# Patient Record
Sex: MALE | Race: BLACK OR AFRICAN AMERICAN | NOT HISPANIC OR LATINO | Employment: OTHER | URBAN - METROPOLITAN AREA
[De-identification: names, ages, dates, MRNs, and addresses within clinical notes are randomized per-mention and may not be internally consistent; named-entity substitution may affect disease eponyms.]

---

## 2017-01-17 ENCOUNTER — ANTI-COAG VISIT (OUTPATIENT)
Dept: CARDIOLOGY | Facility: CLINIC | Age: 82
End: 2017-01-17
Payer: MEDICARE

## 2017-01-17 DIAGNOSIS — Z79.01 LONG TERM (CURRENT) USE OF ANTICOAGULANTS: Primary | ICD-10-CM

## 2017-01-17 DIAGNOSIS — Z86.711 HISTORY OF PULMONARY EMBOLISM: ICD-10-CM

## 2017-01-17 LAB
CTP QC/QA: NORMAL
INR PPP: 2.1 (ref 2–3)

## 2017-01-17 PROCEDURE — 85610 PROTHROMBIN TIME: CPT | Mod: QW,S$GLB,,

## 2017-01-17 PROCEDURE — 99211 OFF/OP EST MAY X REQ PHY/QHP: CPT | Mod: 25,S$GLB,,

## 2017-01-17 RX ORDER — WARFARIN SODIUM 5 MG/1
TABLET ORAL
Qty: 30 TABLET | Refills: 3 | Status: SHIPPED | OUTPATIENT
Start: 2017-01-17 | End: 2017-05-29 | Stop reason: SDUPTHER

## 2017-01-17 RX ORDER — WARFARIN 4 MG/1
TABLET ORAL
Qty: 30 TABLET | Refills: 3 | Status: SHIPPED | OUTPATIENT
Start: 2017-01-17 | End: 2017-06-28 | Stop reason: SDUPTHER

## 2017-01-17 NOTE — MR AVS SNAPSHOT
[facility]  [address] [facility]  [place] [address]  Phone: [phone]  Fax: [phone]                  Amauri Lara   2017 8:15 AM   Anti-coag visit    Description:  Male : 1929   Provider:  Anamaria Santos PharmD   Department:  [facility]           Diagnoses this Visit        Comments    Long term (current) use of anticoagulants    -  Primary     History of pulmonary embolism                To Do List           Future Appointments        Provider Department Dept Phone    2017 8:15 AM Anamaria Santos, PharmD [facility] [phone]    2017 8:40 AM LABORATORY, [facility] Laboratory [phone]    2017 1:40 PM Stefan Vera MD [facility] Internal Medicine [phone]    2017 9:00 AM NANNETTE Townsend OD [facility] [phone]      Goals (5 Years of Data)     None      [facility]     [facility] On Call Nurse [facility] -  Assistance  Registered nurses in the [facility] On Call Center provide clinical advisement, health education, appointment booking, and other advisory services.  Call for this free service at [phone].             Medications           Message regarding Medications     Verify the changes and/or additions to your medication regime listed below are the same as discussed with your clinician today.  If any of these changes or additions are incorrect, please notify your healthcare provider.             Verify that the below list of medications is an accurate representation of the medications you are currently taking.  If none reported, the list may be blank. If incorrect, please contact your healthcare provider. Carry this list with you in case of emergency.           Current Medications     allopurinol (ZYLOPRIM) 300 MG tablet TAKE ONE TABLET BY MOUTH EVERY DAY    glipiZIDE (GLUCOTROL) 10 MG TR24     hydrocodone-acetaminophen 7.5-325mg (NORCO) 7.5-325 mg per tablet Take 1 tablet by mouth  every 6 (six) hours as needed for Pain.    lisinopril-hydrochlorothiazide (PRINZIDE,ZESTORETIC) 20-12.5 mg per tablet TAKE 1 TABLET BY MOUTH 2 TIMES DAILY    lovastatin (MEVACOR) 40 MG tablet TAKE ONE TABLET BY MOUTH EVERY EVENING    metformin (GLUCOPHAGE) 850 MG tablet ONE BY MOUTH DAILY    metoprolol tartrate (LOPRESSOR) 25 MG tablet     PROAIR HFA 90 mcg/actuation inhaler     SSD 1 % cream     warfarin (COUMADIN) 4 MG tablet Take one-half tablet (2mg) by mouth every evening along with 5mg tablet as directed by the [facility] Clinic.    warfarin (COUMADIN) 5 MG tablet Take 1 tablet by mouth every evening along with 2mg (one-half of 4mg) as directed by the [facility]madin Clinic.           Clinical Reference Information           Allergies as of 1/17/2017     No Known Allergies      Immunizations Administered on Date of Encounter - 1/17/2017     None      Orders Placed During Today's Visit      Normal Orders This Visit    POCT PT/INR          1/17/2017  8:21 AM - Yanet KoD      Component Results     Component Value Flag Ref Range Units Status    INR 2.1  2.0 - 3.0  Final     Acceptable           December 2016 Details    Sun Mon Tue Wed Thu Fri Sat         1               2               3                 4               5               6               7               8               9               10                 11               12               13   2.5   7 mg   See details      14      7 mg         15      7 mg         16      7 mg         17      7 mg           18      7 mg         19      7 mg         20      7 mg         21      7 mg         22      7 mg         23      7 mg         24      7 mg           25      7 mg         26      7 mg         27      7 mg         28      7 mg         29      7 mg         30      7 mg         31      7 mg          Date Details   12/13 Last INR check   INR: 2.5                 January 2017 Details    Sun Mon Tue Wed Thu Fri Sat     1      7 mg          2      7 mg         3      7 mg         4      7 mg         5      7 mg         6      7 mg         7      7 mg           8      7 mg         9      7 mg         10      7 mg         11      7 mg         12      7 mg         13      7 mg         14      7 mg           15      7 mg         16      7 mg         17   2.1   7 mg   See details      18      7 mg         19      7 mg         20      7 mg         21      7 mg           22      7 mg         23      7 mg         24      7 mg         25      7 mg         26      7 mg         27      7 mg         28      7 mg           29      7 mg         30      7 mg         31      7 mg              Date Details   01/17 This INR check   INR: 2.1                     How to take your warfarin dose     To take:  7 mg Take 1 of the 5 mg tablets and 0.5 of a 4 mg tablet.           February 2017 Details    Sun Mon Tue Wed Thu Fri Sat        1      7 mg         2      7 mg         3      7 mg         4      7 mg           5      7 mg         6      7 mg         7      7 mg         8      7 mg         9      7 mg         10      7 mg         11      7 mg           12      7 mg         13      7 mg         14      7 mg         15      7 mg         16      7 mg         17      7 mg         18      7 mg           19      7 mg         20      7 mg         21            22               23               24               25                 26               27               28                    Date Details   No additional details    Date of next INR:  2/21/2017         How to take your warfarin dose     To take:  7 mg Take 1 of the 5 mg tablets and 0.5 of a 4 mg tablet.           Anticoagulation Summary as of 1/17/2017     Maintenance plan 7 mg (5 mg x 1 and 4 mg x 0.5) every day    Full instructions 7 mg every day    Next INR check 2/21/2017      Anticoagulation Episode Summary     Comments       [facility] Sign-Up     Activating your [facility] account is as easy as 1-2-3!     1)  Visit [email or web address], select Sign Up Now, enter this activation code and your date of birth, then select Next.  [email or web address]  Expires: 2/13/2017  2:06 PM      2) Create a username and password to use when you visit [facility] in the future and select a security question in case you lose your password and select Next.    3) Enter your e-mail address and click Sign Up!    Additional Information  If you have questions, please e-mail [email or web address] or call [phone] to talk to our [facility] staff. Remember, [facility] is NOT to be used for urgent needs. For medical emergencies, dial 911.

## 2017-01-17 NOTE — PROGRESS NOTES
INR is therapeutic. Patient recently received flu vaccine but no other changes and does not appear to affect INR. Continue dose and diet until follow-up.

## 2017-01-27 ENCOUNTER — HOSPITAL ENCOUNTER (OUTPATIENT)
Facility: HOSPITAL | Age: 82
Discharge: HOME | End: 2017-01-28
Attending: SPECIALIST | Admitting: INTERNAL MEDICINE
Payer: MEDICARE

## 2017-01-27 DIAGNOSIS — R10.9 RIGHT SIDED ABDOMINAL PAIN: ICD-10-CM

## 2017-01-27 DIAGNOSIS — R06.02 SHORTNESS OF BREATH: ICD-10-CM

## 2017-01-27 DIAGNOSIS — R33.9 URINARY RETENTION: Primary | ICD-10-CM

## 2017-01-27 DIAGNOSIS — R79.89 ELEVATED TROPONIN: ICD-10-CM

## 2017-01-27 LAB
ALBUMIN SERPL BCP-MCNC: 3.6 G/DL
ALLENS TEST: ABNORMAL
ALP SERPL-CCNC: 68 U/L
ALT SERPL W/O P-5'-P-CCNC: 10 U/L
ANION GAP SERPL CALC-SCNC: 11 MMOL/L
APTT BLDCRRT: 43.2 SEC
AST SERPL-CCNC: 18 U/L
BACTERIA #/AREA URNS HPF: ABNORMAL /HPF
BASOPHILS # BLD AUTO: 0.01 K/UL
BASOPHILS NFR BLD: 0.1 %
BILIRUB SERPL-MCNC: 1.1 MG/DL
BILIRUB UR QL STRIP: NEGATIVE
BNP SERPL-MCNC: 70 PG/ML
BUN SERPL-MCNC: 16 MG/DL
CALCIUM SERPL-MCNC: 9.7 MG/DL
CHLORIDE SERPL-SCNC: 103 MMOL/L
CK SERPL-CCNC: 104 U/L
CLARITY UR: CLEAR
CO2 SERPL-SCNC: 25 MMOL/L
COLOR UR: YELLOW
CREAT SERPL-MCNC: 1.2 MG/DL
D DIMER PPP IA.FEU-MCNC: 1.3 MG/L FEU
DELSYS: ABNORMAL
DIFFERENTIAL METHOD: ABNORMAL
EOSINOPHIL # BLD AUTO: 0.1 K/UL
EOSINOPHIL NFR BLD: 0.8 %
ERYTHROCYTE [DISTWIDTH] IN BLOOD BY AUTOMATED COUNT: 16.7 %
EST. GFR  (AFRICAN AMERICAN): >60 ML/MIN/1.73 M^2
EST. GFR  (NON AFRICAN AMERICAN): 54 ML/MIN/1.73 M^2
FIO2: 21
GLUCOSE SERPL-MCNC: 100 MG/DL
GLUCOSE UR QL STRIP: NEGATIVE
HCO3 UR-SCNC: 26.9 MMOL/L (ref 24–28)
HCT VFR BLD AUTO: 48.1 %
HGB BLD-MCNC: 16.2 G/DL
HGB UR QL STRIP: ABNORMAL
INR PPP: 2.8
KETONES UR QL STRIP: NEGATIVE
LACTATE SERPL-SCNC: 1.4 MMOL/L
LEUKOCYTE ESTERASE UR QL STRIP: NEGATIVE
LYMPHOCYTES # BLD AUTO: 1.4 K/UL
LYMPHOCYTES NFR BLD: 12.9 %
MAGNESIUM SERPL-MCNC: 1.9 MG/DL
MCH RBC QN AUTO: 28.9 PG
MCHC RBC AUTO-ENTMCNC: 33.7 %
MCV RBC AUTO: 86 FL
MICROSCOPIC COMMENT: ABNORMAL
MODE: ABNORMAL
MONOCYTES # BLD AUTO: 1.6 K/UL
MONOCYTES NFR BLD: 14.6 %
NEUTROPHILS # BLD AUTO: 7.6 K/UL
NEUTROPHILS NFR BLD: 71.9 %
NITRITE UR QL STRIP: POSITIVE
PCO2 BLDA: 41.1 MMHG (ref 35–45)
PH SMN: 7.42 [PH] (ref 7.35–7.45)
PH UR STRIP: 6 [PH] (ref 5–8)
PLATELET # BLD AUTO: 179 K/UL
PMV BLD AUTO: 10.9 FL
PO2 BLDA: 68 MMHG (ref 80–100)
POC BE: 2 MMOL/L
POC SATURATED O2: 94 % (ref 95–100)
POCT GLUCOSE: 112 MG/DL (ref 70–110)
POTASSIUM SERPL-SCNC: 4.1 MMOL/L
PROT SERPL-MCNC: 7.8 G/DL
PROT UR QL STRIP: NEGATIVE
PROTHROMBIN TIME: 28.1 SEC
RBC # BLD AUTO: 5.61 M/UL
RBC #/AREA URNS HPF: 2 /HPF (ref 0–4)
SAMPLE: ABNORMAL
SITE: ABNORMAL
SODIUM SERPL-SCNC: 139 MMOL/L
SP GR UR STRIP: 1.01 (ref 1–1.03)
TROPONIN I SERPL DL<=0.01 NG/ML-MCNC: 0.03 NG/ML
TROPONIN I SERPL DL<=0.01 NG/ML-MCNC: <0.006 NG/ML
URN SPEC COLLECT METH UR: ABNORMAL
UROBILINOGEN UR STRIP-ACNC: 1 EU/DL
WBC # BLD AUTO: 10.65 K/UL
WBC #/AREA URNS HPF: 3 /HPF (ref 0–5)
WBC CLUMPS URNS QL MICRO: ABNORMAL

## 2017-01-27 PROCEDURE — 25000242 PHARM REV CODE 250 ALT 637 W/ HCPCS: Performed by: NURSE PRACTITIONER

## 2017-01-27 PROCEDURE — G0378 HOSPITAL OBSERVATION PER HR: HCPCS

## 2017-01-27 PROCEDURE — 81000 URINALYSIS NONAUTO W/SCOPE: CPT

## 2017-01-27 PROCEDURE — 85730 THROMBOPLASTIN TIME PARTIAL: CPT

## 2017-01-27 PROCEDURE — 36600 WITHDRAWAL OF ARTERIAL BLOOD: CPT

## 2017-01-27 PROCEDURE — 99900035 HC TECH TIME PER 15 MIN (STAT)

## 2017-01-27 PROCEDURE — 63600175 PHARM REV CODE 636 W HCPCS: Performed by: NURSE PRACTITIONER

## 2017-01-27 PROCEDURE — 93010 ELECTROCARDIOGRAM REPORT: CPT | Mod: ,,, | Performed by: INTERNAL MEDICINE

## 2017-01-27 PROCEDURE — 94640 AIRWAY INHALATION TREATMENT: CPT

## 2017-01-27 PROCEDURE — 85025 COMPLETE CBC W/AUTO DIFF WBC: CPT

## 2017-01-27 PROCEDURE — 82550 ASSAY OF CK (CPK): CPT

## 2017-01-27 PROCEDURE — 96376 TX/PRO/DX INJ SAME DRUG ADON: CPT

## 2017-01-27 PROCEDURE — 83880 ASSAY OF NATRIURETIC PEPTIDE: CPT

## 2017-01-27 PROCEDURE — 63600175 PHARM REV CODE 636 W HCPCS: Performed by: SPECIALIST

## 2017-01-27 PROCEDURE — 82962 GLUCOSE BLOOD TEST: CPT

## 2017-01-27 PROCEDURE — 99285 EMERGENCY DEPT VISIT HI MDM: CPT | Mod: 25

## 2017-01-27 PROCEDURE — 83036 HEMOGLOBIN GLYCOSYLATED A1C: CPT

## 2017-01-27 PROCEDURE — 85379 FIBRIN DEGRADATION QUANT: CPT

## 2017-01-27 PROCEDURE — 83735 ASSAY OF MAGNESIUM: CPT

## 2017-01-27 PROCEDURE — 83605 ASSAY OF LACTIC ACID: CPT

## 2017-01-27 PROCEDURE — 85610 PROTHROMBIN TIME: CPT

## 2017-01-27 PROCEDURE — 93005 ELECTROCARDIOGRAM TRACING: CPT

## 2017-01-27 PROCEDURE — 87040 BLOOD CULTURE FOR BACTERIA: CPT | Mod: 59

## 2017-01-27 PROCEDURE — 84484 ASSAY OF TROPONIN QUANT: CPT

## 2017-01-27 PROCEDURE — 80053 COMPREHEN METABOLIC PANEL: CPT

## 2017-01-27 PROCEDURE — 25000242 PHARM REV CODE 250 ALT 637 W/ HCPCS: Performed by: SPECIALIST

## 2017-01-27 PROCEDURE — 51702 INSERT TEMP BLADDER CATH: CPT

## 2017-01-27 PROCEDURE — 25000003 PHARM REV CODE 250: Performed by: SPECIALIST

## 2017-01-27 PROCEDURE — 96365 THER/PROPH/DIAG IV INF INIT: CPT

## 2017-01-27 PROCEDURE — 87086 URINE CULTURE/COLONY COUNT: CPT

## 2017-01-27 PROCEDURE — 96375 TX/PRO/DX INJ NEW DRUG ADDON: CPT

## 2017-01-27 PROCEDURE — 82803 BLOOD GASES ANY COMBINATION: CPT

## 2017-01-27 RX ORDER — HYDROCODONE BITARTRATE AND ACETAMINOPHEN 10; 325 MG/1; MG/1
1 TABLET ORAL EVERY 4 HOURS PRN
Status: DISCONTINUED | OUTPATIENT
Start: 2017-01-27 | End: 2017-01-28 | Stop reason: HOSPADM

## 2017-01-27 RX ORDER — HYDROCODONE BITARTRATE AND ACETAMINOPHEN 5; 325 MG/1; MG/1
1 TABLET ORAL EVERY 4 HOURS PRN
Status: DISCONTINUED | OUTPATIENT
Start: 2017-01-27 | End: 2017-01-28 | Stop reason: HOSPADM

## 2017-01-27 RX ORDER — IPRATROPIUM BROMIDE AND ALBUTEROL SULFATE 2.5; .5 MG/3ML; MG/3ML
3 SOLUTION RESPIRATORY (INHALATION) ONCE
Status: COMPLETED | OUTPATIENT
Start: 2017-01-27 | End: 2017-01-27

## 2017-01-27 RX ORDER — IBUPROFEN 200 MG
24 TABLET ORAL
Status: DISCONTINUED | OUTPATIENT
Start: 2017-01-27 | End: 2017-01-28 | Stop reason: HOSPADM

## 2017-01-27 RX ORDER — GLUCAGON 1 MG
1 KIT INJECTION
Status: DISCONTINUED | OUTPATIENT
Start: 2017-01-27 | End: 2017-01-28 | Stop reason: HOSPADM

## 2017-01-27 RX ORDER — IPRATROPIUM BROMIDE AND ALBUTEROL SULFATE 2.5; .5 MG/3ML; MG/3ML
3 SOLUTION RESPIRATORY (INHALATION) EVERY 6 HOURS PRN
Status: DISCONTINUED | OUTPATIENT
Start: 2017-01-27 | End: 2017-01-28 | Stop reason: HOSPADM

## 2017-01-27 RX ORDER — METOPROLOL TARTRATE 25 MG/1
25 TABLET, FILM COATED ORAL 2 TIMES DAILY
Status: DISCONTINUED | OUTPATIENT
Start: 2017-01-27 | End: 2017-01-28 | Stop reason: HOSPADM

## 2017-01-27 RX ORDER — ASPIRIN 325 MG
325 TABLET ORAL DAILY
Status: DISCONTINUED | OUTPATIENT
Start: 2017-01-28 | End: 2017-01-28 | Stop reason: HOSPADM

## 2017-01-27 RX ORDER — FUROSEMIDE 10 MG/ML
40 INJECTION INTRAMUSCULAR; INTRAVENOUS
Status: COMPLETED | OUTPATIENT
Start: 2017-01-27 | End: 2017-01-27

## 2017-01-27 RX ORDER — FAMOTIDINE 10 MG/ML
20 INJECTION INTRAVENOUS EVERY 12 HOURS
Status: DISCONTINUED | OUTPATIENT
Start: 2017-01-27 | End: 2017-01-28 | Stop reason: HOSPADM

## 2017-01-27 RX ORDER — INSULIN ASPART 100 [IU]/ML
0-5 INJECTION, SOLUTION INTRAVENOUS; SUBCUTANEOUS
Status: DISCONTINUED | OUTPATIENT
Start: 2017-01-27 | End: 2017-01-28 | Stop reason: HOSPADM

## 2017-01-27 RX ORDER — LOVASTATIN 20 MG/1
40 TABLET ORAL NIGHTLY
Status: DISCONTINUED | OUTPATIENT
Start: 2017-01-27 | End: 2017-01-28 | Stop reason: HOSPADM

## 2017-01-27 RX ORDER — IBUPROFEN 200 MG
16 TABLET ORAL
Status: DISCONTINUED | OUTPATIENT
Start: 2017-01-27 | End: 2017-01-28 | Stop reason: HOSPADM

## 2017-01-27 RX ADMIN — HYDROCODONE BITARTRATE AND ACETAMINOPHEN 1 TABLET: 5; 325 TABLET ORAL at 10:01

## 2017-01-27 RX ADMIN — FUROSEMIDE 40 MG: 10 INJECTION, SOLUTION INTRAVENOUS at 06:01

## 2017-01-27 RX ADMIN — FAMOTIDINE 20 MG: 10 INJECTION, SOLUTION INTRAVENOUS at 10:01

## 2017-01-27 RX ADMIN — METOPROLOL TARTRATE 25 MG: 25 TABLET ORAL at 10:01

## 2017-01-27 RX ADMIN — FUROSEMIDE 40 MG: 10 INJECTION, SOLUTION INTRAVENOUS at 07:01

## 2017-01-27 RX ADMIN — CEFTRIAXONE 1 G: 1 INJECTION, SOLUTION INTRAVENOUS at 10:01

## 2017-01-27 RX ADMIN — IPRATROPIUM BROMIDE AND ALBUTEROL SULFATE 3 ML: .5; 3 SOLUTION RESPIRATORY (INHALATION) at 06:01

## 2017-01-27 RX ADMIN — IPRATROPIUM BROMIDE AND ALBUTEROL SULFATE 3 ML: .5; 3 SOLUTION RESPIRATORY (INHALATION) at 09:01

## 2017-01-27 NOTE — IP AVS SNAPSHOT
[address] [place] [address]           Patient Discharge Instructions     Our goal is to set you up for success. This packet includes information on your condition, medications, and your home care. It will help you to care for yourself so you don't get sicker and need to go back to the hospital.     Please ask your nurse if you have any questions.        There are many details to remember when preparing to leave the hospital. Here is what you will need to do:    1. Take your medicine. If you are prescribed medications, review your Medication List in the following pages. You may have new medications to  at the pharmacy and others that you'll need to stop taking. Review the instructions for how and when to take your medications. Talk with your doctor or nurses if you are unsure of what to do.     2. Go to your follow-up appointments. Specific follow-up information is listed in the following pages. Your may be contacted by a transition nurse or clinical provider about future appointments. Be sure we have all of the phone numbers to reach you, if needed. Please contact your provider's office if you are unable to make an appointment.     3. Watch for warning signs. Your doctor or nurse will give you detailed warning signs to watch for and when to call for assistance. These instructions may also include educational information about your condition. If you experience any of warning signs to your health, call your doctor.               ** Verify the list of medication(s) below is accurate and up to date. Carry this with you in case of emergency. If your medications have changed, please notify your healthcare provider.             Medication List      START taking these medications        Additional Info                      ciprofloxacin HCl 500 MG tablet   Commonly known as:  CIPRO   Quantity:  10 tablet   Refills:  0   Dose:  500 mg    Instructions:  Take 1 tablet  (500 mg total) by mouth 2 (two) times daily.     Begin Date    AM    Noon    PM    Bedtime         CONTINUE taking these medications        Additional Info                      allopurinol 300 MG tablet   Commonly known as:  ZYLOPRIM   Quantity:  30 tablet   Refills:  11    Instructions:  TAKE ONE TABLET BY MOUTH EVERY DAY     Begin Date    AM    Noon    PM    Bedtime       glipiZIDE 10 MG Tr24   Commonly known as:  GLUCOTROL   Refills:  0      Begin Date    AM    Noon    PM    Bedtime       hydrocodone-acetaminophen 7.5-325mg 7.5-325 mg per tablet   Commonly known as:  NORCO   Quantity:  90 tablet   Refills:  0   Dose:  1 tablet    Instructions:  Take 1 tablet by mouth every 6 (six) hours as needed for Pain.     Begin Date    AM    Noon    PM    Bedtime       lisinopril-hydrochlorothiazide 20-12.5 mg per tablet   Commonly known as:  PRINZIDE,ZESTORETIC   Quantity:  60 tablet   Refills:  11    Instructions:  TAKE 1 TABLET BY MOUTH 2 TIMES DAILY     Begin Date    AM    Noon    PM    Bedtime       lovastatin 40 MG tablet   Commonly known as:  MEVACOR   Quantity:  30 tablet   Refills:  11   Comments:  This prescription was filled today(11/28/2016). Any refills authorized will be placed on file.    Last time this was given:  40 mg on 1/28/2017 12:19 AM   Instructions:  TAKE ONE TABLET BY MOUTH EVERY EVENING     Begin Date    AM    Noon    PM    Bedtime       metformin 850 MG tablet   Commonly known as:  GLUCOPHAGE   Quantity:  30 tablet   Refills:  11   Comments:  This prescription was filled today. Any refills authorized will be placed on file.    Instructions:  ONE BY MOUTH DAILY     Begin Date    AM    Noon    PM    Bedtime       metoprolol tartrate 25 MG tablet   Commonly known as:  LOPRESSOR   Refills:  0    Last time this was given:  25 mg on 1/28/2017  8:03 AM     Begin Date    AM    Noon    PM    Bedtime       PROAIR HFA 90 mcg/actuation inhaler   Refills:  0   Generic drug:  albuterol      Begin Date    AM     Noon    PM    Bedtime       SSD 1 % cream   Refills:  0   Generic drug:  silver sulfADIAZINE 1%      Begin Date    AM    Noon    PM    Bedtime       * warfarin 4 MG tablet   Commonly known as:  COUMADIN   Quantity:  30 tablet   Refills:  3    Instructions:  Take one-half tablet (2mg) by mouth every evening along with 5mg tablet as directed by the [facility]madin Clinic.     Begin Date    AM    Noon    PM    Bedtime       * warfarin 5 MG tablet   Commonly known as:  COUMADIN   Quantity:  30 tablet   Refills:  3    Instructions:  Take 1 tablet by mouth every evening along with 2mg (one-half of 4mg) as directed by the [facility]madin Clinic.     Begin Date    AM    Noon    PM    Bedtime       * Notice:  This list has 2 medication(s) that are the same as other medications prescribed for you. Read the directions carefully, and ask your doctor or other care provider to review them with you.         Where to Get Your Medications      You can get these medications from any pharmacy     Bring a paper prescription for each of these medications     ciprofloxacin HCl 500 MG tablet                  Please bring to all follow up appointments:    1. A copy of your discharge instructions.  2. All medicines you are currently taking in their original bottles.  3. Identification and insurance card.    Please arrive 15 minutes ahead of scheduled appointment time.    Please call 24 hours in advance if you must reschedule your appointment and/or time.        Your Scheduled Appointments     Feb 21, 2017  8:15 AM CST   Coumadin with Yanet KoD   [facility]    [address] [facility] [address] [phone]            Jun 23, 2017  8:40 AM CDT   Fasting Lab with LABORATORY, [facility] ([place])    [address] [facility] [address] [phone]            Jun 30, 2017  1:40 PM CDT   Established Patient Visit with Stefan Vera MD   [place] [facility] ([place]     [address] [facility] [id number] [phone]            Jul 21, 2017  9:00 AM CDT   Established Patient Visit with NANNETTE Townsend OD   [facility] ([facility]    [address]  [facility] [address] [phone]              Follow-up Information     Follow up with Stefan Vera MD In 3 days.    Specialty:  Internal Medicine    Why:  hospital follow up     Contact information:    [address] [facility]  [address]  [facility] [address] [phone]          Follow up with Michael Westbrook IV, MD In 1 week.    Specialty:  Urology    Why:  follow up for urinary retention with indwelling catheter placed    Contact information:    [address]  [facility] [address] [phone]        Referrals     Future Orders    Ambulatory Referral to Urology         Discharge Instructions     Future Orders    Activity as tolerated     Call MD for:  difficulty breathing or increased cough     Call MD for:  increased confusion or weakness     Call MD for:  persistent dizziness, light-headedness, or visual disturbances     Call MD for:  persistent nausea and vomiting or diarrhea     Call MD for:  severe uncontrolled pain     Call MD for:  temperature >100.4     Diet general     Questions:    Total calories:      Fat restriction, if any:      Protein restriction, if any:      Na restriction, if any:  2gNa    Fluid restriction:      Additional restrictions:  Diabetic 1800      Discharge References/Attachments     TYPE 2 DIABETES, MANAGING (ENGLISH)    CATHETER-ASSOCIATED URINARY TRACT INFECTIONS (ENGLISH)    BONILLA CATHETER, CARE (ENGLISH)    INDWELLING URINARY CATHETER, DISCHARGE INSTRUCTIONS (ENGLISH)    LEG BAG, DISCHARGE INSTRUCTIONS (ENGLISH)        Primary Diagnosis     Your primary diagnosis was:  Retention Of Urine      Admission Information     Date & Time Provider Department CSN    1/27/2017  3:59 PM Michael Wright MD [facility] [id number]      Care Providers      "Provider Role Specialty Primary office phone    Michael Wright MD Attending Provider Internal Medicine [phone]    Michael Wright MD Consulting Physician  Internal Medicine [phone]      Your Vitals Were     BP Pulse Temp Resp Height Weight    116/65 57 98.6 °F (37 °C) (Oral) 20 5' 11.5" (1.816 m) 106.9 kg (235 lb 10.8 oz)    SpO2 BMI             98% 32.41 kg/m2         Recent Lab Values        6/11/2013 12/11/2013 6/11/2014 12/16/2014 6/15/2015 12/15/2015 6/15/2016 12/13/2016      8:20 AM  5:17 PM  8:31 AM  8:54 AM  8:35 AM  8:15 AM  8:00 AM  8:29 AM    A1C 6.2 6.4 (H) 6.1 6.0 6.2 6.0 6.1 5.9    Comment for A1C at  8:29 AM on 12/13/2016:  According to ADA guidelines, hemoglobin A1C <7.0% represents  optimal control in non-pregnant diabetic patients.  Different  metrics may apply to specific populations.   Standards of Medical Care in Diabetes - 2016.  For the purpose of screening for the presence of diabetes:  <5.7%     Consistent with the absence of diabetes  5.7-6.4%  Consistent with increasing risk for diabetes   (prediabetes)  >or=6.5%  Consistent with diabetes  Currently no consensus exists for use of hemoglobin A1C  for diagnosis of diabetes for children.        Pending Labs     Order Current Status    Hemoglobin A1c In process    Urine culture In process    Blood culture Preliminary result    Blood culture Preliminary result      Allergies as of 1/28/2017     No Known Allergies      [facility]     [facility] Nurse Care Line - 24/7 Assistance  Unless otherwise directed by your provider, please contact [facility], our nurse care line that is available for 24/7 assistance.     Registered nurses in the [facility] Center provide clinical advisement, health education, appointment booking, and other advisory services.  Call for this free service at [phone].        Advance Directives     An advance directive is a document which, in the event you are no longer able to make " decisions for yourself, tells your healthcare team what kind of treatment you do or do not want to receive, or who you would like to make those decisions for you.  If you do not currently have an advance directive, [facility] encourages you to create one.  For more information call:  [phone]), [phone] ([phone]),  or log on to [email or web address].        Language Assistance Services     ATTENTION: Language assistance services are available, free of charge. Please call [phone].      ATENCIÓN: Si habla español, tiene a majano disposición servicios gratuitos de asistencia lingüística. Llame al [phone].     CHÚ Ý: N?u b?n nói Ti?ng Vi?t, có các d?ch v? h? tr? ngôn ng? mi?n phí dành cho b?n. G?i s? [phone].        Coumadin Discharge Instructions                         Diabetes Discharge Instructions                                   [facility] Sign-Up     Activating your [facility] account is as easy as 1-2-3!     1) Visit [email or web address], select Sign Up Now, enter this activation code and your date of birth, then select Next.  [email or web address]  Expires: 2/13/2017  2:06 PM      2) Create a username and password to use when you visit [facility] in the future and select a security question in case you lose your password and select Next.    3) Enter your e-mail address and click Sign Up!    Additional Information  If you have questions, please e-mail [email or web address] or call [phone] to talk to our [facility] staff. Remember, [facility] is NOT to be used for urgent needs. For medical emergencies, dial 911.          [facility] complies with applicable Federal civil rights laws and does not discriminate on the basis of race, color, national origin, age, disability, or sex.

## 2017-01-27 NOTE — ED PROVIDER NOTES
SCRIBE #1 NOTE: I, Dandy Henderson, am scribing for, and in the presence of, Lupe Lima MD. I have scribed the entire note.      History      Chief Complaint   Patient presents with    Shortness of Breath     with cough and pain to right chest wall. states feels the same as when dx with pna before       Review of patient's allergies indicates:  No Known Allergies     HPI   HPI    1/27/2017, 4:14 PM   History obtained from the patient      History of Present Illness: Amauri Lara is a 87 y.o. male patient who presents to the Emergency Department for SOB which onset gradually last night. Symptoms are constant and moderate in severity.  No mitigating or exacerbating factors reported. No associated sxs. Patient denies any fever, leg pain/swelling, CP, cough, n/v, or dizziness.    Pt also have complaints of flank pain suddenly last night. Pain is located to the right flank and radiates to the RLQ. No mitigating or exacerbating factors reported. No associated sxs.  Pt denies chills, dysuria, hematuria, urinary frequency/urgency, n/v/d, and all other symptoms at this time. No further complaints or concerns at this time.      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   HENT: Negative for sore throat.    Respiratory: Positive for shortness of breath. Negative for cough.    Cardiovascular: Negative for chest pain and leg swelling.   Gastrointestinal: Negative for abdominal pain, blood in stool, constipation, diarrhea, nausea and vomiting.   Genitourinary: Positive for flank pain. Negative for dysuria, frequency, hematuria and urgency.   Musculoskeletal: Negative for back pain.        (-) calf pain   Skin: Negative for rash.   Neurological: Negative for dizziness, weakness, light-headedness, numbness and headaches.   Hematological: Does not bruise/bleed easily.       Physical Exam    Initial Vitals   BP Pulse Resp Temp SpO2   01/27/17 1558 01/27/17 1558 01/27/17 1558 -- 01/27/17 1558   116/68 78 20  94 %      Physical Exam  Nursing Notes and Vital Signs Reviewed.  Constitutional: Patient is in no acute distress. Awake and alert. Well-developed and well-nourished.  Head: Atraumatic. Normocephalic.  Eyes: PERRL. EOM intact. Conjunctivae are not pale. No scleral icterus.  ENT: Mucous membranes are moist. Oropharynx is clear and symmetric.    Neck: Supple. Full ROM. No lymphadenopathy.  Cardiovascular: Regular rate. Regular rhythm. No murmurs, rubs, or gallops. Distal pulses are 2+ and symmetric.  Pulmonary/Chest: No respiratory distress. Clear to auscultation bilaterally. No wheezing, rales, or rhonchi.  Abdominal: Soft and non-distended.  RUQ and RLQ tenderness.  No rebound, guarding, or rigidity.  Good bowel sounds.    Genitourinary: Right CVA.  Musculoskeletal: Moves all extremities. No obvious deformities. No edema. No calf tenderness.  Skin: Warm and dry.  Neurological:  Alert, awake, and appropriate.  Normal speech.  No acute focal neurological deficits are  "appreciated.  Psychiatric: Normal affect. Good eye contact. Appropriate in content.    ED Course    Procedures  ED Vital Signs:  Vitals:    01/27/17 1558 01/27/17 1632 01/27/17 1723 01/27/17 1802   BP: 116/68 114/63 133/70 (!) 105/57   Pulse: 78 76 76 68   Resp: 20 (!) 23 18 (!) 23   TempSrc: Oral      SpO2: (!) 94% 97% 99% 95%   Weight: 108.4 kg (239 lb)      Height: 5' 11.5" (1.816 m)       01/27/17 1838 01/27/17 1937   BP:     Pulse: 76 (S) 82   Resp: 18    TempSrc:     SpO2: 95%    Weight:     Height:         Abnormal Lab Results:  Labs Reviewed   CBC W/ AUTO DIFFERENTIAL - Abnormal; Notable for the following:        Result Value    RDW 16.7 (*)     Mono # 1.6 (*)     Lymph% 12.9 (*)     All other components within normal limits   COMPREHENSIVE METABOLIC PANEL - Abnormal; Notable for the following:     Total Bilirubin 1.1 (*)     eGFR if non  54 (*)     All other components within normal limits   TROPONIN I - Abnormal; Notable for the following:     Troponin I 0.027 (*)     All other components within normal limits   URINALYSIS - Abnormal; Notable for the following:     Occult Blood UA Trace (*)     Nitrite, UA Positive (*)     All other components within normal limits   PROTIME-INR - Abnormal; Notable for the following:     Prothrombin Time 28.1 (*)     INR 2.8 (*)     All other components within normal limits   APTT - Abnormal; Notable for the following:     aPTT 43.2 (*)     All other components within normal limits   URINALYSIS MICROSCOPIC - Abnormal; Notable for the following:     Bacteria, UA Moderate (*)     All other components within normal limits   ISTAT PROCEDURE - Abnormal; Notable for the following:     POC PO2 68 (*)     POC SATURATED O2 94 (*)     All other components within normal limits   MAGNESIUM   CK   LACTIC ACID, PLASMA   B-TYPE NATRIURETIC PEPTIDE   D DIMER, QUANTITATIVE   HEMOGLOBIN A1C        All Lab Results:  Results for orders placed or performed during the hospital " encounter of 01/27/17   CBC auto differential   Result Value Ref Range    WBC 10.65 3.90 - 12.70 K/uL    RBC 5.61 4.60 - 6.20 M/uL    Hemoglobin 16.2 14.0 - 18.0 g/dL    Hematocrit 48.1 40.0 - 54.0 %    MCV 86 82 - 98 fL    MCH 28.9 27.0 - 31.0 pg    MCHC 33.7 32.0 - 36.0 %    RDW 16.7 (H) 11.5 - 14.5 %    Platelets 179 150 - 350 K/uL    MPV 10.9 9.2 - 12.9 fL    Gran # 7.6 1.8 - 7.7 K/uL    Lymph # 1.4 1.0 - 4.8 K/uL    Mono # 1.6 (H) 0.3 - 1.0 K/uL    Eos # 0.1 0.0 - 0.5 K/uL    Baso # 0.01 0.00 - 0.20 K/uL    Gran% 71.9 38.0 - 73.0 %    Lymph% 12.9 (L) 18.0 - 48.0 %    Mono% 14.6 4.0 - 15.0 %    Eosinophil% 0.8 0.0 - 8.0 %    Basophil% 0.1 0.0 - 1.9 %    Differential Method Automated    Comprehensive metabolic panel   Result Value Ref Range    Sodium 139 136 - 145 mmol/L    Potassium 4.1 3.5 - 5.1 mmol/L    Chloride 103 95 - 110 mmol/L    CO2 25 23 - 29 mmol/L    Glucose 100 70 - 110 mg/dL    BUN, Bld 16 8 - 23 mg/dL    Creatinine 1.2 0.5 - 1.4 mg/dL    Calcium 9.7 8.7 - 10.5 mg/dL    Total Protein 7.8 6.0 - 8.4 g/dL    Albumin 3.6 3.5 - 5.2 g/dL    Total Bilirubin 1.1 (H) 0.1 - 1.0 mg/dL    Alkaline Phosphatase 68 55 - 135 U/L    AST 18 10 - 40 U/L    ALT 10 10 - 44 U/L    Anion Gap 11 8 - 16 mmol/L    eGFR if African American >60 >60 mL/min/1.73 m^2    eGFR if non African American 54 (A) >60 mL/min/1.73 m^2   Magnesium   Result Value Ref Range    Magnesium 1.9 1.6 - 2.6 mg/dL   CK   Result Value Ref Range     20 - 200 U/L   Troponin I   Result Value Ref Range    Troponin I 0.027 (H) 0.000 - 0.026 ng/mL   Urinalysis   Result Value Ref Range    Specimen UA Urine, Clean Catch     Color, UA Yellow Yellow, Straw, Jennifer    Appearance, UA Clear Clear    pH, UA 6.0 5.0 - 8.0    Specific Gravity, UA 1.015 1.005 - 1.030    Protein, UA Negative Negative    Glucose, UA Negative Negative    Ketones, UA Negative Negative    Bilirubin (UA) Negative Negative    Occult Blood UA Trace (A) Negative    Nitrite, UA Positive  (A) Negative    Urobilinogen, UA 1.0 <2.0 EU/dL    Leukocytes, UA Negative Negative   Protime-INR   Result Value Ref Range    Prothrombin Time 28.1 (H) 9.0 - 12.5 sec    INR 2.8 (H) 0.8 - 1.2   APTT   Result Value Ref Range    aPTT 43.2 (H) 21.0 - 32.0 sec   Lactic acid, plasma   Result Value Ref Range    Lactate (Lactic Acid) 1.4 0.5 - 2.2 mmol/L   Brain natriuretic peptide   Result Value Ref Range    BNP 70 0 - 99 pg/mL   Urinalysis Microscopic   Result Value Ref Range    RBC, UA 2 0 - 4 /hpf    WBC, UA 3 0 - 5 /hpf    WBC Clumps, UA None None-Rare    Bacteria, UA Moderate (A) None-Occ /hpf    Microscopic Comment SEE COMMENT    ISTAT PROCEDURE   Result Value Ref Range    POC PH 7.423 7.35 - 7.45    POC PCO2 41.1 35 - 45 mmHg    POC PO2 68 (L) 80 - 100 mmHg    POC HCO3 26.9 24 - 28 mmol/L    POC BE 2 -2 to 2 mmol/L    POC SATURATED O2 94 (L) 95 - 100 %    Sample ARTERIAL     Site RR     Allens Test Pass     DelSys Room Air     Mode SPONT     FiO2 21          Imaging Results:  Imaging Results         CT Renal Stone Study ABD Pelvis WO (Final result) Result time:  01/27/17 17:42:32    Final result by Shelia Jenkins Jr., MD (01/27/17 17:42:32)    Impression:             1.  Markedly distended urinary bladder above the level of the umbilicus likely due to prostatic hypertrophy that's protruding into the base of the bladder.  Correlate with patient's symptoms..  2.  Diverticulosis  3.  Cholelithiasis.    All CT scan at this facility use dose modulation, iterative reconstruction, and/or weight base dosing when appropriate to reduce radiation dose to as low as reasonably achievable.      Electronically signed by: SHELIA JENKINS  Date:     01/27/17  Time:    17:42     Narrative:    Exam: CT abdomen and pelvis without intravenous contrast.    Technique: Axial CT images performed through the abdomen and pelvis without intravenous contrast. Multiplanar reformats were performed and interpreted.    Comparison:  None    History:    Right flank pain radiating to the right lower quadrant..    Findings:         Lung bases are clear.  Liver and spleen are unremarkable.  Heart is mildly enlarged.      No renal stones or obstructive uropathy.  2.6 cm lower pole right renal cyst.  There is a midpole 3.5 cm left renal cyst.      Adrenal glands and pancreas are within normal limits.  Layering small stones within an otherwise normal gallbladder.    There is a markedly distended urinary bladder extending above the umbilicus.  Patient's prostate gland is also markedly enlarged and protruding into the base of the bladder. Transverse diameter of the prostate gland is 7 cm.    Appendix is normal.  No bowel obstruction or mesenteric inflammation.  Diffuse diverticulosis of the colon with no surrounding inflammation.  No free air or free fluid.  Abdominal aorta is normal in size and moderately calcified.  Degenerative change of the spine.            X-Ray Chest 1 View (Final result) Result time:  01/27/17 16:33:58    Final result by BG Moreno Sr., MD (01/27/17 16:33:58)    Impression:        1.  There has been interval development of a mild amount of interstitial and alveolar opacities seen in both lungs with curly B-lines visualized bilaterally.  This is characteristic of pulmonary edema.  2.  There are mild osteoarthritic changes in the acromioclavicular joints bilaterally.  3.  The size of the heart is a prominent.  This may be secondary to magnification.      Electronically signed by: BG MORENO MD  Date:     01/27/17  Time:    16:33     Narrative:    One-view chest x-ray    Clinical History: Shortness of breath    Finding: Comparison was made to a prior examination performed on 5/4/2013.  The size of the heart is a prominent.  There has been interval development of a mild amount of interstitial and alveolar opacities seen in both lungs with curly B-lines visualized bilaterally.  There is no pneumothorax.  The costophrenic angles  are sharp.  There are mild osteoarthritic changes in the acromioclavicular joints bilaterally.                    The EKG was ordered, reviewed, and independently interpreted by the ED provider.  Interpretation time: 16:07  Rate: 77 BPM  Rhythm: normal sinus rhythm  Interpretation: Left axis deviation. No STEMI.        The Emergency Provider reviewed the vital signs and test results, which are outlined above.    ED Discussion     6:15 PM: Dr. Lima discussed the pt's case with Alessandra Cassidy NP ([facility]) who states that she will call back.     6:49 PM: Discussed case with Alessandra Cassidy NP ([facility]). Alessandra agrees with current care and management of pt and accepts admission.   Admitting Service: Hospital medicine   Admitting Physician: Dr. Wright  Admit to: Observation.     Wife's phone number: [phone]    ED Medication(s):  Medications   furosemide injection 40 mg (40 mg Intravenous Given 1/27/17 1838)   albuterol-ipratropium 2.5mg-0.5mg/3mL nebulizer solution 3 mL (3 mLs Nebulization Given 1/27/17 1838)   furosemide injection 40 mg (40 mg Intravenous Given 1/27/17 1939)         Medical Decision Making              Scribe Attestation:   Scribe #1: I performed the above scribed service and the documentation accurately describes the services I performed. I attest to the accuracy of the note.    Attending:   Physician Attestation Statement for Scribe #1: I, Lupe Lima MD, personally performed the services described in this documentation, as scribed by Dandy Henderson, in my presence, and it is both accurate and complete.          Clinical Impression       ICD-10-CM ICD-9-CM   1. Urinary retention R33.9 788.20   2. Right sided abdominal pain R10.9 789.09   3. Elevated troponin R79.89 790.6   4. Shortness of breath R06.02 786.05       Disposition:   Disposition: Placed in Observation  Condition: Fair         Lupe Lima MD  01/27/17 2001

## 2017-01-28 VITALS
HEIGHT: 72 IN | BODY MASS INDEX: 31.92 KG/M2 | HEART RATE: 57 BPM | RESPIRATION RATE: 20 BRPM | DIASTOLIC BLOOD PRESSURE: 65 MMHG | OXYGEN SATURATION: 98 % | SYSTOLIC BLOOD PRESSURE: 116 MMHG | TEMPERATURE: 99 F | WEIGHT: 235.69 LBS

## 2017-01-28 LAB
ALBUMIN SERPL BCP-MCNC: 3.3 G/DL
ALP SERPL-CCNC: 74 U/L
ALT SERPL W/O P-5'-P-CCNC: 12 U/L
ANION GAP SERPL CALC-SCNC: 12 MMOL/L
AST SERPL-CCNC: 16 U/L
BASOPHILS # BLD AUTO: 0.02 K/UL
BASOPHILS NFR BLD: 0.2 %
BILIRUB SERPL-MCNC: 0.7 MG/DL
BUN SERPL-MCNC: 19 MG/DL
CALCIUM SERPL-MCNC: 9.6 MG/DL
CHLORIDE SERPL-SCNC: 104 MMOL/L
CO2 SERPL-SCNC: 24 MMOL/L
CREAT SERPL-MCNC: 1.3 MG/DL
DIASTOLIC DYSFUNCTION: NO
DIFFERENTIAL METHOD: ABNORMAL
EOSINOPHIL # BLD AUTO: 0.1 K/UL
EOSINOPHIL NFR BLD: 0.9 %
ERYTHROCYTE [DISTWIDTH] IN BLOOD BY AUTOMATED COUNT: 16.9 %
EST. GFR  (AFRICAN AMERICAN): 57 ML/MIN/1.73 M^2
EST. GFR  (NON AFRICAN AMERICAN): 49 ML/MIN/1.73 M^2
ESTIMATED PA SYSTOLIC PRESSURE: 7.29
GLUCOSE SERPL-MCNC: 119 MG/DL
HCT VFR BLD AUTO: 47.9 %
HGB BLD-MCNC: 15.9 G/DL
INR PPP: 2.5
LYMPHOCYTES # BLD AUTO: 1.1 K/UL
LYMPHOCYTES NFR BLD: 11.5 %
MCH RBC QN AUTO: 28.3 PG
MCHC RBC AUTO-ENTMCNC: 33.2 %
MCV RBC AUTO: 85 FL
MONOCYTES # BLD AUTO: 1.5 K/UL
MONOCYTES NFR BLD: 15.4 %
NEUTROPHILS # BLD AUTO: 6.9 K/UL
NEUTROPHILS NFR BLD: 72 %
PLATELET # BLD AUTO: 181 K/UL
PMV BLD AUTO: 11.3 FL
POCT GLUCOSE: 88 MG/DL (ref 70–110)
POCT GLUCOSE: 90 MG/DL (ref 70–110)
POTASSIUM SERPL-SCNC: 3.6 MMOL/L
PROT SERPL-MCNC: 7.5 G/DL
PROTHROMBIN TIME: 24.9 SEC
RBC # BLD AUTO: 5.61 M/UL
RETIRED EF AND QEF - SEE NOTES: 60 (ref 55–65)
SODIUM SERPL-SCNC: 140 MMOL/L
TROPONIN I SERPL DL<=0.01 NG/ML-MCNC: 0.01 NG/ML
TROPONIN I SERPL DL<=0.01 NG/ML-MCNC: 0.02 NG/ML
WBC # BLD AUTO: 9.63 K/UL

## 2017-01-28 PROCEDURE — G0378 HOSPITAL OBSERVATION PER HR: HCPCS

## 2017-01-28 PROCEDURE — 99900035 HC TECH TIME PER 15 MIN (STAT)

## 2017-01-28 PROCEDURE — 25000003 PHARM REV CODE 250: Performed by: SPECIALIST

## 2017-01-28 PROCEDURE — 84484 ASSAY OF TROPONIN QUANT: CPT

## 2017-01-28 PROCEDURE — 63600175 PHARM REV CODE 636 W HCPCS: Performed by: NURSE PRACTITIONER

## 2017-01-28 PROCEDURE — 85025 COMPLETE CBC W/AUTO DIFF WBC: CPT

## 2017-01-28 PROCEDURE — 93306 TTE W/DOPPLER COMPLETE: CPT | Mod: 26,,, | Performed by: INTERNAL MEDICINE

## 2017-01-28 PROCEDURE — 80053 COMPREHEN METABOLIC PANEL: CPT

## 2017-01-28 PROCEDURE — 85610 PROTHROMBIN TIME: CPT

## 2017-01-28 PROCEDURE — C8929 TTE W OR WO FOL WCON,DOPPLER: HCPCS

## 2017-01-28 PROCEDURE — 36415 COLL VENOUS BLD VENIPUNCTURE: CPT

## 2017-01-28 PROCEDURE — 25500020 PHARM REV CODE 255: Performed by: INTERNAL MEDICINE

## 2017-01-28 PROCEDURE — 27000221 HC OXYGEN, UP TO 24 HOURS

## 2017-01-28 RX ORDER — CIPROFLOXACIN 500 MG/1
500 TABLET ORAL 2 TIMES DAILY
Qty: 10 TABLET | Refills: 0 | Status: SHIPPED | OUTPATIENT
Start: 2017-01-28 | End: 2017-02-02

## 2017-01-28 RX ADMIN — METOPROLOL TARTRATE 25 MG: 25 TABLET ORAL at 08:01

## 2017-01-28 RX ADMIN — LOVASTATIN 40 MG: 20 TABLET ORAL at 12:01

## 2017-01-28 RX ADMIN — IOHEXOL 100 ML: 350 INJECTION, SOLUTION INTRAVENOUS at 11:01

## 2017-01-28 RX ADMIN — FAMOTIDINE 20 MG: 10 INJECTION, SOLUTION INTRAVENOUS at 08:01

## 2017-01-28 RX ADMIN — ASPIRIN 325 MG ORAL TABLET 325 MG: 325 PILL ORAL at 08:01

## 2017-01-28 RX ADMIN — CEFTRIAXONE 1 G: 1 INJECTION, SOLUTION INTRAVENOUS at 09:01

## 2017-01-28 NOTE — PLAN OF CARE
Problem: Patient Care Overview  Goal: Plan of Care Review  Outcome: Ongoing (interventions implemented as appropriate)  2l nc, respirations even and unlabored, no distress noted on admit, hard of hearing, pain, and shortness of breath, no nausea, scds, bed alarm, daughter at bedside, monitor

## 2017-01-28 NOTE — PROGRESS NOTES
Clinical Pharmacy Brief Progress Note: Coumadin     Pharmacy has been consulted to dose Coumadin by Eugenia Curiel NP.   INR: 2.5 (Down from 2.8 yesterday)   Goal INR: 2-3   Home regimen is 7 mg daily and is scheduled to begin 01/28/2017 @ 1700.   Per Chart Review, patient has monthly appointments with [facility] clinic and if usually within goal INR range at the 7 mg by mouth daily dose for several months.     1/28/17   Patient/caregivers educated on warfarin indication, side effects, and drug interactions. Discussed importance of medication compliance and INR monitoring and reviewed signs of abnormal bleeding. Patient/caregiver given warfarin educational handout. Patient/caregiver expressed understanding.     Patient is concerned that he is no longer able to drive. For the past 3 months or so patient has been relying on family members to drive him to his INR appointments. Patient asked if there were a home health option for taking his INR readings.     Thank you for allowing us to participate in this patient's care.     Ericka Barger 1/28/2017 1:27 PM         Thank you for allowing us to participate in this patient's care.    Ericka Barger, PharmD 1/28/2017 9:50 AM

## 2017-01-28 NOTE — ED NOTES
Dr. Lima at bedside to review results with pt's family members. Discussed admission & POC. Questions answered. All verbalize understanding. No further complaints or concerns.

## 2017-01-28 NOTE — ED NOTES
Positive response to norco. Marked improvement. Pt states he is feeling much better, NAD noted, RR even & unlabored. No further complaints at this time. VSS. Denies need for assistance. Fall precautions in place. Call light within reach. Family at bedside. Will continue to monitor.

## 2017-01-28 NOTE — ED NOTES
Lab called to notify that they do not have enough blood to complete A1C add-on. Will collect with repeat troponin blood draw.

## 2017-01-28 NOTE — ASSESSMENT & PLAN NOTE
-Pt admitted to Observation Unit  -Cuevas Catheter placed  -pt to follow up Urology  -pt has hx of enlarged prostate and followed by Urologist approximately 6 years ago

## 2017-01-28 NOTE — H&P
[facility]  History & Physical    Patient Name: Amauri Lara  MRN: [id number]  Admission Date: 1/27/2017  Attending Physician:    Primary Care Provider: Stefan Vera MD         Patient information was obtained from patient, relative(s) and ER records.     Subjective:     Principal Problem:Urinary retention    Chief Complaint:  Shortness of breath      HPI: Amauri Lara is a 87 y.o. male patient who presents to the Emergency Department for SOB which onset gradually last night. Symptoms are constant and moderate in severity. No mitigating or exacerbating factors reported. No associated sxs. Patient denies any fever, leg pain/swelling, CP, cough, n/v, or dizziness. Pt also have complaints of flank pain suddenly last night. Pain is located to the right flank and radiates to the RLQ, cough (dry), and swelling ankles. No mitigating or exacerbating factors reported. No associated sxs. Pt denies chills, dysuria, hematuria, urinary frequency/urgency, n/v/d, and all other symptoms at this time. No further complaints or concerns at this time.    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      Current Outpatient Prescriptions on File Prior to Encounter   Medication Sig    allopurinol (ZYLOPRIM) 300 MG tablet TAKE ONE TABLET BY MOUTH EVERY DAY    glipiZIDE (GLUCOTROL) 10 MG TR24      hydrocodone-acetaminophen 7.5-325mg (NORCO) 7.5-325 mg per tablet Take 1 tablet by mouth every 6 (six) hours as needed for Pain.    lisinopril-hydrochlorothiazide (PRINZIDE,ZESTORETIC) 20-12.5 mg per tablet TAKE 1 TABLET BY MOUTH 2 TIMES DAILY    lovastatin (MEVACOR) 40 MG tablet TAKE ONE TABLET BY MOUTH EVERY EVENING    metformin (GLUCOPHAGE) 850 MG tablet ONE BY MOUTH DAILY    metoprolol tartrate (LOPRESSOR) 25 MG tablet     PROAIR HFA 90 mcg/actuation inhaler     warfarin (COUMADIN) 4 MG tablet Take one-half tablet (2mg) by mouth every evening along with 5mg tablet as directed by the Coumadin Clinic.    warfarin (COUMADIN) 5 MG tablet Take 1 tablet by mouth every evening along with 2mg (one-half of 4mg) as directed by the Coumadin Clinic.    SSD 1 % cream      Family History     Problem Relation (Age of Onset)    Diabetes Brother        Social History Main Topics    Smoking status: Never Smoker    Smokeless tobacco: Never Used    Alcohol use No    Drug use: No    Sexual activity: Not on file     Review of Systems   Constitutional: Positive for chills. Negative for activity change, appetite change, fatigue and fever.   HENT: Negative for congestion, nosebleeds, postnasal drip, sinus pressure, sneezing, sore throat and trouble swallowing.    Eyes: Negative for discharge, redness and visual disturbance.   Respiratory: Positive for cough and shortness of breath. Negative for chest tightness and wheezing.    Cardiovascular: Positive for leg swelling. Negative for chest pain and palpitations.   Gastrointestinal: Positive for abdominal distention. Negative for abdominal pain, diarrhea, nausea and vomiting.   Endocrine: Negative.  Negative for cold intolerance and heat intolerance.   Genitourinary: Positive for flank pain. Negative for decreased urine volume, difficulty urinating, dysuria, frequency, penile swelling and urgency.   Musculoskeletal: Negative for arthralgias, back pain and myalgias.   Skin:  Negative.  Negative for color change and wound.   Allergic/Immunologic: Negative.  Negative for environmental allergies, food allergies and immunocompromised state.   Neurological: Negative for dizziness, weakness and headaches.   Hematological: Negative.    Psychiatric/Behavioral: Negative.  Negative for agitation, confusion and sleep disturbance. The patient is not nervous/anxious.      Objective:     Vital Signs (Most Recent):  Pulse: (S) 82 (01/27/17 1937)  Resp: 18 (01/27/17 1838)  BP: (!) 105/57 (01/27/17 1802)  SpO2: 95 % (01/27/17 1838) Vital Signs (24h Range):  Pulse:  [68-82] 82  Resp:  [18-23] 18  SpO2:  [94 %-99 %] 95 %  BP: (105-133)/(57-70) 105/57     Weight: 108.4 kg (239 lb)  Body mass index is 32.87 kg/(m^2).    Physical Exam   Constitutional: He is oriented to person, place, and time. He appears well-developed and well-nourished.   HENT:   Head: Normocephalic.   Nose: Nose normal.   Mouth/Throat: Oropharynx is clear and moist.   Eyes: Conjunctivae and EOM are normal.   Neck: Normal range of motion. JVD present.   Cardiovascular: Normal rate, regular rhythm, normal heart sounds and intact distal pulses.  Exam reveals no friction rub.    No murmur heard.  Pulmonary/Chest: Effort normal and breath sounds normal. No respiratory distress.   Abdominal: He exhibits distension. Bowel sounds are increased. There is tenderness in the right lower quadrant. A hernia is present.   Genitourinary:   Genitourinary Comments: Cuevas catheter present   Musculoskeletal: Normal range of motion.   Trace edema to bilateral ankles   Neurological: He is alert and oriented to person, place, and time.   [place]   Skin: Skin is warm and dry.   Psychiatric: He has a normal mood and affect. His behavior is normal.        Significant Labs:   CBC:   Recent Labs  Lab 01/27/17  1620   WBC 10.65   HGB 16.2   HCT 48.1        CMP:   Recent Labs  Lab 01/27/17  1620      K 4.1      CO2 25      BUN 16   CREATININE  1.2   CALCIUM 9.7   PROT 7.8   ALBUMIN 3.6   BILITOT 1.1*   ALKPHOS 68   AST 18   ALT 10   ANIONGAP 11   EGFRNONAA 54*       Significant Imaging:   Imaging Results         CT Renal Stone Study ABD Pelvis WO (Final result) Result time:  01/27/17 17:42:32    Final result by Shelia Jenkins Jr., MD (01/27/17 17:42:32)    Impression:             1.  Markedly distended urinary bladder above the level of the umbilicus likely due to prostatic hypertrophy that's protruding into the base of the bladder.  Correlate with patient's symptoms..  2.  Diverticulosis  3.  Cholelithiasis.    All CT scan at this facility use dose modulation, iterative reconstruction, and/or weight base dosing when appropriate to reduce radiation dose to as low as reasonably achievable.      Electronically signed by: SHELIA JENKINS  Date:     01/27/17  Time:    17:42     Narrative:    Exam: CT abdomen and pelvis without intravenous contrast.    Technique: Axial CT images performed through the abdomen and pelvis without intravenous contrast. Multiplanar reformats were performed and interpreted.    Comparison: None    History:    Right flank pain radiating to the right lower quadrant..    Findings:         Lung bases are clear.  Liver and spleen are unremarkable.  Heart is mildly enlarged.      No renal stones or obstructive uropathy.  2.6 cm lower pole right renal cyst.  There is a midpole 3.5 cm left renal cyst.      Adrenal glands and pancreas are within normal limits.  Layering small stones within an otherwise normal gallbladder.    There is a markedly distended urinary bladder extending above the umbilicus.  Patient's prostate gland is also markedly enlarged and protruding into the base of the bladder. Transverse diameter of the prostate gland is 7 cm.    Appendix is normal.  No bowel obstruction or mesenteric inflammation.  Diffuse diverticulosis of the colon with no surrounding inflammation.  No free air or free fluid.  Abdominal aorta is  normal in size and moderately calcified.  Degenerative change of the spine.            X-Ray Chest 1 View (Final result) Result time:  01/27/17 16:33:58    Final result by BG Moreno Sr., MD (01/27/17 16:33:58)    Impression:        1.  There has been interval development of a mild amount of interstitial and alveolar opacities seen in both lungs with curly B-lines visualized bilaterally.  This is characteristic of pulmonary edema.  2.  There are mild osteoarthritic changes in the acromioclavicular joints bilaterally.  3.  The size of the heart is a prominent.  This may be secondary to magnification.      Electronically signed by: BG MORENO MD  Date:     01/27/17  Time:    16:33     Narrative:    One-view chest x-ray    Clinical History: Shortness of breath    Finding: Comparison was made to a prior examination performed on 5/4/2013.  The size of the heart is a prominent.  There has been interval development of a mild amount of interstitial and alveolar opacities seen in both lungs with curly B-lines visualized bilaterally.  There is no pneumothorax.  The costophrenic angles are sharp.  There are mild osteoarthritic changes in the acromioclavicular joints bilaterally.            Assessment/Plan:     * Urinary retention  -Pt admitted to Observation Unit  -Cuevas Catheter placed  -pt to follow up Urology  -pt has hx of enlarged prostate and followed by Urologist approximately 6 years ago  -Renal CT markedly distended urinary bladder above the level of the umbilicus likely due to prostatic hypertrophy that's protruding into the base of the bladder.    Obesity, unspecified  -BMI    DM (diabetes mellitus), type 2 with complications  -controlled  -Accuchecks and SSI    Hypertension associated with diabetes  -will continue to monitor  -antihypertensives held due to episode of hypotension    Elevated troponin  -will follow serial troponin     Shortness of breath  -chest xray consistent with pulmonary edema with  prominent hear size  -IV Lasix given in ED  -Duonebs prn  -supplemental oxygen prn   -Echo results pending      VTE Risk Mitigation         Ordered     Medium Risk of VTE  Once      01/27/17 2030     Reason for No Pharmacological VTE Prophylaxis  Once      01/27/17 2030        Eugenia Curiel NP  Department of Hospital Medicine   [facility]

## 2017-01-28 NOTE — ED NOTES
Post void output at this time 1700mL. Intermittently clamping at 1000mL for period of time per MD request.

## 2017-01-28 NOTE — ED NOTES
Bed rails are up and call light is within patient reach. FALL PRECAUTIONS IN PLACE PER PROTOCOL. FALL RISK BRACELET TO PT'S ARM. YELLOW NON SLIP SOCKS TO BILATERAL FEET. INSTRUCTED PT TO CALL FOR ASSISTANCE. PT & FAMILY VERBALIZE UNDERSTANDING. CALL LIGHT WITHIN REACH. WILL CONTINUE TO MONITOR.

## 2017-01-28 NOTE — SUBJECTIVE & OBJECTIVE
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      Current Outpatient Prescriptions on File Prior to Encounter   Medication Sig    allopurinol (ZYLOPRIM) 300 MG tablet TAKE ONE TABLET BY MOUTH EVERY DAY    glipiZIDE (GLUCOTROL) 10 MG TR24     hydrocodone-acetaminophen 7.5-325mg (NORCO) 7.5-325 mg per tablet Take 1 tablet by mouth every 6 (six) hours as needed for Pain.    lisinopril-hydrochlorothiazide (PRINZIDE,ZESTORETIC) 20-12.5 mg per tablet TAKE 1 TABLET BY MOUTH 2 TIMES DAILY    lovastatin (MEVACOR) 40 MG tablet TAKE ONE TABLET BY MOUTH EVERY EVENING    metformin (GLUCOPHAGE) 850 MG tablet ONE BY MOUTH DAILY    metoprolol tartrate (LOPRESSOR) 25 MG tablet     PROAIR HFA 90 mcg/actuation inhaler     warfarin (COUMADIN) 4 MG tablet Take one-half tablet (2mg) by mouth every evening along with 5mg tablet as directed by the [facility]mad[facility] Clinic.    warfarin (COUMADIN) 5 MG tablet Take 1 tablet by mouth every evening along with 2mg (one-half of 4mg) as directed by the Coumadin Clinic.    SSD 1 % cream      Family History     Problem Relation (Age of Onset)    Diabetes Brother        Social History Main Topics    Smoking status: Never Smoker    Smokeless tobacco: Never Used    Alcohol use No    Drug use: No    Sexual activity: Not on file     Review of Systems   Constitutional: Positive for chills. Negative for  activity change, appetite change, fatigue and fever.   HENT: Negative for congestion, nosebleeds, postnasal drip, sinus pressure, sneezing, sore throat and trouble swallowing.    Eyes: Negative for discharge, redness and visual disturbance.   Respiratory: Positive for cough and shortness of breath. Negative for chest tightness and wheezing.    Cardiovascular: Positive for leg swelling. Negative for chest pain and palpitations.   Gastrointestinal: Positive for abdominal distention. Negative for abdominal pain, diarrhea, nausea and vomiting.   Endocrine: Negative.  Negative for cold intolerance and heat intolerance.   Genitourinary: Positive for flank pain. Negative for decreased urine volume, difficulty urinating, dysuria, frequency, penile swelling and urgency.   Musculoskeletal: Negative for arthralgias, back pain and myalgias.   Skin: Negative.  Negative for color change and wound.   Allergic/Immunologic: Negative.  Negative for environmental allergies, food allergies and immunocompromised state.   Neurological: Negative for dizziness, weakness and headaches.   Hematological: Negative.    Psychiatric/Behavioral: Negative.  Negative for agitation, confusion and sleep disturbance. The patient is not nervous/anxious.      Objective:     Vital Signs (Most Recent):  Pulse: (S) 82 (01/27/17 1937)  Resp: 18 (01/27/17 1838)  BP: (!) 105/57 (01/27/17 1802)  SpO2: 95 % (01/27/17 1838) Vital Signs (24h Range):  Pulse:  [68-82] 82  Resp:  [18-23] 18  SpO2:  [94 %-99 %] 95 %  BP: (105-133)/(57-70) 105/57     Weight: 108.4 kg (239 lb)  Body mass index is 32.87 kg/(m^2).    Physical Exam   Constitutional: He is oriented to person, place, and time. He appears well-developed and well-nourished.   HENT:   Head: Normocephalic.   Nose: Nose normal.   Mouth/Throat: Oropharynx is clear and moist.   Eyes: Conjunctivae and EOM are normal.   Neck: Normal range of motion. JVD present.   Cardiovascular: Normal rate, regular rhythm, normal  heart sounds and intact distal pulses.  Exam reveals no friction rub.    No murmur heard.  Pulmonary/Chest: Effort normal and breath sounds normal. No respiratory distress.   Abdominal: He exhibits distension. Bowel sounds are increased. There is tenderness in the right lower quadrant. A hernia is present.   Genitourinary:   Genitourinary Comments: Cuevas catheter present   Musculoskeletal: Normal range of motion.   Trace edema to bilateral ankles   Neurological: He is alert and oriented to person, place, and time.   [place]   Skin: Skin is warm and dry.   Psychiatric: He has a normal mood and affect. His behavior is normal.        Significant Labs:   CBC:   Recent Labs  Lab 01/27/17  1620   WBC 10.65   HGB 16.2   HCT 48.1        CMP:   Recent Labs  Lab 01/27/17  1620      K 4.1      CO2 25      BUN 16   CREATININE 1.2   CALCIUM 9.7   PROT 7.8   ALBUMIN 3.6   BILITOT 1.1*   ALKPHOS 68   AST 18   ALT 10   ANIONGAP 11   EGFRNONAA 54*       Significant Imaging:   Imaging Results         CT Renal Stone Study ABD Pelvis WO (Final result) Result time:  01/27/17 17:42:32    Final result by Shelia Jenkins Jr., MD (01/27/17 17:42:32)    Impression:             1.  Markedly distended urinary bladder above the level of the umbilicus likely due to prostatic hypertrophy that's protruding into the base of the bladder.  Correlate with patient's symptoms..  2.  Diverticulosis  3.  Cholelithiasis.    All CT scan at this facility use dose modulation, iterative reconstruction, and/or weight base dosing when appropriate to reduce radiation dose to as low as reasonably achievable.      Electronically signed by: SHELIA JENKINS  Date:     01/27/17  Time:    17:42     Narrative:    Exam: CT abdomen and pelvis without intravenous contrast.    Technique: Axial CT images performed through the abdomen and pelvis without intravenous contrast. Multiplanar reformats were performed and interpreted.    Comparison:  None    History:    Right flank pain radiating to the right lower quadrant..    Findings:         Lung bases are clear.  Liver and spleen are unremarkable.  Heart is mildly enlarged.      No renal stones or obstructive uropathy.  2.6 cm lower pole right renal cyst.  There is a midpole 3.5 cm left renal cyst.      Adrenal glands and pancreas are within normal limits.  Layering small stones within an otherwise normal gallbladder.    There is a markedly distended urinary bladder extending above the umbilicus.  Patient's prostate gland is also markedly enlarged and protruding into the base of the bladder. Transverse diameter of the prostate gland is 7 cm.    Appendix is normal.  No bowel obstruction or mesenteric inflammation.  Diffuse diverticulosis of the colon with no surrounding inflammation.  No free air or free fluid.  Abdominal aorta is normal in size and moderately calcified.  Degenerative change of the spine.            X-Ray Chest 1 View (Final result) Result time:  01/27/17 16:33:58    Final result by BG Moreno Sr., MD (01/27/17 16:33:58)    Impression:        1.  There has been interval development of a mild amount of interstitial and alveolar opacities seen in both lungs with curly B-lines visualized bilaterally.  This is characteristic of pulmonary edema.  2.  There are mild osteoarthritic changes in the acromioclavicular joints bilaterally.  3.  The size of the heart is a prominent.  This may be secondary to magnification.      Electronically signed by: BG MORENO MD  Date:     01/27/17  Time:    16:33     Narrative:    One-view chest x-ray    Clinical History: Shortness of breath    Finding: Comparison was made to a prior examination performed on 5/4/2013.  The size of the heart is a prominent.  There has been interval development of a mild amount of interstitial and alveolar opacities seen in both lungs with curly B-lines visualized bilaterally.  There is no pneumothorax.  The costophrenic angles  are sharp.  There are mild osteoarthritic changes in the acromioclavicular joints bilaterally.

## 2017-01-28 NOTE — ED NOTES
Pt c/o increasing SOB while at rest. NAD noted, RR even & unlabored. No wheezing auscultated. Diminished breath sounds throughout chest. Denies CP. No further complaints or concerns. Tiera, respiratory therapist, called for pt evaluation.

## 2017-01-28 NOTE — CONSULTS
Clinical Pharmacy: Coumadin Consult Note     Amauri Lara's Coumadin will be dosed and monitored by Pharmacy at the request of Eugenia Curiel NP.    Indication: history of pulmonary embolism   Target INR: 2-3    INR   Date Value Ref Range Status   01/27/2017 2.8 (H) 0.8 - 1.2 Final     Comment:     Coumadin Therapy:  2.0 - 3.0 for INR for all indicators except mechanical heart valves  and antiphospholipid syndromes which should use 2.5 - 3.5.     01/17/2017 2.1 2.0 - 3.0 Final     Patient will be continued on his home regimen of 7 mg per day.  This has been his regimen for the last several months. His last visit to our Coumadin Clinic was on 1-17-17 and his INR was 2.1 at that time. PT/INR will be monitored daily. Dose adjustments will be made accordingly.      Thank you for allowing us to participate in this patient's care.   Katherine McArdle, Pharm.D. 1/27/2017 9:17 PM

## 2017-01-28 NOTE — ED NOTES
Pt placed on portable cardiac monitor for transport, NAD noted, RR even & unlabored. No questions or concerns in regards to POC or need for admission. VSS. Pt stable for transport at this time.

## 2017-01-28 NOTE — ED NOTES
Dr. Fuller, [facility] medicine, paged at this time. Awaiting call back to notify of d dimer result.

## 2017-01-28 NOTE — DISCHARGE SUMMARY
[facility] Hospital Medicine  Discharge Summary      Patient Name: Amauri Lara  MRN: [id number]  Admission Date: 1/27/2017  Hospital Length of Stay: 0 days  Discharge Date and Time: 1/28/2017  3:05 PM  Attending Physician: Dr. Michael Wright   Discharging Provider: Eugenia Curiel NP  Primary Care Provider: Stefan Vera MD      HPI:   Amauri aLra is a 87 y.o. male patient who presents to the Emergency Department for SOB which onset gradually last night. Symptoms are constant and moderate in severity. No mitigating or exacerbating factors reported. No associated sxs. Patient denies any fever, leg pain/swelling, CP, cough, n/v, or dizziness. Pt also have complaints of flank pain suddenly last night. Pain is located to the right flank and radiates to the RLQ, cough (dry), and swelling ankles. No mitigating or exacerbating factors reported. No associated sxs. Pt denies chills, dysuria, hematuria, urinary frequency/urgency, n/v/d, and all other symptoms at this time. No further complaints or concerns at this time.    * No surgery found *      Indwelling Lines/Drains at time of discharge:   Lines/Drains/Airways     Drain                 Urethral Catheter 01/27/17 1826 16 Fr. less than 1 day              Hospital Course:   Pt admitted for Observation for Urinary retention.  Chest xray showed pulmonary edema.  Renal CT showed markedly distended urinary bladder above the level of the umbilicus likely due to prostatic hypertrophy that's protruding into the base of the bladder.  Riggs catheter placed and IV Lasix given. UTI treated with IV Rocephin.  Echo results showed EF 60% with no diastolic dysfunction.  Pt verbalized significant improvement of symptoms and moderate amount of UOP noted.  D-dimer elevated and CTA of chest negative.  Pt seen and examined on the date of discharge and deemed suitable to discharge to home accompanied by daughters with riggs catheter in place. Current medications resumed  with Cipro prescribed.  Pt instructed to follow up with PCP and with Dr. Westbrook (Urology) with ambulatory referral placed.      Consults:     Significant Diagnostic Studies:   Imaging Results         CTA Chest Non Coronary (Final result) Result time:  01/28/17 12:26:49    Final result by Shelia Rust MD (01/28/17 12:26:49)    Impression:      Negative for pulmonary embolus.    Shallow inspiration with some dependent lung base atelectasis.  Other incidentals as noted above.    All CT scans at this facility use dose modulation, iterative reconstruction, and/or weight based dosing when appropriate to reduce radiation dose to as low as reasonably achievable.      Electronically signed by: SHELIA RUST MD  Date:     01/28/17  Time:    12:26     Narrative:    EXAM: CTA CHEST NON CORONARY    CLINICAL HISTORY: shortness of breath    TECHNIQUE: CT angiogram performed of the chest following IV contrast administration to evaluate for pulmonary emboli. Multiplanar MIP reformations performed.    COMPARISON STUDIES: Chest x-ray January 28, 2017    FINDINGS:  No evidence of pulmonary emboli.  Mild atherosclerosis aorta without evidence of aneurysm or dissection.    Small amount coronary arterial calcifications in the LAD.    Dependent lung base atelectasis.  No pleural effusions.    Minimal sludge in the gallbladder.    1.2 cm left thyroid lobe nodule.    Diffusely mildly distended thoracic esophagus.            X-Ray Chest 1 View (Final result) Result time:  01/28/17 09:16:59    Final result by Jeny Pedro MD (Timothy) (01/28/17 09:16:59)    Impression:     Comparison 01/27/2017.  Atherosclerosis of the thoracic aorta.  Stable thyromegaly.  There appears to be in mild infiltrate or atelectasis at the lung bases there may be slight worsening on the left this exam.  Followup recommended.      Electronically signed by: JENY PEDRO MD  Date:     01/28/17  Time:    09:16     Narrative:    Chest, 1 view.    Clinical History:  Shortness of breath            CT Renal Stone Study ABD Pelvis WO (Final result) Result time:  01/27/17 17:42:32    Final result by Shelia Jenkins Jr., MD (01/27/17 17:42:32)    Impression:             1.  Markedly distended urinary bladder above the level of the umbilicus likely due to prostatic hypertrophy that's protruding into the base of the bladder.  Correlate with patient's symptoms..  2.  Diverticulosis  3.  Cholelithiasis.    All CT scan at this facility use dose modulation, iterative reconstruction, and/or weight base dosing when appropriate to reduce radiation dose to as low as reasonably achievable.      Electronically signed by: SHELIA JENKINS  Date:     01/27/17  Time:    17:42     Narrative:    Exam: CT abdomen and pelvis without intravenous contrast.    Technique: Axial CT images performed through the abdomen and pelvis without intravenous contrast. Multiplanar reformats were performed and interpreted.    Comparison: None    History:    Right flank pain radiating to the right lower quadrant..    Findings:         Lung bases are clear.  Liver and spleen are unremarkable.  Heart is mildly enlarged.      No renal stones or obstructive uropathy.  2.6 cm lower pole right renal cyst.  There is a midpole 3.5 cm left renal cyst.      Adrenal glands and pancreas are within normal limits.  Layering small stones within an otherwise normal gallbladder.    There is a markedly distended urinary bladder extending above the umbilicus.  Patient's prostate gland is also markedly enlarged and protruding into the base of the bladder. Transverse diameter of the prostate gland is 7 cm.    Appendix is normal.  No bowel obstruction or mesenteric inflammation.  Diffuse diverticulosis of the colon with no surrounding inflammation.  No free air or free fluid.  Abdominal aorta is normal in size and moderately calcified.  Degenerative change of the spine.            X-Ray Chest 1 View (Final result) Result time:  01/27/17  16:33:58    Final result by BG Moreno Sr., MD (01/27/17 16:33:58)    Impression:        1.  There has been interval development of a mild amount of interstitial and alveolar opacities seen in both lungs with curly B-lines visualized bilaterally.  This is characteristic of pulmonary edema.  2.  There are mild osteoarthritic changes in the acromioclavicular joints bilaterally.  3.  The size of the heart is a prominent.  This may be secondary to magnification.      Electronically signed by: BG MORENO MD  Date:     01/27/17  Time:    16:33     Narrative:    One-view chest x-ray    Clinical History: Shortness of breath    Finding: Comparison was made to a prior examination performed on 5/4/2013.  The size of the heart is a prominent.  There has been interval development of a mild amount of interstitial and alveolar opacities seen in both lungs with curly B-lines visualized bilaterally.  There is no pneumothorax.  The costophrenic angles are sharp.  There are mild osteoarthritic changes in the acromioclavicular joints bilaterally.              Pending Diagnostic Studies:     Procedure Component Value Units Date/Time    Hemoglobin A1c [[id number]] Collected:  01/27/17 2145    Order Status:  Sent Lab Status:  In process Updated:  01/28/17 6522    Specimen:  Blood from Blood         Final Active Diagnoses:    Diagnosis Date Noted POA    PRINCIPAL PROBLEM:  Urinary retention [R33.9] 01/27/2017 Yes    Elevated troponin [R79.89] 01/27/2017 Unknown    Shortness of breath [R06.02] 01/27/2017 Unknown    DM (diabetes mellitus), type 2 with complications [E11.8]  Yes    Hypertension associated with diabetes [E11.59, I10]  Yes    Obesity, unspecified [E66.9]  Yes      Problems Resolved During this Admission:    Diagnosis Date Noted Date Resolved POA      No new Assessment & Plan notes have been filed under this hospital service since the last note was generated.  Service: Hospital Medicine      Discharged Condition:  stable    Disposition: Home or Self Care    Follow Up:  Follow-up Information     Follow up with Stefan Vera MD In 3 days.    Specialty:  Internal Medicine    Why:  hospital follow up     Contact information:    [address]  [address]  [place] [address] [phone]          Follow up with Michael Westbrook IV, MD In 1 week.    Specialty:  Urology    Why:  follow up for urinary retention with indwelling catheter placed    Contact information:    [address]  [place] [address]  [phone]          Patient Instructions:     Ambulatory Referral to Urology   Referral Priority: Routine Referral Type: Consultation   Referral Reason: Specialty Services Required    Requested Specialty: Urology    Number of Visits Requested: 1      Diet general   Order Specific Question Answer Comments   Na restriction, if any: 2gNa    Additional restrictions: Diabetic 1800      Activity as tolerated     Call MD for:  temperature >100.4     Call MD for:  increased confusion or weakness     Call MD for:  persistent dizziness, light-headedness, or visual disturbances     Call MD for:  difficulty breathing or increased cough     Call MD for:  severe uncontrolled pain     Call MD for:  persistent nausea and vomiting or diarrhea       Medications:  Reconciled Home Medications:   Discharge Medication List as of 1/28/2017  2:24 PM      START taking these medications    Details   ciprofloxacin HCl (CIPRO) 500 MG tablet Take 1 tablet (500 mg total) by mouth 2 (two) times daily., Starting 1/28/2017, Until u 2/2/17, Print         CONTINUE these medications which have NOT CHANGED    Details   allopurinol (ZYLOPRIM) 300 MG tablet TAKE ONE TABLET BY MOUTH EVERY DAY, Normal      glipiZIDE (GLUCOTROL) 10 MG TR24 Starting 10/1/2014, Until Discontinued, Historical Med      hydrocodone-acetaminophen 7.5-325mg (NORCO) 7.5-325 mg per tablet Take 1 tablet by mouth every 6 (six) hours as needed for Pain., Starting 6/22/2016, Until  Discontinued, Normal      lisinopril-hydrochlorothiazide (PRINZIDE,ZESTORETIC) 20-12.5 mg per tablet TAKE 1 TABLET BY MOUTH 2 TIMES DAILY, Normal      lovastatin (MEVACOR) 40 MG tablet TAKE ONE TABLET BY MOUTH EVERY EVENING, Normal      metformin (GLUCOPHAGE) 850 MG tablet ONE BY MOUTH DAILY, Normal      metoprolol tartrate (LOPRESSOR) 25 MG tablet Starting 11/29/2013, Until Discontinued, Historical Med      PROAIR HFA 90 mcg/actuation inhaler Starting 11/29/2013, Until Discontinued, Historical Med      !! warfarin (COUMADIN) 4 MG tablet Take one-half tablet (2mg) by mouth every evening along with 5mg tablet as directed by the [facility]., Normal      !! warfarin (COUMADIN) 5 MG tablet Take 1 tablet by mouth every evening along with 2mg (one-half of 4mg) as directed by the [facility]., Normal      SSD 1 % cream Starting 11/29/2013, Until Discontinued, Historical Med       !! - Potential duplicate medications found. Please discuss with provider.        Time spent on the discharge of patient: 40 minutes    Eugenia Curiel NP  Department of Hospital Medicine  [facility]

## 2017-01-28 NOTE — PLAN OF CARE
Problem: Patient Care Overview  Goal: Plan of Care Review  Outcome: Ongoing (interventions implemented as appropriate)  Pt on O2 tolerating well. No distress noted at this time.

## 2017-01-28 NOTE — ED NOTES
Pt states rt sided abdominal pain & suprapubic pain are starting to increase. Pt rates pain 6/10. AAOx3, NAD noted RR even & unlabored. No further complaints or concerns. Denies need for assistance. Fall precautions in place. Call light within reach. Will review prn orders & administer pain medication as necessary. Will continue to monitor pt.

## 2017-01-28 NOTE — PROGRESS NOTES
Patient D/C'd home with family via wheelchair in stable condition.  Discharge instructions given.

## 2017-01-28 NOTE — ED NOTES
Pt repositioned to a position of comfort. Pillow placed beneath head, warm blankets draped over pt. No further requests. Denies need for assistance. Call light within reach. Will continue to monitor.

## 2017-01-28 NOTE — ASSESSMENT & PLAN NOTE
-chest xray consistent with pulmonary edema  -IV Lasix given in ED  -BNP 70  -Echo results pending

## 2017-01-28 NOTE — ED NOTES
Dr. Fuller, [facility] medicine, informed of lab value. No new orders at this time.

## 2017-01-29 LAB
BACTERIA UR CULT: NORMAL
BACTERIA UR CULT: NORMAL
ESTIMATED AVG GLUCOSE: 126 MG/DL
HBA1C MFR BLD HPLC: 6 %

## 2017-01-30 ENCOUNTER — TELEPHONE (OUTPATIENT)
Dept: UROLOGY | Facility: CLINIC | Age: 82
End: 2017-01-30

## 2017-01-31 ENCOUNTER — OFFICE VISIT (OUTPATIENT)
Dept: INTERNAL MEDICINE | Facility: CLINIC | Age: 82
End: 2017-01-31
Payer: MEDICARE

## 2017-01-31 ENCOUNTER — HOSPITAL ENCOUNTER (OUTPATIENT)
Dept: RADIOLOGY | Facility: HOSPITAL | Age: 82
Discharge: HOME | End: 2017-01-31
Attending: INTERNAL MEDICINE
Payer: MEDICARE

## 2017-01-31 VITALS
OXYGEN SATURATION: 97 % | WEIGHT: 240.06 LBS | HEART RATE: 79 BPM | BODY MASS INDEX: 32.52 KG/M2 | DIASTOLIC BLOOD PRESSURE: 62 MMHG | TEMPERATURE: 98 F | SYSTOLIC BLOOD PRESSURE: 116 MMHG | HEIGHT: 72 IN

## 2017-01-31 DIAGNOSIS — I15.2 HYPERTENSION ASSOCIATED WITH DIABETES: ICD-10-CM

## 2017-01-31 DIAGNOSIS — E11.42 TYPE 2 DIABETES MELLITUS WITH POLYNEUROPATHY: ICD-10-CM

## 2017-01-31 DIAGNOSIS — E11.59 HYPERTENSION ASSOCIATED WITH DIABETES: ICD-10-CM

## 2017-01-31 DIAGNOSIS — E11.8 TYPE 2 DIABETES MELLITUS WITH COMPLICATION, WITHOUT LONG-TERM CURRENT USE OF INSULIN: Primary | ICD-10-CM

## 2017-01-31 PROCEDURE — 1160F RVW MEDS BY RX/DR IN RCRD: CPT | Mod: S$GLB,,, | Performed by: INTERNAL MEDICINE

## 2017-01-31 PROCEDURE — 1159F MED LIST DOCD IN RCRD: CPT | Mod: S$GLB,,, | Performed by: INTERNAL MEDICINE

## 2017-01-31 PROCEDURE — 71020 XR CHEST PA AND LATERAL: CPT | Mod: TC,PO

## 2017-01-31 PROCEDURE — 99999 PR PBB SHADOW E&M-EST. PATIENT-LVL III: CPT | Mod: PBBFAC,,, | Performed by: INTERNAL MEDICINE

## 2017-01-31 PROCEDURE — 1125F AMNT PAIN NOTED PAIN PRSNT: CPT | Mod: S$GLB,,, | Performed by: INTERNAL MEDICINE

## 2017-01-31 PROCEDURE — 71020 XR CHEST PA AND LATERAL: CPT | Mod: 26,,, | Performed by: RADIOLOGY

## 2017-01-31 PROCEDURE — 99499 UNLISTED E&M SERVICE: CPT | Mod: S$GLB,,, | Performed by: INTERNAL MEDICINE

## 2017-01-31 PROCEDURE — 99213 OFFICE O/P EST LOW 20 MIN: CPT | Mod: S$GLB,,, | Performed by: INTERNAL MEDICINE

## 2017-01-31 PROCEDURE — 1157F ADVNC CARE PLAN IN RCRD: CPT | Mod: S$GLB,,, | Performed by: INTERNAL MEDICINE

## 2017-01-31 NOTE — PROGRESS NOTES
"HPI:  Patient is a 87-year-old man who comes in today for hospital follow-up.  Patient was admitted with complaints of progressive shortness of breath.  Chest x-ray on a portable film suggested pulmonary edema.  He was admitted and given IV diuretics.  She was also found to have urinary obstruction.  He had a Cuevas catheter placed and left in.  He has appointment to see the urologist tomorrow.  Imaging studies showed he had significant hydronephrosis.  Patient had an echocardiogram showed he had normal LV function and no diastolic dysfunction.  This would lead me to question the initial diagnosis of pulmonary edema    Current meds have been verified and updated per the EMR  Exam:  Visit Vitals    /62    Pulse 79    Temp 98.1 °F (36.7 °C) (Tympanic)    Ht 5' 11.5" (1.816 m)    Wt 108.9 kg (240 lb 1.3 oz)    SpO2 97%    BMI 33.02 kg/m2     Chest is clear  Cardiovascular regular rate and rhythm without murmur, gallop or rub    Lab Results   Component Value Date    WBC 9.63 01/28/2017    HGB 15.9 01/28/2017    HCT 47.9 01/28/2017     01/28/2017    CHOL 165 12/13/2016    TRIG 135 12/13/2016    HDL 39 (L) 12/13/2016    ALT 12 01/28/2017    AST 16 01/28/2017     01/28/2017    K 3.6 01/28/2017     01/28/2017    CREATININE 1.3 01/28/2017    BUN 19 01/28/2017    CO2 24 01/28/2017    TSH 2.859 12/13/2016    PSA 14 (H) 08/10/2011    INR 2.5 (H) 01/28/2017    HGBA1C 6.0 01/27/2017       Impression:  Urinary obstruction secondary to BPH  Multiple other medical problems below, stable  Patient Active Problem List   Diagnosis    Obesity, unspecified    Hearing loss of aging    History of pulmonary embolism    DM (diabetes mellitus), type 2 with complications    Hypertension associated with diabetes    History of gout    Type 2 diabetes mellitus with polyneuropathy    Urinary retention    Elevated troponin    Shortness of breath       Plan:  Orders Placed This Encounter    X-Ray Chest PA And " Lateral     He will have a chest x-ray done today, PA and lateral.  He'll see me in a freckle scheduled appointment.  He needs to keep his appointment with the urologist tomorrow.

## 2017-02-01 ENCOUNTER — OFFICE VISIT (OUTPATIENT)
Dept: UROLOGY | Facility: CLINIC | Age: 82
End: 2017-02-01
Payer: MEDICARE

## 2017-02-01 VITALS — WEIGHT: 240 LBS | BODY MASS INDEX: 33.01 KG/M2 | SYSTOLIC BLOOD PRESSURE: 140 MMHG | DIASTOLIC BLOOD PRESSURE: 74 MMHG

## 2017-02-01 DIAGNOSIS — R33.9 URINARY RETENTION: Primary | ICD-10-CM

## 2017-02-01 PROCEDURE — 1159F MED LIST DOCD IN RCRD: CPT | Mod: S$GLB,,, | Performed by: UROLOGY

## 2017-02-01 PROCEDURE — 99999 PR PBB SHADOW E&M-EST. PATIENT-LVL II: CPT | Mod: PBBFAC,,, | Performed by: UROLOGY

## 2017-02-01 PROCEDURE — 1157F ADVNC CARE PLAN IN RCRD: CPT | Mod: S$GLB,,, | Performed by: UROLOGY

## 2017-02-01 PROCEDURE — 99204 OFFICE O/P NEW MOD 45 MIN: CPT | Mod: S$GLB,,, | Performed by: UROLOGY

## 2017-02-01 PROCEDURE — 1126F AMNT PAIN NOTED NONE PRSNT: CPT | Mod: S$GLB,,, | Performed by: UROLOGY

## 2017-02-01 PROCEDURE — 1160F RVW MEDS BY RX/DR IN RCRD: CPT | Mod: S$GLB,,, | Performed by: UROLOGY

## 2017-02-01 RX ORDER — TAMSULOSIN HYDROCHLORIDE 0.4 MG/1
0.4 CAPSULE ORAL DAILY
Qty: 30 CAPSULE | Refills: 11 | Status: SHIPPED | OUTPATIENT
Start: 2017-02-01 | End: 2018-01-15 | Stop reason: SDUPTHER

## 2017-02-01 NOTE — PROGRESS NOTES
Chief Complaint: Retention    HPI:   2/1/17: 86 yo man went to the ER for a few complaints and he was found to be in retention and a riggs was placed; bag filled/emptied 1.5 times shortly thereafter.  Is no no BPH meds.  Had double voiding prior, nocturia x2.  No abd/pelvic pain and no exac/rel factors.  No hematuria.  No urolithiasis.      Allergies:  Review of patient's allergies indicates no known allergies.    Medications:  has a current medication list which includes the following prescription(s): allopurinol, ciprofloxacin hcl, glipizide, hydrocodone-acetaminophen 7.5-325mg, lisinopril-hydrochlorothiazide, lovastatin, metformin, metoprolol tartrate, proair hfa, ssd, warfarin, and warfarin.    Review of Systems:  General: No fever, chills, fatigability, or weight loss.  Skin: No rashes, itching, or changes in color or texture of skin.  Chest: Denies DELGAD[place]O, cyanosis, wheezing, cough, and sputum production.  Abdomen: Appetite fine. No weight loss. Denies diarrhea, abdominal pain, hematemesis, or blood in stool.  Musculoskeletal: No joint stiffness or swelling. Denies back pain.  : As above.  All other review of systems negative.    PMH:   has a past medical history of Aphakia of right eye; Cataract; DM (diabetes mellitus), type 2 with complications; Hearing loss of aging; History of gout; History of pulmonary embolism (2011); Hypertension associated with diabetes; Iridodialysis of right eye; Obesity, unspecified; and Type 2 diabetes mellitus with polyneuropathy.    PSH:   has a past surgical history that includes Cataract extraction.    FamHx: family history includes Diabetes in his brother.    SocHx:  reports that he has never smoked. He has never used smokeless tobacco. He reports that he does not drink alcohol or use illicit drugs.      Physical Exam:  Vitals:    02/01/17 1107   BP: (!) 140/74     General: A&Ox3, no apparent distress, no deformities  Neck: No masses, normal thyroid  Lungs: normal inspiration,  no use of accessory muscles  Heart: normal pulse, no arrhythmias  Abdomen: Soft, NT, ND, no masses, no hernias, no hepatosplenomegaly  Lymphatic: Neck and groin nodes negative  Skin: The skin is warm and dry. No jaundice.  Ext: No c/c/e.  : deferred to cysto    Labs/Studies:   None today    Impression/Plan:   1. Will RTC for cysto/uroflow after a week of flomax.

## 2017-02-01 NOTE — MR AVS SNAPSHOT
[facility]  [address] [facility] [address]  Phone: [phone]  Fax: [phone]                  Amauri Lara   2017 10:20 AM   Office Visit    Description:  Male : 1929   Provider:  Michael Westbrook IV, MD   [facility]           Reason for Visit     Other           Diagnoses this Visit        Comments    Urinary retention    -  Primary            To Do List           Future Appointments        Provider Department Dept Phone    2017 2:40 PM Michael Westbrook IV, MD [facility] Urology [phone]    2017 8:15 AM Anamaria Santos PharmD [facility] Coumadin [phone]    2017 8:40 AM LABORATORY, [address] [facility] [phone]    2017 1:40 PM Stefan Vera MD [facility] - Internal Medicine [phone]    2017 9:00 AM NANNETTE Townsend OD [facility] - Ophthalmology [phone]      Goals (5 Years of Data)     None       These Medications        Disp Refills Start End    tamsulosin (FLOMAX) 0.4 mg Cp24 30 capsule 11 2017    Take 1 capsule (0.4 mg total) by mouth once daily. - Oral    Pharmacy: [facility], [address] #: [phone]         [facility]     [facility] -  Assistance  Registered nurses in the [facility] provide clinical advisement, health education, appointment booking, and other advisory services.  Call for this free service at [phone].             Medications           Message regarding Medications     Verify the changes and/or additions to your medication regime listed below are the same as discussed with your clinician today.  If any of these changes or additions are incorrect, please notify your healthcare provider.        START taking these NEW medications        Refills    tamsulosin (FLOMAX) 0.4 mg Cp24 11    Sig: Take 1 capsule (0.4 mg total) by mouth once daily.    Class:  Normal    Route: Oral           Verify that the below list of medications is an accurate representation of the medications you are currently taking.  If none reported, the list may be blank. If incorrect, please contact your healthcare provider. Carry this list with you in case of emergency.           Current Medications     allopurinol (ZYLOPRIM) 300 MG tablet TAKE ONE TABLET BY MOUTH EVERY DAY    ciprofloxacin HCl (CIPRO) 500 MG tablet Take 1 tablet (500 mg total) by mouth 2 (two) times daily.    glipiZIDE (GLUCOTROL) 10 MG TR24     hydrocodone-acetaminophen 7.5-325mg (NORCO) 7.5-325 mg per tablet Take 1 tablet by mouth every 6 (six) hours as needed for Pain.    lisinopril-hydrochlorothiazide (PRINZIDE,ZESTORETIC) 20-12.5 mg per tablet TAKE 1 TABLET BY MOUTH 2 TIMES DAILY    lovastatin (MEVACOR) 40 MG tablet TAKE ONE TABLET BY MOUTH EVERY EVENING    metformin (GLUCOPHAGE) 850 MG tablet ONE BY MOUTH DAILY    metoprolol tartrate (LOPRESSOR) 25 MG tablet     PROAIR HFA 90 mcg/actuation inhaler     SSD 1 % cream     tamsulosin (FLOMAX) 0.4 mg Cp24 Take 1 capsule (0.4 mg total) by mouth once daily.    warfarin (COUMADIN) 4 MG tablet Take one-half tablet (2mg) by mouth every evening along with 5mg tablet as directed by the [facility].    warfarin (COUMADIN) 5 MG tablet Take 1 tablet by mouth every evening along with 2mg (one-half of 4mg) as directed by the [facility].           Clinical Reference Information           Vital Signs - Last Recorded  Most recent update: 2/1/2017 11:09 AM by Georgina Koch LPN    BP Wt BMI          (!) 140/74 (BP Location: Left arm, Patient Position: Sitting, BP Method: Manual) 108.9 kg (240 lb) 33.01 kg/m2        Blood Pressure          Most Recent Value    BP  (!)  140/74      Allergies as of 2/1/2017     No Known Allergies      Immunizations Administered on Date of Encounter - 2/1/2017     None

## 2017-02-02 LAB
BACTERIA BLD CULT: NORMAL
BACTERIA BLD CULT: NORMAL

## 2017-02-13 ENCOUNTER — OFFICE VISIT (OUTPATIENT)
Dept: UROLOGY | Facility: CLINIC | Age: 82
End: 2017-02-13
Payer: MEDICARE

## 2017-02-13 VITALS — BODY MASS INDEX: 33.01 KG/M2 | WEIGHT: 240 LBS

## 2017-02-13 DIAGNOSIS — R33.9 URINARY RETENTION: Primary | ICD-10-CM

## 2017-02-13 PROCEDURE — 99214 OFFICE O/P EST MOD 30 MIN: CPT | Mod: 25,S$GLB,, | Performed by: UROLOGY

## 2017-02-13 PROCEDURE — 99999 PR PBB SHADOW E&M-EST. PATIENT-LVL III: CPT | Mod: PBBFAC,,, | Performed by: UROLOGY

## 2017-02-13 PROCEDURE — 1159F MED LIST DOCD IN RCRD: CPT | Mod: S$GLB,,, | Performed by: UROLOGY

## 2017-02-13 PROCEDURE — 96372 THER/PROPH/DIAG INJ SC/IM: CPT | Mod: 59,S$GLB,, | Performed by: UROLOGY

## 2017-02-13 PROCEDURE — 52000 CYSTOURETHROSCOPY: CPT | Mod: S$GLB,,, | Performed by: UROLOGY

## 2017-02-13 PROCEDURE — 1160F RVW MEDS BY RX/DR IN RCRD: CPT | Mod: S$GLB,,, | Performed by: UROLOGY

## 2017-02-13 PROCEDURE — 1157F ADVNC CARE PLAN IN RCRD: CPT | Mod: S$GLB,,, | Performed by: UROLOGY

## 2017-02-13 PROCEDURE — 1125F AMNT PAIN NOTED PAIN PRSNT: CPT | Mod: S$GLB,,, | Performed by: UROLOGY

## 2017-02-13 RX ORDER — FINASTERIDE 5 MG/1
5 TABLET, FILM COATED ORAL DAILY
Qty: 30 TABLET | Refills: 11 | Status: SHIPPED | OUTPATIENT
Start: 2017-02-13 | End: 2017-08-14 | Stop reason: SDUPTHER

## 2017-02-13 RX ORDER — CEFTRIAXONE 1 G/1
1 INJECTION, POWDER, FOR SOLUTION INTRAMUSCULAR; INTRAVENOUS
Status: COMPLETED | OUTPATIENT
Start: 2017-02-13 | End: 2017-02-13

## 2017-02-13 RX ADMIN — CEFTRIAXONE 1 G: 1 INJECTION, POWDER, FOR SOLUTION INTRAMUSCULAR; INTRAVENOUS at 03:02

## 2017-02-13 NOTE — MR AVS SNAPSHOT
[facility]  [address] [facility] [address]  Phone: [phone]  Fax: [phone]                  Amauri Lara   2017 2:40 PM   Office Visit    Description:  Male : 1929   Provider:  Michael Westbrook IV, MD   Department:  [facility]           Diagnoses this Visit        Comments    Urinary retention    -  Primary            To Do List           Future Appointments        Provider Department Dept Phone    2017 8:15 AM Anamaria Santos, PharmD [facility] Coumadin [phone]    3/14/2017 10:40 AM [facility] [phone]    2017 8:40 AM LABORATORY, [address] [facility] [phone]    2017 1:40 PM Stefan Vera MD [facility] Internal Medicine [phone]    2017 9:00 AM NANNETTE Townsend, [facility] Ophthalmology [phone]      Goals (5 Years of Data)     None      Follow-Up and Disposition     Return in about 6 months (around 2017).    Follow-up and Disposition History       These Medications        Disp Refills Start End    finasteride (PROSCAR) 5 mg tablet 30 tablet 11 2017    Take 1 tablet (5 mg total) by mouth once daily. - Oral    Pharmacy: [facility], [address] Ph #: [phone]         [facility]     [facility] Nurse Care Line -  Assistance  Registered nurses in the [facility] provide clinical advisement, health education, appointment booking, and other advisory services.  Call for this free service at [phone].             Medications           Message regarding Medications     Verify the changes and/or additions to your medication regime listed below are the same as discussed with your clinician today.  If any of these changes or additions are incorrect, please notify your healthcare provider.        START taking these NEW medications        Refills    finasteride (PROSCAR) 5 mg  tablet 11    Sig: Take 1 tablet (5 mg total) by mouth once daily.    Class: Normal    Route: Oral      These medications were administered today        Dose Freq    cefTRIAXone injection 1 g 1 g Clinic/HOD 1 time    Sig: Inject 1 g into the muscle one time.    Class: Normal    Route: Intramuscular           Verify that the below list of medications is an accurate representation of the medications you are currently taking.  If none reported, the list may be blank. If incorrect, please contact your healthcare provider. Carry this list with you in case of emergency.           Current Medications     allopurinol (ZYLOPRIM) 300 MG tablet TAKE ONE TABLET BY MOUTH EVERY DAY    glipiZIDE (GLUCOTROL) 10 MG TR24     hydrocodone-acetaminophen 7.5-325mg (NORCO) 7.5-325 mg per tablet Take 1 tablet by mouth every 6 (six) hours as needed for Pain.    lisinopril-hydrochlorothiazide (PRINZIDE,ZESTORETIC) 20-12.5 mg per tablet TAKE 1 TABLET BY MOUTH 2 TIMES DAILY    lovastatin (MEVACOR) 40 MG tablet TAKE ONE TABLET BY MOUTH EVERY EVENING    metformin (GLUCOPHAGE) 850 MG tablet ONE BY MOUTH DAILY    metoprolol tartrate (LOPRESSOR) 25 MG tablet     PROAIR HFA 90 mcg/actuation inhaler     SSD 1 % cream     tamsulosin (FLOMAX) 0.4 mg Cp24 Take 1 capsule (0.4 mg total) by mouth once daily.    warfarin (COUMADIN) 4 MG tablet Take one-half tablet (2mg) by mouth every evening along with 5mg tablet as directed by the Coumadin Clinic.    warfarin (COUMADIN) 5 MG tablet Take 1 tablet by mouth every evening along with 2mg (one-half of 4mg) as directed by the Coumadin Clinic.    finasteride (PROSCAR) 5 mg tablet Take 1 tablet (5 mg total) by mouth once daily.           Clinical Reference Information           Your Vitals Were     Weight BMI             108.9 kg (240 lb) 33.01 kg/m2         Allergies as of 2/13/2017     No Known Allergies      Immunizations Administered on Date of Encounter - 2/13/2017     None      Administrations This Visit      cefTRIAXone injection 1 g     Admin Date Action Dose Route Administered By             02/13/2017 Given 1 g Intramuscular Savannah Koch LPN                      Language Assistance Services     ATTENTION: Language assistance services are available, free of charge. Please call [phone].      ATENCIÓN: Si habla español, tiene a majano disposición servicios gratuitos de asistencia lingüística. Llame al [phone].     CHÚ Ý: N?u b?n nói Ti?ng Vi?t, có các d?ch v? h? tr? ngôn ng? mi?n phí dành cho b?n. G?i s? [phone].         [facility] complies with applicable Federal civil rights laws and does not discriminate on the basis of race, color, national origin, age, disability, or sex.

## 2017-02-13 NOTE — PROGRESS NOTES
Chief Complaint: Retention    HPI:   2/13/17: Cysto today shows severe BPH with trilobar hypertrophy.    2/1/17: 86 yo man went to the ER for a few complaints and he was found to be in retention and a riggs was placed; bag filled/emptied 1.5 times shortly thereafter.  Is no no BPH meds.  Had double voiding prior, nocturia x2.  No abd/pelvic pain and no exac/rel factors.  No hematuria.  No urolithiasis.      Allergies:  Review of patient's allergies indicates no known allergies.    Medications:  has a current medication list which includes the following prescription(s): allopurinol, glipizide, hydrocodone-acetaminophen 7.5-325mg, lisinopril-hydrochlorothiazide, lovastatin, metformin, metoprolol tartrate, proair hfa, ssd, tamsulosin, warfarin, and warfarin.    Review of Systems:  General: No fever, chills, fatigability, or weight loss.  Skin: No rashes, itching, or changes in color or texture of skin.  Chest: Denies DELGADILLO, cyanosis, wheezing, cough, and sputum production.  Abdomen: Appetite fine. No weight loss. Denies diarrhea, abdominal pain, hematemesis, or blood in stool.  Musculoskeletal: No joint stiffness or swelling. Denies back pain.  : As above.  All other review of systems negative.    PMH:   has a past medical history of Aphakia of right eye; Cataract; DM (diabetes mellitus), type 2 with complications; Hearing loss of aging; History of gout; History of pulmonary embolism (2011); Hypertension associated with diabetes; Iridodialysis of right eye; Obesity, unspecified; and Type 2 diabetes mellitus with polyneuropathy.    PSH:   has a past surgical history that includes Cataract extraction.    FamHx: family history includes Diabetes in his brother.    SocHx:  reports that he has never smoked. He has never used smokeless tobacco. He reports that he does not drink alcohol or use illicit drugs.      Physical Exam:  There were no vitals filed for this visit.  General: A&Ox3, no apparent distress, no  deformities  Neck: No masses, normal thyroid  Lungs: normal inspiration, no use of accessory muscles  Heart: normal pulse, no arrhythmias  Abdomen: Soft, NT, ND, no masses, no hernias, no hepatosplenomegaly  Lymphatic: Neck and groin nodes negative  Skin: The skin is warm and dry. No jaundice.  Ext: No c/c/e.  : ALVA normal, penis/test normal.    Labs/Studies:     Procedure: Diagnostic Cystoscopy    Procedure in Detail: After proper consents were obtained, the patient was prepped and draped in normal sterile fashion for diagnostic cystoscopy. 5 ml of lidocaine jelly was instilled in the urethra. The flexible cystoscope was then introduced into the urethra, and advanced into the bladder under direct vision. The urethral mucosa appeared normal, and no strictures were noted. The sphincter appeared to be normal, and the veru montanum was unremarkable. The prostatic mucosa and the lateral lobes of the prostate were opposed with trilobar hypertrophy. The bladder neck was normal. Inspection of the interior of the bladder was then carried out. The trigone was unremarkable, with no mucosal lesions. The ureteral orifices were normal in position and configuration. Systematic inspection of the mucosa of the bladder it was then carried out, rotating the cystoscope so that all areas of the left and right lateral walls, the dome of the bladder, and the posterior wall were all visualized. The cystoscope was then advanced further into the bladder, and maximum deflection of the scope was performed so that the bladder neck could be inspected. No mucosal lesions were noted there. The cystoscope was then removed, and the procedure terminated.     Findings: severe BPH with trilobar hypertrophy    ABx: Rocephin 1 gm IM    Impression/Plan:   1. Pt is not very hardy and I worry about his surgical risk.  Tolerated catheter well.  Will start finasteride and change riggs monthly x6 mo; will do a voiding trial after meds have had a chance to  work.  E&M for considerable time spent with discussion of situation and plans with pt and family.

## 2017-02-13 NOTE — PROGRESS NOTES
...After obtaining consent, and per orders of Dr. Westbrook, injection of Rocephin 1 g given in right ventrogluteal.  Patient instructed to remain in clinic for 20 minutes afterwards, and to report any adverse reaction to me immediately.  Pt tolerated well and next appointment given to patient.

## 2017-02-14 ENCOUNTER — TELEPHONE (OUTPATIENT)
Dept: UROLOGY | Facility: CLINIC | Age: 82
End: 2017-02-14

## 2017-02-15 ENCOUNTER — TELEPHONE (OUTPATIENT)
Dept: UROLOGY | Facility: CLINIC | Age: 82
End: 2017-02-15

## 2017-02-15 NOTE — TELEPHONE ENCOUNTER
----- Message from Sanjuana Oneal sent at 2/15/2017  2:51 PM CST -----  Contact: Patients daughter, Nel  Ms Nel needs to give the nurse some information regarding her father, please call her back at  [phone].Thank you

## 2017-02-15 NOTE — TELEPHONE ENCOUNTER
Patient's daughter requesting order for homehealth to change catheters monthly because it will be difficult for patient to come for a visit every month. Is that okay with you?

## 2017-02-16 ENCOUNTER — TELEPHONE (OUTPATIENT)
Dept: UROLOGY | Facility: CLINIC | Age: 82
End: 2017-02-16

## 2017-02-16 NOTE — TELEPHONE ENCOUNTER
----- Message from Julia Barrigaite sent at 2/16/2017  9:18 AM CST -----  Contact: Lena with [facility]   Lena called and stated she needed to speak to the nurse. She stated that they are out of network with pt's insurance  She can be reached at [phone].    Thanks,  TF

## 2017-02-16 NOTE — TELEPHONE ENCOUNTER
Notified patient's daughter, Nel, that [facility] is not in patient's network. She will contact patient's insurance company and call back with a home health that is in his network.

## 2017-02-21 ENCOUNTER — ANTI-COAG VISIT (OUTPATIENT)
Dept: CARDIOLOGY | Facility: CLINIC | Age: 82
End: 2017-02-21
Payer: MEDICARE

## 2017-02-21 DIAGNOSIS — Z86.711 HISTORY OF PULMONARY EMBOLISM: ICD-10-CM

## 2017-02-21 DIAGNOSIS — Z79.01 LONG TERM (CURRENT) USE OF ANTICOAGULANTS: Primary | ICD-10-CM

## 2017-02-21 LAB — INR PPP: 2.8 (ref 2–3)

## 2017-02-21 PROCEDURE — 85610 PROTHROMBIN TIME: CPT | Mod: QW,S$GLB,,

## 2017-02-21 PROCEDURE — 99211 OFF/OP EST MAY X REQ PHY/QHP: CPT | Mod: 25,S$GLB,,

## 2017-02-21 NOTE — PROGRESS NOTES
INR remains therapeutic. Recent hospital admission after c/o SOB. Patient now on finasteride, and recently completed ciprofloxacin. Continue dose and diet until follow-up. Patient reports no bleeding or bruising. I reminded the patient to call with any problems, changes or questions before the next visit.

## 2017-03-14 ENCOUNTER — TELEPHONE (OUTPATIENT)
Dept: UROLOGY | Facility: CLINIC | Age: 82
End: 2017-03-14

## 2017-03-14 ENCOUNTER — CLINICAL SUPPORT (OUTPATIENT)
Dept: UROLOGY | Facility: CLINIC | Age: 82
End: 2017-03-14
Payer: MEDICARE

## 2017-03-14 VITALS — WEIGHT: 240 LBS | HEIGHT: 71 IN | BODY MASS INDEX: 33.6 KG/M2

## 2017-03-14 DIAGNOSIS — R33.9 URINARY RETENTION: Primary | ICD-10-CM

## 2017-03-14 PROCEDURE — 99999 PR PBB SHADOW E&M-EST. PATIENT-LVL III: CPT | Mod: PBBFAC,,,

## 2017-03-14 RX ORDER — LISINOPRIL AND HYDROCHLOROTHIAZIDE 12.5; 2 MG/1; MG/1
TABLET ORAL
Qty: 60 TABLET | Refills: 11 | Status: SHIPPED | OUTPATIENT
Start: 2017-03-14 | End: 2018-03-22 | Stop reason: SDUPTHER

## 2017-03-14 NOTE — PROGRESS NOTES
Patient came to clinic for monthly catheter change. Patient had a 18 fr indwelling riggs catheter with 10 ml balloon. I deflated the 10 ml balloon and withdrew catheter from patient's bladder. Using sterile technique, I inserted a 18fr indwelling riggs catheter with 10ml bulb into patient's bladder.  Noticed clear, yellow urine return. Inflated 10ml bulb and attached leg bag to patient's leg. Patient tolerated well.

## 2017-03-14 NOTE — MR AVS SNAPSHOT
[facility]  [address] [facility]  [place] [address]  Phone: [phone]  Fax: [phone]                  Amauri Lara   3/14/2017 10:40 AM   Appointment    Description:  Male : 1929   Provider:  UROLOGY NURSEJESUS   Department:  [facility]                To Do List           Future Appointments        Provider Department Dept Phone    3/28/2017 8:15 AM Anamaria Santos, [place] [facility] Coumadin [phone]    2017 11:20 AM UROLOGY NURSEJESUS [facility] [phone]    2017 8:40 AM LABORATORY, [address] [facility] [phone]    2017 1:40 PM Stefan Vera MD [facility] Internal Medicine [phone]    2017 9:00 AM NANNETTE Townsend [facility] Ophthalmology [phone]      Goals (5 Years of Data)     None      [facility]     [facility] Care [facility] -  Assistance  Registered nurses in the [facility] provide clinical advisement, health education, appointment booking, and other advisory services.  Call for this free service at [phone].             Medications           Message regarding Medications     Verify the changes and/or additions to your medication regime listed below are the same as discussed with your clinician today.  If any of these changes or additions are incorrect, please notify your healthcare provider.             Verify that the below list of medications is an accurate representation of the medications you are currently taking.  If none reported, the list may be blank. If incorrect, please contact your healthcare provider. Carry this list with you in case of emergency.           Current Medications     allopurinol (ZYLOPRIM) 300 MG tablet TAKE ONE TABLET BY MOUTH EVERY DAY    finasteride (PROSCAR) 5 mg tablet Take 1 tablet (5 mg total) by mouth once daily.    glipiZIDE (GLUCOTROL) 10 MG TR24     hydrocodone-acetaminophen 7.5-325mg (NORCO) 7.5-325 mg per tablet Take 1  tablet by mouth every 6 (six) hours as needed for Pain.    lisinopril-hydrochlorothiazide (PRINZIDE,ZESTORETIC) 20-12.5 mg per tablet TAKE 1 TABLET BY MOUTH 2 TIMES DAILY    lovastatin (MEVACOR) 40 MG tablet TAKE ONE TABLET BY MOUTH EVERY EVENING    metformin (GLUCOPHAGE) 850 MG tablet ONE BY MOUTH DAILY    metoprolol tartrate (LOPRESSOR) 25 MG tablet     PROAIR HFA 90 mcg/actuation inhaler     SSD 1 % cream     tamsulosin (FLOMAX) 0.4 mg Cp24 Take 1 capsule (0.4 mg total) by mouth once daily.    warfarin (COUMADIN) 4 MG tablet Take one-half tablet (2mg) by mouth every evening along with 5mg tablet as directed by the Coumadin Clinic.    warfarin (COUMADIN) 5 MG tablet Take 1 tablet by mouth every evening along with 2mg (one-half of 4mg) as directed by the Coumadin Clinic.           Clinical Reference Information           Allergies as of 3/14/2017     No Known Allergies      Immunizations Administered on Date of Encounter - 3/14/2017     None      Language Assistance Services     ATTENTION: Language assistance services are available, free of charge. Please call [phone].      ATENCIÓN: Si habla jjholland, tiene a majano disposición servicios gratuitos de asistencia lingüística. Llame al [phone].     CHÚ Ý: N?u b?n nói Ti?ng Vi?t, có các d?ch v? h? tr? ngôn ng? mi?n phí dành cho b?n. G?i s? [phone].         [facility] complies with applicable Federal civil rights laws and does not discriminate on the basis of race, color, national origin, age, disability, or sex.

## 2017-03-14 NOTE — TELEPHONE ENCOUNTER
----- Message from Valeri Johnson sent at 3/14/2017  3:50 PM CDT -----  She states that the nurse told her to call regarding his home health.    Call her at [phone].                                                 sharif

## 2017-03-14 NOTE — TELEPHONE ENCOUNTER
Reminded patient's daughter about finding a home health agency who is covered under patient's insurance plan so that Dr. Westbrook can send orders to them for monthly catheter changes. Nel states understanding and will call back.

## 2017-03-15 ENCOUNTER — TELEPHONE (OUTPATIENT)
Dept: UROLOGY | Facility: CLINIC | Age: 82
End: 2017-03-15

## 2017-03-15 ENCOUNTER — TELEPHONE (OUTPATIENT)
Dept: INTERNAL MEDICINE | Facility: CLINIC | Age: 82
End: 2017-03-15

## 2017-03-15 NOTE — TELEPHONE ENCOUNTER
Call [facility] and tell them to start doing his INR every month. Find out from patient his exact current dose. Don't trust the medcard.

## 2017-03-15 NOTE — TELEPHONE ENCOUNTER
----- Message from Chelle Umanzor sent at 3/15/2017 12:44 PM CDT -----  Taylor with [facility] at [phone]//states she has a question regarding an order for home health//she needs more information//please call//george/arun

## 2017-03-15 NOTE — TELEPHONE ENCOUNTER
Taylor with [facility] states she needs a copy of patient's last office visit faxed to her office to begin the admission process for monthly catheter changes. Noted faxed to Taylor.

## 2017-03-15 NOTE — TELEPHONE ENCOUNTER
Pt called to request that Dr. Vera write an order for Coumadin monitoring through [facility]. Pt is using HH to change catheters. Please advise. LARRY 01/31/17. /ANTWON

## 2017-03-16 NOTE — TELEPHONE ENCOUNTER
----- Message from Sasha Lese sent at 3/16/2017  7:55 AM CDT -----  Contact: pt daughter elia  Pt daughter states that her dad coumadin he takes is 7 mg. For future info call her at [phone].

## 2017-03-16 NOTE — TELEPHONE ENCOUNTER
----- Message from Sanjuana Oneal sent at 3/15/2017 12:54 PM CDT -----  Contact: Patients daughter, Nel Neumann wants to set up home health for her father and would like to have the orders for coumadin done with home health as well, [facility] [phone]. Thank you

## 2017-03-22 NOTE — PROGRESS NOTES
Mikhail called to report that last week pt was admitted to [facility] services and would like his INRS to be drawn through , told da that as long as he has  services INR can be drawn through them, but once HH service stops then he would need to return to the coumadin clinic at , next INR is due 3/28 so I called  to verify that Pt is being seen by them and S/w Glendy who said system is down at the moment but left Ph # tcb and verify date Pt started w/[facility], also got the fax # from Glendy to  and order for INR to be drawn on 3/28 was faxed to

## 2017-03-28 ENCOUNTER — ANTI-COAG VISIT (OUTPATIENT)
Dept: CARDIOLOGY | Facility: CLINIC | Age: 82
End: 2017-03-28

## 2017-03-28 LAB — INR PPP: 2.8

## 2017-03-28 NOTE — PROGRESS NOTES
Faxed result taken from __[facility]___. PT/INR __33.0/2.8____ Date drawn___3/28_____ continue dose. Repeat INR in 4 weeks. Orders faxed.

## 2017-03-29 ENCOUNTER — TELEPHONE (OUTPATIENT)
Dept: INTERNAL MEDICINE | Facility: CLINIC | Age: 82
End: 2017-03-29

## 2017-03-29 NOTE — TELEPHONE ENCOUNTER
----- Message from Valeri Johnson sent at 3/29/2017  9:50 AM CDT -----  Contact: [facility]  She needs to get his last lab results of Hemoglobin A1C faxed to [phone] and phone [phone].                                             sharif

## 2017-04-25 ENCOUNTER — TELEPHONE (OUTPATIENT)
Dept: INTERNAL MEDICINE | Facility: CLINIC | Age: 82
End: 2017-04-25

## 2017-04-25 NOTE — TELEPHONE ENCOUNTER
S/ Evelin with [facility].   PT 38.2  INR 3.2  Pt is currently taking Coumadin 7mg daily. He reported her did drink Cranberry juice on Sunday. Please advise/TGD

## 2017-04-25 NOTE — TELEPHONE ENCOUNTER
Called Evelin at [facility].  Left message of MD recommendation to hold Coumadin one day no change in daily dose and to repeat lab in 1 week.

## 2017-04-25 NOTE — TELEPHONE ENCOUNTER
----- Message from Carter Smith sent at 4/25/2017  9:04 AM CDT -----  Contact: rosalinda [facility]/ [facility]   She's calling in regards to the pt's PT: 38.2 / INR: 3.2, pt is taking Warfarin 7 mg daily,  pt drinked cranberry juice on Sunday 4/23/17, please advise, ph# [phone]

## 2017-05-03 ENCOUNTER — TELEPHONE (OUTPATIENT)
Dept: INTERNAL MEDICINE | Facility: CLINIC | Age: 82
End: 2017-05-03

## 2017-05-03 NOTE — TELEPHONE ENCOUNTER
----- Message from Carter Smith sent at 5/3/2017  2:39 PM CDT -----  Contact: rosalinda Waters/ [facility]  She's calling in regards to the pt's PT: 20.9/ INR: 1.7, she states the pt did not take his coumadin on 4/25/17 (held) and took 7 mg daily after, please advise, ph# [phone]

## 2017-05-03 NOTE — TELEPHONE ENCOUNTER
Spoke with E[facility].  Pt's PT 20.9 INR 1.7.  Coumadin held 4/25/17 and pt is taking 7 mg daily.  Please advise.

## 2017-05-04 NOTE — TELEPHONE ENCOUNTER
----- Message from Ludwin Rodriguez sent at 5/4/2017  9:56 AM CDT -----  Luci ( [facility] )   Is returning a call to nurse in regards to a PTINR results.            Please call Luci ( [facility] ) back at  [phone]

## 2017-05-16 ENCOUNTER — TELEPHONE (OUTPATIENT)
Dept: INTERNAL MEDICINE | Facility: CLINIC | Age: 82
End: 2017-05-16

## 2017-05-16 NOTE — TELEPHONE ENCOUNTER
Puzzled, why are they wanting it taken to [facility]? I thought they (the  company) was pricking his finger and doing there at his home

## 2017-05-16 NOTE — TELEPHONE ENCOUNTER
S/w Evelin with [facility].   PT/ INR as follows:  PT 25.3  INR 2.1  Pt is currently taking Coumadin 7 mg daily. Please advise/TGD

## 2017-05-16 NOTE — TELEPHONE ENCOUNTER
S/w Evelin with [facility]. Advised as listed. Order repeated back.   Can home health draw labs and bring to [facility] prior to visit? If so, please advise on written order.   You ordered Lipids, CMP and A1C. Please advise/TGD

## 2017-05-16 NOTE — TELEPHONE ENCOUNTER
----- Message from Valeri Johnson sent at 5/16/2017 11:28 AM CDT -----  Contact: [facility]  PT 25.3 and INR 2.1.  7 mgs coumadin mary kay.   They are only scheduled after today once a month for catheter change and wants to know if they can change his PT & INR to that date next month.  She said she will draw his labs on the same day if you fax her the orders at [phone].      Call her at [phone].                                                        sharif

## 2017-05-29 DIAGNOSIS — Z79.01 LONG TERM (CURRENT) USE OF ANTICOAGULANTS: ICD-10-CM

## 2017-05-29 RX ORDER — WARFARIN SODIUM 5 MG/1
TABLET ORAL
Qty: 30 TABLET | Refills: 11 | Status: SHIPPED | OUTPATIENT
Start: 2017-05-29 | End: 2017-12-28 | Stop reason: SDUPTHER

## 2017-05-29 NOTE — TELEPHONE ENCOUNTER
Physician order for PT/INR signed and faxed to [facility]. Fax number [phone].

## 2017-06-05 ENCOUNTER — TELEPHONE (OUTPATIENT)
Dept: INTERNAL MEDICINE | Facility: CLINIC | Age: 82
End: 2017-06-05

## 2017-06-05 ENCOUNTER — HOSPITAL ENCOUNTER (EMERGENCY)
Facility: HOSPITAL | Age: 82
Discharge: HOME | End: 2017-06-05
Payer: MEDICARE

## 2017-06-05 VITALS
RESPIRATION RATE: 14 BRPM | WEIGHT: 237 LBS | TEMPERATURE: 98 F | BODY MASS INDEX: 33.18 KG/M2 | HEIGHT: 71 IN | DIASTOLIC BLOOD PRESSURE: 74 MMHG | HEART RATE: 72 BPM | OXYGEN SATURATION: 98 % | SYSTOLIC BLOOD PRESSURE: 120 MMHG

## 2017-06-05 DIAGNOSIS — I72.4 ANEURYSM ARTERY, POPLITEAL: Primary | ICD-10-CM

## 2017-06-05 DIAGNOSIS — M79.605 LEFT LEG PAIN: ICD-10-CM

## 2017-06-05 DIAGNOSIS — R31.9 URINARY TRACT INFECTION WITH HEMATURIA, SITE UNSPECIFIED: ICD-10-CM

## 2017-06-05 DIAGNOSIS — N39.0 URINARY TRACT INFECTION WITH HEMATURIA, SITE UNSPECIFIED: ICD-10-CM

## 2017-06-05 DIAGNOSIS — M25.552 LEFT HIP PAIN: ICD-10-CM

## 2017-06-05 LAB
ALBUMIN SERPL BCP-MCNC: 3.8 G/DL
ALP SERPL-CCNC: 77 U/L
ALT SERPL W/O P-5'-P-CCNC: 29 U/L
ANION GAP SERPL CALC-SCNC: 8 MMOL/L
APTT BLDCRRT: 37.4 SEC
AST SERPL-CCNC: 25 U/L
BACTERIA #/AREA URNS HPF: ABNORMAL /HPF
BASOPHILS # BLD AUTO: 0.01 K/UL
BASOPHILS NFR BLD: 0.1 %
BILIRUB SERPL-MCNC: 0.8 MG/DL
BILIRUB UR QL STRIP: NEGATIVE
BUN SERPL-MCNC: 16 MG/DL
CALCIUM SERPL-MCNC: 10.2 MG/DL
CHLORIDE SERPL-SCNC: 103 MMOL/L
CK SERPL-CCNC: 107 U/L
CLARITY UR: CLEAR
CO2 SERPL-SCNC: 26 MMOL/L
COLOR UR: YELLOW
CREAT SERPL-MCNC: 1.2 MG/DL
DIFFERENTIAL METHOD: ABNORMAL
EOSINOPHIL # BLD AUTO: 0.1 K/UL
EOSINOPHIL NFR BLD: 1.3 %
ERYTHROCYTE [DISTWIDTH] IN BLOOD BY AUTOMATED COUNT: 17.2 %
EST. GFR  (AFRICAN AMERICAN): >60 ML/MIN/1.73 M^2
EST. GFR  (NON AFRICAN AMERICAN): 54 ML/MIN/1.73 M^2
GLUCOSE SERPL-MCNC: 111 MG/DL
GLUCOSE UR QL STRIP: NEGATIVE
HCT VFR BLD AUTO: 49.4 %
HGB BLD-MCNC: 16.7 G/DL
HGB UR QL STRIP: ABNORMAL
HYALINE CASTS #/AREA URNS LPF: 0 /LPF
INR PPP: 2
KETONES UR QL STRIP: NEGATIVE
LEUKOCYTE ESTERASE UR QL STRIP: ABNORMAL
LYMPHOCYTES # BLD AUTO: 1.3 K/UL
LYMPHOCYTES NFR BLD: 14.3 %
MAGNESIUM SERPL-MCNC: 2.2 MG/DL
MCH RBC QN AUTO: 28.7 PG
MCHC RBC AUTO-ENTMCNC: 33.8 %
MCV RBC AUTO: 85 FL
MICROSCOPIC COMMENT: ABNORMAL
MONOCYTES # BLD AUTO: 1.2 K/UL
MONOCYTES NFR BLD: 12.9 %
NEUTROPHILS # BLD AUTO: 6.5 K/UL
NEUTROPHILS NFR BLD: 71.4 %
NITRITE UR QL STRIP: NEGATIVE
PH UR STRIP: 7 [PH] (ref 5–8)
PLATELET # BLD AUTO: 180 K/UL
PMV BLD AUTO: 10.9 FL
POTASSIUM SERPL-SCNC: 4.2 MMOL/L
PROT SERPL-MCNC: 8.3 G/DL
PROT UR QL STRIP: ABNORMAL
PROTHROMBIN TIME: 20.5 SEC
RBC # BLD AUTO: 5.82 M/UL
RBC #/AREA URNS HPF: >100 /HPF (ref 0–4)
SODIUM SERPL-SCNC: 137 MMOL/L
SP GR UR STRIP: 1.01 (ref 1–1.03)
URN SPEC COLLECT METH UR: ABNORMAL
UROBILINOGEN UR STRIP-ACNC: 1 EU/DL
WBC # BLD AUTO: 9.05 K/UL
WBC #/AREA URNS HPF: 20 /HPF (ref 0–5)
WBC CLUMPS URNS QL MICRO: ABNORMAL

## 2017-06-05 PROCEDURE — 96365 THER/PROPH/DIAG IV INF INIT: CPT

## 2017-06-05 PROCEDURE — 85730 THROMBOPLASTIN TIME PARTIAL: CPT

## 2017-06-05 PROCEDURE — 85610 PROTHROMBIN TIME: CPT

## 2017-06-05 PROCEDURE — 83735 ASSAY OF MAGNESIUM: CPT

## 2017-06-05 PROCEDURE — 80053 COMPREHEN METABOLIC PANEL: CPT

## 2017-06-05 PROCEDURE — 25000003 PHARM REV CODE 250: Performed by: PHYSICIAN ASSISTANT

## 2017-06-05 PROCEDURE — 87077 CULTURE AEROBIC IDENTIFY: CPT | Mod: 59

## 2017-06-05 PROCEDURE — 87086 URINE CULTURE/COLONY COUNT: CPT

## 2017-06-05 PROCEDURE — 63600175 PHARM REV CODE 636 W HCPCS: Performed by: PHYSICIAN ASSISTANT

## 2017-06-05 PROCEDURE — 85025 COMPLETE CBC W/AUTO DIFF WBC: CPT

## 2017-06-05 PROCEDURE — 87088 URINE BACTERIA CULTURE: CPT

## 2017-06-05 PROCEDURE — 81000 URINALYSIS NONAUTO W/SCOPE: CPT

## 2017-06-05 PROCEDURE — 87186 SC STD MICRODIL/AGAR DIL: CPT | Mod: 59

## 2017-06-05 PROCEDURE — 99284 EMERGENCY DEPT VISIT MOD MDM: CPT | Mod: 25

## 2017-06-05 PROCEDURE — 82550 ASSAY OF CK (CPK): CPT

## 2017-06-05 RX ORDER — CIPROFLOXACIN 500 MG/1
500 TABLET ORAL 2 TIMES DAILY
Qty: 28 TABLET | Refills: 0 | Status: SHIPPED | OUTPATIENT
Start: 2017-06-05 | End: 2017-06-19

## 2017-06-05 RX ORDER — HYDROCODONE BITARTRATE AND ACETAMINOPHEN 5; 325 MG/1; MG/1
1 TABLET ORAL
Status: COMPLETED | OUTPATIENT
Start: 2017-06-05 | End: 2017-06-05

## 2017-06-05 RX ADMIN — CEFTRIAXONE 1 G: 1 INJECTION, SOLUTION INTRAVENOUS at 10:06

## 2017-06-05 RX ADMIN — HYDROCODONE BITARTRATE AND ACETAMINOPHEN 1 TABLET: 5; 325 TABLET ORAL at 10:06

## 2017-06-05 NOTE — TELEPHONE ENCOUNTER
S/w pt's daughter, Nel. Pt was taken to the ER this morning with leg pain, thought he was having a muscle spasm. 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 Pt and daughter want to know what how to monitor this until appt with vascular surgeon on Thursday, Dr. Joshi at [facility] on Thursday. Any particular things pt should be doing?  LV 01/31/17. [facility] scheduled for ER follow up on Thursday, 06/08/17.    According to medcard, pt is taking Coumadin 7 mg daily.   Please advise/TGD

## 2017-06-05 NOTE — ED NOTES
"Assisted pt to sitting position and up out of bed, pt reported that he was able to walk with his cane saying "I walked in here!", pt denied dizziness or weakness that would prevent him from walking to the bathroom.  Family member walked with pt to the bathroom.  "

## 2017-06-05 NOTE — ED PROVIDER NOTES
"   History      Chief Complaint   Patient presents with    Leg Pain     left leg pain since last night, from knee to hip with movement       Review of patient's allergies indicates:  No Known Allergies     HPI   HPI    6/5/2017, 8:31 AM   History obtained from the patient and daughter      History of Present Illness: Amauri Lara is a 87 y.o. male patient who presents to the Emergency Department for left leg pain since last night.  He points to left thigh. He says the pain feels like a viktoriya horse.  He denies injury, fever, cp or sob.  Hx of "blood clots" takes coumadin.  Denies missing any doses.  Symptoms are moderate in severity.     No further complaints or concerns at this time.           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   HENT: Negative for sore throat.    Respiratory: Negative for chest tightness, shortness of breath and wheezing.    Cardiovascular: Negative for chest pain and palpitations.   Gastrointestinal: Negative for nausea.   Genitourinary: Negative for dysuria. " "  Musculoskeletal: Negative for back pain, neck pain and neck stiffness.   Skin: Negative for rash and wound.   Neurological: Negative for weakness and numbness.   Hematological: Does not bruise/bleed easily.   All other systems reviewed and are negative.      Physical Exam      Initial Vitals [06/05/17 0752]   BP Pulse Resp Temp SpO2   (!) 120/94 83 18 97.7 °F (36.5 °C) (!) 94 %     Physical Exam  Vital signs and nursing notes reviewed.  Constitutional: Patient is in NAD. Awake and alert. Well-developed and well-nourished.  Head: Atraumatic. Normocephalic.  Eyes: PERRL. EOM intact. Conjunctivae nl. No scleral icterus.  ENT: Mucous membranes are moist. Oropharynx is clear.  Neck: Supple. No JVD. No lymphadenopathy.  No meningismus  Cardiovascular: Regular rate and rhythm. No murmurs, rubs, or gallops. Distal pulses are 1+ and symmetric.  Pulmonary/Chest: No respiratory distress. Clear to auscultation bilaterally. No wheezing, rales, or rhonchi.  Abdominal: Soft. Non-distended. No TTP. No rebound, guarding, or rigidity. Good bowel sounds.  Genitourinary: No CVA tenderness  Musculoskeletal: Moves all extremities. No unilateral le edema.  +mild bilateral edema.  FROM of left hip and knee with no ttp, heat or erythema. 1+dp bilaterally   Skin: Warm and dry.  Neurological: Awake and alert. No acute focal neurological deficits are appreciated.  Psychiatric: Normal affect. Good eye contact. Appropriate in content.      ED Course          Procedures  ED Vital Signs:  Vitals:    06/05/17 0752 06/05/17 0900 06/05/17 0935 06/05/17 1124   BP: (!) 120/94  119/73 120/74   Pulse: 83 67 67 72   Resp: 18  18 14   Temp: 97.7 °F (36.5 °C)      TempSrc: Oral      SpO2: (!) 94%  95% 98%   Weight: 107.5 kg (237 lb)      Height: 5' 11" (1.803 m)            Results for orders placed or performed during the hospital encounter of 06/05/17   CBC auto differential   Result Value Ref Range    WBC 9.05 3.90 - 12.70 K/uL    RBC 5.82 4.60 - 6.20 " M/uL    Hemoglobin 16.7 14.0 - 18.0 g/dL    Hematocrit 49.4 40.0 - 54.0 %    MCV 85 82 - 98 fL    MCH 28.7 27.0 - 31.0 pg    MCHC 33.8 32.0 - 36.0 %    RDW 17.2 (H) 11.5 - 14.5 %    Platelets 180 150 - 350 K/uL    MPV 10.9 9.2 - 12.9 fL    Gran # 6.5 1.8 - 7.7 K/uL    Lymph # 1.3 1.0 - 4.8 K/uL    Mono # 1.2 (H) 0.3 - 1.0 K/uL    Eos # 0.1 0.0 - 0.5 K/uL    Baso # 0.01 0.00 - 0.20 K/uL    Gran% 71.4 38.0 - 73.0 %    Lymph% 14.3 (L) 18.0 - 48.0 %    Mono% 12.9 4.0 - 15.0 %    Eosinophil% 1.3 0.0 - 8.0 %    Basophil% 0.1 0.0 - 1.9 %    Differential Method Automated    Comprehensive metabolic panel   Result Value Ref Range    Sodium 137 136 - 145 mmol/L    Potassium 4.2 3.5 - 5.1 mmol/L    Chloride 103 95 - 110 mmol/L    CO2 26 23 - 29 mmol/L    Glucose 111 (H) 70 - 110 mg/dL    BUN, Bld 16 8 - 23 mg/dL    Creatinine 1.2 0.5 - 1.4 mg/dL    Calcium 10.2 8.7 - 10.5 mg/dL    Total Protein 8.3 6.0 - 8.4 g/dL    Albumin 3.8 3.5 - 5.2 g/dL    Total Bilirubin 0.8 0.1 - 1.0 mg/dL    Alkaline Phosphatase 77 55 - 135 U/L    AST 25 10 - 40 U/L    ALT 29 10 - 44 U/L    Anion Gap 8 8 - 16 mmol/L    eGFR if African American >60 >60 mL/min/1.73 m^2    eGFR if non African American 54 (A) >60 mL/min/1.73 m^2   Urinalysis   Result Value Ref Range    Specimen UA Urine, Clean Catch     Color, UA Yellow Yellow, Straw, Jennifer    Appearance, UA Clear Clear    pH, UA 7.0 5.0 - 8.0    Specific Gravity, UA 1.015 1.005 - 1.030    Protein, UA 1+ (A) Negative    Glucose, UA Negative Negative    Ketones, UA Negative Negative    Bilirubin (UA) Negative Negative    Occult Blood UA 3+ (A) Negative    Nitrite, UA Negative Negative    Urobilinogen, UA 1.0 <2.0 EU/dL    Leukocytes, UA 1+ (A) Negative   CPK   Result Value Ref Range     20 - 200 U/L   Magnesium   Result Value Ref Range    Magnesium 2.2 1.6 - 2.6 mg/dL   APTT   Result Value Ref Range    aPTT 37.4 (H) 21.0 - 32.0 sec   Protime-INR   Result Value Ref Range    Prothrombin Time 20.5 (H)  9.0 - 12.5 sec    INR 2.0 (H) 0.8 - 1.2   Urinalysis Microscopic   Result Value Ref Range    RBC, UA >100 (H) 0 - 4 /hpf    WBC, UA 20 (H) 0 - 5 /hpf    WBC Clumps, UA Many (A) None-Rare    Bacteria, UA Many (A) None-Occ /hpf    Hyaline Casts, UA 0 0-1/lpf /lpf    Microscopic Comment SEE COMMENT              Imaging Results:  Imaging Results          X-Ray Hip 2 View Left (Final result)  Result time 06/05/17 09:03:35    Final result by Qing Rayo MD (06/05/17 09:03:35)                 Impression:      No acute findings.  Please see above.      Electronically signed by: QING RAYO MD  Date:     06/05/17  Time:    09:03              Narrative:    Exam: XR HIP 2 VIEW LEFT,    Date:  06/05/17 08:57:49    History: Left hip joint pain.    Comparison:  04/16/2011.    Findings: Lumbar degenerative disc disease and facet arthritis.  SI joint ankylosis.    Osteoporosis.    Mild left hip arthropathy.                             US Lower Extremity Veins Left (Final result)  Result time 06/05/17 09:15:41    Final result by Qing Rayo MD (06/05/17 09:15:41)                 Impression:     Negative for DVT.  Left popliteal artery fusiform aneurysm.      Electronically signed by: QING RAYO MD  Date:     06/05/17  Time:    09:15              Narrative:    US LOWER EXTREMITY VEINS LEFT    Date: 06/05/17 08:35:00    History:  Left leg pain and swelling,     Duplex and color flow imaging with spectral wave analysis performed. The veins demonstrate normal compressibility and phasicity. No evidence of filling defect.    There is focal aneurysmal dilatation of the left popliteal artery measuring 2.1 x 3.1 cm axially with a vertical length of 5.6 cm.                                 The Emergency Provider reviewed the vital signs and test results, which are outlined above.    ED Discussion             Medication(s) given in the ER:  Medications   cefTRIAXone (ROCEPHIN) 1 g in dextrose 5 % 50 mL IVPB (0 g  Intravenous Stopped 6/5/17 1117)   hydrocodone-acetaminophen 5-325mg per tablet 1 tablet (1 tablet Oral Given 6/5/17 1042)           Follow-up Information     Gabe Joshi IV, MD on 6/8/2017.    Specialty:  Vascular Surgery  Why:  At 8:00AM, to discuss left thigh aneurysm  Contact information:  [address]  [address]RD [address]LO[address]  [facility]  [place] [address]  [phone]             Stefan Vera MD in 3 days.    Specialty:  Internal Medicine  Why:  to check on your urinary tract infection  Contact information:  [address]  [address]  [place] [address]  [phone]                       Discharge Medication List as of 6/5/2017 10:48 AM      START taking these medications    Details   ciprofloxacin HCl (CIPRO) 500 MG tablet Take 1 tablet (500 mg total) by mouth 2 (two) times daily., Starting Mon 6/5/2017, Until Mon 6/19/2017, Print                Medical Decision Making          CONSULT Discussed case with Dr Joshi, vascular surgery, who agrees with the treatment plan and recommends pt fu as an outpatient in his office, Thursday at 8AM.      All findings were reviewed with the patient/family in detail.   All remaining questions and concerns were addressed at that time.  Patient/family has been counseled regarding the need for follow-up as well as the indication to return to the emergency room should new or worrisome developments occur.        MDM               Clinical Impression:        ICD-10-CM ICD-9-CM   1. Aneurysm artery, poplitealAcute I72.4 442.3   2. Left leg painAcute M79.605 729.5   3. Left hip painAcute M25.552 719.45   4. Urinary tract infection with hematuria, site unspecifiedAcute N39.0 599.0    R31.9          Disposition  Stable  Discharged       Court Vargas PA-C  06/05/17 3617

## 2017-06-05 NOTE — TELEPHONE ENCOUNTER
----- Message from Dev Rai sent at 6/5/2017 11:09 AM CDT -----  Nel, daughter, #[phone] is requesting to speak with he nurse../ States they are at the ER./ They were told the pt was diagnosed with an aneurysm in his L Leg and the ER is trying to discharge him.

## 2017-06-06 NOTE — TELEPHONE ENCOUNTER
----- Message from Yara Rai sent at 6/6/2017  9:04 AM CDT -----  Contact: Nel  The pt is returning a missed call, can be reached at [phone]///[id number]

## 2017-06-08 LAB — BACTERIA UR CULT: NORMAL

## 2017-06-14 ENCOUNTER — OFFICE VISIT (OUTPATIENT)
Dept: INTERNAL MEDICINE | Facility: CLINIC | Age: 82
End: 2017-06-14
Payer: MEDICARE

## 2017-06-14 VITALS
SYSTOLIC BLOOD PRESSURE: 136 MMHG | OXYGEN SATURATION: 96 % | DIASTOLIC BLOOD PRESSURE: 80 MMHG | HEART RATE: 85 BPM | BODY MASS INDEX: 34.38 KG/M2 | TEMPERATURE: 98 F | WEIGHT: 245.56 LBS | HEIGHT: 71 IN

## 2017-06-14 DIAGNOSIS — E11.59 HYPERTENSION ASSOCIATED WITH DIABETES: Primary | ICD-10-CM

## 2017-06-14 DIAGNOSIS — E11.42 TYPE 2 DIABETES MELLITUS WITH POLYNEUROPATHY: ICD-10-CM

## 2017-06-14 DIAGNOSIS — I15.2 HYPERTENSION ASSOCIATED WITH DIABETES: Primary | ICD-10-CM

## 2017-06-14 DIAGNOSIS — E11.8 TYPE 2 DIABETES MELLITUS WITH COMPLICATION, WITHOUT LONG-TERM CURRENT USE OF INSULIN: ICD-10-CM

## 2017-06-14 PROCEDURE — 1125F AMNT PAIN NOTED PAIN PRSNT: CPT | Mod: S$GLB,,, | Performed by: INTERNAL MEDICINE

## 2017-06-14 PROCEDURE — 99213 OFFICE O/P EST LOW 20 MIN: CPT | Mod: S$GLB,,, | Performed by: INTERNAL MEDICINE

## 2017-06-14 PROCEDURE — 99999 PR PBB SHADOW E&M-EST. PATIENT-LVL III: CPT | Mod: PBBFAC,,, | Performed by: INTERNAL MEDICINE

## 2017-06-14 PROCEDURE — 99499 UNLISTED E&M SERVICE: CPT | Mod: S$GLB,,, | Performed by: INTERNAL MEDICINE

## 2017-06-14 PROCEDURE — 1159F MED LIST DOCD IN RCRD: CPT | Mod: S$GLB,,, | Performed by: INTERNAL MEDICINE

## 2017-06-14 NOTE — PROGRESS NOTES
"HPI:  Patient is a 87-year-old man who comes in today after ER follow-up.  He was seen in emergency room for lower abdominal pain and leg pain.  He is found to have a urinary tract infection.  Ultrasound of his left lower extremity revealed a popliteal aneurysm on the left lower extremity.  He was referred to see a vascular surgeon.  He'll be having surgery to repair the aneurysm in 2 days.  Patient has no other complaints at this time.  He was placed on antibiotics for the urinary tract infection.  He still on Cipro.    Current meds have been verified and updated per the EMR  Exam:/80   Pulse 85   Temp 98.3 °F (36.8 °C) (Tympanic)   Ht 5' 11" (1.803 m)   Wt 111.4 kg (245 lb 9.5 oz)   SpO2 96%   BMI 34.25 kg/m²   Exam was deferred    Lab Results   Component Value Date    WBC 9.05 06/05/2017    HGB 16.7 06/05/2017    HCT 49.4 06/05/2017     06/05/2017    CHOL 165 12/13/2016    TRIG 135 12/13/2016    HDL 39 (L) 12/13/2016    ALT 29 06/05/2017    AST 25 06/05/2017     06/05/2017    K 4.2 06/05/2017     06/05/2017    CREATININE 1.2 06/05/2017    BUN 16 06/05/2017    CO2 26 06/05/2017    TSH 2.859 12/13/2016    PSA 14 (H) 08/10/2011    INR 2.0 (H) 06/05/2017    HGBA1C 6.0 01/27/2017       Impression:  Popliteal aneurysm  Urinary tract infection  Multiple other medical problems identified below  Patient Active Problem List   Diagnosis    Obesity, unspecified    Hearing loss of aging    History of pulmonary embolism    DM (diabetes mellitus), type 2 with complications    Hypertension associated with diabetes    History of gout    Type 2 diabetes mellitus with polyneuropathy    Urinary retention    Elevated troponin    Shortness of breath    Aneurysm artery, popliteal       Plan:     His medications remain same.  He'll see me in his regular scheduled appointment in 2 weeks for his diabetes after getting his labwork done.    "

## 2017-06-22 ENCOUNTER — TELEPHONE (OUTPATIENT)
Dept: INTERNAL MEDICINE | Facility: CLINIC | Age: 82
End: 2017-06-22

## 2017-06-22 NOTE — TELEPHONE ENCOUNTER
S/w [facility]. Advised as listed per Dr. Vera. Verbalized understanding/TGD

## 2017-06-22 NOTE — TELEPHONE ENCOUNTER
Dr. Vera, pt was last seen on 06/14/17. Mey RIBEIRO was sticking finger for INR prior to appt. Do you still want pt's coumadin level monitored by Mey? Last results in system from 05/03/17. /ANTWON

## 2017-06-22 NOTE — TELEPHONE ENCOUNTER
----- Message from Anamaria Santos PharmD sent at 6/22/2017  9:38 AM CDT -----  Good Morning,     Is Mr. Lara still enrolled with [facility] for PT/INR monitoring?

## 2017-06-23 ENCOUNTER — LAB VISIT (OUTPATIENT)
Dept: LAB | Facility: HOSPITAL | Age: 82
End: 2017-06-23
Attending: INTERNAL MEDICINE
Payer: MEDICARE

## 2017-06-23 DIAGNOSIS — E11.8 TYPE 2 DIABETES MELLITUS WITH COMPLICATION, WITHOUT LONG-TERM CURRENT USE OF INSULIN: ICD-10-CM

## 2017-06-23 LAB
ALBUMIN SERPL BCP-MCNC: 3.3 G/DL
ALP SERPL-CCNC: 79 U/L
ALT SERPL W/O P-5'-P-CCNC: 84 U/L
ANION GAP SERPL CALC-SCNC: 8 MMOL/L
AST SERPL-CCNC: 45 U/L
BILIRUB SERPL-MCNC: 0.7 MG/DL
BUN SERPL-MCNC: 13 MG/DL
CALCIUM SERPL-MCNC: 9.7 MG/DL
CHLORIDE SERPL-SCNC: 104 MMOL/L
CHOLEST/HDLC SERPL: 3.9 {RATIO}
CO2 SERPL-SCNC: 29 MMOL/L
CREAT SERPL-MCNC: 1.1 MG/DL
EST. GFR  (AFRICAN AMERICAN): >60 ML/MIN/1.73 M^2
EST. GFR  (NON AFRICAN AMERICAN): >60 ML/MIN/1.73 M^2
ESTIMATED AVG GLUCOSE: 117 MG/DL
GLUCOSE SERPL-MCNC: 93 MG/DL
HBA1C MFR BLD HPLC: 5.7 %
HDL/CHOLESTEROL RATIO: 25.9 %
HDLC SERPL-MCNC: 143 MG/DL
HDLC SERPL-MCNC: 37 MG/DL
LDLC SERPL CALC-MCNC: 76.6 MG/DL
NONHDLC SERPL-MCNC: 106 MG/DL
POTASSIUM SERPL-SCNC: 3.7 MMOL/L
PROT SERPL-MCNC: 7.4 G/DL
SODIUM SERPL-SCNC: 141 MMOL/L
TRIGL SERPL-MCNC: 147 MG/DL

## 2017-06-23 PROCEDURE — 80053 COMPREHEN METABOLIC PANEL: CPT

## 2017-06-23 PROCEDURE — 36415 COLL VENOUS BLD VENIPUNCTURE: CPT

## 2017-06-23 PROCEDURE — 83036 HEMOGLOBIN GLYCOSYLATED A1C: CPT

## 2017-06-23 PROCEDURE — 80061 LIPID PANEL: CPT

## 2017-06-28 ENCOUNTER — OFFICE VISIT (OUTPATIENT)
Dept: INTERNAL MEDICINE | Facility: CLINIC | Age: 82
End: 2017-06-28
Payer: MEDICARE

## 2017-06-28 VITALS
BODY MASS INDEX: 32.81 KG/M2 | HEIGHT: 71 IN | SYSTOLIC BLOOD PRESSURE: 108 MMHG | OXYGEN SATURATION: 96 % | TEMPERATURE: 98 F | DIASTOLIC BLOOD PRESSURE: 60 MMHG | HEART RATE: 75 BPM | WEIGHT: 234.38 LBS

## 2017-06-28 DIAGNOSIS — E11.42 TYPE 2 DIABETES MELLITUS WITH POLYNEUROPATHY: ICD-10-CM

## 2017-06-28 DIAGNOSIS — E11.8 TYPE 2 DIABETES MELLITUS WITH COMPLICATION, WITHOUT LONG-TERM CURRENT USE OF INSULIN: Primary | ICD-10-CM

## 2017-06-28 DIAGNOSIS — Z79.01 LONG TERM (CURRENT) USE OF ANTICOAGULANTS: ICD-10-CM

## 2017-06-28 DIAGNOSIS — I15.2 HYPERTENSION ASSOCIATED WITH DIABETES: ICD-10-CM

## 2017-06-28 DIAGNOSIS — E11.59 HYPERTENSION ASSOCIATED WITH DIABETES: ICD-10-CM

## 2017-06-28 PROCEDURE — 99999 PR PBB SHADOW E&M-EST. PATIENT-LVL III: CPT | Mod: PBBFAC,,, | Performed by: INTERNAL MEDICINE

## 2017-06-28 PROCEDURE — 99499 UNLISTED E&M SERVICE: CPT | Mod: S$GLB,,, | Performed by: INTERNAL MEDICINE

## 2017-06-28 PROCEDURE — 99214 OFFICE O/P EST MOD 30 MIN: CPT | Mod: S$GLB,,, | Performed by: INTERNAL MEDICINE

## 2017-06-28 PROCEDURE — 1125F AMNT PAIN NOTED PAIN PRSNT: CPT | Mod: S$GLB,,, | Performed by: INTERNAL MEDICINE

## 2017-06-28 PROCEDURE — 1159F MED LIST DOCD IN RCRD: CPT | Mod: S$GLB,,, | Performed by: INTERNAL MEDICINE

## 2017-06-28 RX ORDER — HYDROCODONE BITARTRATE AND ACETAMINOPHEN 7.5; 325 MG/1; MG/1
1 TABLET ORAL EVERY 6 HOURS PRN
Qty: 90 TABLET | Refills: 0 | Status: SHIPPED | OUTPATIENT
Start: 2017-06-28 | End: 2018-04-11 | Stop reason: SDUPTHER

## 2017-06-28 RX ORDER — WARFARIN 4 MG/1
TABLET ORAL
Qty: 30 TABLET | Refills: 3 | Status: SHIPPED | OUTPATIENT
Start: 2017-06-28 | End: 2017-10-23 | Stop reason: SDUPTHER

## 2017-06-28 NOTE — PROGRESS NOTES
"HPI:  Patient is a 87-year-old man who comes in today for follow-up of his diabetes, hypertension, lipids.  Patient will be having surgery next week to repair a popliteal aneurysm.  He's artery seen a cardiologist for clearance.  Patient at this time is otherwise been doing well.  The been no other complaints or problems.  He was seen about 3 weeks ago.  In the emergency room for bladder infection.  He is finished all the antibiotics.  He has no symptoms at all at this time.    Current meds have been verified and updated per the EMR  Exam:/60   Pulse 75   Temp 97.8 °F (36.6 °C) (Tympanic)   Ht 5' 11" (1.803 m)   Wt 106.3 kg (234 lb 5.6 oz)   SpO2 96%   BMI 32.69 kg/m²   Carotids 2+ equal without bruits  Chest clear  Cardiovascular regular rate and rhythm with 1 to 2/6 systolic murmur    Lab Results   Component Value Date    WBC 9.05 06/05/2017    HGB 16.7 06/05/2017    HCT 49.4 06/05/2017     06/05/2017    CHOL 143 06/23/2017    TRIG 147 06/23/2017    HDL 37 (L) 06/23/2017    ALT 84 (H) 06/23/2017    AST 45 (H) 06/23/2017     06/23/2017    K 3.7 06/23/2017     06/23/2017    CREATININE 1.1 06/23/2017    BUN 13 06/23/2017    CO2 29 06/23/2017    TSH 2.859 12/13/2016    PSA 14 (H) 08/10/2011    INR 2.0 (H) 06/05/2017    HGBA1C 5.7 (H) 06/23/2017       Impression:  Diabetes, hypertension, lipids, all well controlled  Elevated transaminases for the first time.  At this time, I would just monitor.  Other medical problems below, stable  Patient Active Problem List   Diagnosis    Obesity, unspecified    Hearing loss of aging    History of pulmonary embolism    DM (diabetes mellitus), type 2 with complications    Hypertension associated with diabetes    History of gout    Type 2 diabetes mellitus with polyneuropathy    Urinary retention    Aneurysm artery, popliteal       Plan:  Orders Placed This Encounter    Hemoglobin A1c    Comprehensive metabolic panel    Lipid panel    " Protein / creatinine ratio, urine    TSH    CBC auto differential    hydrocodone-acetaminophen 7.5-325mg (NORCO) 7.5-325 mg per tablet    warfarin (COUMADIN) 4 MG tablet     His medications remain the same.  He'll be seen again in 6 months with above lab work.

## 2017-07-20 ENCOUNTER — TELEPHONE (OUTPATIENT)
Dept: INTERNAL MEDICINE | Facility: CLINIC | Age: 82
End: 2017-07-20

## 2017-07-20 DIAGNOSIS — Z51.81 ENCOUNTER FOR THERAPEUTIC DRUG MONITORING: Primary | ICD-10-CM

## 2017-07-20 NOTE — TELEPHONE ENCOUNTER
----- Message from Maria Luz London sent at 7/20/2017  3:41 PM CDT -----  Contact: [facility] home care   Caller states that she need to see when she need to draw PT & INR cause pt was reinstated today.    [phone]

## 2017-07-20 NOTE — TELEPHONE ENCOUNTER
S/w Evelin with [facility]. Pt was admitted at [facility] on 07/12/17 and discharged today for below the knee popliteal artery bypass. He was discharged to  with Coumadin 7.5 mg daily but no order to check lab. Called pt for follow up appt. FRAN /ANTWON

## 2017-07-21 ENCOUNTER — TELEPHONE (OUTPATIENT)
Dept: INTERNAL MEDICINE | Facility: CLINIC | Age: 82
End: 2017-07-21

## 2017-07-21 NOTE — TELEPHONE ENCOUNTER
----- Message from Dev Rai sent at 7/21/2017  8:19 AM CDT -----  Nel, daughter, #[phone], is requesting to speak the nurse in regards to some bp issues the pt is having.

## 2017-07-21 NOTE — TELEPHONE ENCOUNTER
S/w pt's Nel. [facility] . Verbalized understanding/TGDS

## 2017-07-21 NOTE — TELEPHONE ENCOUNTER
Received call from Fabiola RIBEIRO.   PT 23.9   INR 2.0  Pt taking Coumadin 7 mg daily. Please advise. /TGD

## 2017-07-25 ENCOUNTER — TELEPHONE (OUTPATIENT)
Dept: INTERNAL MEDICINE | Facility: CLINIC | Age: 82
End: 2017-07-25

## 2017-07-27 ENCOUNTER — OFFICE VISIT (OUTPATIENT)
Dept: INTERNAL MEDICINE | Facility: CLINIC | Age: 82
End: 2017-07-27
Payer: MEDICARE

## 2017-07-27 VITALS
BODY MASS INDEX: 31.8 KG/M2 | HEIGHT: 72 IN | TEMPERATURE: 98 F | SYSTOLIC BLOOD PRESSURE: 124 MMHG | WEIGHT: 234.81 LBS | DIASTOLIC BLOOD PRESSURE: 72 MMHG | HEART RATE: 60 BPM | OXYGEN SATURATION: 95 %

## 2017-07-27 DIAGNOSIS — H91.10 PRESBYCUSIS, UNSPECIFIED LATERALITY: ICD-10-CM

## 2017-07-27 DIAGNOSIS — E11.59 HYPERTENSION ASSOCIATED WITH DIABETES: Primary | ICD-10-CM

## 2017-07-27 DIAGNOSIS — I72.4 ANEURYSM ARTERY, POPLITEAL: ICD-10-CM

## 2017-07-27 DIAGNOSIS — E11.42 TYPE 2 DIABETES MELLITUS WITH POLYNEUROPATHY: ICD-10-CM

## 2017-07-27 DIAGNOSIS — E11.8 TYPE 2 DIABETES MELLITUS WITH COMPLICATION, WITHOUT LONG-TERM CURRENT USE OF INSULIN: ICD-10-CM

## 2017-07-27 DIAGNOSIS — I15.2 HYPERTENSION ASSOCIATED WITH DIABETES: Primary | ICD-10-CM

## 2017-07-27 PROCEDURE — 99214 OFFICE O/P EST MOD 30 MIN: CPT | Mod: S$GLB,,, | Performed by: NURSE PRACTITIONER

## 2017-07-27 PROCEDURE — 1159F MED LIST DOCD IN RCRD: CPT | Mod: S$GLB,,, | Performed by: NURSE PRACTITIONER

## 2017-07-27 PROCEDURE — 99499 UNLISTED E&M SERVICE: CPT | Mod: S$GLB,,, | Performed by: NURSE PRACTITIONER

## 2017-07-27 PROCEDURE — 99999 PR PBB SHADOW E&M-EST. PATIENT-LVL IV: CPT | Mod: PBBFAC,,, | Performed by: NURSE PRACTITIONER

## 2017-07-27 PROCEDURE — 1126F AMNT PAIN NOTED NONE PRSNT: CPT | Mod: S$GLB,,, | Performed by: NURSE PRACTITIONER

## 2017-07-27 NOTE — PROGRESS NOTES
"Subjective:      Patient ID: Amauri Lara is a 87 y.o. male.    Chief Complaint: Follow-up (B/P)    HPI:   Patient is here with his daughter today. He is here for a hospital follow up.  He was seen in our ER on 6/5/17 for a popliteal aneurysm.  He then had surgery on his left leg at [facility].  He is going to see his surgeon today for a follow up.  He has no complaints. He is getting around well with his rolling walker.  Has [employer] .  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    Obesity, unspecified     Type 2 diabetes mellitus with polyneuropathy        Past Surgical History:   Procedure Laterality Date    CATARACT EXTRACTION      aphakic od       Lab Results   Component Value Date    WBC 9.05 06/05/2017    HGB 16.7 06/05/2017    HCT 49.4 06/05/2017     06/05/2017    CHOL 143 06/23/2017    TRIG 147 06/23/2017    HDL 37 (L) 06/23/2017    ALT 84 (H) 06/23/2017    AST 45 (H) 06/23/2017     06/23/2017    K 3.7 06/23/2017     06/23/2017    CREATININE 1.1 06/23/2017    BUN 13 06/23/2017    CO2 29 06/23/2017    TSH 2.859 12/13/2016    PSA 14 (H) 08/10/2011    INR 2.0 (H) 06/05/2017    HGBA1C 5.7 (H) 06/23/2017       /72 (BP Location: Left arm, Patient Position: Sitting, BP Method: Manual)   Pulse 60   Temp 97.5 °F (36.4 °C) (Oral)   Ht 5' 11.5" (1.816 m)   Wt 106.5 kg (234 lb 12.6 oz)   SpO2 95%   BMI 32.29 kg/m²       Review of Systems   Constitutional: Negative for appetite change, chills, diaphoresis and fever.   HENT: Negative for congestion, ear pain, postnasal drip, rhinorrhea, sneezing, sore throat and trouble " swallowing.    Eyes: Negative for photophobia, pain and visual disturbance.   Respiratory: Negative for apnea, cough, choking, chest tightness, shortness of breath and wheezing.    Cardiovascular: Negative for chest pain, palpitations and leg swelling.   Gastrointestinal: Negative for abdominal pain, constipation, diarrhea, nausea and vomiting.   Genitourinary: Negative for decreased urine volume, difficulty urinating, dysuria, hematuria and urgency.   Musculoskeletal: Negative for arthralgias, gait problem, joint swelling and myalgias.   Skin: Negative for rash.   Neurological: Negative for dizziness, tremors, seizures, syncope, weakness, light-headedness, numbness and headaches.   Psychiatric/Behavioral: Negative for agitation, confusion, decreased concentration, hallucinations and sleep disturbance. The patient is not nervous/anxious.       Objective:     Physical Exam   Constitutional: He is oriented to person, place, and time. He appears well-developed and well-nourished. No distress.   Musculoskeletal:   Normal gait   Neurological: He is alert and oriented to person, place, and time.   Skin: Skin is warm and dry.   Psychiatric: He has a normal mood and affect. His behavior is normal.     Assessment:      1. Hypertension associated with diabetes    2. Aneurysm artery, popliteal    3. Type 2 diabetes mellitus with polyneuropathy    4. Presbycusis, unspecified laterality    5. Type 2 diabetes mellitus with complication, without long-term current use of insulin      Plan:   Hypertension associated with diabetes    Aneurysm artery, popliteal    Type 2 diabetes mellitus with polyneuropathy    Presbycusis, unspecified laterality    Type 2 diabetes mellitus with complication, without long-term current use of insulin    will continue on current medications.  Home health will continue to monitor his PT levels, monitored by his pcp, Dr Vera      Current Outpatient Prescriptions:     allopurinol (ZYLOPRIM) 300 MG  tablet, TAKE ONE TABLET BY MOUTH EVERY DAY, Disp: 30 tablet, Rfl: 11    finasteride (PROSCAR) 5 mg tablet, Take 1 tablet (5 mg total) by mouth once daily., Disp: 30 tablet, Rfl: 11    glipiZIDE (GLUCOTROL) 10 MG TR24, , Disp: , Rfl:     hydrocodone-acetaminophen 7.5-325mg (NORCO) 7.5-325 mg per tablet, Take 1 tablet by mouth every 6 (six) hours as needed for Pain., Disp: 90 tablet, Rfl: 0    lisinopril-hydrochlorothiazide (PRINZIDE,ZESTORETIC) 20-12.5 mg per tablet, TAKE 1 TABLET BY MOUTH 2 TIMES DAILY, Disp: 60 tablet, Rfl: 11    lovastatin (MEVACOR) 40 MG tablet, TAKE ONE TABLET BY MOUTH EVERY EVENING, Disp: 30 tablet, Rfl: 11    metformin (GLUCOPHAGE) 850 MG tablet, ONE BY MOUTH DAILY, Disp: 30 tablet, Rfl: 11    metoprolol tartrate (LOPRESSOR) 25 MG tablet, , Disp: , Rfl:     PROAIR HFA 90 mcg/actuation inhaler, , Disp: , Rfl:     SSD 1 % cream, , Disp: , Rfl:     tamsulosin (FLOMAX) 0.4 mg Cp24, Take 1 capsule (0.4 mg total) by mouth once daily., Disp: 30 capsule, Rfl: 11    warfarin (COUMADIN) 4 MG tablet, Take one-half tablet (2mg) by mouth every evening along with 5mg tablet as directed by the [facility]., Disp: 30 tablet, Rfl: 3    warfarin (COUMADIN) 5 MG tablet, Take 1 tablet by mouth every evening along with 2 mg (one-half of 4mg) as directed by the [facility]., Disp: 30 tablet, Rfl: 11

## 2017-08-14 ENCOUNTER — OFFICE VISIT (OUTPATIENT)
Dept: UROLOGY | Facility: CLINIC | Age: 82
End: 2017-08-14
Payer: MEDICARE

## 2017-08-14 VITALS
DIASTOLIC BLOOD PRESSURE: 64 MMHG | WEIGHT: 230.06 LBS | SYSTOLIC BLOOD PRESSURE: 106 MMHG | HEART RATE: 70 BPM | BODY MASS INDEX: 31.64 KG/M2

## 2017-08-14 DIAGNOSIS — N40.0 BENIGN PROSTATIC HYPERPLASIA, PRESENCE OF LOWER URINARY TRACT SYMPTOMS UNSPECIFIED: Primary | ICD-10-CM

## 2017-08-14 PROCEDURE — 99999 PR PBB SHADOW E&M-EST. PATIENT-LVL II: CPT | Mod: PBBFAC,,, | Performed by: UROLOGY

## 2017-08-14 PROCEDURE — 99499 UNLISTED E&M SERVICE: CPT | Mod: S$GLB,,, | Performed by: UROLOGY

## 2017-08-14 PROCEDURE — 1159F MED LIST DOCD IN RCRD: CPT | Mod: S$GLB,,, | Performed by: UROLOGY

## 2017-08-14 PROCEDURE — 1126F AMNT PAIN NOTED NONE PRSNT: CPT | Mod: S$GLB,,, | Performed by: UROLOGY

## 2017-08-14 PROCEDURE — 99214 OFFICE O/P EST MOD 30 MIN: CPT | Mod: S$GLB,,, | Performed by: UROLOGY

## 2017-08-14 RX ORDER — FINASTERIDE 5 MG/1
5 TABLET, FILM COATED ORAL DAILY
Qty: 30 TABLET | Refills: 11 | Status: SHIPPED | OUTPATIENT
Start: 2017-08-14 | End: 2018-01-15 | Stop reason: SDUPTHER

## 2017-08-14 NOTE — PROGRESS NOTES
Chief Complaint: Retention    HPI:   8/14/17: Faith is working okay for him.  Has taken finasteride on that time.  Faith changed one week ago.  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  Tolerated right surgery reasonably well.  2/13/17: Cysto today shows severe BPH with trilobar hypertrophy.    2/1/17: 86 yo man went to the ER for a few complaints and he was found to be in retention and a riggs was placed; bag filled/emptied 1.5 times shortly thereafter.  Is no no BPH meds.  Had double voiding prior, nocturia x2.  No abd/pelvic pain and no exac/rel factors.  No hematuria.  No urolithiasis.      Allergies:  Review of patient's allergies indicates no known allergies.    Medications:  has a current medication list which includes the following prescription(s): allopurinol, finasteride, glipizide, hydrocodone-acetaminophen 7.5-325mg, lisinopril-hydrochlorothiazide, lovastatin, metformin, metoprolol tartrate, proair hfa, ssd, tamsulosin, warfarin, and warfarin.    Review of Systems:  General: No fever, chills, fatigability, or weight loss.  Skin: No rashes, itching, or changes in color or texture of skin.  Chest: Denies DELGADILLO, cyanosis, wheezing, cough, and sputum production.  Abdomen: Appetite fine. No weight loss. Denies diarrhea, abdominal pain, hematemesis, or blood in stool.  Musculoskeletal: No joint stiffness or swelling. Denies back pain.  : As above.  All other review of systems negative.    PMH:   has a past medical history of Aphakia of right eye; Cataract; DM (diabetes mellitus), type 2 with complications; Hearing loss of aging; History of gout; History of pulmonary embolism (2011); Hypertension associated with diabetes; Iridodialysis of right eye; Obesity, unspecified; and Type 2 diabetes mellitus with polyneuropathy.    PSH:   has a past surgical history that includes Cataract extraction.    FamHx: family history includes Diabetes in his brother.    SocHx:   reports that he has never smoked. He has never used smokeless tobacco. He reports that he does not drink alcohol or use drugs.      Physical Exam:  Vitals:    08/14/17 1334   BP: 106/64   Pulse: 70     General: A&Ox3, no apparent distress, no deformities  Neck: No masses, normal thyroid  Lungs: normal inspiration, no use of accessory muscles  Heart: normal pulse, no arrhythmias  Abdomen: Soft, NT, ND  Skin: The skin is warm and dry. No jaundice.  Ext: No c/c/e.  :   2/17: ALVA normal, penis/test normal.    Labs/Studies:     Impression/Plan:   1. Pt is not very hardy and I worry about his surgical risk.  Tolerated catheter well.  Will continue finasteride and change riggs monthly x6 mo; will do a voiding trial after meds have had a chance to work.

## 2017-08-21 ENCOUNTER — ANTI-COAG VISIT (OUTPATIENT)
Dept: CARDIOLOGY | Facility: CLINIC | Age: 82
End: 2017-08-21

## 2017-08-21 LAB — INR PPP: 2

## 2017-08-21 NOTE — PROGRESS NOTES
Verbal result taken from __[facility]___. PT/INR __24.2/2.0____ Date drawn__8/21____ continue dose, repeat INR in 1 month.

## 2017-08-22 ENCOUNTER — CLINICAL SUPPORT (OUTPATIENT)
Dept: AUDIOLOGY | Facility: CLINIC | Age: 82
End: 2017-08-22
Payer: MEDICARE

## 2017-08-22 DIAGNOSIS — H90.3 SENSORINEURAL HEARING LOSS, BILATERAL: Primary | ICD-10-CM

## 2017-08-22 DIAGNOSIS — H93.293 IMPAIRMENT, AUDITORY DISCRIMINATION, BILATERAL: ICD-10-CM

## 2017-08-22 PROCEDURE — 92604 REPROGRAM COCHLEAR IMPLT 7/>: CPT | Mod: S$GLB,,, | Performed by: AUDIOLOGIST

## 2017-08-22 NOTE — PROGRESS NOTES
8/22/2017    Cochlear Implant Programming Session:      Amauri Lara was seen for a cochlear implant programming session to upgrade his sound processor to the KANSO sound processor.  He was fit with a #3 magnet and a silver  KANSO sound processor # [id number].  The remote control # [id number] was not issued because it was not working properly.  A replacement was requested from [employer] and will be mailed directly to his home address.  The TV streamer # [id number] was paired to his sound processor and demonstrated today.  His daughter was instructed to contact [employer] if further assistance was needed with setting up the TV streamer.  The child lights were activated and demonstrated today.      P1=SCAN  P2=SCAN    Mr. Lara was counseled on the features of the KANSO sound processor and instructed on changing the disposable batteries.  All of his questions were answered.  His daughter, Nel was instructed on the procedure for ordering an additional accessory with the PROMO code on [employer]'s website.    Recommend:  1.  Follow up programming as needed.  2.  Annual evaluation.

## 2017-09-20 ENCOUNTER — TELEPHONE (OUTPATIENT)
Dept: CARDIOLOGY | Facility: CLINIC | Age: 82
End: 2017-09-20

## 2017-09-20 NOTE — TELEPHONE ENCOUNTER
Patient recently discharged after surgery, she is unsure what type of surgery. Home health will be re-certifying him per [facility].

## 2017-10-03 ENCOUNTER — TELEPHONE (OUTPATIENT)
Dept: INTERNAL MEDICINE | Facility: CLINIC | Age: 82
End: 2017-10-03

## 2017-10-03 DIAGNOSIS — Z51.81 ENCOUNTER FOR THERAPEUTIC DRUG MONITORING: Primary | ICD-10-CM

## 2017-10-03 NOTE — TELEPHONE ENCOUNTER
Orders per Dr. Vera given to Orestes with [facility]. INR 1.4 change warfarin to take 8 mg per day repeat INR in 10 days. Order read back verified with Dr. Vera.

## 2017-10-03 NOTE — TELEPHONE ENCOUNTER
----- Message from Suresh Jimenez sent at 10/3/2017 10:09 AM CDT -----  Contact: [facility]- [phone]   Would like to consult with nurse about PTINR calling to find out when should this be done again. Please call back at [phone] ask for [facility]

## 2017-10-13 ENCOUNTER — TELEPHONE (OUTPATIENT)
Dept: INTERNAL MEDICINE | Facility: CLINIC | Age: 82
End: 2017-10-13

## 2017-10-13 NOTE — TELEPHONE ENCOUNTER
----- Message from Darlene Fajardo LPN sent at 10/13/2017 10:35 AM CDT -----  Contact: Gretchen/ [facility]    Please review and give new orders  ----- Message -----  From: Fartun Rust  Sent: 10/13/2017  10:28 AM  To: [facility] Staff    Caller states the results of the Pt/ INR test. PT is on 8mg a day. Phone [phone]   31.0 PT  2.6 INR

## 2017-10-18 ENCOUNTER — TELEPHONE (OUTPATIENT)
Dept: INTERNAL MEDICINE | Facility: CLINIC | Age: 82
End: 2017-10-18

## 2017-10-18 NOTE — TELEPHONE ENCOUNTER
Mr. Amauri Lara    Lab faxed from [facility] - PT:31.0  INR: 2.6  Per Dr. Vera no change in coumadin dose. Repeat INR in 4 weeks.

## 2017-10-23 RX ORDER — WARFARIN 4 MG/1
TABLET ORAL
Qty: 30 TABLET | Refills: 11 | Status: SHIPPED | OUTPATIENT
Start: 2017-10-23 | End: 2018-05-16

## 2017-10-24 ENCOUNTER — TELEPHONE (OUTPATIENT)
Dept: INTERNAL MEDICINE | Facility: CLINIC | Age: 82
End: 2017-10-24

## 2017-10-24 NOTE — TELEPHONE ENCOUNTER
----- Message from Lee Orozco MA sent at 10/24/2017  4:18 PM CDT -----  Contact: ishad-daughter      ----- Message -----  From: Marisol Nolasco  Sent: 10/24/2017   4:11 PM  To: [facility] Staff    duplicate msg rg warfarin...[phone]

## 2017-10-24 NOTE — TELEPHONE ENCOUNTER
Notified patients daughter per Dr. Vera there was to be no change in patients coumadin dose he was to continue the same dosage and recheck in 4 weeks these orders were given to [facility] on 10/13/17.

## 2017-10-24 NOTE — TELEPHONE ENCOUNTER
----- Message from Lee Orozco MA sent at 10/24/2017  4:17 PM CDT -----  Contact: ishad-daughter      ----- Message -----  From: Marisol Nolasco  Sent: 10/24/2017   4:11 PM  To: [facility]ody LABOY Staff    duplicate msg rg warfarin...[phone]

## 2017-11-09 ENCOUNTER — HOSPITAL ENCOUNTER (EMERGENCY)
Facility: HOSPITAL | Age: 82
Discharge: HOME | End: 2017-11-09
Attending: EMERGENCY MEDICINE
Payer: MEDICARE

## 2017-11-09 VITALS
WEIGHT: 229.19 LBS | DIASTOLIC BLOOD PRESSURE: 65 MMHG | OXYGEN SATURATION: 99 % | HEIGHT: 71 IN | HEART RATE: 55 BPM | RESPIRATION RATE: 18 BRPM | BODY MASS INDEX: 32.09 KG/M2 | SYSTOLIC BLOOD PRESSURE: 118 MMHG | TEMPERATURE: 98 F

## 2017-11-09 DIAGNOSIS — R04.0 ANTERIOR EPISTAXIS: Primary | ICD-10-CM

## 2017-11-09 LAB
ALBUMIN SERPL BCP-MCNC: 3.2 G/DL
ALP SERPL-CCNC: 82 U/L
ALT SERPL W/O P-5'-P-CCNC: 23 U/L
ANION GAP SERPL CALC-SCNC: 10 MMOL/L
AST SERPL-CCNC: 17 U/L
BASOPHILS # BLD AUTO: 0.02 K/UL
BASOPHILS NFR BLD: 0.3 %
BILIRUB SERPL-MCNC: 0.6 MG/DL
BUN SERPL-MCNC: 15 MG/DL
CALCIUM SERPL-MCNC: 9.5 MG/DL
CHLORIDE SERPL-SCNC: 107 MMOL/L
CO2 SERPL-SCNC: 25 MMOL/L
CREAT SERPL-MCNC: 1 MG/DL
DIFFERENTIAL METHOD: ABNORMAL
EOSINOPHIL # BLD AUTO: 0.1 K/UL
EOSINOPHIL NFR BLD: 1.2 %
ERYTHROCYTE [DISTWIDTH] IN BLOOD BY AUTOMATED COUNT: 16.8 %
EST. GFR  (AFRICAN AMERICAN): >60 ML/MIN/1.73 M^2
EST. GFR  (NON AFRICAN AMERICAN): >60 ML/MIN/1.73 M^2
GLUCOSE SERPL-MCNC: 102 MG/DL
HCT VFR BLD AUTO: 49.2 %
HGB BLD-MCNC: 16 G/DL
INR PPP: 2.9
LYMPHOCYTES # BLD AUTO: 0.9 K/UL
LYMPHOCYTES NFR BLD: 12.9 %
MCH RBC QN AUTO: 28.4 PG
MCHC RBC AUTO-ENTMCNC: 32.5 G/DL
MCV RBC AUTO: 87 FL
MONOCYTES # BLD AUTO: 0.7 K/UL
MONOCYTES NFR BLD: 10.4 %
NEUTROPHILS # BLD AUTO: 5.2 K/UL
NEUTROPHILS NFR BLD: 75.2 %
PLATELET # BLD AUTO: 181 K/UL
PMV BLD AUTO: 10.7 FL
POTASSIUM SERPL-SCNC: 3.8 MMOL/L
PROT SERPL-MCNC: 7.5 G/DL
PROTHROMBIN TIME: 28.8 SEC
RBC # BLD AUTO: 5.64 M/UL
SODIUM SERPL-SCNC: 142 MMOL/L
WBC # BLD AUTO: 6.92 K/UL

## 2017-11-09 PROCEDURE — 85025 COMPLETE CBC W/AUTO DIFF WBC: CPT

## 2017-11-09 PROCEDURE — 80053 COMPREHEN METABOLIC PANEL: CPT

## 2017-11-09 PROCEDURE — 85610 PROTHROMBIN TIME: CPT

## 2017-11-09 PROCEDURE — 25000003 PHARM REV CODE 250: Performed by: EMERGENCY MEDICINE

## 2017-11-09 PROCEDURE — 99284 EMERGENCY DEPT VISIT MOD MDM: CPT

## 2017-11-09 RX ORDER — METFORMIN HYDROCHLORIDE 850 MG/1
TABLET ORAL
Qty: 30 TABLET | Refills: 11 | Status: SHIPPED | OUTPATIENT
Start: 2017-11-09 | End: 2018-11-08 | Stop reason: SDUPTHER

## 2017-11-09 RX ADMIN — Medication 2 SPRAY: at 09:11

## 2017-11-09 NOTE — ED PROVIDER NOTES
SCRIBE #1 NOTE: I, Michel Baer, am scribing for, and in the presence of, Tania Atkins Do, MD. I have scribed the entire note.      History      Chief Complaint   Patient presents with    Epistaxis     nose bleed upon waking this am. stopped upon EMS arrival. no bleeding in route to ED. on coumadin with therapeutic level 2 days ago       Review of patient's allergies indicates:  No Known Allergies     HPI   HPI    11/9/2017, 8:58 AM   History obtained from the patient and daughter      History of Present Illness: Amauri Lara is a 87 y.o. male patient who presents to the Emergency Department for epistaxis which onset gradually this morning after pt woke up. Sxs are episodic and moderate in severity. Pt had 2 episodes of epistaxis PTA.  There are no mitigating or exacerbating factors noted.  Pt denies any fever, N/V/D, HA, blood in stool, facial trauma, CP, nose-picking and all other sxs at this time. Pt is on coumadin. No further complaints or concerns at this time.       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        Pt is deaf and his hearring aid is not working well right now but able to understand my lips and his daughter is also in the room.    HENT: Positive for nosebleeds. Negative for sore throat.    Respiratory: Negative for cough and shortness of breath.    Cardiovascular: Negative for chest pain.   Gastrointestinal: Negative for nausea.   Genitourinary: Negative for dysuria.   Musculoskeletal: Negative for back pain.   Skin: Negative for rash.   Neurological: Negative for weakness.   Hematological: Does not bruise/bleed easily.     Physical Exam      Initial Vitals [11/09/17 0835]   BP Pulse Resp Temp SpO2   138/68 61 18 98.2 °F (36.8 °C) 97 %      MAP       91.33          Physical Exam  Nursing Notes and Vital Signs Reviewed.  Constitutional: Patient is in no acute distress. Well-developed and well-nourished.  Head: Atraumatic. Normocephalic.  Eyes: PERRL. EOM intact. Conjunctivae are not pale. No scleral icterus.  Ears: Right TM normal. Left TM normal. No erythema. No bulging. No effusion or air-fluid levels. No perforation.   Nose: Patent nares. Friable blood vessels in right nare, no active bleeding. No polyps. No clots. Turbinates are normal. No drainage.   Throat: Moist mucous membranes. Posterior oropharynx is symmetric without erythema. Tonsillar exudate is not present. No trismus. Normal handling of secretions. No stridor.  Neck: Supple. Full ROM. No lymphadenopathy.  Cardiovascular: Regular rate. Regular rhythm. No murmurs, rubs, or gallops. Distal pulses are 2+ and symmetric.  Pulmonary/Chest: No respiratory distress. Clear to auscultation bilaterally. No wheezing, rales, or rhonchi.  Abdominal: Soft and non-distended.  There is no tenderness.  No rebound, guarding, or rigidity. Good bowel sounds.  Genitourinary: No CVA tenderness  Musculoskeletal: Moves all extremities. No obvious deformities. No edema. No calf tenderness.  Skin: Warm and dry.  Neurological:   "Alert, awake, and appropriate.  Normal speech.  No acute focal neurological deficits are appreciated.  Psychiatric: Normal affect. Good eye contact. Appropriate in content.    ED Course    Procedures  ED Vital Signs:  Vitals:    11/09/17 0835 11/09/17 0906 11/09/17 1006 11/09/17 1036   BP: 138/68 (!) 114/56 110/61 118/65   Pulse: 61 (!) 58 (!) 55 (!) 55   Resp: 18      Temp: 98.2 °F (36.8 °C)      TempSrc: Oral      SpO2: 97% 97% 98% 99%   Weight: 104 kg (229 lb 3.2 oz)      Height: 5' 11" (1.803 m)          Abnormal Lab Results:  Labs Reviewed   CBC W/ AUTO DIFFERENTIAL - Abnormal; Notable for the following:        Result Value    RDW 16.8 (*)     Lymph # 0.9 (*)     Gran% 75.2 (*)     Lymph% 12.9 (*)     All other components within normal limits   COMPREHENSIVE METABOLIC PANEL - Abnormal; Notable for the following:     Albumin 3.2 (*)     All other components within normal limits   PROTIME-INR - Abnormal; Notable for the following:     Prothrombin Time 28.8 (*)     INR 2.9 (*)     All other components within normal limits        All Lab Results:  Results for orders placed or performed during the hospital encounter of 11/09/17   CBC auto differential   Result Value Ref Range    WBC 6.92 3.90 - 12.70 K/uL    RBC 5.64 4.60 - 6.20 M/uL    Hemoglobin 16.0 14.0 - 18.0 g/dL    Hematocrit 49.2 40.0 - 54.0 %    MCV 87 82 - 98 fL    MCH 28.4 27.0 - 31.0 pg    MCHC 32.5 32.0 - 36.0 g/dL    RDW 16.8 (H) 11.5 - 14.5 %    Platelets 181 150 - 350 K/uL    MPV 10.7 9.2 - 12.9 fL    Gran # 5.2 1.8 - 7.7 K/uL    Lymph # 0.9 (L) 1.0 - 4.8 K/uL    Mono # 0.7 0.3 - 1.0 K/uL    Eos # 0.1 0.0 - 0.5 K/uL    Baso # 0.02 0.00 - 0.20 K/uL    Gran% 75.2 (H) 38.0 - 73.0 %    Lymph% 12.9 (L) 18.0 - 48.0 %    Mono% 10.4 4.0 - 15.0 %    Eosinophil% 1.2 0.0 - 8.0 %    Basophil% 0.3 0.0 - 1.9 %    Differential Method Automated    Comprehensive metabolic panel   Result Value Ref Range    Sodium 142 136 - 145 mmol/L    Potassium 3.8 3.5 - 5.1 mmol/L "    Chloride 107 95 - 110 mmol/L    CO2 25 23 - 29 mmol/L    Glucose 102 70 - 110 mg/dL    BUN, Bld 15 8 - 23 mg/dL    Creatinine 1.0 0.5 - 1.4 mg/dL    Calcium 9.5 8.7 - 10.5 mg/dL    Total Protein 7.5 6.0 - 8.4 g/dL    Albumin 3.2 (L) 3.5 - 5.2 g/dL    Total Bilirubin 0.6 0.1 - 1.0 mg/dL    Alkaline Phosphatase 82 55 - 135 U/L    AST 17 10 - 40 U/L    ALT 23 10 - 44 U/L    Anion Gap 10 8 - 16 mmol/L    eGFR if African American >60 >60 mL/min/1.73 m^2    eGFR if non African American >60 >60 mL/min/1.73 m^2   Protime-INR   Result Value Ref Range    Prothrombin Time 28.8 (H) 9.0 - 12.5 sec    INR 2.9 (H) 0.8 - 1.2               The Emergency Provider reviewed the vital signs and test results, which are outlined above.    ED Discussion     12:17 PM: Reassessed pt. Pt's nose has not bled throughout his stay in ED.  Discussed with pt all pertinent ED information and results. Discussed plan of treatment with pt. Gave pt all f/u and return to the ED instructions. All questions and concerns were addressed at this time. Pt understands and agrees to plan as discussed. Pt is stable for discharge.       ED Medication(s):  Medications   phenylephrine HCL 0.5% nasal spray 2 spray (2 sprays Each Nare Given 11/9/17 0910)       Discharge Medication List as of 11/9/2017 10:42 AM          Follow-up Information     Mani Scott MD In 1 day.    Specialty:  Otolaryngology  Contact information:  [address]  [place] [address] [phone]                     Medical Decision Making    Medical Decision Making:   Clinical Tests:   Lab Tests: Reviewed and Ordered           Scribe Attestation:   Scribe #1: I performed the above scribed service and the documentation accurately describes the services I performed. I attest to the accuracy of the note.    Attending:   Physician Attestation Statement for Scribe #1: I, Tania Atkins Do, MD, personally performed the services described in this documentation, as scribed by Michel Baer,  in my presence, and it is both accurate and complete.          Clinical Impression       ICD-10-CM ICD-9-CM   1. Anterior epistaxis R04.0 784.7       Disposition:   Disposition: Discharged  Condition: Stable         Tania Atkins Do, MD  11/09/17 9979

## 2017-11-15 ENCOUNTER — TELEPHONE (OUTPATIENT)
Dept: INTERNAL MEDICINE | Facility: CLINIC | Age: 82
End: 2017-11-15

## 2017-11-15 NOTE — TELEPHONE ENCOUNTER
----- Message from Sanjuana Oneal sent at 11/15/2017  1:54 PM CST -----  Contact: Gretchen Garvey, [facility]  Ms Page needs to speak to nurse about patients PT-INR, please jaqueline her back at [phone]. Thank you

## 2017-11-15 NOTE — TELEPHONE ENCOUNTER
Called Gretchen.  She reports that the pt was in the ER 11/9/17 with nosebleed.  That he was told to take Coumadin 7 mg.  Pt's PT 38.1 INR 3.2 drawn today.  Gretchen would like orders for the pt's Coumadin.  Please advise.

## 2017-11-16 NOTE — TELEPHONE ENCOUNTER
Called Gretchen ARREDONDO informed her per Dr. Vera/Zuleyka LPN to decrease Coumadin to 5 mg and recheck INR in one week.  She voiced understanding.

## 2017-11-22 ENCOUNTER — TELEPHONE (OUTPATIENT)
Dept: INTERNAL MEDICINE | Facility: CLINIC | Age: 82
End: 2017-11-22

## 2017-11-24 NOTE — TELEPHONE ENCOUNTER
Called pt no answer.  Called cell phone number spoke with pt's daughter.  Informed her of results and recommendations.  She voiced understanding .

## 2017-11-24 NOTE — TELEPHONE ENCOUNTER
Called Page with [facility].  Informed her of results and recommendations.  She voiced understanding.

## 2017-12-09 RX ORDER — LOVASTATIN 40 MG/1
TABLET ORAL
Qty: 30 TABLET | Refills: 11 | Status: SHIPPED | OUTPATIENT
Start: 2017-12-09 | End: 2018-11-08 | Stop reason: SDUPTHER

## 2017-12-18 ENCOUNTER — PATIENT OUTREACH (OUTPATIENT)
Dept: ADMINISTRATIVE | Facility: HOSPITAL | Age: 82
End: 2017-12-18

## 2017-12-20 ENCOUNTER — TELEPHONE (OUTPATIENT)
Dept: INTERNAL MEDICINE | Facility: CLINIC | Age: 82
End: 2017-12-20

## 2017-12-20 NOTE — TELEPHONE ENCOUNTER
----- Message from Maria Luz London sent at 12/20/2017  3:36 PM CST -----  Contact: [facility]/home health nurse   Caller is calling in pt PT & INR.   PT is 16.8 and INR is 1.4. Pt is taking 5 mg daily.  [phone]

## 2017-12-21 ENCOUNTER — LAB VISIT (OUTPATIENT)
Dept: LAB | Facility: HOSPITAL | Age: 82
End: 2017-12-21
Attending: INTERNAL MEDICINE
Payer: MEDICARE

## 2017-12-21 DIAGNOSIS — E11.8 TYPE 2 DIABETES MELLITUS WITH COMPLICATION, WITHOUT LONG-TERM CURRENT USE OF INSULIN: ICD-10-CM

## 2017-12-21 LAB
ALBUMIN SERPL BCP-MCNC: 3.5 G/DL
ALP SERPL-CCNC: 81 U/L
ALT SERPL W/O P-5'-P-CCNC: 22 U/L
ANION GAP SERPL CALC-SCNC: 8 MMOL/L
AST SERPL-CCNC: 19 U/L
BASOPHILS # BLD AUTO: 0.03 K/UL
BASOPHILS NFR BLD: 0.4 %
BILIRUB SERPL-MCNC: 0.7 MG/DL
BUN SERPL-MCNC: 20 MG/DL
CALCIUM SERPL-MCNC: 9.6 MG/DL
CHLORIDE SERPL-SCNC: 104 MMOL/L
CHOLEST SERPL-MCNC: 186 MG/DL
CHOLEST/HDLC SERPL: 4.5 {RATIO}
CO2 SERPL-SCNC: 29 MMOL/L
CREAT SERPL-MCNC: 1.1 MG/DL
DIFFERENTIAL METHOD: ABNORMAL
EOSINOPHIL # BLD AUTO: 0.1 K/UL
EOSINOPHIL NFR BLD: 1.7 %
ERYTHROCYTE [DISTWIDTH] IN BLOOD BY AUTOMATED COUNT: 17.1 %
EST. GFR  (AFRICAN AMERICAN): >60 ML/MIN/1.73 M^2
EST. GFR  (NON AFRICAN AMERICAN): 59.6 ML/MIN/1.73 M^2
ESTIMATED AVG GLUCOSE: 105 MG/DL
GLUCOSE SERPL-MCNC: 101 MG/DL
HBA1C MFR BLD HPLC: 5.3 %
HCT VFR BLD AUTO: 48.2 %
HDLC SERPL-MCNC: 41 MG/DL
HDLC SERPL: 22 %
HGB BLD-MCNC: 15.1 G/DL
IMM GRANULOCYTES # BLD AUTO: 0.04 K/UL
IMM GRANULOCYTES NFR BLD AUTO: 0.5 %
LDLC SERPL CALC-MCNC: 113.6 MG/DL
LYMPHOCYTES # BLD AUTO: 1.4 K/UL
LYMPHOCYTES NFR BLD: 18.1 %
MCH RBC QN AUTO: 27.3 PG
MCHC RBC AUTO-ENTMCNC: 31.3 G/DL
MCV RBC AUTO: 87 FL
MONOCYTES # BLD AUTO: 0.9 K/UL
MONOCYTES NFR BLD: 11.7 %
NEUTROPHILS # BLD AUTO: 5.1 K/UL
NEUTROPHILS NFR BLD: 67.6 %
NONHDLC SERPL-MCNC: 145 MG/DL
NRBC BLD-RTO: 0 /100 WBC
PLATELET # BLD AUTO: 207 K/UL
PMV BLD AUTO: 11.4 FL
POTASSIUM SERPL-SCNC: 4.2 MMOL/L
PROT SERPL-MCNC: 7.8 G/DL
RBC # BLD AUTO: 5.54 M/UL
SODIUM SERPL-SCNC: 141 MMOL/L
TRIGL SERPL-MCNC: 157 MG/DL
TSH SERPL DL<=0.005 MIU/L-ACNC: 1.85 UIU/ML
WBC # BLD AUTO: 7.51 K/UL

## 2017-12-21 PROCEDURE — 80061 LIPID PANEL: CPT

## 2017-12-21 PROCEDURE — 80053 COMPREHEN METABOLIC PANEL: CPT

## 2017-12-21 PROCEDURE — 85025 COMPLETE CBC W/AUTO DIFF WBC: CPT

## 2017-12-21 PROCEDURE — 84443 ASSAY THYROID STIM HORMONE: CPT

## 2017-12-21 PROCEDURE — 36415 COLL VENOUS BLD VENIPUNCTURE: CPT

## 2017-12-21 PROCEDURE — 83036 HEMOGLOBIN GLYCOSYLATED A1C: CPT

## 2017-12-21 NOTE — TELEPHONE ENCOUNTER
INR 1.4, increase coumadin to alternate between 5 mg and 7.5 mg every other day. Repeat INR on 12/28

## 2017-12-28 ENCOUNTER — OFFICE VISIT (OUTPATIENT)
Dept: INTERNAL MEDICINE | Facility: CLINIC | Age: 82
End: 2017-12-28
Payer: MEDICARE

## 2017-12-28 VITALS
HEART RATE: 73 BPM | BODY MASS INDEX: 33.46 KG/M2 | OXYGEN SATURATION: 98 % | DIASTOLIC BLOOD PRESSURE: 78 MMHG | TEMPERATURE: 98 F | WEIGHT: 239 LBS | SYSTOLIC BLOOD PRESSURE: 126 MMHG | RESPIRATION RATE: 18 BRPM | HEIGHT: 71 IN

## 2017-12-28 VITALS
HEART RATE: 73 BPM | WEIGHT: 239 LBS | TEMPERATURE: 98 F | BODY MASS INDEX: 33.33 KG/M2 | SYSTOLIC BLOOD PRESSURE: 126 MMHG | DIASTOLIC BLOOD PRESSURE: 78 MMHG | OXYGEN SATURATION: 98 % | RESPIRATION RATE: 20 BRPM

## 2017-12-28 DIAGNOSIS — I15.2 HYPERTENSION ASSOCIATED WITH DIABETES: ICD-10-CM

## 2017-12-28 DIAGNOSIS — R33.9 URINARY RETENTION: ICD-10-CM

## 2017-12-28 DIAGNOSIS — Z86.711 HISTORY OF PULMONARY EMBOLISM: ICD-10-CM

## 2017-12-28 DIAGNOSIS — I70.0 ATHEROSCLEROSIS OF AORTA: ICD-10-CM

## 2017-12-28 DIAGNOSIS — E66.9 CLASS 1 OBESITY WITH BODY MASS INDEX (BMI) OF 33.0 TO 33.9 IN ADULT, UNSPECIFIED OBESITY TYPE, UNSPECIFIED WHETHER SERIOUS COMORBIDITY PRESENT: ICD-10-CM

## 2017-12-28 DIAGNOSIS — Z00.00 ENCOUNTER FOR PREVENTIVE HEALTH EXAMINATION: ICD-10-CM

## 2017-12-28 DIAGNOSIS — E11.59 HYPERTENSION ASSOCIATED WITH DIABETES: ICD-10-CM

## 2017-12-28 DIAGNOSIS — E11.42 TYPE 2 DIABETES MELLITUS WITH POLYNEUROPATHY: ICD-10-CM

## 2017-12-28 DIAGNOSIS — E11.8 TYPE 2 DIABETES MELLITUS WITH COMPLICATION, WITHOUT LONG-TERM CURRENT USE OF INSULIN: ICD-10-CM

## 2017-12-28 DIAGNOSIS — H91.13 PRESBYCUSIS OF BOTH EARS: ICD-10-CM

## 2017-12-28 DIAGNOSIS — Z87.39 HISTORY OF GOUT: ICD-10-CM

## 2017-12-28 DIAGNOSIS — Z01.00 ROUTINE EYE EXAM: Primary | ICD-10-CM

## 2017-12-28 DIAGNOSIS — Z00.00 ROUTINE GENERAL MEDICAL EXAMINATION AT A HEALTH CARE FACILITY: Primary | ICD-10-CM

## 2017-12-28 PROCEDURE — 99499 UNLISTED E&M SERVICE: CPT | Mod: S$GLB,,, | Performed by: NURSE PRACTITIONER

## 2017-12-28 PROCEDURE — 99999 PR PBB SHADOW E&M-EST. PATIENT-LVL IV: CPT | Mod: PBBFAC,,, | Performed by: NURSE PRACTITIONER

## 2017-12-28 PROCEDURE — G0008 ADMIN INFLUENZA VIRUS VAC: HCPCS | Mod: S$GLB,,, | Performed by: INTERNAL MEDICINE

## 2017-12-28 PROCEDURE — 99499 UNLISTED E&M SERVICE: CPT | Mod: S$GLB,,, | Performed by: INTERNAL MEDICINE

## 2017-12-28 PROCEDURE — 99397 PER PM REEVAL EST PAT 65+ YR: CPT | Mod: 25,S$GLB,, | Performed by: INTERNAL MEDICINE

## 2017-12-28 PROCEDURE — G0439 PPPS, SUBSEQ VISIT: HCPCS | Mod: S$GLB,,, | Performed by: NURSE PRACTITIONER

## 2017-12-28 PROCEDURE — 99999 PR PBB SHADOW E&M-EST. PATIENT-LVL III: CPT | Mod: PBBFAC,,, | Performed by: INTERNAL MEDICINE

## 2017-12-28 PROCEDURE — 90662 IIV NO PRSV INCREASED AG IM: CPT | Mod: S$GLB,,, | Performed by: INTERNAL MEDICINE

## 2017-12-28 NOTE — PROGRESS NOTES
HPI:  Patient is an 88-year-old man who comes today for follow-up of his diabetes, hypertension, lipids and for his annual physical.  Patient has been doing exceptionally well.  He had a popliteal aneurysm.  Last summer and is doing excellent.  He denies any hypoglycemic problems.  He has no new complaints.  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 Denies any nausea, vomiting or diarrhea. Denies any fever, chills or sweats. Denies any change in weight, voice, stool, skin or hair. Denies any dysuria, dyspepsia or dysphagia. Denies any change in vision, hearing or headaches. Denies any swollen lymph nodes or loss of memory.    PE:  /78   Pulse 73   Temp 97.9 °F (36.6 °C) (Tympanic)   Resp 20   Wt 108.4 kg (238 lb 15.7 oz)   SpO2 98%   BMI 33.33 kg/m²   Gen: Well-developed, well-nourished, male, in no acute distress, oriented x3  HEENT: neck is supple, no adenopathy, carotids 2+ equal without bruits, thyroid exam normal size without nodules.  CHEST: clear to auscultation and percussion  CVS: regular rate and rhythm without significant murmur, gallop, or rubs  ABD: soft, benign, no rebound no guarding, no distention.  Bowel sounds are normal.      nontender.  No palpable masses.  No organomegaly and no audible bruits.  RECTAL: Deferred  EXT: no clubbing, cyanosis, or edema  LYMPH: no cervical, inguinal, or axillary adenopathy  FEET: no loss of sensation.  No ulcers or pressure sores.  Monofilament testing normal  NEURO: gait normal.  Cranial nerves II- XII intact. No nystagmus.  Speech normal.   Gross motor and sensory unremarkable.    Lab Results   Component Value Date    WBC 7.51 12/21/2017    HGB 15.1 12/21/2017    HCT 48.2 12/21/2017     12/21/2017    CHOL 186 12/21/2017    TRIG 157 (H) 12/21/2017    HDL 41 12/21/2017    ALT 22 12/21/2017    AST 19 12/21/2017     12/21/2017    K 4.2 12/21/2017     12/21/2017    CREATININE 1.1 12/21/2017    BUN 20 12/21/2017    CO2 29 12/21/2017    TSH 1.851 12/21/2017    PSA 14 (H) 08/10/2011    INR 2.9 (H) 11/09/2017    HGBA1C 5.3 12/21/2017       Impression:  Multiple medical problems below, stable  Patient Active Problem List   Diagnosis    Obesity, unspecified    Hearing loss of aging    History of pulmonary embolism    DM (diabetes mellitus), type 2 with complications    Hypertension associated with diabetes    History of gout    Type 2 diabetes mellitus with polyneuropathy    Urinary retention    Aneurysm artery, popliteal       Plan:   Orders Placed This Encounter    Influenza - High Dose (65+) (PF) (IM)    Hemoglobin A1c    Comprehensive metabolic panel    Lipid panel    Protein / creatinine ratio, urine    TSH    CBC auto differential    Uric acid     He was given high-dose influenza vaccine today.  His medications remain the same.  He'll be seen again in 6 months with above lab work.

## 2017-12-28 NOTE — PATIENT INSTRUCTIONS
Counseling and Referral of Other Preventative  (Italic type indicates deductible and co-insurance are waived)    Patient Name: Amauri Lara  Today's Date: 12/28/2017      SERVICE LIMITATIONS RECOMMENDATION    Vaccines    · Pneumococcal (once after 65)    · Influenza (annually)    · Hepatitis B (if medium/high risk)    · Prevnar 13      Hepatitis B medium/high risk factors:       - End-stage renal disease       - Hemophiliacs who received Factor VII or         IX concentrates       - Clients of institutions for the mentally             retarded       - Persons who live in the same house as          a HepB carrier       - Homosexual men       - Illicit injectable drug abusers     Pneumococcal: Done, no repeat necessary     Influenza: Done, repeat in one year     Hepatitis B: N/A     Prevnar 13: Done, no repeat necessary    Prostate cancer screening (annually to age 75)     Prostate specific antigen (PSA) Shared decision making with Provider. Sometimes a co-pay may be required if the patient decides to have this test. The USPSTF no longer recommends prostate cancer screening routinely in medicine: not to follow    Colorectal cancer screening (to age 75)    · Fecal occult blood test (annual)  · Flexible sigmoidoscopy (5y)  · Screening colonoscopy (10y)  · Barium enema   N/A    Diabetes self-management training (no USPSTF recommendations)  Requires referral by treating physician for patient with diabetes or renal disease. 10 hours of initial DSMT sessions of no less than 30 minutes each in a continuous 12-month period. 2 hours of follow-up DSMT in subsequent years.  N/A    Glaucoma screening (no USPSTF recommendation)  Diabetes mellitus, family history   , age 50 or over    American, age 65 or over  Scheduled, see appointments    Medical nutrition therapy for diabetes or renal disease (no recommended schedule)  Requires referral by treating physician for patient with diabetes or renal  disease or kidney transplant within the past 3 years.  Can be provided in same year as diabetes self-management training (DSMT), and CMS recommends medical nutrition therapy take place after DSMT. Up to 3 hours for initial year and 2 hours in subsequent years.  N/A    Cardiovascular screening blood tests (every 5 years)  · Fasting lipid panel  Order as a panel if possible  Done this year, repeat every year    Diabetes screening tests (at least every 3 years, Medicare covers annually or at 6-month intervals for prediabetic patients)  · Fasting blood sugar (FBS) or glucose tolerance test (GTT)  Patient must be diagnosed with one of the following:       - Hypertension       - Dyslipidemia       - Obesity (BMI 30kg/m2)       - Previous elevated impaired FBS or GTT       ... or any two of the following:       - Overweight (BMI 25 but <30)       - Family history of diabetes       - Age 65 or older       - History of gestational diabetes or birth of baby weighing more than 9 pounds  Done this year, repeat every year    Abdominal aortic aneurysm screening (once)  · Sonogram   Limited to patients who meet one of the following criteria:       - Men who are 65-75 years old and have smoked more than 100 cigarette in their lifetime       - Anyone with a family history of abdominal aortic aneurysm       - Anyone recommended for screening by the USPSTF  N/A    HIV screening (annually for increased risk patients)  · HIV-1 and HIV-2 by EIA, or WENCESLAO, rapid antibody test or oral mucosa transudate  Patients must be at increased risk for HIV infection per USPSTF guidelines or pregnant. Tests covered annually for patient at increased risk or as requested by the patient. Pregnant patients may receive up to 3 tests during pregnancy.  Risks discussed, screening is not recommended    Smoking cessation counseling (up to 8 sessions per year)  Patients must be asymptomatic of tobacco-related conditions to receive as a preventative service.   Non-smoker    Subsequent annual wellness visit  At least 12 months since last AWV  Return in one year     The following information is provided to all patients.  This information is to help you find resources for any of the problems found today that may be affecting your health:                Living healthy guide: www.[facility].[place].[facility]      Understanding Diabetes: www.diabetes.org      Eating healthy: www.cdc.gov/healthyweight      CDC home safety checklist: www.cdc.gov/steadi/patient.html      Agency on Aging: www.goea.[place].[facility]      Alcoholics anonymous (AA): www.aa.org      Physical Activity: www.coco.nih.gov/pt6nrxs      Tobacco use: www.quitwithusla.org

## 2017-12-28 NOTE — PROGRESS NOTES
"Amauri Lara presented for a  Medicare AWV and comprehensive Health Risk Assessment today. The following components were reviewed and updated:    · Medical history  · Family History  · Social history  · Allergies and Current Medications  · Health Risk Assessment  · Health Maintenance  · Care Team     ** See Completed Assessments for Annual Wellness Visit within the encounter summary.**       The following assessments were completed:  · Living Situation  · CAGE  · Depression Screening  · Timed Get Up and Go  · Whisper Test  · Cognitive Function Screening  · Nutrition Screening  · ADL Screening  · PAQ Screening    Vitals:    12/28/17 1000   BP: 126/78   BP Location: Right arm   Patient Position: Sitting   Pulse: 73   Resp: 18   Temp: 97.9 °F (36.6 °C)   TempSrc: Tympanic   SpO2: 98%   Weight: 108.4 kg (238 lb 15.7 oz)   Height: 5' 11" (1.803 m)     Body mass index is 33.33 kg/m².  Physical Exam   Constitutional: He is oriented to person, place, and time. He appears well-developed and well-nourished. No distress.   HENT:   Head: Normocephalic and atraumatic.   Nose: Nose normal.   Mouth/Throat: No oropharyngeal exudate.   Bilateral hearing aids   Neck: Normal range of motion. Neck supple. No JVD present.   No carotid bruits   Cardiovascular: Normal rate, regular rhythm and normal heart sounds.  Exam reveals no gallop and no friction rub.    No murmur heard.  Bilateral lower legs post fem pop, mild edema   Pulmonary/Chest: Effort normal and breath sounds normal. No respiratory distress. He has no wheezes. He has no rales. He exhibits no tenderness.   Abdominal: Soft. Bowel sounds are normal. He exhibits no distension and no mass. There is no tenderness. There is no rebound and no guarding. A hernia is present.   No abd bruits, ventral hernia   Genitourinary:   Genitourinary Comments: Cuevas catheter intact and secure to leg bag   Musculoskeletal: Normal range of motion. He exhibits no edema or tenderness.   Walks " with cane   Lymphadenopathy:     He has no cervical adenopathy.   Neurological: He is alert and oriented to person, place, and time. No cranial nerve deficit.   Skin: Skin is warm and dry. He is not diaphoretic.   Psychiatric: He has a normal mood and affect. His behavior is normal.         Diagnoses and health risks identified today and associated recommendations/orders:    1. Routine eye exam  Will see his eye doctor for routine exam  - Ambulatory Referral to Optometry    2. Encounter for preventive health examination  Screenings performed, as noted above.  Personal preventative testing needs reviewed.     3. Hypertension associated with diabetes  Monitored/treated on meds, continue the same tx, sees his cardiologist routinely at [facility]    4. Atherosclerosis of aorta  Monitored/treated on meds, continue the same tx    5. Urinary retention  Monitored/treated on meds, continue the same tx, sees his urologist routinely    6. History of pulmonary embolism  Monitored/treated on meds, continue the same tx    7. Type 2 diabetes mellitus with complication, without long-term current use of insulin  Monitored/treated on meds, continue the same tx    8. Class 1 obesity with body mass index (BMI) of 33.0 to 33.9 in adult, unspecified obesity type, unspecified whether serious comorbidity present  Monitored/treated on meds, continue the same tx    9. Type 2 diabetes mellitus with polyneuropathy  Monitored/treated on meds, continue the same tx    10. History of gout  Monitored/treated on meds, continue the same tx    11. Presbycusis of both ears  Monitored/treated on meds, continue the same tx      Provided Amauri with a 5-10 year written screening schedule and personal prevention plan. Recommendations were developed using the USPSTF age appropriate recommendations. Education, counseling, and referrals were provided as needed. After Visit Summary printed and given to patient which includes a list of additional screenings\tests  needed.    No Follow-up on file.    Juan Ritter, NP

## 2017-12-28 NOTE — PROGRESS NOTES
"Subjective:      Patient ID: Amauri Lara is a 88 y.o. male.    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    Obesity, unspecified     Type 2 diabetes mellitus with polyneuropathy        Past Surgical History:   Procedure Laterality Date    CATARACT EXTRACTION      aphakic od       Lab Results   Component Value Date    WBC 7.51 12/21/2017    HGB 15.1 12/21/2017    HCT 48.2 12/21/2017     12/21/2017    CHOL 186 12/21/2017    TRIG 157 (H) 12/21/2017    HDL 41 12/21/2017    ALT 22 12/21/2017    AST 19 12/21/2017     12/21/2017    K 4.2 12/21/2017     12/21/2017    CREATININE 1.1 12/21/2017    BUN 20 12/21/2017    CO2 29 12/21/2017    TSH 1.851 12/21/2017    PSA 14 (H) 08/10/2011    INR 2.9 (H) 11/09/2017    HGBA1C 5.3 12/21/2017       /78 (BP Location: Right arm, Patient Position: Sitting)   Pulse 73   Temp 97.9 °F (36.6 °C) (Tympanic)   Resp 18   Ht 5' 11" (1.803 m)   Wt 108.4 kg (238 lb 15.7 oz)   SpO2 98%   BMI 33.33 kg/m²       Review of Systems   Objective:     Physical Exam  Assessment:      1. Routine eye exam    2. Encounter for preventive health examination    3. Hypertension associated with diabetes    4. Atherosclerosis of aorta    5. Urinary retention    6. History of pulmonary embolism    7. Type 2 diabetes mellitus with complication, without long-term current use of insulin    8. Class 1 obesity with body mass index (BMI) of 33.0 to 33.9 in adult, unspecified obesity type, unspecified whether serious comorbidity present    9. Type 2 diabetes mellitus with polyneuropathy    10. History of gout    11. Presbycusis of both ears  "     Plan:   Routine eye exam  -     Ambulatory Referral to Optometry    Encounter for preventive health examination    Hypertension associated with diabetes    Atherosclerosis of aorta    Urinary retention    History of pulmonary embolism    Type 2 diabetes mellitus with complication, without long-term current use of insulin    Class 1 obesity with body mass index (BMI) of 33.0 to 33.9 in adult, unspecified obesity type, unspecified whether serious comorbidity present    Type 2 diabetes mellitus with polyneuropathy    History of gout    Presbycusis of both ears          Current Outpatient Prescriptions:     allopurinol (ZYLOPRIM) 300 MG tablet, TAKE ONE TABLET BY MOUTH EVERY DAY, Disp: 30 tablet, Rfl: 11    finasteride (PROSCAR) 5 mg tablet, Take 1 tablet (5 mg total) by mouth once daily., Disp: 30 tablet, Rfl: 11    glipiZIDE (GLUCOTROL) 10 MG TR24, , Disp: , Rfl:     hydrocodone-acetaminophen 7.5-325mg (NORCO) 7.5-325 mg per tablet, Take 1 tablet by mouth every 6 (six) hours as needed for Pain., Disp: 90 tablet, Rfl: 0    lisinopril-hydrochlorothiazide (PRINZIDE,ZESTORETIC) 20-12.5 mg per tablet, TAKE 1 TABLET BY MOUTH 2 TIMES DAILY, Disp: 60 tablet, Rfl: 11    lovastatin (MEVACOR) 40 MG tablet, TAKE ONE TABLET BY MOUTH EVERY EVENING, Disp: 30 tablet, Rfl: 11    metFORMIN (GLUCOPHAGE) 850 MG tablet, TAKE ONE TABLET BY MOUTH EVERY DAY, Disp: 30 tablet, Rfl: 11    metoprolol tartrate (LOPRESSOR) 25 MG tablet, , Disp: , Rfl:     SSD 1 % cream, , Disp: , Rfl:     tamsulosin (FLOMAX) 0.4 mg Cp24, Take 1 capsule (0.4 mg total) by mouth once daily., Disp: 30 capsule, Rfl: 11    warfarin (COUMADIN) 4 MG tablet, Take one-half tablet (2mg) by mouth every evening along with 5mg tablet as directed by the [facility]., Disp: 30 tablet, Rfl: 11

## 2018-01-04 ENCOUNTER — TELEPHONE (OUTPATIENT)
Dept: INTERNAL MEDICINE | Facility: CLINIC | Age: 83
End: 2018-01-04

## 2018-01-04 NOTE — TELEPHONE ENCOUNTER
Called Evelin she reports pt's PT 21.1/INR 1.8.  Coumadin dose 5 mg alternating with 7.5 mg every other day.  Please advise.

## 2018-01-04 NOTE — TELEPHONE ENCOUNTER
----- Message from Helen Sharma sent at 1/4/2018 12:06 PM CST -----  Contact: [facility]/Evelin  States his PT 21.1 and INR 1.8. States he's currently take coumadin 5 mg alternating 7.5 mg every other day. Please call Evelin at [phone]. Thank you

## 2018-01-05 ENCOUNTER — OFFICE VISIT (OUTPATIENT)
Dept: OPHTHALMOLOGY | Facility: CLINIC | Age: 83
End: 2018-01-05
Payer: MEDICARE

## 2018-01-05 DIAGNOSIS — H27.01 APHAKIA OF EYE, RIGHT: ICD-10-CM

## 2018-01-05 DIAGNOSIS — H21.531: ICD-10-CM

## 2018-01-05 DIAGNOSIS — H52.223 REGULAR ASTIGMATISM OF BOTH EYES: ICD-10-CM

## 2018-01-05 DIAGNOSIS — E11.9 DIABETES MELLITUS TYPE 2 WITHOUT RETINOPATHY: Primary | ICD-10-CM

## 2018-01-05 DIAGNOSIS — H43.22 ASTEROID HYALOSIS OF LEFT EYE: ICD-10-CM

## 2018-01-05 DIAGNOSIS — H50.10 MONOCULAR EXOTROPIA: ICD-10-CM

## 2018-01-05 DIAGNOSIS — H52.4 PRESBYOPIA: ICD-10-CM

## 2018-01-05 DIAGNOSIS — H04.123 BILATERAL DRY EYES: ICD-10-CM

## 2018-01-05 DIAGNOSIS — H25.12 NUCLEAR SCLEROTIC CATARACT, LEFT: ICD-10-CM

## 2018-01-05 PROCEDURE — 99499 UNLISTED E&M SERVICE: CPT | Mod: S$GLB,,, | Performed by: OPTOMETRIST

## 2018-01-05 PROCEDURE — 92014 COMPRE OPH EXAM EST PT 1/>: CPT | Mod: S$GLB,,, | Performed by: OPTOMETRIST

## 2018-01-05 PROCEDURE — 99999 PR PBB SHADOW E&M-EST. PATIENT-LVL I: CPT | Mod: PBBFAC,,, | Performed by: OPTOMETRIST

## 2018-01-05 PROCEDURE — 92015 DETERMINE REFRACTIVE STATE: CPT | Mod: S$GLB,,, | Performed by: OPTOMETRIST

## 2018-01-05 NOTE — LETTER
January 5, 2018      [place]  [address] [facility] [id number] [place] [id number]  Phone: [phone]  Fax: [phone]          Patient: Amauri Lara   MR Number: [id number]   YOB: 1929   Date of Visit: 1/5/2018       Dear Juan Ritter:    Thank you for referring Amauri Lara to me for evaluation. Attached you will find relevant portions of my assessment and plan of care.    If you have questions, please do not hesitate to call me. I look forward to following Amauri Lara along with you.    Sincerely,    NANNETTE Townsend, OD    Enclosure  CC:  No Recipients    If you would like to receive this communication electronically, please contact [email or web address] or [phone] to request more information on [employer] access.    For providers and/or their staff who would like to refer a patient to [facility], please contact us through our one-stop-shop provider referral line, Peter Obrien, at [phone].    If you feel you have received this communication in error or would no longer like to receive these types of communications, please e-mail [email or web address]

## 2018-01-05 NOTE — PROGRESS NOTES
HPI     Last [facility] exam 07/15/2016  Diabetic/NIDDM  Aphakia, OD  Iridodialysis, OD  Cataract, nuclear, bilateral  Screening for glaucoma  RE    Last edited by Henry London MA on 1/5/2018  1:03 PM. (History)            Assessment /Plan     For exam results, see Encounter Report.    Diabetes mellitus type 2 without retinopathy    Nuclear sclerotic cataract, left    Aphakia of eye, right    Iridodialysis, right    Bilateral dry eyes    Exotropia, alternating    Regular astigmatism of both eyes      No diabetic retinopathy OU.  Moderate NS cataract OS = will follow.  Old aphakia with iridodialysis OD = stable.  Dry eyes = Systane Balance prn OU.  Stable alternating XT.  Specs given for presbyopic astigmatism.  RTC one year.

## 2018-01-08 RX ORDER — ALLOPURINOL 300 MG/1
TABLET ORAL
Qty: 30 TABLET | Refills: 11 | Status: SHIPPED | OUTPATIENT
Start: 2018-01-08 | End: 2019-01-03 | Stop reason: SDUPTHER

## 2018-01-15 ENCOUNTER — HOSPITAL ENCOUNTER (EMERGENCY)
Facility: HOSPITAL | Age: 83
Discharge: HOME | End: 2018-01-16
Attending: EMERGENCY MEDICINE
Payer: MEDICARE

## 2018-01-15 ENCOUNTER — OFFICE VISIT (OUTPATIENT)
Dept: UROLOGY | Facility: CLINIC | Age: 83
End: 2018-01-15
Payer: MEDICARE

## 2018-01-15 VITALS — WEIGHT: 238 LBS | BODY MASS INDEX: 33.19 KG/M2

## 2018-01-15 DIAGNOSIS — N30.00 ACUTE CYSTITIS WITHOUT HEMATURIA: ICD-10-CM

## 2018-01-15 DIAGNOSIS — R33.9 URINARY RETENTION: Primary | ICD-10-CM

## 2018-01-15 LAB
BACTERIA #/AREA URNS HPF: ABNORMAL /HPF
BILIRUB UR QL STRIP: NEGATIVE
CLARITY UR: ABNORMAL
COLOR UR: YELLOW
GLUCOSE UR QL STRIP: NEGATIVE
HGB UR QL STRIP: ABNORMAL
KETONES UR QL STRIP: NEGATIVE
LEUKOCYTE ESTERASE UR QL STRIP: ABNORMAL
MICROSCOPIC COMMENT: ABNORMAL
NITRITE UR QL STRIP: POSITIVE
PH UR STRIP: 6 [PH] (ref 5–8)
PROT UR QL STRIP: NEGATIVE
RBC #/AREA URNS HPF: 3 /HPF (ref 0–4)
SP GR UR STRIP: 1.02 (ref 1–1.03)
SQUAMOUS #/AREA URNS HPF: 1 /HPF
URN SPEC COLLECT METH UR: ABNORMAL
UROBILINOGEN UR STRIP-ACNC: NEGATIVE EU/DL
WBC #/AREA URNS HPF: 30 /HPF (ref 0–5)

## 2018-01-15 PROCEDURE — 99284 EMERGENCY DEPT VISIT MOD MDM: CPT

## 2018-01-15 PROCEDURE — 99214 OFFICE O/P EST MOD 30 MIN: CPT | Mod: S$GLB,,, | Performed by: UROLOGY

## 2018-01-15 PROCEDURE — 51702 INSERT TEMP BLADDER CATH: CPT

## 2018-01-15 PROCEDURE — 99999 PR PBB SHADOW E&M-EST. PATIENT-LVL II: CPT | Mod: PBBFAC,,, | Performed by: UROLOGY

## 2018-01-15 PROCEDURE — 81000 URINALYSIS NONAUTO W/SCOPE: CPT

## 2018-01-15 RX ORDER — FINASTERIDE 5 MG/1
5 TABLET, FILM COATED ORAL DAILY
Qty: 30 TABLET | Refills: 11 | Status: SHIPPED | OUTPATIENT
Start: 2018-01-15 | End: 2019-02-04 | Stop reason: SDUPTHER

## 2018-01-15 RX ORDER — SULFAMETHOXAZOLE AND TRIMETHOPRIM 800; 160 MG/1; MG/1
1 TABLET ORAL
Status: COMPLETED | OUTPATIENT
Start: 2018-01-15 | End: 2018-01-16

## 2018-01-15 RX ORDER — SULFAMETHOXAZOLE AND TRIMETHOPRIM 800; 160 MG/1; MG/1
1 TABLET ORAL 2 TIMES DAILY
Qty: 14 TABLET | Refills: 0 | Status: SHIPPED | OUTPATIENT
Start: 2018-01-15 | End: 2018-01-22

## 2018-01-15 RX ORDER — TAMSULOSIN HYDROCHLORIDE 0.4 MG/1
0.4 CAPSULE ORAL DAILY
Qty: 30 CAPSULE | Refills: 11 | Status: SHIPPED | OUTPATIENT
Start: 2018-01-15 | End: 2019-01-03 | Stop reason: SDUPTHER

## 2018-01-15 NOTE — PROGRESS NOTES
Chief Complaint: Retention    HPI:   1/15/18: Has been taking flomax/finasteride for the interval. No upcoming surgeries.  No new problems.  Reviewed history in detail.  8/14/17: Faith is working okay for him.  Has taken finasteride on that time.  Riggs changed one week ago.  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  Tolerated right surgery reasonably well.  2/13/17: Cysto today shows severe BPH with trilobar hypertrophy.    2/1/17: 86 yo man went to the ER for a few complaints and he was found to be in retention and a riggs was placed; bag filled/emptied 1.5 times shortly thereafter.  Is no no BPH meds.  Had double voiding prior, nocturia x2.  No abd/pelvic pain and no exac/rel factors.  No hematuria.  No urolithiasis.      Allergies:  Patient has no known allergies.    Medications:  has a current medication list which includes the following prescription(s): allopurinol, finasteride, glipizide, hydrocodone-acetaminophen 7.5-325mg, lisinopril-hydrochlorothiazide, lovastatin, metformin, metoprolol tartrate, ssd, tamsulosin, and warfarin.    Review of Systems:  General: No fever, chills, fatigability, or weight loss.  Skin: No rashes, itching, or changes in color or texture of skin.  Chest: Denies DELGADILLO, cyanosis, wheezing, cough, and sputum production.  Abdomen: Appetite fine. No weight loss. Denies diarrhea, abdominal pain, hematemesis, or blood in stool.  Musculoskeletal: No joint stiffness or swelling. Denies back pain.  : As above.  All other review of systems negative.    PMH:   has a past medical history of Aphakia of right eye; Cataract; DM (diabetes mellitus), type 2 with complications; Hearing loss of aging; History of gout; History of pulmonary embolism (2011); Hypertension associated with diabetes; Iridodialysis of right eye; Obesity, unspecified; and Type 2 diabetes mellitus with polyneuropathy.    PSH:   has a past surgical history that includes Cataract  extraction.    FamHx: family history includes Diabetes in his brother.    SocHx:  reports that he has never smoked. He has never used smokeless tobacco. He reports that he does not drink alcohol or use drugs.      Physical Exam:  There were no vitals filed for this visit.  General: A&Ox3, no apparent distress, no deformities  Neck: No masses, normal thyroid  Lungs: normal inspiration, no use of accessory muscles  Heart: normal pulse, no arrhythmias  Abdomen: Soft, NT, ND  Skin: The skin is warm and dry. No jaundice.  Ext: No c/c/e.  :   1/15/18: Cuevas in good position, removed for voiding trial today  2/17: ALVA normal, penis/test normal.    Labs/Studies:     Impression/Plan:   1. Pt is not very hardy and I worry about his surgical risk.  Tolerated catheter well.  Time for an overnight voiding trial.  2. Continue flomax/finasteride.  NP visit to check PVR tomorrow.  RTC 1 mo with NP if does fine tomorrow.

## 2018-01-16 ENCOUNTER — OFFICE VISIT (OUTPATIENT)
Dept: UROLOGY | Facility: CLINIC | Age: 83
End: 2018-01-16
Payer: MEDICARE

## 2018-01-16 VITALS — HEIGHT: 71 IN | BODY MASS INDEX: 33.18 KG/M2 | WEIGHT: 237 LBS

## 2018-01-16 VITALS
BODY MASS INDEX: 33.22 KG/M2 | RESPIRATION RATE: 20 BRPM | WEIGHT: 237.31 LBS | OXYGEN SATURATION: 98 % | SYSTOLIC BLOOD PRESSURE: 143 MMHG | HEIGHT: 71 IN | HEART RATE: 69 BPM | TEMPERATURE: 98 F | DIASTOLIC BLOOD PRESSURE: 68 MMHG

## 2018-01-16 DIAGNOSIS — R33.9 URINARY RETENTION: Primary | ICD-10-CM

## 2018-01-16 PROCEDURE — 25000003 PHARM REV CODE 250: Performed by: EMERGENCY MEDICINE

## 2018-01-16 PROCEDURE — 99999 PR PBB SHADOW E&M-EST. PATIENT-LVL II: CPT | Mod: PBBFAC,,, | Performed by: NURSE PRACTITIONER

## 2018-01-16 PROCEDURE — 87077 CULTURE AEROBIC IDENTIFY: CPT

## 2018-01-16 PROCEDURE — 87088 URINE BACTERIA CULTURE: CPT

## 2018-01-16 PROCEDURE — 87186 SC STD MICRODIL/AGAR DIL: CPT

## 2018-01-16 PROCEDURE — 87086 URINE CULTURE/COLONY COUNT: CPT

## 2018-01-16 PROCEDURE — 99213 OFFICE O/P EST LOW 20 MIN: CPT | Mod: S$GLB,,, | Performed by: NURSE PRACTITIONER

## 2018-01-16 RX ADMIN — SULFAMETHOXAZOLE AND TRIMETHOPRIM 1 TABLET: 800; 160 TABLET ORAL at 12:01

## 2018-01-16 NOTE — PROGRESS NOTES
Chief Complaint: Retention    HPI:   1/16/18: Laws: Went to the ER here at [facility] last night following riggs removal yesterday for voiding trial. Riggs was replaced in ER and he was started on Bactrim.   1/15/18: Has been taking flomax/finasteride for the interval. No upcoming surgeries.  No new problems.  Reviewed history in detail.  8/14/17: Riggs is working okay for him.  Has taken finasteride on that time.  Riggs changed one week ago.  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  Tolerated right surgery reasonably well.  2/13/17: Cysto today shows severe BPH with trilobar hypertrophy.    2/1/17: 88 yo man went to the ER for a few complaints and he was found to be in retention and a riggs was placed; bag filled/emptied 1.5 times shortly thereafter.  Is no no BPH meds.  Had double voiding prior, nocturia x2.  No abd/pelvic pain and no exac/rel factors.  No hematuria.  No urolithiasis.      Allergies:  Patient has no known allergies.    Medications:  has a current medication list which includes the following prescription(s): allopurinol, finasteride, glipizide, hydrocodone-acetaminophen 7.5-325mg, lisinopril-hydrochlorothiazide, lovastatin, metformin, metoprolol tartrate, ssd, sulfamethoxazole-trimethoprim 800-160mg, tamsulosin, and warfarin.    Review of Systems:  General: No fever, chills, fatigability, or weight loss.  Skin: No rashes, itching, or changes in color or texture of skin.  Chest: Denies DELGADILLO, cyanosis, wheezing, cough, and sputum production.  Abdomen: Appetite fine. No weight loss. Denies diarrhea, abdominal pain, hematemesis, or blood in stool.  Musculoskeletal: No joint stiffness or swelling. Denies back pain.  : As above.  All other review of systems negative.    PMH:   has a past medical history of Aphakia of right eye; Cataract; DM (diabetes mellitus), type 2 with complications; Hearing loss of aging; History of gout; History of pulmonary  embolism (2011); Hypertension associated with diabetes; Iridodialysis of right eye; Obesity, unspecified; and Type 2 diabetes mellitus with polyneuropathy.    PSH:   has a past surgical history that includes Cataract extraction.    FamHx: family history includes Diabetes in his brother.    SocHx:  reports that he has never smoked. He has never used smokeless tobacco. He reports that he does not drink alcohol or use drugs.      Physical Exam:  There were no vitals filed for this visit.  General: A&Ox3, no apparent distress, no deformities  Neck: No masses, normal thyroid  Lungs: normal inspiration, no use of accessory muscles  Heart: normal pulse, no arrhythmias  Abdomen: Soft, NT, ND  Skin: The skin is warm and dry. No jaundice.  Ext: No c/c/e.  :   1/15/18: Riggs in good position, removed for voiding trial today  2/17: ALVA normal, penis/test normal.    Labs/Studies:     Impression/Plan:   1. Discussion with patient and family regarding need for riggs. Continue Finasteride and Flomax. Home health does monthly riggs changes.   2. RTC 1 month to f/u

## 2018-01-16 NOTE — ED PROVIDER NOTES
SCRIBE #1 NOTE: I, Miles Hodge, am scribing for, and in the presence of, Sahil Mccullough Jr., MD. I have scribed the entire note.      History      Chief Complaint   Patient presents with    Urinary Retention     reports seeing Dr Westbrook today and his indwelling cath x 1 yr was removed today and now he is unable to urinate.        Review of patient's allergies indicates:  No Known Allergies     HPI   HPI    1/15/2018, 10:26 PM   History obtained from the patient      History of Present Illness: Amauri Lara is a 88 y.o. male patient who presents to the Emergency Department for urinary retention which onset gradually 8 hours PTA. Symptoms are constant and moderate in severity. Pt had his indwelling catheter removed today by Dr. Westbrook after having it in for over a year for a enlarged prostate. Pt has only been able to get minimal urine output today. Pt has an appointment with Dr. Westbrook tomorrow. No mitigating or exacerbating factors reported. Associated sxs include lower abdominal pain. Patient denies any fever, chills, n/v/d, dysuria, hematuria, penile swelling, scrotal swelling,  and all other sxs at this time. No further complaints or concerns at this time.         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   HENT: Negative for sore throat.    Respiratory: Negative for shortness of breath.    Cardiovascular: Negative for chest pain.   Gastrointestinal: Positive for abdominal pain (lower). Negative for blood in stool, constipation, diarrhea, nausea and vomiting.   Genitourinary: Negative for discharge, dysuria, hematuria, penile pain, penile swelling, scrotal swelling and testicular pain.        + urinary retention   Musculoskeletal: Negative for back pain.   Skin: Negative for rash.   Neurological: Negative for dizziness, weakness, light-headedness and headaches.   Hematological: Does not bruise/bleed easily.       Physical Exam      Initial Vitals [01/15/18 2158]   BP Pulse Resp Temp SpO2   (!) 148/82 68 20 97.9 °F (36.6 °C) 98 %      MAP       104          Physical Exam  Nursing Notes and Vital Signs Reviewed.  Constitutional: Patient is in no acute distress. Well-developed and well-nourished.  Head: Atraumatic. Normocephalic.  Eyes: PERRL. EOM intact. Conjunctivae are not pale. No scleral icterus.  ENT: Mucous membranes are moist. Oropharynx is clear and symmetric.    Neck: Supple. Full ROM. No lymphadenopathy.  Cardiovascular: Regular rate. Regular rhythm. No murmurs, rubs, or gallops. Distal pulses are 2+ and symmetric.  Pulmonary/Chest: No respiratory distress. Clear to auscultation bilaterally. No wheezing or rales.  Abdominal: Soft and non-distended.  Palpable bladder in suprapubic area with mild TTP.  No rebound, guarding, or rigidity.   Musculoskeletal: Moves all extremities. No obvious deformities. No edema.   Skin: Warm and dry.  Neurological:  Alert, awake, and appropriate.  Normal speech.  No acute focal neurological deficits are appreciated.  Psychiatric: Normal affect.  "Good eye contact. Appropriate in content.          Stroke Scales      ED Course    Procedures  ED Vital Signs:  Vitals:    01/15/18 2158 01/15/18 2251 01/15/18 2302 01/16/18 0002   BP: (!) 148/82 136/61 (!) 130/58 (!) 124/58   Pulse: 68 65 67 66   Resp: 20      Temp: 97.9 °F (36.6 °C)      TempSrc: Oral      SpO2: 98% 96% 97% 97%   Weight: 107.6 kg (237 lb 5.2 oz)      Height: 5' 11" (1.803 m)       01/16/18 0017   BP: (!) 143/68   Pulse: 69   Resp:    Temp:    TempSrc:    SpO2: 98%   Weight:    Height:        Abnormal Lab Results:  Labs Reviewed   URINALYSIS - Abnormal; Notable for the following:        Result Value    Appearance, UA Cloudy (*)     Occult Blood UA 2+ (*)     Nitrite, UA Positive (*)     Leukocytes, UA 2+ (*)     All other components within normal limits   URINALYSIS MICROSCOPIC - Abnormal; Notable for the following:     WBC, UA 30 (*)     Bacteria, UA Moderate (*)     All other components within normal limits   CULTURE, URINE        All Lab Results:  Results for orders placed or performed during the hospital encounter of 01/15/18   Urine culture **CANNOT BE ORDERED STAT**   Result Value Ref Range    Urine Culture, Routine ENTEROBACTER AEROGENES  >100,000 cfu/ml          Susceptibility    Enterobacter aerogenes - CULTURE, URINE     Ceftriaxone <=8 Sensitive mcg/mL     Ciprofloxacin <=1 Sensitive mcg/mL     Cefepime <=8 Sensitive mcg/mL     Ertapenem <=2 Sensitive mcg/mL     Nitrofurantoin 64 Intermediate mcg/mL     Gentamicin <=4 Sensitive mcg/mL     Meropenem <=4 Sensitive mcg/mL     Piperacillin/Tazo <=16 Sensitive mcg/mL     Trimeth/Sulfa <=2/38 Sensitive mcg/mL     Tetracycline <=4 Sensitive mcg/mL     Tobramycin <=4 Sensitive mcg/mL   Urinalysis   Result Value Ref Range    Specimen UA Urine, Clean Catch     Color, UA Yellow Yellow, Straw, Jennifer    Appearance, UA Cloudy (A) Clear    pH, UA 6.0 5.0 - 8.0    Specific Gravity, UA 1.020 1.005 - 1.030    Protein, UA Negative Negative    Glucose, UA " Negative Negative    Ketones, UA Negative Negative    Bilirubin (UA) Negative Negative    Occult Blood UA 2+ (A) Negative    Nitrite, UA Positive (A) Negative    Urobilinogen, UA Negative <2.0 EU/dL    Leukocytes, UA 2+ (A) Negative   Urinalysis Microscopic   Result Value Ref Range    RBC, UA 3 0 - 4 /hpf    WBC, UA 30 (H) 0 - 5 /hpf    Bacteria, UA Moderate (A) None-Occ /hpf    Squam Epithel, UA 1 /hpf    Microscopic Comment SEE COMMENT                     The Emergency Provider reviewed the vital signs and test results, which are outlined above.    ED Discussion     11:38 PM: Dr. Mccullough discussed the pt's case with Dr. Moody (Urology) who agrees with leaving the catheter in and starting the pt on Bactrim. He will see the pt in the clinic tomorrow.    11:41 PM: Reassessed pt at this time.  Pt is awake, alert, and in no distress. Pt will be sent home with the riggs in place. Discusses riggs care with the pt and family. Discussed with pt all pertinent ED information and results. Discussed pt dx and plan of tx. Gave pt all f/u and return to the ED instructions. All questions and concerns were addressed at this time. Pt expresses understanding of information and instructions, and is comfortable with plan to discharge. Pt is stable for discharge.    For URINARY TRACT INFECTION, I instructed patient to avoid holding in urine and recommended that he urinate when he feels the urge.  Also advised patient to drink plenty of liquids and reminded patient that he may need to drink more fluids than usual to help flush out the bacteria. Instructed patient to avoid alcohol, caffeine, and citrus juices as these substances can irritate your bladder and increase your symptoms.  Also recommended that patient apply heat to lower abdomen for 20 to 30 minutes every two hours to help decrease discomfort and pressure in the bladder.          ED Medication(s):  Medications   sulfamethoxazole-trimethoprim 800-160mg per tablet 1 tablet  (1 tablet Oral Given 1/16/18 0014)       Discharge Medication List as of 1/15/2018 11:44 PM      START taking these medications    Details   sulfamethoxazole-trimethoprim 800-160mg (BACTRIM DS) 800-160 mg Tab Take 1 tablet by mouth 2 (two) times daily., Starting Mon 1/15/2018, Until Mon 1/22/2018, Print             Follow-up Information     Michael Westbrook IV, MD. Go in 1 day.    Specialty:  Urology  Why:  per apointment scheduled  Contact information:  [address] [id number] [phone]                     Medical Decision Making    Medical Decision Making:   Clinical Tests:   Lab Tests: Ordered and Reviewed           Scribe Attestation:   Scribe #1: I performed the above scribed service and the documentation accurately describes the services I performed. I attest to the accuracy of the note.    Attending:   Physician Attestation Statement for Scribe #1: I, Sahil Mccullough Jr., MD, personally performed the services described in this documentation, as scribed by Miles Hodge, in my presence, and it is both accurate and complete.          Clinical Impression       ICD-10-CM ICD-9-CM   1. Urinary retention R33.9 788.20   2. Acute cystitis without hematuria N30.00 595.0       Disposition:   Disposition: Discharged  Condition: Stable         Sahil Mccullough Jr., MD  01/18/18 2020

## 2018-01-16 NOTE — DISCHARGE INSTRUCTIONS
For URINARY TRACT INFECTION, I instructed patient to avoid holding in urine and recommended that he urinate when he feels the urge.  Also advised patient to drink plenty of liquids and reminded patient that he may need to drink more fluids than usual to help flush out the bacteria. Instructed patient to avoid alcohol, caffeine, and citrus juices as these substances can irritate your bladder and increase your symptoms.  Also recommended that patient apply heat to lower abdomen for 20 to 30 minutes every two hours to help decrease discomfort and pressure in the bladder.    Follow up with Dr. Westbrook tomorrow per appointment already scheduled.

## 2018-01-18 LAB — BACTERIA UR CULT: NORMAL

## 2018-01-19 ENCOUNTER — TELEPHONE (OUTPATIENT)
Dept: INTERNAL MEDICINE | Facility: CLINIC | Age: 83
End: 2018-01-19

## 2018-01-19 NOTE — TELEPHONE ENCOUNTER
Notified Tyra with [facility] regarding Mr. Amauri Lara INR 2.5 no change in coumadin dosage recheck in one month.

## 2018-01-19 NOTE — TELEPHONE ENCOUNTER
----- Message from Carter Smith sent at 1/19/2018 12:54 PM CST -----  Contact: rosalinda cid/ [facility]  She's calling in regards to the pt's PT: 28.9 / INR: 2.4, and on Wed. & Sat. pt is on 5 mg and on all other day pt is taking 7.5 mg, if any questions please call, ph# [phone] (cell)

## 2018-02-15 ENCOUNTER — OFFICE VISIT (OUTPATIENT)
Dept: UROLOGY | Facility: CLINIC | Age: 83
End: 2018-02-15
Payer: MEDICARE

## 2018-02-15 VITALS
HEART RATE: 67 BPM | DIASTOLIC BLOOD PRESSURE: 72 MMHG | HEIGHT: 72 IN | BODY MASS INDEX: 31.95 KG/M2 | SYSTOLIC BLOOD PRESSURE: 136 MMHG | WEIGHT: 235.88 LBS

## 2018-02-15 DIAGNOSIS — R33.9 URINARY RETENTION: Primary | ICD-10-CM

## 2018-02-15 PROCEDURE — 3008F BODY MASS INDEX DOCD: CPT | Mod: S$GLB,,, | Performed by: NURSE PRACTITIONER

## 2018-02-15 PROCEDURE — 1126F AMNT PAIN NOTED NONE PRSNT: CPT | Mod: S$GLB,,, | Performed by: NURSE PRACTITIONER

## 2018-02-15 PROCEDURE — 99213 OFFICE O/P EST LOW 20 MIN: CPT | Mod: S$GLB,,, | Performed by: NURSE PRACTITIONER

## 2018-02-15 PROCEDURE — 99999 PR PBB SHADOW E&M-EST. PATIENT-LVL III: CPT | Mod: PBBFAC,,, | Performed by: NURSE PRACTITIONER

## 2018-02-15 PROCEDURE — 1159F MED LIST DOCD IN RCRD: CPT | Mod: S$GLB,,, | Performed by: NURSE PRACTITIONER

## 2018-02-15 NOTE — PROGRESS NOTES
Chief Complaint: Retention    HPI:   2/15/18: Laws: One month f/u for urinary retention. Riggs still in place; wants another voiding trial. Home health would be able to replace it if he cannot void. Still taking Flomax and Finasteride.   1/16/18: Laws: Went to the ER here at [facility] last night following riggs removal yesterday for voiding trial. Riggs was replaced in ER and he was started on Bactrim.   1/15/18: Has been taking flomax/finasteride for the interval. No upcoming surgeries.  No new problems.  Reviewed history in detail.  8/14/17: Riggs is working okay for him.  Has taken finasteride on that time.  Riggs changed one week ago.  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  Tolerated right surgery reasonably well.  2/13/17: Cysto today shows severe BPH with trilobar hypertrophy.    2/1/17: 86 yo man went to the ER for a few complaints and he was found to be in retention and a riggs was placed; bag filled/emptied 1.5 times shortly thereafter.  Is no no BPH meds.  Had double voiding prior, nocturia x2.  No abd/pelvic pain and no exac/rel factors.  No hematuria.  No urolithiasis.      Allergies:  Patient has no known allergies.    Medications:  has a current medication list which includes the following prescription(s): allopurinol, finasteride, glipizide, hydrocodone-acetaminophen 7.5-325mg, lisinopril-hydrochlorothiazide, lovastatin, metformin, metoprolol tartrate, ssd, tamsulosin, and warfarin.    Review of Systems:  General: No fever, chills, fatigability, or weight loss.  Skin: No rashes, itching, or changes in color or texture of skin.  Chest: Denies DELGADILLO, cyanosis, wheezing, cough, and sputum production.  Abdomen: Appetite fine. No weight loss. Denies diarrhea, abdominal pain, hematemesis, or blood in stool.  Musculoskeletal: No joint stiffness or swelling. Denies back pain.  : As above.  All other review of systems negative.    PMH:   has a past medical  history of Aphakia of right eye; Cataract; DM (diabetes mellitus), type 2 with complications; Hearing loss of aging; History of gout; History of pulmonary embolism (2011); Hypertension associated with diabetes; Iridodialysis of right eye; Obesity, unspecified; and Type 2 diabetes mellitus with polyneuropathy.    PSH:   has a past surgical history that includes Cataract extraction.    FamHx: family history includes Diabetes in his brother.    SocHx:  reports that he has never smoked. He has never used smokeless tobacco. He reports that he does not drink alcohol or use drugs.      Physical Exam:  Vitals:    02/15/18 1058   BP: 136/72   Pulse: 67     General: A&Ox3, no apparent distress, no deformities  Neck: No masses, normal thyroid  Lungs: normal inspiration, no use of accessory muscles  Heart: normal pulse, no arrhythmias  Abdomen: Soft, NT, ND  Skin: The skin is warm and dry. No jaundice.  Ext: No c/c/e.  :   1/15/18: Riggs in good position, removed for voiding trial today  2/17: ALVA normal, penis/test normal.    Labs/Studies:     Impression/Plan:   1. Riggs removed today. If he cannot void within 6 hours (5pm),  ok for home health to put in riggs. Discussed [facility]; not sure if he would be physically able to do it.   2. RTC 1 month

## 2018-02-21 ENCOUNTER — TELEPHONE (OUTPATIENT)
Dept: INTERNAL MEDICINE | Facility: CLINIC | Age: 83
End: 2018-02-21

## 2018-02-21 NOTE — TELEPHONE ENCOUNTER
Orders given to Veronique Mathias  INR 2.6 no change in dosage repeat in one month.   She wants to know if she can repeat INR in 3 weeks when she goes out for visit. Please advise.

## 2018-02-21 NOTE — TELEPHONE ENCOUNTER
----- Message from Veronica Lundberg sent at 2/21/2018  1:14 PM CST -----  Contact: pt nurse/ jackie  Caller in regards to pt  PTINR results  And they are 31.3 and 2.6 please contact her at [phone]

## 2018-03-14 ENCOUNTER — TELEPHONE (OUTPATIENT)
Dept: INTERNAL MEDICINE | Facility: CLINIC | Age: 83
End: 2018-03-14

## 2018-03-14 NOTE — TELEPHONE ENCOUNTER
----- Message from Julia Dent sent at 3/14/2018 10:16 AM CDT -----  Contact: Page with [facility]  Page called and stated she needed to speak to the nurse. She stated that the pt PT INR resulst are as follows:    PT 31.8   INR 2.6     Coumadin 5 mg on Wed and Sat and 7.5 other days.    She can be reached at [phone].    Thanks,  TF

## 2018-03-14 NOTE — TELEPHONE ENCOUNTER
Notified Rosalie at [facility]. No change in patients coumadin dosage recheck in one month. She verbalized understanding will contact patient.

## 2018-03-22 RX ORDER — LISINOPRIL AND HYDROCHLOROTHIAZIDE 12.5; 2 MG/1; MG/1
TABLET ORAL
Qty: 60 TABLET | Refills: 11 | Status: SHIPPED | OUTPATIENT
Start: 2018-03-22 | End: 2019-04-01 | Stop reason: SDUPTHER

## 2018-04-11 ENCOUNTER — TELEPHONE (OUTPATIENT)
Dept: INTERNAL MEDICINE | Facility: CLINIC | Age: 83
End: 2018-04-11

## 2018-04-11 RX ORDER — HYDROCODONE BITARTRATE AND ACETAMINOPHEN 7.5; 325 MG/1; MG/1
1 TABLET ORAL EVERY 6 HOURS PRN
Qty: 90 TABLET | Refills: 0 | Status: SHIPPED | OUTPATIENT
Start: 2018-04-11 | End: 2018-06-28 | Stop reason: SDUPTHER

## 2018-04-11 NOTE — TELEPHONE ENCOUNTER
Called Evelin and informed her of no change in dose and to repeat lab in one month.  She voiced understanding.

## 2018-04-11 NOTE — TELEPHONE ENCOUNTER
----- Message from Marisol Nolasco sent at 4/11/2018  9:40 AM CDT -----  Contact: daughter-wilbur  pain meds refills..[phone]    ..  [employer], [facility] [place], [place] - [address] [place] [id number]  Phone: [phone] Fax: [phone]

## 2018-04-11 NOTE — TELEPHONE ENCOUNTER
----- Message from Maria Luz London sent at 4/11/2018  8:40 AM CDT -----  Contact: [facility]in[facility]   Caller was calling in pt PT & INR.  PT is 30.7 and INR 2.6 taking Warfarin 5 mg on wednesday & Saturday . Pt takes 7.5 mg on the other days.     [phone]

## 2018-04-11 NOTE — TELEPHONE ENCOUNTER
Called pt informed Nel of INR results.  No change in dose and lab repeated in one month.  She voiced understanding.  Pt is needing a refill on [place] he is out of medication.

## 2018-05-11 ENCOUNTER — TELEPHONE (OUTPATIENT)
Dept: INTERNAL MEDICINE | Facility: CLINIC | Age: 83
End: 2018-05-11

## 2018-05-11 NOTE — TELEPHONE ENCOUNTER
Dr. Salmeron, Home Health called with the results of the patient's PT/ INR results:    PT 28.8  INR 2.4    Rosalie with the Home Health states that the patient has been taking 5mg on Wednesdays and Sundays and 7mg all other days but is supposed to be taking 7.5 mg, and wants to know if he can be rechecked in 4 weeks when his catheter is changed or what are your recommendations, please advise, Thanks

## 2018-05-11 NOTE — TELEPHONE ENCOUNTER
----- Message from Lindsey Asif sent at 5/11/2018  1:47 PM CDT -----  Contact: [facility]  She's calling to discuss pt's coumadin numbers, please advise [phone]

## 2018-05-11 NOTE — TELEPHONE ENCOUNTER
Informed Rosalie with [facility] of the recommendations, she verbalized understanding.

## 2018-05-16 ENCOUNTER — HOSPITAL ENCOUNTER (OUTPATIENT)
Dept: RADIOLOGY | Facility: HOSPITAL | Age: 83
Discharge: HOME | End: 2018-05-16
Attending: UROLOGY
Payer: MEDICARE

## 2018-05-16 ENCOUNTER — OFFICE VISIT (OUTPATIENT)
Dept: UROLOGY | Facility: CLINIC | Age: 83
End: 2018-05-16
Payer: MEDICARE

## 2018-05-16 ENCOUNTER — TELEPHONE (OUTPATIENT)
Dept: UROLOGY | Facility: CLINIC | Age: 83
End: 2018-05-16

## 2018-05-16 ENCOUNTER — TELEPHONE (OUTPATIENT)
Dept: INTERNAL MEDICINE | Facility: CLINIC | Age: 83
End: 2018-05-16

## 2018-05-16 ENCOUNTER — CLINICAL SUPPORT (OUTPATIENT)
Dept: CARDIOLOGY | Facility: CLINIC | Age: 83
End: 2018-05-16
Payer: MEDICARE

## 2018-05-16 VITALS
HEIGHT: 72 IN | DIASTOLIC BLOOD PRESSURE: 84 MMHG | WEIGHT: 234.81 LBS | SYSTOLIC BLOOD PRESSURE: 126 MMHG | BODY MASS INDEX: 31.8 KG/M2

## 2018-05-16 DIAGNOSIS — Z01.818 PREOP TESTING: ICD-10-CM

## 2018-05-16 DIAGNOSIS — N40.0 BENIGN PROSTATIC HYPERPLASIA, UNSPECIFIED WHETHER LOWER URINARY TRACT SYMPTOMS PRESENT: Primary | ICD-10-CM

## 2018-05-16 PROCEDURE — 71046 X-RAY EXAM CHEST 2 VIEWS: CPT | Mod: TC

## 2018-05-16 PROCEDURE — 99214 OFFICE O/P EST MOD 30 MIN: CPT | Mod: 57,S$GLB,, | Performed by: UROLOGY

## 2018-05-16 PROCEDURE — 99999 PR PBB SHADOW E&M-EST. PATIENT-LVL III: CPT | Mod: PBBFAC,,, | Performed by: UROLOGY

## 2018-05-16 PROCEDURE — 93000 ELECTROCARDIOGRAM COMPLETE: CPT | Mod: S$GLB,,, | Performed by: INTERNAL MEDICINE

## 2018-05-16 PROCEDURE — 71046 X-RAY EXAM CHEST 2 VIEWS: CPT | Mod: 26,,, | Performed by: RADIOLOGY

## 2018-05-16 RX ORDER — WARFARIN SODIUM 5 MG/1
TABLET ORAL
Refills: 11 | COMMUNITY
Start: 2018-04-23 | End: 2018-08-24 | Stop reason: SDUPTHER

## 2018-05-16 NOTE — PROGRESS NOTES
Call placed to Dr. Moreno at [facility] ([phone]) to request a note of surgery clearance for TURP since pt had been recently seen in their clinic.  Requested to have clearance faxed to us at [phone].

## 2018-05-16 NOTE — TELEPHONE ENCOUNTER
Ninfa with Dr. Moreno's office (patient's cardiologist) states that she will need a request for cardiac clearance via fax. Also, Ninfa states that Dr. Moreno will be unable to advise that patient may or may not discontinue coumadin as he was not the provider who prescribed the anticoagulant. Dr. Vera will need to advise when patient is to DC coumadin. Will fax request for cardiac clearance for patient's upcoming TURP to [phone].

## 2018-05-16 NOTE — TELEPHONE ENCOUNTER
----- Message from Chelle Umanzor sent at 5/16/2018  2:12 PM CDT -----  Ninfa with Dr Moreno at [phone]//states she is calling regarding a preop clearance//please call//thanks/[facility]

## 2018-05-16 NOTE — PROGRESS NOTES
Chief Complaint: Retention    HPI:   5/16/18: Had to have riggs replaced after last visit.  Tolerated two surgeries for arterial restoration in last year, held coumadin for them no problem.  2/15/18: Laws: One month f/u for urinary retention. Riggs still in place; wants another voiding trial. Home health would be able to replace it if he cannot void. Still taking Flomax and Finasteride.   1/16/18: Laws: Went to the ER here at [facility] last night following riggs removal yesterday for voiding trial. Riggs was replaced in ER and he was started on Bactrim.   1/15/18: Has been taking flomax/finasteride for the interval. No upcoming surgeries.  No new problems.  Reviewed history in detail.  8/14/17: Riggs is working okay for him.  Has taken finasteride on that time.  Riggs changed one week ago.  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  Tolerated right surgery reasonably well.  2/13/17: Cysto today shows severe BPH with trilobar hypertrophy.    2/1/17: 86 yo man went to the ER for a few complaints and he was found to be in retention and a riggs was placed; bag filled/emptied 1.5 times shortly thereafter.  Is no no BPH meds.  Had double voiding prior, nocturia x2.  No abd/pelvic pain and no exac/rel factors.  No hematuria.  No urolithiasis.      Allergies:  Patient has no known allergies.    Medications:  has a current medication list which includes the following prescription(s): allopurinol, finasteride, glipizide, hydrocodone-acetaminophen 7.5-325mg, lisinopril-hydrochlorothiazide, lovastatin, metformin, metoprolol tartrate, ssd, tamsulosin, and warfarin.    Review of Systems:  General: No fever, chills, fatigability, or weight loss.  Skin: No rashes, itching, or changes in color or texture of skin.  Chest: Denies DELGADILLO, cyanosis, wheezing, cough, and sputum production.  Abdomen: Appetite fine. No weight loss. Denies diarrhea, abdominal pain, hematemesis, or blood in  stool.  Musculoskeletal: No joint stiffness or swelling. Denies back pain.  : As above.  All other review of systems negative.    PMH:   has a past medical history of Aphakia of right eye; Cataract; DM (diabetes mellitus), type 2 with complications; Hearing loss of aging; History of gout; History of pulmonary embolism (2011); Hypertension associated with diabetes; Iridodialysis of right eye; Obesity, unspecified; and Type 2 diabetes mellitus with polyneuropathy.    PSH:   has a past surgical history that includes Cataract extraction.    FamHx: family history includes Diabetes in his brother.    SocHx:  reports that he has never smoked. He has never used smokeless tobacco. He reports that he does not drink alcohol or use drugs.      Physical Exam:  Vitals:    05/16/18 0826   BP: 126/84     General: A&Ox3, no apparent distress, no deformities  Neck: No masses, normal thyroid  Lungs: normal inspiration, no use of accessory muscles  Heart: normal pulse, no arrhythmias  Abdomen: Soft, NT, ND  Skin: The skin is warm and dry. No jaundice.  Ext: No c/c/e.  :   1/15/18: Cuevas in good position, removed for voiding trial today  2/17: ALVA normal, penis/test normal.    Labs/Studies:     Impression/Plan:   1. Will arrange for laser TURP as pt more healthy than last year and tolerated his surgeries no problem.

## 2018-05-16 NOTE — TELEPHONE ENCOUNTER
This is a mutual pt that Dr. Westbrook will be taking to surgery on May 31 for a TURP.  Please contact pt and provide instructions regarding holding/resuming his coumadin.  Thanks so much.

## 2018-05-21 ENCOUNTER — OFFICE VISIT (OUTPATIENT)
Dept: INTERNAL MEDICINE | Facility: CLINIC | Age: 83
End: 2018-05-21
Payer: MEDICARE

## 2018-05-21 VITALS
TEMPERATURE: 97 F | HEIGHT: 72 IN | WEIGHT: 241.38 LBS | SYSTOLIC BLOOD PRESSURE: 110 MMHG | OXYGEN SATURATION: 97 % | BODY MASS INDEX: 32.69 KG/M2 | DIASTOLIC BLOOD PRESSURE: 70 MMHG | RESPIRATION RATE: 16 BRPM | HEART RATE: 71 BPM

## 2018-05-21 DIAGNOSIS — E11.8 TYPE 2 DIABETES MELLITUS WITH COMPLICATION, WITHOUT LONG-TERM CURRENT USE OF INSULIN: ICD-10-CM

## 2018-05-21 DIAGNOSIS — Z01.818 PREOP EXAMINATION: Primary | ICD-10-CM

## 2018-05-21 DIAGNOSIS — I15.2 HYPERTENSION ASSOCIATED WITH DIABETES: ICD-10-CM

## 2018-05-21 DIAGNOSIS — E11.42 TYPE 2 DIABETES MELLITUS WITH POLYNEUROPATHY: ICD-10-CM

## 2018-05-21 DIAGNOSIS — E11.59 HYPERTENSION ASSOCIATED WITH DIABETES: ICD-10-CM

## 2018-05-21 DIAGNOSIS — Z87.39 HISTORY OF GOUT: ICD-10-CM

## 2018-05-21 DIAGNOSIS — Z86.711 HISTORY OF PULMONARY EMBOLISM: ICD-10-CM

## 2018-05-21 DIAGNOSIS — R33.9 URINARY RETENTION: ICD-10-CM

## 2018-05-21 PROCEDURE — 99999 PR PBB SHADOW E&M-EST. PATIENT-LVL III: CPT | Mod: PBBFAC,,, | Performed by: INTERNAL MEDICINE

## 2018-05-21 PROCEDURE — 99214 OFFICE O/P EST MOD 30 MIN: CPT | Mod: S$GLB,,, | Performed by: INTERNAL MEDICINE

## 2018-05-21 PROCEDURE — 99499 UNLISTED E&M SERVICE: CPT | Mod: S$PBB,,, | Performed by: INTERNAL MEDICINE

## 2018-05-21 NOTE — PROGRESS NOTES
HPI:  Patient is an 88-year-old male who comes today for preoperative clearance to have TURP done by laser surgery.  Patient has had history of urinary retention for over a year.  Patient denies any chest pains or shortness of breath.  His blood pressures been well controlled.  His diabetes is been excellent.  He has had no hypoglycemic problems. He denies any other ongoing problems. He did have a pulmonary embolus 7 years ago and has been on chronic anticoagulation ever since.      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 Denies any nausea, vomiting or diarrhea. Denies any fever, chills or sweats. Denies any change in weight, voice, stool, skin or hair. Denies any dysuria, dyspepsia or dysphagia. Denies any change in vision, hearing or headaches. Denies any swollen lymph nodes or loss of memory.    PE:  /70   Pulse 71   Temp 96.8 °F (36 °C)   Resp 16   Ht 6' (1.829 m)   Wt 109.5 kg (241 lb 6.5 oz)   SpO2 97%   BMI 32.74 kg/m²   Gen: Well-developed, well-nourished, male, in no acute distress, oriented x3  HEENT: neck is supple, no adenopathy, carotids 2+ equal without bruits, thyroid exam normal size without nodules.  CHEST: clear to auscultation and percussion  CVS: regular rate and rhythm  without significant murmur, gallop, or rubs  ABD: soft, benign, no rebound no guarding, no distention.  Bowel sounds are normal.     nontender.  No palpable masses.  No organomegaly and no audible bruits.  .    Lab Results   Component Value Date    WBC 7.63 05/16/2018    HGB 15.9 05/16/2018    HCT 51.2 05/16/2018     05/16/2018    CHOL 186 12/21/2017    TRIG 157 (H) 12/21/2017    HDL 41 12/21/2017    ALT 22 12/21/2017    AST 19 12/21/2017     05/16/2018    K 3.8 05/16/2018     05/16/2018    CREATININE 1.1 05/16/2018    BUN 17 05/16/2018    CO2 30 (H) 05/16/2018    TSH 1.851 12/21/2017    PSA 14 (H) 08/10/2011    INR 2.9 (H) 11/09/2017    HGBA1C 5.3 12/21/2017    Chest x-ray and EKG were unremarkable    Impression:  Urinary retention secondary to BPH  Multiple other medical problems below, stable  Patient Active Problem List   Diagnosis    Obesity    Hearing loss of aging    History of pulmonary embolism    DM (diabetes mellitus), type 2 with complications    Hypertension associated with diabetes    History of gout    Type 2 diabetes mellitus with polyneuropathy    Urinary retention    Aneurysm artery, popliteal    Atherosclerosis of aorta    Acute cystitis without hematuria       Plan:      He should not take the glipizide the morning of surgery.  He should not take the metformin the day before or the morning of surgery.  Patient should stop the Coumadin 7 days prior to his procedure.  Patient is cleared for anesthesia and surgery.  He has no absolute contraindications.  He is at slightly increased risk due to his age and comorbid conditions.  Copy of this note is been forwarded to his surgeon.

## 2018-05-24 NOTE — PRE-PROCEDURE INSTRUCTIONS
Pre op instructions reviewed with patient's daughter per phone:    To confirm, Your surgeon has instructed you:  Surgery is scheduled 5/31/18 at [facility].      Please report to [facility] 1st floor main lobby by MD.   Pre admit office to call afternoon prior to surgery with final arrival time      INSTRUCTIONS IMPORTANT!!!  ¨ Do not eat, drink, or smoke after 12 midnight-including water. OK to brush teeth, no gum, candy or mints!    ¨ Take only these medicines with a small swallow of water-morning of surgery.  Metoprolol    ____  Do not wear makeup, including mascara.  ____  No powder, lotions or creams to surgical area.  ____  Please remove all jewelry, including piercings and leave at home.  ____  No money or valuables needed. Please leave at home.  ____  Please bring identification and insurance information to hospital.  ____  If going home the same day, arrange for a ride home. You will not be able to   drive if Anesthesia was used.  ____  Children, under 12 years old, must remain in the waiting room with an adult.  They are not allowed in patient areas.  ____  Wear loose fitting clothing. Allow for dressings, bandages.  ____  Stop Aspirin, Ibuprofen, Motrin and Aleve at least 5-7 days before surgery, unless otherwise instructed by your doctor, or the nurse.   You MAY use Tylenol/acetaminophen until day of surgery.  ____  If you take diabetic medication, do not take am of surgery unless instructed by   Doctor.  ____ Stop taking any Fish Oil supplement or any Vitamins that contain Vitamin E at least 5 days prior to surgery.          Bathing Instructions-- The night before surgery and the morning prior to coming to the hospital:   -Do not shave the surgical area.   -Shower and wash your hair and body as usual with anti-bacterial  soap and shampoo.   -Rinse your hair and body completely.   -Use one packet of hibiclens to wash the surgical site (using your hand) gently for 5 minutes.  Do not  scrub you skin too hard.   -Do not use hibiclens on your head, face, or genitals.   -Do not wash with anti-bacterial soap after you use the hibiclens.   -Rinse your body thoroughly.   -Dry with clean, soft towel.  Do not use lotion, cream, deodorant, or powders on   the surgical site.    Use antibacterial soap in place of hibiclens if your surgery is on the head, face or genitals.         Surgical Site Infection    Prevention of surgical site infections:     -Keep incisions clean and dry.   -Do not soak/submerge incisions in water until completely healed.   -Do not apply lotions, powders, creams, or deodorants to site.   -Always make sure hands are cleaned with antibacterial soap/ alcohol-based   prior to touching the surgical site.  (This includes doctors, nurses, staff, and yourself.)    Signs and symptoms:   -Redness and pain around the area where you had surgery   -Drainage of cloudy fluid from your surgical wound   -Fever over 100.4  I have read or had read and explained to me, and understand the above information.

## 2018-05-30 ENCOUNTER — ANESTHESIA EVENT (OUTPATIENT)
Dept: SURGERY | Facility: HOSPITAL | Age: 83
End: 2018-05-30
Payer: MEDICARE

## 2018-05-30 ENCOUNTER — TELEPHONE (OUTPATIENT)
Dept: UROLOGY | Facility: CLINIC | Age: 83
End: 2018-05-30

## 2018-05-31 ENCOUNTER — ANESTHESIA (OUTPATIENT)
Dept: SURGERY | Facility: HOSPITAL | Age: 83
End: 2018-05-31
Payer: MEDICARE

## 2018-05-31 ENCOUNTER — HOSPITAL ENCOUNTER (OUTPATIENT)
Facility: HOSPITAL | Age: 83
Discharge: HOME | End: 2018-05-31
Attending: UROLOGY | Admitting: UROLOGY
Payer: MEDICARE

## 2018-05-31 ENCOUNTER — SURGERY (OUTPATIENT)
Age: 83
End: 2018-05-31

## 2018-05-31 DIAGNOSIS — N40.0 BENIGN PROSTATIC HYPERPLASIA, UNSPECIFIED WHETHER LOWER URINARY TRACT SYMPTOMS PRESENT: ICD-10-CM

## 2018-05-31 LAB
POCT GLUCOSE: 130 MG/DL (ref 70–110)
POCT GLUCOSE: 98 MG/DL (ref 70–110)

## 2018-05-31 PROCEDURE — 25000003 PHARM REV CODE 250: Performed by: ANESTHESIOLOGY

## 2018-05-31 PROCEDURE — 36000707: Performed by: UROLOGY

## 2018-05-31 PROCEDURE — 71000039 HC RECOVERY, EACH ADD'L HOUR: Performed by: UROLOGY

## 2018-05-31 PROCEDURE — 25000003 PHARM REV CODE 250: Performed by: NURSE ANESTHETIST, CERTIFIED REGISTERED

## 2018-05-31 PROCEDURE — 71000033 HC RECOVERY, INTIAL HOUR: Performed by: UROLOGY

## 2018-05-31 PROCEDURE — 37000009 HC ANESTHESIA EA ADD 15 MINS: Performed by: UROLOGY

## 2018-05-31 PROCEDURE — 00914 ANES TRURL PX RESCJ PRST8: CPT | Performed by: UROLOGY

## 2018-05-31 PROCEDURE — 36000706: Performed by: UROLOGY

## 2018-05-31 PROCEDURE — 63600175 PHARM REV CODE 636 W HCPCS: Performed by: ANESTHESIOLOGY

## 2018-05-31 PROCEDURE — 63600175 PHARM REV CODE 636 W HCPCS: Performed by: NURSE ANESTHETIST, CERTIFIED REGISTERED

## 2018-05-31 PROCEDURE — 27201423 OPTIME MED/SURG SUP & DEVICES STERILE SUPPLY: Performed by: UROLOGY

## 2018-05-31 PROCEDURE — 71000015 HC POSTOP RECOV 1ST HR: Performed by: UROLOGY

## 2018-05-31 PROCEDURE — 63600175 PHARM REV CODE 636 W HCPCS: Performed by: UROLOGY

## 2018-05-31 PROCEDURE — 52648 LASER SURGERY OF PROSTATE: CPT | Mod: ,,, | Performed by: UROLOGY

## 2018-05-31 PROCEDURE — 71000016 HC POSTOP RECOV ADDL HR: Performed by: UROLOGY

## 2018-05-31 PROCEDURE — 37000008 HC ANESTHESIA 1ST 15 MINUTES: Performed by: UROLOGY

## 2018-05-31 PROCEDURE — C1769 GUIDE WIRE: HCPCS | Performed by: UROLOGY

## 2018-05-31 RX ORDER — SODIUM CHLORIDE 0.9 % (FLUSH) 0.9 %
3 SYRINGE (ML) INJECTION EVERY 8 HOURS
Status: DISCONTINUED | OUTPATIENT
Start: 2018-05-31 | End: 2018-05-31 | Stop reason: HOSPADM

## 2018-05-31 RX ORDER — FUROSEMIDE 10 MG/ML
INJECTION INTRAMUSCULAR; INTRAVENOUS
Status: DISCONTINUED | OUTPATIENT
Start: 2018-05-31 | End: 2018-05-31

## 2018-05-31 RX ORDER — OXYCODONE HYDROCHLORIDE 5 MG/1
5 TABLET ORAL
Status: DISCONTINUED | OUTPATIENT
Start: 2018-05-31 | End: 2018-05-31 | Stop reason: HOSPADM

## 2018-05-31 RX ORDER — LIDOCAINE HCL/PF 100 MG/5ML
SYRINGE (ML) INTRAVENOUS
Status: DISCONTINUED | OUTPATIENT
Start: 2018-05-31 | End: 2018-05-31

## 2018-05-31 RX ORDER — SODIUM CHLORIDE 0.9 % (FLUSH) 0.9 %
3 SYRINGE (ML) INJECTION
Status: DISCONTINUED | OUTPATIENT
Start: 2018-05-31 | End: 2018-05-31 | Stop reason: HOSPADM

## 2018-05-31 RX ORDER — ONDANSETRON 2 MG/ML
4 INJECTION INTRAMUSCULAR; INTRAVENOUS DAILY PRN
Status: DISCONTINUED | OUTPATIENT
Start: 2018-05-31 | End: 2018-05-31 | Stop reason: HOSPADM

## 2018-05-31 RX ORDER — CEFAZOLIN SODIUM 2 G/50ML
2 SOLUTION INTRAVENOUS
Status: DISCONTINUED | OUTPATIENT
Start: 2018-05-31 | End: 2018-05-31 | Stop reason: HOSPADM

## 2018-05-31 RX ORDER — SODIUM CHLORIDE, SODIUM LACTATE, POTASSIUM CHLORIDE, CALCIUM CHLORIDE 600; 310; 30; 20 MG/100ML; MG/100ML; MG/100ML; MG/100ML
INJECTION, SOLUTION INTRAVENOUS CONTINUOUS
Status: DISCONTINUED | OUTPATIENT
Start: 2018-05-31 | End: 2018-05-31 | Stop reason: HOSPADM

## 2018-05-31 RX ORDER — DOCUSATE SODIUM 100 MG/1
100 CAPSULE, LIQUID FILLED ORAL 2 TIMES DAILY
Qty: 60 CAPSULE | Refills: 0 | Status: SHIPPED | OUTPATIENT
Start: 2018-05-31 | End: 2021-12-29 | Stop reason: SDUPTHER

## 2018-05-31 RX ORDER — OXYCODONE AND ACETAMINOPHEN 5; 325 MG/1; MG/1
1-2 TABLET ORAL EVERY 4 HOURS PRN
Qty: 30 TABLET | Refills: 0 | Status: SHIPPED | OUTPATIENT
Start: 2018-05-31 | End: 2019-06-27

## 2018-05-31 RX ORDER — ROCURONIUM BROMIDE 10 MG/ML
INJECTION, SOLUTION INTRAVENOUS
Status: DISCONTINUED | OUTPATIENT
Start: 2018-05-31 | End: 2018-05-31

## 2018-05-31 RX ORDER — LIDOCAINE HYDROCHLORIDE 10 MG/ML
1 INJECTION, SOLUTION EPIDURAL; INFILTRATION; INTRACAUDAL; PERINEURAL ONCE
Status: DISCONTINUED | OUTPATIENT
Start: 2018-05-31 | End: 2018-05-31 | Stop reason: HOSPADM

## 2018-05-31 RX ORDER — FENTANYL CITRATE 50 UG/ML
INJECTION, SOLUTION INTRAMUSCULAR; INTRAVENOUS
Status: DISCONTINUED | OUTPATIENT
Start: 2018-05-31 | End: 2018-05-31

## 2018-05-31 RX ORDER — EPHEDRINE SULFATE 50 MG/ML
INJECTION, SOLUTION INTRAVENOUS
Status: DISCONTINUED | OUTPATIENT
Start: 2018-05-31 | End: 2018-05-31

## 2018-05-31 RX ORDER — MEPERIDINE HYDROCHLORIDE 50 MG/ML
12.5 INJECTION INTRAMUSCULAR; INTRAVENOUS; SUBCUTANEOUS ONCE AS NEEDED
Status: COMPLETED | OUTPATIENT
Start: 2018-05-31 | End: 2018-05-31

## 2018-05-31 RX ORDER — CIPROFLOXACIN 500 MG/1
500 TABLET ORAL EVERY 12 HOURS
Qty: 6 TABLET | Refills: 0 | Status: SHIPPED | OUTPATIENT
Start: 2018-05-31 | End: 2018-06-03

## 2018-05-31 RX ORDER — SUCCINYLCHOLINE CHLORIDE 20 MG/ML
INJECTION INTRAMUSCULAR; INTRAVENOUS
Status: DISCONTINUED | OUTPATIENT
Start: 2018-05-31 | End: 2018-05-31

## 2018-05-31 RX ORDER — PROPOFOL 10 MG/ML
VIAL (ML) INTRAVENOUS
Status: DISCONTINUED | OUTPATIENT
Start: 2018-05-31 | End: 2018-05-31

## 2018-05-31 RX ORDER — HYDROCODONE BITARTRATE AND ACETAMINOPHEN 10; 325 MG/1; MG/1
1 TABLET ORAL EVERY 4 HOURS PRN
Status: DISCONTINUED | OUTPATIENT
Start: 2018-05-31 | End: 2018-05-31 | Stop reason: HOSPADM

## 2018-05-31 RX ORDER — FENTANYL CITRATE 50 UG/ML
25 INJECTION, SOLUTION INTRAMUSCULAR; INTRAVENOUS EVERY 5 MIN PRN
Status: COMPLETED | OUTPATIENT
Start: 2018-05-31 | End: 2018-05-31

## 2018-05-31 RX ORDER — HYDROCODONE BITARTRATE AND ACETAMINOPHEN 5; 325 MG/1; MG/1
1 TABLET ORAL EVERY 4 HOURS PRN
Status: DISCONTINUED | OUTPATIENT
Start: 2018-05-31 | End: 2018-05-31 | Stop reason: HOSPADM

## 2018-05-31 RX ORDER — ONDANSETRON 2 MG/ML
INJECTION INTRAMUSCULAR; INTRAVENOUS
Status: DISCONTINUED | OUTPATIENT
Start: 2018-05-31 | End: 2018-05-31

## 2018-05-31 RX ORDER — DIPHENHYDRAMINE HYDROCHLORIDE 50 MG/ML
25 INJECTION INTRAMUSCULAR; INTRAVENOUS EVERY 6 HOURS PRN
Status: DISCONTINUED | OUTPATIENT
Start: 2018-05-31 | End: 2018-05-31 | Stop reason: HOSPADM

## 2018-05-31 RX ORDER — ONDANSETRON 8 MG/1
8 TABLET, ORALLY DISINTEGRATING ORAL EVERY 8 HOURS PRN
Status: DISCONTINUED | OUTPATIENT
Start: 2018-05-31 | End: 2018-05-31 | Stop reason: HOSPADM

## 2018-05-31 RX ADMIN — FUROSEMIDE 10 MG: 10 INJECTION, SOLUTION INTRAMUSCULAR; INTRAVENOUS at 10:05

## 2018-05-31 RX ADMIN — FENTANYL CITRATE 50 MCG: 50 INJECTION, SOLUTION INTRAMUSCULAR; INTRAVENOUS at 08:05

## 2018-05-31 RX ADMIN — FENTANYL CITRATE 25 MCG: 50 INJECTION, SOLUTION INTRAMUSCULAR; INTRAVENOUS at 11:05

## 2018-05-31 RX ADMIN — ROCURONIUM BROMIDE 25 MG: 10 INJECTION, SOLUTION INTRAVENOUS at 09:05

## 2018-05-31 RX ADMIN — EPHEDRINE SULFATE 10 MG: 50 INJECTION, SOLUTION INTRAMUSCULAR; INTRAVENOUS; SUBCUTANEOUS at 09:05

## 2018-05-31 RX ADMIN — MEPERIDINE HYDROCHLORIDE 12.5 MG: 50 INJECTION INTRAMUSCULAR; INTRAVENOUS; SUBCUTANEOUS at 11:05

## 2018-05-31 RX ADMIN — SUCCINYLCHOLINE CHLORIDE 200 MG: 20 INJECTION, SOLUTION INTRAMUSCULAR; INTRAVENOUS at 08:05

## 2018-05-31 RX ADMIN — SODIUM CHLORIDE, SODIUM LACTATE, POTASSIUM CHLORIDE, AND CALCIUM CHLORIDE: 600; 310; 30; 20 INJECTION, SOLUTION INTRAVENOUS at 08:05

## 2018-05-31 RX ADMIN — CEFAZOLIN SODIUM 2 G: 2 SOLUTION INTRAVENOUS at 09:05

## 2018-05-31 RX ADMIN — LIDOCAINE HYDROCHLORIDE 100 MG: 20 INJECTION, SOLUTION INTRAVENOUS at 08:05

## 2018-05-31 RX ADMIN — ONDANSETRON 4 MG: 2 INJECTION, SOLUTION INTRAMUSCULAR; INTRAVENOUS at 10:05

## 2018-05-31 RX ADMIN — PROPOFOL 100 MG: 10 INJECTION, EMULSION INTRAVENOUS at 08:05

## 2018-05-31 RX ADMIN — OXYCODONE HYDROCHLORIDE 5 MG: 5 TABLET ORAL at 11:05

## 2018-05-31 RX ADMIN — ROCURONIUM BROMIDE 5 MG: 10 INJECTION, SOLUTION INTRAVENOUS at 08:05

## 2018-05-31 NOTE — ANESTHESIA POSTPROCEDURE EVALUATION
"Anesthesia Post Evaluation    Patient: Amauri Lara    Procedure(s) Performed: Procedure(s) (LRB):  PROSTATECTOMY-TRANSURETHRAL (N/A)    Final Anesthesia Type: general  Patient location during evaluation: PACU  Patient participation: Yes- Able to Participate  Level of consciousness: awake and alert  Post-procedure vital signs: reviewed and stable  Pain management: adequate  Airway patency: patent  PONV status at discharge: No PONV  Anesthetic complications: no      Cardiovascular status: blood pressure returned to baseline  Respiratory status: unassisted  Hydration status: euvolemic  Follow-up not needed.        Visit Vitals  /72   Pulse 81   Temp 36.5 °C (97.7 °F) (Temporal)   Resp 20   Ht 5' 11" (1.803 m)   Wt 106.3 kg (234 lb 5.6 oz)   SpO2 96%   BMI 32.69 kg/m²       Pain/Sharita Score: Pain Assessment Performed: Yes (5/31/2018  1:15 PM)  Presence of Pain: denies (5/31/2018  1:15 PM)  Pain Rating Prior to Med Admin: 4 (5/31/2018 12:15 PM)  Sharita Score: 10 (5/31/2018  1:15 PM)      "

## 2018-05-31 NOTE — ANESTHESIA PREPROCEDURE EVALUATION
05/31/2018  Amauri Lara is a 88 y.o., male.    Anesthesia Evaluation    I have reviewed the Patient Summary Reports.        Review of Systems  Cardiovascular:   Hypertension     PVD NYHA Classification II ECG has been reviewed.  1 - Mild left atrial enlargement.     2 - Concentric remodeling.     3 - Normal left ventricular systolic function (EF 60-65%).     4 - Normal left ventricular diastolic function.     5 - Normal right ventricular systolic function .     6 - The estimated PA systolic pressure is greater than 7 mmHg   Pulmonary:   H/o PE 2011; last coumadin 1 week ago; denies any dyspnea   Renal/:   BPH    Endocrine:   Diabetes, type 2        Physical Exam  General:  Well nourished    Airway/Jaw/Neck:  Airway Findings: Mouth Opening: Normal Mallampati: II       Chest/Lungs:  Chest/Lungs Findings: Clear to auscultation     Heart/Vascular:  Heart Findings: Rate: Normal             Anesthesia Plan  Type of Anesthesia, risks & benefits discussed:  Anesthesia Type:  general  Patient's Preference:   Intra-op Monitoring Plan:   Intra-op Monitoring Plan Comments:   Post Op Pain Control Plan: multimodal analgesia  Post Op Pain Control Plan Comments:   Induction:   IV  Beta Blocker:         Informed Consent: Patient understands risks and agrees with Anesthesia plan.  Questions answered. Anesthesia consent signed with patient.  ASA Score: 3     Day of Surgery Review of History & Physical: I have interviewed and examined the patient. I have reviewed the patient's H&P dated:  There are no significant changes.          Ready For Surgery From Anesthesia Perspective.     WBC 7.63 05/16/2018      HGB 15.9 05/16/2018     HCT 51.2 05/16/2018      05/16/2018     CHOL 186 12/21/2017     TRIG 157 (H) 12/21/2017     HDL 41 12/21/2017     ALT 22 12/21/2017     AST 19 12/21/2017      05/16/2018     K 3.8  05/16/2018      05/16/2018     CREATININE 1.1 05/16/2018     BUN 17 05/16/2018     CO2 30 (H) 05/16/2018

## 2018-05-31 NOTE — DISCHARGE INSTRUCTIONS
General Information:    1. Do not drink alcoholic beverages including beer for 24 hours or as long as you are on pain medication..  2. Do not drive a motor vehicle, operate machinery or power tools, or signs legal papers for 24 hours or as long as you are on pain medication.   3. You may experience light-headedness, dizziness, and sleepiness following surgery. Please do not stay alone. A responsible adult should be with you for this 24 hour period.  4. Go home and rest.  5. Progress slowly to a normal diet unless instructed.  Otherwise, begin with liquids such as soft drinks, then soup and crackers working up to solid foods. Drink plenty of nonalcoholic fluids.  6. Certain anesthetics and pain medications produce nausea and vomiting in certain individuals. If nausea becomes a problem at home, call you doctor.  7. A nurse will be calling you sometime after surgery. Do not be alarmed. This is our way of finding out how you are doing.  8. Several times every hour while you are awake, take 2-3 deep breaths and cough. If you had stomach surgery hold a pillow or rolled towel firmly against your stomach before you cough. This will help with any pain the cough might cause.  9. Several times every hour while you are awake, pump and flex your feet 5-6 times and do foot circles. This will help prevent blood clots.  10. Call your doctor for severe pain, bleeding, fever, or signs or symptoms of infection (pain, swelling, redness, foul odor, drainage).  11. You can contact your doctor anytime by [phone] for the [facility] (at [facility]) or [phone] for the [facility] on [facility].   [email or web address] is another way to contact your doctor if you are an active participant online with [facility].  12. Continue Nozin provided at discharge twice daily for 7 days or until the incision is healed.  See pamphlet or box for instructions.

## 2018-05-31 NOTE — ANESTHESIA RELEASE NOTE
"Anesthesia Release from PACU Note    Patient: Amauri Lara    Procedure(s) Performed: Procedure(s) (LRB):  PROSTATECTOMY-TRANSURETHRAL (N/A)    Anesthesia type: general    Post pain: Adequate analgesia    Post assessment: no apparent anesthetic complications, tolerated procedure well and no evidence of recall    Last Vitals:   Visit Vitals  BP (!) 153/78 (BP Location: Right arm, Patient Position: Lying)   Pulse 83   Temp 36.5 °C (97.7 °F) (Temporal)   Resp 20   Ht 5' 11" (1.803 m)   Wt 106.3 kg (234 lb 5.6 oz)   SpO2 99%   BMI 32.69 kg/m²       Post vital signs: stable    Level of consciousness: awake, alert  and oriented    Nausea/Vomiting: no nausea/no vomiting    Complications: none    Airway Patency: patent    Respiratory: unassisted, spontaneous ventilation    Cardiovascular: stable and blood pressure at baseline    Hydration: euvolemic  "

## 2018-05-31 NOTE — INTERVAL H&P NOTE
The patient has been examined and the H&P has been reviewed:    I concur with the findings and no changes have occurred since H&P was written.    Anesthesia/Surgery risks, benefits and alternative options discussed and understood by patient/family.          Active Hospital Problems    Diagnosis  POA    Benign prostatic hyperplasia [N40.0]  Yes      Resolved Hospital Problems    Diagnosis Date Resolved POA   No resolved problems to display.

## 2018-05-31 NOTE — H&P (VIEW-ONLY)
Chief Complaint: Retention    HPI:   5/16/18: Had to have riggs replaced after last visit.  Tolerated two surgeries for arterial restoration in last year, held coumadin for them no problem.  2/15/18: Laws: One month f/u for urinary retention. Riggs still in place; wants another voiding trial. Home health would be able to replace it if he cannot void. Still taking Flomax and Finasteride.   1/16/18: Laws: Went to the ER here at [facility] last night following riggs removal yesterday for voiding trial. Riggs was replaced in ER and he was started on Bactrim.   1/15/18: Has been taking flomax/finasteride for the interval. No upcoming surgeries.  No new problems.  Reviewed history in detail.  8/14/17: Riggs is working okay for him.  Has taken finasteride on that time.  Riggs changed one week ago.  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  Tolerated right surgery reasonably well.  2/13/17: Cysto today shows severe BPH with trilobar hypertrophy.    2/1/17: 88 yo man went to the ER for a few complaints and he was found to be in retention and a riggs was placed; bag filled/emptied 1.5 times shortly thereafter.  Is no no BPH meds.  Had double voiding prior, nocturia x2.  No abd/pelvic pain and no exac/rel factors.  No hematuria.  No urolithiasis.      Allergies:  Patient has no known allergies.    Medications:  has a current medication list which includes the following prescription(s): allopurinol, finasteride, glipizide, hydrocodone-acetaminophen 7.5-325mg, lisinopril-hydrochlorothiazide, lovastatin, metformin, metoprolol tartrate, ssd, tamsulosin, and warfarin.    Review of Systems:  General: No fever, chills, fatigability, or weight loss.  Skin: No rashes, itching, or changes in color or texture of skin.  Chest: Denies DELGADILLO, cyanosis, wheezing, cough, and sputum production.  Abdomen: Appetite fine. No weight loss. Denies diarrhea, abdominal pain, hematemesis, or blood in  stool.  Musculoskeletal: No joint stiffness or swelling. Denies back pain.  : As above.  All other review of systems negative.    PMH:   has a past medical history of Aphakia of right eye; Cataract; DM (diabetes mellitus), type 2 with complications; Hearing loss of aging; History of gout; History of pulmonary embolism (2011); Hypertension associated with diabetes; Iridodialysis of right eye; Obesity, unspecified; and Type 2 diabetes mellitus with polyneuropathy.    PSH:   has a past surgical history that includes Cataract extraction.    FamHx: family history includes Diabetes in his brother.    SocHx:  reports that he has never smoked. He has never used smokeless tobacco. He reports that he does not drink alcohol or use drugs.      Physical Exam:  Vitals:    05/16/18 0826   BP: 126/84     General: A&Ox3, no apparent distress, no deformities  Neck: No masses, normal thyroid  Lungs: normal inspiration, no use of accessory muscles  Heart: normal pulse, no arrhythmias  Abdomen: Soft, NT, ND  Skin: The skin is warm and dry. No jaundice.  Ext: No c/c/e.  :   1/15/18: Cuevas in good position, removed for voiding trial today  2/17: ALVA normal, penis/test normal.    Labs/Studies:     Impression/Plan:   1. Will arrange for laser TURP as pt more healthy than last year and tolerated his surgeries no problem.

## 2018-05-31 NOTE — OP NOTE
Date of Procedure: 05/31/2018    PREOP DIAGNOSIS:  BPH with obstruction.     POSTOP DIAGNOSIS:  BPH with obstruction.    PROCEDURES:      1. Laser TURP (03909)    SURGEON:   Ramiro Westbrook IV, M.D.    Assistants: None    Specimen: None    Prosthetics, Devices, Grafts: None    ANES: General endotracheal.       FINDINGS:  The patient had a prostate that was obstructing urine flow and laser TURP was performed without difficulty.  Ureteral orifices were  preserved and the verumontanum was the distal limit of resection with no  injury to the verumontanum or any distal structures.  His median lobe was as large as 30gm, and took a long time to resect.  The lateral lobes were significantly resected and he had a good channel but staged second TURP maybe needed.       PROCEDURE IN DETAIL:  After proper consents were obtained, the patient  brought to the Operating Room where he was prepped and draped in normal sterile fashion and provided general endotracheal anesthesia in dorsal  lithotomy position.  The laser resectoscope was assembled and introduced per urethra and the boundaries of resection were appreciated including the lateral sides of the median lobe, ureteral orifices, which were uninvolved in the resection area, and verumontanum.  After appreciation of resection boundaries, resection was commenced first at the 7 o'clock position  laying a groove from the right bladder neck down to the veru.  This was repeated on the left.  Median lobe was thus raised and resected along the base of the prostate.   Resection was then performed at 6 o'clock position followed by the right lateral lobe followed by the left lateral lobe.  After resection with excellent hemostasis, the bladder was rinsed and drained.  The laser was set for cauterizing energy and a short time was spent assuring excellent hemostasis and trimming down some high spots that were observed after draining the bladder. After this was done, there was an excellent  channel through the prostate.  The prostate chips were retrieved from the bladder.  The resectoscope was withdrawn and a good flow of urine was observed at the penis.  A 20-Bhutanese Cuevas catheter was then placed in the bladder with 10 mL sterile water in its balloon.  This was sent to gravity drainage and was irrigated and seen to be clear.  The patient tolerated this well and there were no complications. He was then awakened and transferred to Recovery in stable condition.                                                                                    BLOOD LOSS:  None.                                                                                                                                     BLOOD GIVEN:  None.                                                                                                                                    URINE OUTPUT:  None.                                                                                                                                   DRAINS:  20-Bhutanese Cuevas catheter.                                                                                                                     DISPOSITION:  Home.                                                                                                                         PLAN:  The patient was provided with pain medicines, stool softeners, prophylactic antibiotics.  He is provided with instructions for care at home and will return in 3 days for removal of his Cuevas catheter.   He will call if any difficulties arise.

## 2018-05-31 NOTE — TRANSFER OF CARE
"Anesthesia Transfer of Care Note    Patient: Amauri Lara    Procedure(s) Performed: Procedure(s) (LRB):  PROSTATECTOMY-TRANSURETHRAL (N/A)    Patient location: PACU    Anesthesia Type: general    Transport from OR: Transported from OR on room air with adequate spontaneous ventilation    Post pain: adequate analgesia    Post assessment: no apparent anesthetic complications and tolerated procedure well    Post vital signs: stable    Level of consciousness: awake and responds to stimulation    Nausea/Vomiting: no nausea/vomiting    Complications: none    Transfer of care protocol was followed      Last vitals:   Visit Vitals  BP (!) 153/78 (BP Location: Right arm, Patient Position: Lying)   Pulse 83   Temp 36.5 °C (97.7 °F) (Temporal)   Resp 20   Ht 5' 11" (1.803 m)   Wt 106.3 kg (234 lb 5.6 oz)   SpO2 99%   BMI 32.69 kg/m²     "

## 2018-06-04 ENCOUNTER — CLINICAL SUPPORT (OUTPATIENT)
Dept: UROLOGY | Facility: CLINIC | Age: 83
End: 2018-06-04
Payer: MEDICARE

## 2018-06-04 NOTE — PROGRESS NOTES
Here to d/c keely   Tolerated well  Instructed pt to call office if unable to void by 3pm.  Also reminded him of his post op appointment June 13th

## 2018-06-05 ENCOUNTER — TELEPHONE (OUTPATIENT)
Dept: INTERNAL MEDICINE | Facility: CLINIC | Age: 83
End: 2018-06-05

## 2018-06-05 ENCOUNTER — TELEPHONE (OUTPATIENT)
Dept: UROLOGY | Facility: CLINIC | Age: 83
End: 2018-06-05

## 2018-06-05 NOTE — TELEPHONE ENCOUNTER
Spoke with [facility] per Dr. Vera patient is to have INR rechecked on Monday 06/11/18. Nurse verbalized understanding.

## 2018-06-05 NOTE — TELEPHONE ENCOUNTER
----- Message from Michael Westbrook IV, MD sent at 6/5/2018  7:09 AM CDT -----  Contact: daughter Nel  Call her....      ----- Message -----  From: Georgina Koch LPN  Sent: 6/4/2018   4:50 PM  To: Michael Westbrook IV, MD        ----- Message -----  From: Zahraa Coronado  Sent: 6/4/2018   3:16 PM  To: Dariana GALDAMEZ IV Staff    Calling concerning patient feeling like he is constantly urinating. Also does he need to continue on medication for bladder shrinkage. Please call daughter ASAP URGENT!! TODAY @ [phone]. Thanks, katie

## 2018-06-05 NOTE — TELEPHONE ENCOUNTER
----- Message from Marisol Nolasco sent at 6/5/2018 11:54 AM CDT -----  [facility]  pt/inr is 16.0/1.3, was off coumadin 5/25-5/31 for surgery. started again 6/1...[phone]

## 2018-06-05 NOTE — TELEPHONE ENCOUNTER
----- Message from Caroline Galicia sent at 6/5/2018  2:19 PM CDT -----  Contact: jackie parker/[facility] [phone] or [phone]  States that she is returning call. Please call back at [phone] or [phone]//thank you acc

## 2018-06-06 DIAGNOSIS — Z79.01 LONG TERM (CURRENT) USE OF ANTICOAGULANTS: ICD-10-CM

## 2018-06-06 RX ORDER — WARFARIN SODIUM 5 MG/1
TABLET ORAL
Qty: 30 TABLET | Refills: 11 | Status: SHIPPED | OUTPATIENT
Start: 2018-06-06 | End: 2019-05-06 | Stop reason: SDUPTHER

## 2018-06-07 ENCOUNTER — TELEPHONE (OUTPATIENT)
Dept: ADMINISTRATIVE | Facility: CLINIC | Age: 83
End: 2018-06-07

## 2018-06-11 ENCOUNTER — TELEPHONE (OUTPATIENT)
Dept: INTERNAL MEDICINE | Facility: CLINIC | Age: 83
End: 2018-06-11

## 2018-06-11 VITALS
TEMPERATURE: 98 F | HEIGHT: 71 IN | OXYGEN SATURATION: 96 % | RESPIRATION RATE: 20 BRPM | SYSTOLIC BLOOD PRESSURE: 139 MMHG | DIASTOLIC BLOOD PRESSURE: 72 MMHG | BODY MASS INDEX: 32.81 KG/M2 | HEART RATE: 81 BPM | WEIGHT: 234.38 LBS

## 2018-06-11 NOTE — TELEPHONE ENCOUNTER
----- Message from Marisol Nolasco sent at 6/11/2018  9:52 AM CDT -----  [facility]  pt's pt/inr is 19.7/1.6...[phone]

## 2018-06-11 NOTE — TELEPHONE ENCOUNTER
INR 1.6, increase coumadin to alternate between 5 mg and 7 mg every other day. Repeat INR in two weeks.

## 2018-06-13 ENCOUNTER — OFFICE VISIT (OUTPATIENT)
Dept: UROLOGY | Facility: CLINIC | Age: 83
End: 2018-06-13
Payer: MEDICARE

## 2018-06-13 VITALS
DIASTOLIC BLOOD PRESSURE: 52 MMHG | BODY MASS INDEX: 32.07 KG/M2 | WEIGHT: 229.06 LBS | HEART RATE: 51 BPM | SYSTOLIC BLOOD PRESSURE: 106 MMHG | HEIGHT: 71 IN

## 2018-06-13 DIAGNOSIS — N40.0 BENIGN PROSTATIC HYPERPLASIA, UNSPECIFIED WHETHER LOWER URINARY TRACT SYMPTOMS PRESENT: Primary | ICD-10-CM

## 2018-06-13 LAB — POC RESIDUAL URINE VOLUME: 268 ML (ref 0–100)

## 2018-06-13 PROCEDURE — 99999 PR PBB SHADOW E&M-EST. PATIENT-LVL II: CPT | Mod: PBBFAC,,, | Performed by: UROLOGY

## 2018-06-13 PROCEDURE — 99024 POSTOP FOLLOW-UP VISIT: CPT | Mod: S$GLB,,, | Performed by: UROLOGY

## 2018-06-13 PROCEDURE — 51798 US URINE CAPACITY MEASURE: CPT | Mod: S$GLB,,, | Performed by: UROLOGY

## 2018-06-13 NOTE — PROGRESS NOTES
Chief Complaint: Retention    HPI:   6/13/18: Stream better after TURP.  Gets some OAB when he arises.  Wearing a little pad for safety.  5/16/18: Had to have riggs replaced after last visit.  Tolerated two surgeries for arterial restoration in last year, held coumadin for them no problem.  2/15/18: Laws: One month f/u for urinary retention. Riggs still in place; wants another voiding trial. Home health would be able to replace it if he cannot void. Still taking Flomax and Finasteride.   1/16/18: Laws: Went to the ER here at [facility] last night following riggs removal yesterday for voiding trial. Riggs was replaced in ER and he was started on Bactrim.   1/15/18: Has been taking flomax/finasteride for the interval. No upcoming surgeries.  No new problems.  Reviewed history in detail.  8/14/17: Riggs is working okay for him.  Has taken finasteride on that time.  Riggs changed one week ago.  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  Tolerated right surgery reasonably well.  2/13/17: Cysto today shows severe BPH with trilobar hypertrophy.    2/1/17: 88 yo man went to the ER for a few complaints and he was found to be in retention and a riggs was placed; bag filled/emptied 1.5 times shortly thereafter.  Is no no BPH meds.  Had double voiding prior, nocturia x2.  No abd/pelvic pain and no exac/rel factors.  No hematuria.  No urolithiasis.      Allergies:  Patient has no known allergies.    Medications:  has a current medication list which includes the following prescription(s): allopurinol, docusate sodium, finasteride, glipizide, hydrocodone-acetaminophen, lisinopril-hydrochlorothiazide, lovastatin, metformin, metoprolol tartrate, oxycodone-acetaminophen, ssd, tamsulosin, warfarin, and warfarin.    Review of Systems:  General: No fever, chills, fatigability, or weight loss.  Skin: No rashes, itching, or changes in color or texture of skin.  Chest: Denies DELGADILLO, cyanosis,  wheezing, cough, and sputum production.  Abdomen: Appetite fine. No weight loss. Denies diarrhea, abdominal pain, hematemesis, or blood in stool.  Musculoskeletal: No joint stiffness or swelling. Denies back pain.  : As above.  All other review of systems negative.    PMH:   has a past medical history of Aphakia of right eye; Cataract; DM (diabetes mellitus), type 2 with complications; Hearing loss of aging; History of gout; History of pulmonary embolism (2011); Hypertension associated with diabetes; Iridodialysis of right eye; Obesity, unspecified; and Type 2 diabetes mellitus with polyneuropathy.    PSH:   has a past surgical history that includes Cataract extraction; Vascular surgery; and Transurethral resection of prostate (N/A, 5/31/2018).    FamHx: family history includes Diabetes in his brother.    SocHx:  reports that he has never smoked. He has never used smokeless tobacco. He reports that he does not drink alcohol or use drugs.      Physical Exam:  Vitals:    06/13/18 1346   BP: (!) 106/52   Pulse: (!) 51     General: A&Ox3, no apparent distress, no deformities  Neck: No masses, normal thyroid  Lungs: normal inspiration, no use of accessory muscles  Heart: normal pulse, no arrhythmias  Abdomen: Soft, NT, ND  Skin: The skin is warm and dry. No jaundice.  Ext: No c/c/e.  :   1/15/18: Cuevas in good position, removed for voiding trial today  2/17: ALVA normal, penis/test normal.    Labs/Studies:     Impression/Plan:   1. Some OAB after TURP to be expected.  RTC 1 mo.

## 2018-06-14 ENCOUNTER — PATIENT OUTREACH (OUTPATIENT)
Dept: ADMINISTRATIVE | Facility: HOSPITAL | Age: 83
End: 2018-06-14

## 2018-06-19 ENCOUNTER — CLINICAL SUPPORT (OUTPATIENT)
Dept: UROLOGY | Facility: CLINIC | Age: 83
End: 2018-06-19
Payer: MEDICARE

## 2018-06-19 ENCOUNTER — LAB VISIT (OUTPATIENT)
Dept: LAB | Facility: HOSPITAL | Age: 83
End: 2018-06-19
Attending: INTERNAL MEDICINE
Payer: MEDICARE

## 2018-06-19 VITALS — WEIGHT: 229 LBS | HEIGHT: 71 IN | BODY MASS INDEX: 32.06 KG/M2

## 2018-06-19 DIAGNOSIS — E11.8 TYPE 2 DIABETES MELLITUS WITH COMPLICATION, WITHOUT LONG-TERM CURRENT USE OF INSULIN: ICD-10-CM

## 2018-06-19 DIAGNOSIS — R33.9 URINARY RETENTION: Primary | ICD-10-CM

## 2018-06-19 LAB
CREAT UR-MCNC: 99 MG/DL
POC RESIDUAL URINE VOLUME: 85 ML (ref 0–100)
PROT UR-MCNC: 298 MG/DL
PROT/CREAT RATIO, UR: 3.01

## 2018-06-19 PROCEDURE — 99999 PR PBB SHADOW E&M-EST. PATIENT-LVL III: CPT | Mod: PBBFAC,,,

## 2018-06-19 PROCEDURE — 51798 US URINE CAPACITY MEASURE: CPT | Mod: S$GLB,,, | Performed by: UROLOGY

## 2018-06-19 PROCEDURE — 82570 ASSAY OF URINE CREATININE: CPT

## 2018-06-19 NOTE — PROGRESS NOTES
Patient arrived to clinic today for bladder scan. PVR = 83 ml. Patient discharged from clinic with follow up appointment for Dr. Westbrook in 1 month.

## 2018-06-22 ENCOUNTER — PES CALL (OUTPATIENT)
Dept: ADMINISTRATIVE | Facility: CLINIC | Age: 83
End: 2018-06-22

## 2018-06-25 ENCOUNTER — TELEPHONE (OUTPATIENT)
Dept: INTERNAL MEDICINE | Facility: CLINIC | Age: 83
End: 2018-06-25

## 2018-06-25 NOTE — TELEPHONE ENCOUNTER
Orders given to Rosalie with [facility] . Per Dr. Vera INR 1.7 patient to take coumadin 7 mg 5 days per week and 5 mg two days per week. Recheck INR in two weeks. Verbalized understanding order read back.

## 2018-06-25 NOTE — TELEPHONE ENCOUNTER
----- Message from Maria Luz London sent at 6/25/2018  9:08 AM CDT -----  Contact: Rosalie   Calling in PT & INR    PT is 20.1 & INR 1.7 pt is on 5 alternate 7 every other day.  [phone]

## 2018-06-25 NOTE — TELEPHONE ENCOUNTER
INR 1.7, increase Coumadin to take 7 mg 5 days per week and 5 mg 2 days per week,  Repeat INR  In 2 weeks

## 2018-06-28 ENCOUNTER — TELEPHONE (OUTPATIENT)
Dept: UROLOGY | Facility: CLINIC | Age: 83
End: 2018-06-28

## 2018-06-28 ENCOUNTER — OFFICE VISIT (OUTPATIENT)
Dept: INTERNAL MEDICINE | Facility: CLINIC | Age: 83
End: 2018-06-28
Payer: MEDICARE

## 2018-06-28 ENCOUNTER — LAB VISIT (OUTPATIENT)
Dept: LAB | Facility: HOSPITAL | Age: 83
End: 2018-06-28
Attending: UROLOGY
Payer: MEDICARE

## 2018-06-28 VITALS
DIASTOLIC BLOOD PRESSURE: 82 MMHG | OXYGEN SATURATION: 95 % | HEIGHT: 72 IN | HEART RATE: 60 BPM | BODY MASS INDEX: 31.08 KG/M2 | WEIGHT: 229.5 LBS | TEMPERATURE: 98 F | SYSTOLIC BLOOD PRESSURE: 132 MMHG

## 2018-06-28 DIAGNOSIS — Z87.39 HISTORY OF GOUT: ICD-10-CM

## 2018-06-28 DIAGNOSIS — N40.0 BENIGN PROSTATIC HYPERPLASIA, UNSPECIFIED WHETHER LOWER URINARY TRACT SYMPTOMS PRESENT: ICD-10-CM

## 2018-06-28 DIAGNOSIS — E11.8 TYPE 2 DIABETES MELLITUS WITH COMPLICATION, WITHOUT LONG-TERM CURRENT USE OF INSULIN: ICD-10-CM

## 2018-06-28 DIAGNOSIS — I70.0 ATHEROSCLEROSIS OF AORTA: ICD-10-CM

## 2018-06-28 DIAGNOSIS — I15.2 HYPERTENSION ASSOCIATED WITH DIABETES: ICD-10-CM

## 2018-06-28 DIAGNOSIS — Z86.711 HISTORY OF PULMONARY EMBOLISM: ICD-10-CM

## 2018-06-28 DIAGNOSIS — E11.42 TYPE 2 DIABETES MELLITUS WITH POLYNEUROPATHY: Primary | ICD-10-CM

## 2018-06-28 DIAGNOSIS — R33.9 URINARY RETENTION: ICD-10-CM

## 2018-06-28 DIAGNOSIS — E11.59 HYPERTENSION ASSOCIATED WITH DIABETES: ICD-10-CM

## 2018-06-28 DIAGNOSIS — R30.0 DYSURIA: Primary | ICD-10-CM

## 2018-06-28 DIAGNOSIS — I72.4 ANEURYSM ARTERY, POPLITEAL: ICD-10-CM

## 2018-06-28 DIAGNOSIS — R30.0 DYSURIA: ICD-10-CM

## 2018-06-28 LAB
BACTERIA #/AREA URNS AUTO: ABNORMAL /HPF
MICROSCOPIC COMMENT: ABNORMAL
RBC #/AREA URNS AUTO: 44 /HPF (ref 0–4)
SQUAMOUS #/AREA URNS AUTO: 0 /HPF
WBC #/AREA URNS AUTO: 22 /HPF (ref 0–5)

## 2018-06-28 PROCEDURE — 87086 URINE CULTURE/COLONY COUNT: CPT

## 2018-06-28 PROCEDURE — 99214 OFFICE O/P EST MOD 30 MIN: CPT | Mod: S$GLB,,, | Performed by: INTERNAL MEDICINE

## 2018-06-28 PROCEDURE — 99999 PR PBB SHADOW E&M-EST. PATIENT-LVL III: CPT | Mod: PBBFAC,,, | Performed by: INTERNAL MEDICINE

## 2018-06-28 PROCEDURE — 81001 URINALYSIS AUTO W/SCOPE: CPT

## 2018-06-28 PROCEDURE — 99499 UNLISTED E&M SERVICE: CPT | Mod: S$PBB,,, | Performed by: INTERNAL MEDICINE

## 2018-06-28 RX ORDER — HYDROCODONE BITARTRATE AND ACETAMINOPHEN 7.5; 325 MG/1; MG/1
1 TABLET ORAL EVERY 6 HOURS PRN
Qty: 90 TABLET | Refills: 0 | Status: SHIPPED | OUTPATIENT
Start: 2018-06-28 | End: 2019-06-27 | Stop reason: SDUPTHER

## 2018-06-28 NOTE — TELEPHONE ENCOUNTER
----- Message from Kiran Jean sent at 6/28/2018 10:36 AM CDT -----  Contact: Nel/Daughter  Call Nel at [phone]      Pt is having pain and when he urinates it burns  She would like to see if something could be called in for him      [facility] [address]  [address]  [place] [address]  Phone: [phone] Fax: [phone]

## 2018-06-28 NOTE — TELEPHONE ENCOUNTER
Returned call to Nel to schedule patient a lab appointment for urinalysis and urine culture. No answer; left message to call back.

## 2018-06-28 NOTE — PROGRESS NOTES
"HPI:  Patient is an 88-year-old man who comes today for follow-up of his diabetes, hypertension, lipids, and history of pulmonary embolus.  He has been doing fairly well.  He is now 1 month status post surgery for his TURP.  Did well.  He is having no difficulty urinating.  He has had no hypoglycemic events.  His blood pressures been well controlled.  There been no other new problems or complaints.    Current meds have been verified and updated per the EMR  Exam:/82 (BP Location: Right arm, Patient Position: Sitting, BP Method: Medium (Manual))   Pulse 60   Temp 97.5 °F (36.4 °C) (Oral)   Ht 5' 11.5" (1.816 m)   Wt 104.1 kg (229 lb 8 oz)   SpO2 95%   BMI 31.56 kg/m²   Carotids 2+ equal without bruits  Chest clear  Cardiovascular regular rate and rhythm with a 2/6 systolic murmur  Extremities without edema    Lab Results   Component Value Date    WBC 8.48 06/19/2018    HGB 15.4 06/19/2018    HCT 49.1 06/19/2018     06/19/2018    CHOL 147 06/19/2018    TRIG 164 (H) 06/19/2018    HDL 36 (L) 06/19/2018    ALT 16 06/19/2018    AST 15 06/19/2018     06/19/2018    K 4.0 06/19/2018     06/19/2018    CREATININE 1.0 06/19/2018    BUN 16 06/19/2018    CO2 30 (H) 06/19/2018    TSH 2.857 06/19/2018    PSA 14 (H) 08/10/2011    INR 2.9 (H) 11/09/2017    HGBA1C 5.6 06/19/2018       Impression:  Multiple medical problems below, stable  Patient Active Problem List   Diagnosis    Obesity    Hearing loss of aging    History of pulmonary embolism    DM (diabetes mellitus), type 2 with complications    Hypertension associated with diabetes    History of gout    Type 2 diabetes mellitus with polyneuropathy    Urinary retention    Aneurysm artery, popliteal    Atherosclerosis of aorta    Benign prostatic hyperplasia       Plan:  Orders Placed This Encounter    Hemoglobin A1c    Lipid panel    Protein / creatinine ratio, urine    TSH    CBC auto differential    Basic metabolic panel    " HYDROcodone-acetaminophen (NORCO) 7.5-325 mg per tablet     Medications remain the same.  He will be seen again in 6 months with above lab work.

## 2018-06-28 NOTE — TELEPHONE ENCOUNTER
----- Message from Rusty Flood sent at 6/28/2018 11:23 AM CDT -----  Contact: Pt/daughter Kat  Please give Kat a call at [phone] she was returning the nurse call.

## 2018-06-30 LAB — BACTERIA UR CULT: NORMAL

## 2018-07-05 ENCOUNTER — TELEPHONE (OUTPATIENT)
Dept: INTERNAL MEDICINE | Facility: CLINIC | Age: 83
End: 2018-07-05

## 2018-07-05 NOTE — TELEPHONE ENCOUNTER
Pg called to verify the day the INR should be redrawn for Mr. Lara , informed her that the  Last was 6/25/2018 and it should be drawn 2 weeks from that day she verbalized understanding .

## 2018-07-05 NOTE — TELEPHONE ENCOUNTER
----- Message from Argenis Up sent at 7/5/2018 10:47 AM CDT -----  Contact: pt home health nurse - Rosalie Garvey  Stated she calling for information for pt coumadin, she can be reached at [phone]

## 2018-07-10 ENCOUNTER — TELEPHONE (OUTPATIENT)
Dept: INTERNAL MEDICINE | Facility: CLINIC | Age: 83
End: 2018-07-10

## 2018-07-10 DIAGNOSIS — Z51.81 ENCOUNTER FOR THERAPEUTIC DRUG MONITORING: Primary | ICD-10-CM

## 2018-07-10 NOTE — TELEPHONE ENCOUNTER
Pt daughter would like to know if they could earlier appt than 5pm on 7/17. Please contact daughter  get a Nel [phone]   -Thanks

## 2018-07-10 NOTE — TELEPHONE ENCOUNTER
Spoke with Gretchen Garvey RN with [facility].  Reports pt's PT 22.0 INR 1.8.  Pt is taking 7 mg Coumadin Monday-Friday and 5 mg on Sat and Sun. Please advise.  Pt is being discharged from [facility] today.  Will need orders to have PT/INR checked at clinic.

## 2018-07-17 ENCOUNTER — OFFICE VISIT (OUTPATIENT)
Dept: UROLOGY | Facility: CLINIC | Age: 83
End: 2018-07-17
Payer: MEDICARE

## 2018-07-17 VITALS
BODY MASS INDEX: 31.08 KG/M2 | SYSTOLIC BLOOD PRESSURE: 130 MMHG | HEIGHT: 72 IN | DIASTOLIC BLOOD PRESSURE: 80 MMHG | WEIGHT: 229.5 LBS

## 2018-07-17 DIAGNOSIS — N40.0 BENIGN PROSTATIC HYPERPLASIA, UNSPECIFIED WHETHER LOWER URINARY TRACT SYMPTOMS PRESENT: Primary | ICD-10-CM

## 2018-07-17 PROCEDURE — 81001 URINALYSIS AUTO W/SCOPE: CPT

## 2018-07-17 PROCEDURE — 99999 PR PBB SHADOW E&M-EST. PATIENT-LVL II: CPT | Mod: PBBFAC,,, | Performed by: UROLOGY

## 2018-07-17 PROCEDURE — 87086 URINE CULTURE/COLONY COUNT: CPT

## 2018-07-17 PROCEDURE — 99024 POSTOP FOLLOW-UP VISIT: CPT | Mod: S$GLB,,, | Performed by: UROLOGY

## 2018-07-17 NOTE — PROGRESS NOTES
Chief Complaint: Retention    HPI:   7/17/18: Stream is good.  Gets a little splatter some mornings.  Wets pad only some days.  Lots better than when we started.  6/13/18: Stream better after TURP.  Gets some OAB when he arises.  Wearing a little pad for safety.  5/16/18: Had to have riggs replaced after last visit.  Tolerated two surgeries for arterial restoration in last year, held coumadin for them no problem.  2/15/18: Laws: One month f/u for urinary retention. Riggs still in place; wants another voiding trial. Home health would be able to replace it if he cannot void. Still taking Flomax and Finasteride.   1/16/18: Laws: Went to the ER here at [facility] last night following riggs removal yesterday for voiding trial. Riggs was replaced in ER and he was started on Bactrim.   1/15/18: Has been taking flomax/finasteride for the interval. No upcoming surgeries.  No new problems.  Reviewed history in detail.  8/14/17: Riggs is working okay for him.  Has taken finasteride on that time.  Riggs changed one week ago.  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  Tolerated right surgery reasonably well.  2/13/17: Cysto today shows severe BPH with trilobar hypertrophy.    2/1/17: 88 yo man went to the ER for a few complaints and he was found to be in retention and a riggs was placed; bag filled/emptied 1.5 times shortly thereafter.  Is no no BPH meds.  Had double voiding prior, nocturia x2.  No abd/pelvic pain and no exac/rel factors.  No hematuria.  No urolithiasis.      Allergies:  Patient has no known allergies.    Medications:  has a current medication list which includes the following prescription(s): allopurinol, docusate sodium, finasteride, glipizide, hydrocodone-acetaminophen, lisinopril-hydrochlorothiazide, lovastatin, metformin, metoprolol tartrate, oxycodone-acetaminophen, ssd, tamsulosin, warfarin, and warfarin.    Review of Systems:  General: No fever, chills,  fatigability, or weight loss.  Skin: No rashes, itching, or changes in color or texture of skin.  Chest: Denies DELGADILLO, cyanosis, wheezing, cough, and sputum production.  Abdomen: Appetite fine. No weight loss. Denies diarrhea, abdominal pain, hematemesis, or blood in stool.  Musculoskeletal: No joint stiffness or swelling. Denies back pain.  : As above.  All other review of systems negative.    PMH:   has a past medical history of Aphakia of right eye; Cataract; DM (diabetes mellitus), type 2 with complications; Hearing loss of aging; History of gout; History of pulmonary embolism (2011); Hypertension associated with diabetes; Iridodialysis of right eye; Obesity, unspecified; and Type 2 diabetes mellitus with polyneuropathy.    PSH:   has a past surgical history that includes Cataract extraction; Vascular surgery; and Transurethral resection of prostate (N/A, 5/31/2018).    FamHx: family history includes Diabetes in his brother.    SocHx:  reports that he has never smoked. He has never used smokeless tobacco. He reports that he does not drink alcohol or use drugs.      Physical Exam:  Vitals:    07/17/18 1646   BP: 130/80     General: A&Ox3, no apparent distress, no deformities  Neck: No masses, normal thyroid  Lungs: normal inspiration, no use of accessory muscles  Heart: normal pulse, no arrhythmias  Abdomen: Soft, NT, ND  Skin: The skin is warm and dry. No jaundice.  Ext: No c/c/e.  :   1/15/18: Cuevas in good position, removed for voiding trial today  2/17: ALVA normal, penis/test normal.    Labs/Studies:     Impression/Plan:   1. UA/UCx today to check no UTI.  Overall seems well.  RTC 6 mo.

## 2018-07-18 LAB
BACTERIA #/AREA URNS AUTO: ABNORMAL /HPF
MICROSCOPIC COMMENT: ABNORMAL
RBC #/AREA URNS AUTO: 28 /HPF (ref 0–4)
WBC #/AREA URNS AUTO: >100 /HPF (ref 0–5)
WBC CLUMPS UR QL AUTO: ABNORMAL

## 2018-07-19 LAB — BACTERIA UR CULT: NO GROWTH

## 2018-07-24 ENCOUNTER — LAB VISIT (OUTPATIENT)
Dept: LAB | Facility: HOSPITAL | Age: 83
End: 2018-07-24
Attending: INTERNAL MEDICINE
Payer: MEDICARE

## 2018-07-24 ENCOUNTER — TELEPHONE (OUTPATIENT)
Dept: INTERNAL MEDICINE | Facility: CLINIC | Age: 83
End: 2018-07-24

## 2018-07-24 DIAGNOSIS — Z51.81 ENCOUNTER FOR THERAPEUTIC DRUG MONITORING: ICD-10-CM

## 2018-07-24 LAB
INR PPP: 2.2
PROTHROMBIN TIME: 21 SEC

## 2018-07-24 PROCEDURE — 85610 PROTHROMBIN TIME: CPT

## 2018-07-24 PROCEDURE — 36415 COLL VENOUS BLD VENIPUNCTURE: CPT

## 2018-07-24 NOTE — TELEPHONE ENCOUNTER
Spoke with pt's daughter.  Informed her of INR 2.2 no change in dose to repeat lab in one month.    She voiced understanding and will call to schedule follow up lab.

## 2018-08-17 ENCOUNTER — PES CALL (OUTPATIENT)
Dept: ADMINISTRATIVE | Facility: CLINIC | Age: 83
End: 2018-08-17

## 2018-08-24 ENCOUNTER — ANTI-COAG VISIT (OUTPATIENT)
Dept: CARDIOLOGY | Facility: CLINIC | Age: 83
End: 2018-08-24
Payer: MEDICARE

## 2018-08-24 DIAGNOSIS — Z79.01 LONG TERM (CURRENT) USE OF ANTICOAGULANTS: Primary | ICD-10-CM

## 2018-08-24 LAB — INR PPP: 2.3 (ref 2–3)

## 2018-08-24 PROCEDURE — 85610 PROTHROMBIN TIME: CPT | Mod: QW,S$GLB,, | Performed by: INTERNAL MEDICINE

## 2018-08-24 RX ORDER — WARFARIN 4 MG/1
4 TABLET ORAL DAILY
COMMUNITY
End: 2018-12-27 | Stop reason: SDUPTHER

## 2018-08-24 NOTE — PROGRESS NOTES
Reenrollment - [facility] d/c.  Patient's INR is therapeutic at 2.3.  Current dose of 7 mg Monday through Friday and 5 mg on Saturday and Sunday.  No changes in dose.  Recheck in 1 month.  Please call should you have any questions or concerns at [phone] or [phone].

## 2018-08-31 ENCOUNTER — OFFICE VISIT (OUTPATIENT)
Dept: INTERNAL MEDICINE | Facility: CLINIC | Age: 83
End: 2018-08-31
Payer: MEDICARE

## 2018-08-31 VITALS
WEIGHT: 230.38 LBS | DIASTOLIC BLOOD PRESSURE: 80 MMHG | BODY MASS INDEX: 32.25 KG/M2 | HEART RATE: 62 BPM | SYSTOLIC BLOOD PRESSURE: 136 MMHG | OXYGEN SATURATION: 98 % | TEMPERATURE: 99 F | RESPIRATION RATE: 16 BRPM | HEIGHT: 71 IN

## 2018-08-31 DIAGNOSIS — E11.42 TYPE 2 DIABETES MELLITUS WITH POLYNEUROPATHY: ICD-10-CM

## 2018-08-31 DIAGNOSIS — E11.8 TYPE 2 DIABETES MELLITUS WITH COMPLICATION, WITHOUT LONG-TERM CURRENT USE OF INSULIN: Primary | ICD-10-CM

## 2018-08-31 DIAGNOSIS — I15.2 HYPERTENSION ASSOCIATED WITH DIABETES: ICD-10-CM

## 2018-08-31 DIAGNOSIS — E11.59 HYPERTENSION ASSOCIATED WITH DIABETES: ICD-10-CM

## 2018-08-31 PROCEDURE — 99999 PR PBB SHADOW E&M-EST. PATIENT-LVL III: CPT | Mod: PBBFAC,,, | Performed by: INTERNAL MEDICINE

## 2018-08-31 PROCEDURE — 99213 OFFICE O/P EST LOW 20 MIN: CPT | Mod: S$GLB,,, | Performed by: INTERNAL MEDICINE

## 2018-08-31 NOTE — PROGRESS NOTES
"HPI:  Patient is an 88-year-old man who comes today for completion of forms so that he can get assistance in the home through [facility].  Patient is unable to bathe Groom are feed himself.  Patient's daughter has been managing all of his medications and finances for years.  Patient gets around with a cane and walker but really is unable to ambulate more than just a few 100 ft.    Current meds have been verified and updated per the EMR  Exam:/80   Pulse 62   Temp 98.7 °F (37.1 °C)   Resp 16   Ht 5' 11" (1.803 m)   Wt 104.5 kg (230 lb 6.1 oz)   SpO2 98%   BMI 32.13 kg/m²   Exam deferred    Lab Results   Component Value Date    WBC 8.48 06/19/2018    HGB 15.4 06/19/2018    HCT 49.1 06/19/2018     06/19/2018    CHOL 147 06/19/2018    TRIG 164 (H) 06/19/2018    HDL 36 (L) 06/19/2018    ALT 16 06/19/2018    AST 15 06/19/2018     06/19/2018    K 4.0 06/19/2018     06/19/2018    CREATININE 1.0 06/19/2018    BUN 16 06/19/2018    CO2 30 (H) 06/19/2018    TSH 2.857 06/19/2018    PSA 14 (H) 08/10/2011    INR 2.3 08/24/2018    HGBA1C 5.6 06/19/2018       Impression:  Multiple problems below, stable  Patient Active Problem List   Diagnosis    Obesity    Hearing loss of aging    History of pulmonary embolism    DM (diabetes mellitus), type 2 with complications    Hypertension associated with diabetes    History of gout    Type 2 diabetes mellitus with polyneuropathy    Urinary retention    Aneurysm artery, popliteal    Atherosclerosis of aorta    Benign prostatic hyperplasia       Plan:     His forms were completed.    "

## 2018-09-21 ENCOUNTER — ANTI-COAG VISIT (OUTPATIENT)
Dept: CARDIOLOGY | Facility: CLINIC | Age: 83
End: 2018-09-21
Payer: MEDICARE

## 2018-09-21 DIAGNOSIS — Z79.01 LONG TERM (CURRENT) USE OF ANTICOAGULANTS: Primary | ICD-10-CM

## 2018-09-21 LAB — INR PPP: 2.6 (ref 2–3)

## 2018-09-21 PROCEDURE — 85610 PROTHROMBIN TIME: CPT | Mod: PBBFAC

## 2018-09-21 NOTE — PROGRESS NOTES
Patient's INR is therapeutic at 2.6.  No changes in dose.  Recheck in 1 month.  Please call should you have any questions or concerns at [phone] or [phone].

## 2018-10-10 ENCOUNTER — PES CALL (OUTPATIENT)
Dept: ADMINISTRATIVE | Facility: CLINIC | Age: 83
End: 2018-10-10

## 2018-10-19 ENCOUNTER — ANTI-COAG VISIT (OUTPATIENT)
Dept: CARDIOLOGY | Facility: CLINIC | Age: 83
End: 2018-10-19
Payer: MEDICARE

## 2018-10-19 DIAGNOSIS — Z79.01 LONG TERM (CURRENT) USE OF ANTICOAGULANTS: Primary | ICD-10-CM

## 2018-10-19 LAB — INR PPP: 3.8 (ref 2–3)

## 2018-10-19 PROCEDURE — 99999 PR PBB SHADOW E&M-EST. PATIENT-LVL I: CPT | Mod: PBBFAC,,,

## 2018-10-19 PROCEDURE — 85610 PROTHROMBIN TIME: CPT | Mod: PBBFAC

## 2018-10-19 PROCEDURE — 99211 OFF/OP EST MAY X REQ PHY/QHP: CPT | Mod: PBBFAC

## 2018-10-19 NOTE — PROGRESS NOTES
Patient's INR is supra-therapeutic at 3.8.  No significant changes in diet, medications, or health reported.  No signs of abnormal bleeding noted.  Advised patient to seek immediate medical attention if he notices any abnormal bleeding.  Instructed patient to hold dose on today only; then resume normal dose of 5mg on Sun/Sat and 7mg on all other days of the week.  Patient voiced understanding.  Follow-up in 3 weeks.

## 2018-11-06 ENCOUNTER — ANTI-COAG VISIT (OUTPATIENT)
Dept: CARDIOLOGY | Facility: CLINIC | Age: 83
End: 2018-11-06
Payer: MEDICARE

## 2018-11-06 DIAGNOSIS — Z79.01 LONG TERM (CURRENT) USE OF ANTICOAGULANTS: Primary | ICD-10-CM

## 2018-11-06 LAB — INR PPP: 2.7 (ref 2–3)

## 2018-11-06 PROCEDURE — 85610 PROTHROMBIN TIME: CPT | Mod: QW,S$GLB,, | Performed by: NUCLEAR MEDICINE

## 2018-11-06 NOTE — PROGRESS NOTES
Patient's INR is therapeutic at 2.7.  No changes or upcoming procedures reported.  No changes in dose.  Continue current dose of 5mg on Saturdays and Sundays; and 7mg on all other days of the week.  Recheck in 3 weeks.  Please call should you have any questions or concerns at [phone] or [phone].

## 2018-11-09 RX ORDER — LOVASTATIN 40 MG/1
TABLET ORAL
Qty: 30 TABLET | Refills: 11 | Status: SHIPPED | OUTPATIENT
Start: 2018-11-09 | End: 2019-11-04 | Stop reason: SDUPTHER

## 2018-11-09 RX ORDER — METFORMIN HYDROCHLORIDE 850 MG/1
TABLET ORAL
Qty: 30 TABLET | Refills: 11 | Status: SHIPPED | OUTPATIENT
Start: 2018-11-09 | End: 2019-11-04 | Stop reason: SDUPTHER

## 2018-11-23 ENCOUNTER — OFFICE VISIT (OUTPATIENT)
Dept: OPHTHALMOLOGY | Facility: CLINIC | Age: 83
End: 2018-11-23
Payer: MEDICARE

## 2018-11-23 DIAGNOSIS — H43.22 ASTEROID HYALOSIS OF LEFT EYE: ICD-10-CM

## 2018-11-23 DIAGNOSIS — H25.12 NUCLEAR SENILE CATARACT OF LEFT EYE: Primary | ICD-10-CM

## 2018-11-23 DIAGNOSIS — H21.531: ICD-10-CM

## 2018-11-23 DIAGNOSIS — E11.9 DIABETES MELLITUS TYPE 2 WITHOUT RETINOPATHY: ICD-10-CM

## 2018-11-23 DIAGNOSIS — H50.111 EXOTROPIA OF RIGHT EYE: ICD-10-CM

## 2018-11-23 DIAGNOSIS — H27.01 APHAKIA OF EYE, RIGHT: ICD-10-CM

## 2018-11-23 PROCEDURE — 99999 PR PBB SHADOW E&M-EST. PATIENT-LVL II: CPT | Mod: PBBFAC,HCNC,, | Performed by: OPHTHALMOLOGY

## 2018-11-23 PROCEDURE — 92014 COMPRE OPH EXAM EST PT 1/>: CPT | Mod: HCNC,S$GLB,, | Performed by: OPHTHALMOLOGY

## 2018-11-23 PROCEDURE — 92136 OPHTHALMIC BIOMETRY: CPT | Mod: HCNC,LT,S$GLB, | Performed by: OPHTHALMOLOGY

## 2018-11-23 RX ORDER — POLYMYXIN B SULFATE AND TRIMETHOPRIM 1; 10000 MG/ML; [USP'U]/ML
1 SOLUTION OPHTHALMIC EVERY 4 HOURS
Qty: 1 BOTTLE | Refills: 1 | Status: SHIPPED | OUTPATIENT
Start: 2018-11-23 | End: 2018-11-28

## 2018-11-23 RX ORDER — KETOROLAC TROMETHAMINE 5 MG/ML
1 SOLUTION OPHTHALMIC 4 TIMES DAILY
Qty: 1 BOTTLE | Refills: 3 | Status: SHIPPED | OUTPATIENT
Start: 2018-11-23 | End: 2018-11-28

## 2018-11-23 RX ORDER — DIFLUPREDNATE OPHTHALMIC 0.5 MG/ML
1 EMULSION OPHTHALMIC 4 TIMES DAILY
Qty: 1 BOTTLE | Refills: 1 | Status: SHIPPED | OUTPATIENT
Start: 2018-11-23 | End: 2018-12-19 | Stop reason: SDUPTHER

## 2018-11-23 NOTE — PROGRESS NOTES
HPI     Cataract      Additional comments: OS              Comments     The patient states his vision in is left eye has been very blurry and he   isn't able to see most things he used to anymore. The patient denies any   ocular pain.    1. +DM  2. CAT OS  3. Aphakia OD(WORK INJURY EARLY 60S)  4. Iriodialysis OD  5. Dry Eyes OU  6. Exotropia- alternating          Last edited by Lizeth Chavarria on 11/23/2018 10:12 AM. (History)            Assessment /Plan     For exam results, see Encounter Report.      ICD-10-CM ICD-9-CM    1. Nuclear senile cataract of left eye H25.12 366.16 Visually Significant Cataract OS  Patient reports decreased vision consistent with the clinical amount of lenticular opacity, which reaches the level of visual significance and affects activities of daily living including reading and glare. Risks, benefits, and alternatives to cataract surgery were discussed.   The pt expressed a desire to proceed with surgery with the potential for some reasonable degree of visual improvement.    Discussed IOL options and refractive outcomes for this patient.    Phaco left eye, shugarcaine  Topical, but block if needed  monofocal IOL.  Will aim for distance  Referral to [facility] for Ophthalmic surgery  Prescriptions sent for preoperative medications  Durezol, polytrim and ketorolac  Explained that patient may need glasses after surgery.  Discussed that vision may be limited by asteroid hyalosis, diabetes        D/c coumadin 1 week   22.5     2. Diabetes mellitus type 2 without retinopathy E11.9 250.00 Diabetes with no diabetic retinopathy on dilated exam.   Reviewed diabetic eye precautions including excellent blood sugar control, and importance of regular follow up.          3. Aphakia of eye, right H27.01 379.31 Stable    4. 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 Exotropia of right eye H50.10 378.10 Appears  stable, fillow        Return to clinic for phaco OS

## 2018-11-23 NOTE — TELEPHONE ENCOUNTER
Joyce Neumann informed her of Gu[place]illa being refilled and sent to pt's pharmacy.  She voiced understanding.   Chief Complaint: AMS    Interval Events: No events overnight.    Review of Systems:  General: No fevers, chills, weight loss or gain  Skin: No rashes, color changes  Cardiovascular: No chest pain, orthopnea  Respiratory: No shortness of breath, cough  Gastrointestinal: No nausea, abdominal pain  Genitourinary: No incontinence, pain with urination  Musculoskeletal: No pain, swelling, decreased range of motion  Neurological: No headache, weakness  Psychiatric: No depression, anxiety  Endocrine: No weight loss or gain, increased thirst  All other systems are comprehensively negative.    Physical Exam:  Vital Signs Last 24 Hrs  T(C): 36.6 (23 Nov 2018 09:21), Max: 36.9 (22 Nov 2018 14:16)  T(F): 97.9 (23 Nov 2018 09:21), Max: 98.4 (22 Nov 2018 14:16)  HR: 70 (23 Nov 2018 09:21) (53 - 70)  BP: 151/70 (23 Nov 2018 09:21) (139/75 - 168/88)  BP(mean): --  RR: 18 (23 Nov 2018 09:21) (18 - 18)  SpO2: 96% (23 Nov 2018 09:21) (95% - 97%)  General: NAD  HEENT: MMM  Neck: No JVD, no carotid bruit  Lungs: CTAB  CV: RRR, nl S1/S2, no M/R/G  Abdomen: S/NT/ND, +BS  Extremities: No LE edema, no cyanosis  Neuro: AAOx3, non-focal  Skin: No rash    Labs:             11-22    142  |  106  |  22  ----------------------------<  82  4.0   |  26  |  0.90    Ca    9.1      22 Nov 2018 07:41                          13.1   6.58  )-----------( 209      ( 22 Nov 2018 07:41 )             38.6

## 2018-11-27 ENCOUNTER — TELEPHONE (OUTPATIENT)
Dept: OPHTHALMOLOGY | Facility: CLINIC | Age: 83
End: 2018-11-27

## 2018-11-27 ENCOUNTER — ANTI-COAG VISIT (OUTPATIENT)
Dept: CARDIOLOGY | Facility: CLINIC | Age: 83
End: 2018-11-27
Payer: MEDICARE

## 2018-11-27 DIAGNOSIS — Z79.01 LONG TERM (CURRENT) USE OF ANTICOAGULANTS: Primary | ICD-10-CM

## 2018-11-27 LAB — INR PPP: 3.1 (ref 2–3)

## 2018-11-27 PROCEDURE — 85610 PROTHROMBIN TIME: CPT | Mod: QW,HCNC,S$GLB, | Performed by: NUCLEAR MEDICINE

## 2018-11-27 NOTE — TELEPHONE ENCOUNTER
----- Message from Stefan Vera MD sent at 11/25/2018  3:59 PM CST -----  Contact: Dr. Davis  Patient may stop the coumadin five days prior to his surgery with no problem.     Dr Vera  ----- Message -----  From: Janny Jones  Sent: 11/23/2018  11:36 AM  To: MD Dr. Jody Vera,  Dr. Davis would like clearance for the aforementioned patient to stop his blood thinner for 5 days prior to surgery.  Please advise if this will be feasible.   Thank you

## 2018-11-27 NOTE — PROGRESS NOTES
Patient's INR is slightly elevated at 3.1.  Reports no current concerns at the time of visit.  Consulted with PharmD:  Maintain current dose of Warfarin 7 mg Monday through Friday and 5 mg on Saturday and Sunday per dosing calendar given.  Recheck in 1 month.

## 2018-11-27 NOTE — TELEPHONE ENCOUNTER
FRAN for patient daughter that clearance was received from  for patient to stop Coumadin 5 days prior to surgery.

## 2018-12-02 RX ORDER — WARFARIN 4 MG/1
TABLET ORAL
Qty: 30 TABLET | Refills: 11 | Status: SHIPPED | OUTPATIENT
Start: 2018-12-02 | End: 2018-12-27 | Stop reason: SDUPTHER

## 2018-12-13 ENCOUNTER — OUTSIDE PLACE OF SERVICE (OUTPATIENT)
Dept: ADMINISTRATIVE | Facility: OTHER | Age: 83
End: 2018-12-13
Payer: MEDICARE

## 2018-12-13 PROCEDURE — 66984 XCAPSL CTRC RMVL W/O ECP: CPT | Mod: LT,,, | Performed by: OPHTHALMOLOGY

## 2018-12-14 ENCOUNTER — OFFICE VISIT (OUTPATIENT)
Dept: OPHTHALMOLOGY | Facility: CLINIC | Age: 83
End: 2018-12-14
Payer: MEDICARE

## 2018-12-14 DIAGNOSIS — Z98.41 CATARACT EXTRACTION STATUS, RIGHT: Primary | ICD-10-CM

## 2018-12-14 PROCEDURE — 99024 POSTOP FOLLOW-UP VISIT: CPT | Mod: HCNC,S$GLB,, | Performed by: OPHTHALMOLOGY

## 2018-12-14 PROCEDURE — 99999 PR PBB SHADOW E&M-EST. PATIENT-LVL II: CPT | Mod: PBBFAC,HCNC,, | Performed by: OPHTHALMOLOGY

## 2018-12-14 NOTE — PROGRESS NOTES
HPI     Post-op Evaluation      Additional comments: 1 day PCIOL OS              Comments     The patient states his left eye is doing well and he denies any ocular   pain.  100% drop complinace    1. +DM  2. PCIOL OS +22.5 SN60WF / CDE: 20.00 /12-14-18  3. Aphakia OD (WORK INJURY EARLY 60S)  4. Iriodialysis OD  5. Dry Eyes OU  6. Exotropia    OS:Durezol QID, Polytrim QID, Keto QID          Last edited by Lizeth Chavarria on 12/14/2018  9:56 AM. (History)            Assessment /Plan     For exam results, see Encounter Report.      ICD-10-CM ICD-9-CM    1. Cataract extraction status, right Z98.41 V45.61        PO Day 1 S/P Phaco/IOL  right eye  Doing well.    Use Durezol QID  Ketorolac  Polytrim  4 x day  Reinstructed in importance of absolute compliance with Post-OP instructions including medications, shield at bedtime, and limitation of activities. Follow up appointments in approximately one and six weeks or call immediately for increased pain, redness or vision loss.     Cancel Full Exam next month with [facility]- Will resume care back with him after phacos

## 2018-12-18 ENCOUNTER — OFFICE VISIT (OUTPATIENT)
Dept: OPHTHALMOLOGY | Facility: CLINIC | Age: 83
End: 2018-12-18
Payer: MEDICARE

## 2018-12-18 DIAGNOSIS — Z98.41 CATARACT EXTRACTION STATUS, RIGHT: Primary | ICD-10-CM

## 2018-12-18 PROCEDURE — 99999 PR PBB SHADOW E&M-EST. PATIENT-LVL I: CPT | Mod: PBBFAC,HCNC,, | Performed by: OPHTHALMOLOGY

## 2018-12-18 PROCEDURE — 99024 POSTOP FOLLOW-UP VISIT: CPT | Mod: HCNC,S$GLB,, | Performed by: OPHTHALMOLOGY

## 2018-12-18 NOTE — PROGRESS NOTES
HPI     Post-op Evaluation      Additional comments: 1 week PCIOL OD              Comments     The patient states his right eye is feeling good and he denies any ocular   pain at this time.  100% drop compliance    1. +DM  2. PCIOL OS +22.5 SN60WF / CDE: 20.00 /12-14-18  3. Aphakia OD (WORK INJURY EARLY 60S)  4. Iriodialysis OD  5. Dry Eyes OU  6. Exotropia    OS:Durezol QID, Polytrim QID, Keto QID          Last edited by Lizeth Chavarria on 12/18/2018  9:36 AM. (History)            Assessment /Plan     For exam results, see Encounter Report.      ICD-10-CM ICD-9-CM    1. Cataract extraction status, right Z98.41 V45.61        PO Week 1 S/P Phaco/ IOL right eye:   Doing well with no evidence of infection or abnormal inflammation.     D/C antibiotic drops and NSAID  Taper Durezol weekly per instruction sheet.  Resume normal activitites and d/c eye shield.  OTC reading glasses can be used until evaluated for final MR.  D/c Shield at night    RTC 4 weeks or PRN pain, redness, vision loss, or other concerns.

## 2018-12-19 DIAGNOSIS — H25.12 NUCLEAR SENILE CATARACT OF LEFT EYE: ICD-10-CM

## 2018-12-19 RX ORDER — DUREZOL 0.5 MG/ML
EMULSION OPHTHALMIC
Qty: 5 ML | Refills: 1 | Status: SHIPPED | OUTPATIENT
Start: 2018-12-19 | End: 2020-09-08 | Stop reason: ALTCHOICE

## 2018-12-21 ENCOUNTER — LAB VISIT (OUTPATIENT)
Dept: LAB | Facility: HOSPITAL | Age: 83
End: 2018-12-21
Attending: INTERNAL MEDICINE
Payer: MEDICARE

## 2018-12-21 DIAGNOSIS — E11.8 TYPE 2 DIABETES MELLITUS WITH COMPLICATION, WITHOUT LONG-TERM CURRENT USE OF INSULIN: ICD-10-CM

## 2018-12-21 LAB
ANION GAP SERPL CALC-SCNC: 7 MMOL/L
BASOPHILS # BLD AUTO: 0.05 K/UL
BASOPHILS NFR BLD: 0.7 %
BUN SERPL-MCNC: 19 MG/DL
CALCIUM SERPL-MCNC: 9.5 MG/DL
CHLORIDE SERPL-SCNC: 109 MMOL/L
CHOLEST SERPL-MCNC: 186 MG/DL
CHOLEST/HDLC SERPL: 4 {RATIO}
CO2 SERPL-SCNC: 27 MMOL/L
CREAT SERPL-MCNC: 1.1 MG/DL
DIFFERENTIAL METHOD: ABNORMAL
EOSINOPHIL # BLD AUTO: 0.2 K/UL
EOSINOPHIL NFR BLD: 2.5 %
ERYTHROCYTE [DISTWIDTH] IN BLOOD BY AUTOMATED COUNT: 16.7 %
EST. GFR  (AFRICAN AMERICAN): >60 ML/MIN/1.73 M^2
EST. GFR  (NON AFRICAN AMERICAN): 59.2 ML/MIN/1.73 M^2
ESTIMATED AVG GLUCOSE: 114 MG/DL
GLUCOSE SERPL-MCNC: 97 MG/DL
HBA1C MFR BLD HPLC: 5.6 %
HCT VFR BLD AUTO: 47.1 %
HDLC SERPL-MCNC: 46 MG/DL
HDLC SERPL: 24.7 %
HGB BLD-MCNC: 14.8 G/DL
IMM GRANULOCYTES # BLD AUTO: 0.04 K/UL
IMM GRANULOCYTES NFR BLD AUTO: 0.5 %
LDLC SERPL CALC-MCNC: 115.8 MG/DL
LYMPHOCYTES # BLD AUTO: 1.6 K/UL
LYMPHOCYTES NFR BLD: 21.5 %
MCH RBC QN AUTO: 27.9 PG
MCHC RBC AUTO-ENTMCNC: 31.4 G/DL
MCV RBC AUTO: 89 FL
MONOCYTES # BLD AUTO: 0.9 K/UL
MONOCYTES NFR BLD: 12.6 %
NEUTROPHILS # BLD AUTO: 4.7 K/UL
NEUTROPHILS NFR BLD: 62.2 %
NONHDLC SERPL-MCNC: 140 MG/DL
NRBC BLD-RTO: 0 /100 WBC
PLATELET # BLD AUTO: 207 K/UL
PMV BLD AUTO: 11.4 FL
POTASSIUM SERPL-SCNC: 4.1 MMOL/L
RBC # BLD AUTO: 5.31 M/UL
SODIUM SERPL-SCNC: 143 MMOL/L
TRIGL SERPL-MCNC: 121 MG/DL
TSH SERPL DL<=0.005 MIU/L-ACNC: 3.72 UIU/ML
WBC # BLD AUTO: 7.48 K/UL

## 2018-12-21 PROCEDURE — 80061 LIPID PANEL: CPT | Mod: HCNC

## 2018-12-21 PROCEDURE — 85025 COMPLETE CBC W/AUTO DIFF WBC: CPT | Mod: HCNC

## 2018-12-21 PROCEDURE — 84443 ASSAY THYROID STIM HORMONE: CPT | Mod: HCNC

## 2018-12-21 PROCEDURE — 36415 COLL VENOUS BLD VENIPUNCTURE: CPT | Mod: HCNC

## 2018-12-21 PROCEDURE — 80048 BASIC METABOLIC PNL TOTAL CA: CPT | Mod: HCNC

## 2018-12-21 PROCEDURE — 83036 HEMOGLOBIN GLYCOSYLATED A1C: CPT | Mod: HCNC

## 2018-12-27 ENCOUNTER — OFFICE VISIT (OUTPATIENT)
Dept: INTERNAL MEDICINE | Facility: CLINIC | Age: 83
End: 2018-12-27
Payer: MEDICARE

## 2018-12-27 VITALS
HEIGHT: 72 IN | DIASTOLIC BLOOD PRESSURE: 70 MMHG | OXYGEN SATURATION: 97 % | BODY MASS INDEX: 32.43 KG/M2 | WEIGHT: 239.44 LBS | HEART RATE: 73 BPM | SYSTOLIC BLOOD PRESSURE: 132 MMHG | TEMPERATURE: 99 F

## 2018-12-27 DIAGNOSIS — I70.0 ATHEROSCLEROSIS OF AORTA: ICD-10-CM

## 2018-12-27 DIAGNOSIS — Z87.39 HISTORY OF GOUT: ICD-10-CM

## 2018-12-27 DIAGNOSIS — E11.8 TYPE 2 DIABETES MELLITUS WITH COMPLICATION, WITHOUT LONG-TERM CURRENT USE OF INSULIN: ICD-10-CM

## 2018-12-27 DIAGNOSIS — Z00.00 ROUTINE GENERAL MEDICAL EXAMINATION AT A HEALTH CARE FACILITY: Primary | ICD-10-CM

## 2018-12-27 DIAGNOSIS — E11.59 HYPERTENSION ASSOCIATED WITH DIABETES: ICD-10-CM

## 2018-12-27 DIAGNOSIS — I15.2 HYPERTENSION ASSOCIATED WITH DIABETES: ICD-10-CM

## 2018-12-27 DIAGNOSIS — N40.0 BENIGN PROSTATIC HYPERPLASIA, UNSPECIFIED WHETHER LOWER URINARY TRACT SYMPTOMS PRESENT: ICD-10-CM

## 2018-12-27 DIAGNOSIS — E11.42 TYPE 2 DIABETES MELLITUS WITH POLYNEUROPATHY: ICD-10-CM

## 2018-12-27 DIAGNOSIS — I72.4 ANEURYSM ARTERY, POPLITEAL: ICD-10-CM

## 2018-12-27 PROCEDURE — 90662 IIV NO PRSV INCREASED AG IM: CPT | Mod: HCNC,S$GLB,, | Performed by: INTERNAL MEDICINE

## 2018-12-27 PROCEDURE — 99499 UNLISTED E&M SERVICE: CPT | Mod: HCNC,S$GLB,, | Performed by: INTERNAL MEDICINE

## 2018-12-27 PROCEDURE — 99397 PER PM REEVAL EST PAT 65+ YR: CPT | Mod: 25,HCNC,S$GLB, | Performed by: INTERNAL MEDICINE

## 2018-12-27 PROCEDURE — 99999 PR PBB SHADOW E&M-EST. PATIENT-LVL IV: CPT | Mod: PBBFAC,HCNC,, | Performed by: INTERNAL MEDICINE

## 2018-12-27 PROCEDURE — G0008 ADMIN INFLUENZA VIRUS VAC: HCPCS | Mod: HCNC,S$GLB,, | Performed by: INTERNAL MEDICINE

## 2018-12-27 NOTE — PROGRESS NOTES
"HPI:  Patient is an 89-year-old man who comes today for follow-up of his diabetes, hypertension, lipids, gout, and for his annual physical exam.  He denies any chest pains, shortness of breath, or palpitations.  He has had no flares of gout.  He denies any hypoglycemic events.  There have been no other new problems or complaints.      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 Denies any nausea, vomiting or diarrhea. Denies any fever, chills or sweats. Denies any change in weight, voice, stool, skin or hair. Denies any dysuria, dyspepsia or dysphagia. Denies any change in vision, hearing or headaches. Denies any swollen lymph nodes or loss of memory.    PE:  /70 (BP Location: Right arm, Patient Position: Sitting, BP Method: Large (Manual))   Pulse 73   Temp 99.1 °F (37.3 °C)   Ht 5' 11.5" (1.816 m)   Wt 108.6 kg (239 lb 6.7 oz)   SpO2 97%   BMI 32.93 kg/m²   Gen: Well-developed, well-nourished, male, in no acute distress, oriented x3  HEENT: neck is supple, no adenopathy, carotids 2+ equal without bruits, thyroid exam normal " size without nodules.  CHEST: clear to auscultation and percussion  CVS: regular rate and rhythm without significant murmur, gallop, or rubs  ABD: soft, benign, no rebound no guarding, no distention.  Bowel sounds are normal.     nontender.  No palpable masses.  No organomegaly and no audible bruits.  RECTAL:  Deferred.  EXT: no clubbing, cyanosis, or edema  LYMPH: no cervical, inguinal, or axillary adenopathy  FEET: No ulcers or pressure sores.  Monofilament testing shows decreased sensation  NEURO: gait normal.  Cranial nerves II- XII intact. No nystagmus.  Speech normal.   Gross motor and sensory unremarkable.    Lab Results   Component Value Date    WBC 7.48 12/21/2018    HGB 14.8 12/21/2018    HCT 47.1 12/21/2018     12/21/2018    CHOL 186 12/21/2018    TRIG 121 12/21/2018    HDL 46 12/21/2018    ALT 16 06/19/2018    AST 15 06/19/2018     12/21/2018    K 4.1 12/21/2018     12/21/2018    CREATININE 1.1 12/21/2018    BUN 19 12/21/2018    CO2 27 12/21/2018    TSH 3.719 12/21/2018    PSA 14 (H) 08/10/2011    INR 3.1 (A) 11/27/2018    HGBA1C 5.6 12/21/2018       Impression:  Multiple medical problems below, stable  Patient Active Problem List   Diagnosis    Obesity    Hearing loss of aging    History of pulmonary embolism    DM (diabetes mellitus), type 2 with complications    Hypertension associated with diabetes    History of gout    Type 2 diabetes mellitus with polyneuropathy    Urinary retention    Aneurysm artery, popliteal    Atherosclerosis of aorta    Benign prostatic hyperplasia       Plan:   Orders Placed This Encounter    Influenza - High Dose (65+) (PF) (IM)    Hemoglobin A1c    Comprehensive metabolic panel    Lipid panel    TSH    CBC auto differential     He was given high-dose influenza vaccine today.  He will be seen again in 6 months with above lab work.  His medications remain the same

## 2018-12-28 ENCOUNTER — ANTI-COAG VISIT (OUTPATIENT)
Dept: CARDIOLOGY | Facility: CLINIC | Age: 83
End: 2018-12-28
Payer: MEDICARE

## 2018-12-28 DIAGNOSIS — Z79.01 LONG TERM (CURRENT) USE OF ANTICOAGULANTS: Primary | ICD-10-CM

## 2018-12-28 LAB — INR PPP: 2.3 (ref 2–3)

## 2018-12-28 PROCEDURE — 85610 PROTHROMBIN TIME: CPT | Mod: QW,HCNC,S$GLB, | Performed by: INTERNAL MEDICINE

## 2018-12-28 NOTE — PROGRESS NOTES
Patient's INR is therapeutic at 2.3.  Reports no current changes.  Instructed to maintain current dose of Warfarin 7 mg Monday through Friday and 5 mg every Saturday and Sunday per dosing calendar given.  Recheck in 1 month.

## 2019-01-03 RX ORDER — TAMSULOSIN HYDROCHLORIDE 0.4 MG/1
CAPSULE ORAL
Qty: 30 CAPSULE | Refills: 11 | Status: SHIPPED | OUTPATIENT
Start: 2019-01-03 | End: 2019-12-09 | Stop reason: SDUPTHER

## 2019-01-03 RX ORDER — ALLOPURINOL 300 MG/1
TABLET ORAL
Qty: 30 TABLET | Refills: 11 | Status: SHIPPED | OUTPATIENT
Start: 2019-01-03 | End: 2019-12-05 | Stop reason: SDUPTHER

## 2019-01-25 ENCOUNTER — ANTI-COAG VISIT (OUTPATIENT)
Dept: CARDIOLOGY | Facility: CLINIC | Age: 84
End: 2019-01-25
Payer: MEDICARE

## 2019-01-25 DIAGNOSIS — Z79.01 LONG TERM (CURRENT) USE OF ANTICOAGULANTS: Primary | ICD-10-CM

## 2019-01-25 LAB — INR PPP: 2.2 (ref 2–3)

## 2019-01-25 PROCEDURE — 85610 POCT INR: ICD-10-PCS | Mod: QW,HCNC,S$GLB, | Performed by: INTERNAL MEDICINE

## 2019-01-25 PROCEDURE — 85610 PROTHROMBIN TIME: CPT | Mod: QW,HCNC,S$GLB, | Performed by: INTERNAL MEDICINE

## 2019-01-25 NOTE — PROGRESS NOTES
Patient's INR is therapeutic at 2.2.  Reports no recent changes.  Instructed to maintain current dose of Warfarin 7 mg Monday through Friday and 5 mg every Saturday and Sunday per dosing calendar given.  Recheck in 1 month.

## 2019-01-30 ENCOUNTER — OFFICE VISIT (OUTPATIENT)
Dept: OPHTHALMOLOGY | Facility: CLINIC | Age: 84
End: 2019-01-30
Payer: MEDICARE

## 2019-01-30 DIAGNOSIS — Z98.42 CATARACT EXTRACTION STATUS, LEFT: Primary | ICD-10-CM

## 2019-01-30 DIAGNOSIS — H25.12 NUCLEAR SENILE CATARACT OF LEFT EYE: ICD-10-CM

## 2019-01-30 DIAGNOSIS — H27.01 APHAKIA OF EYE, RIGHT: ICD-10-CM

## 2019-01-30 PROCEDURE — 99024 POSTOP FOLLOW-UP VISIT: CPT | Mod: HCNC,S$GLB,, | Performed by: OPHTHALMOLOGY

## 2019-01-30 PROCEDURE — 99999 PR PBB SHADOW E&M-EST. PATIENT-LVL III: ICD-10-PCS | Mod: PBBFAC,HCNC,, | Performed by: OPHTHALMOLOGY

## 2019-01-30 PROCEDURE — 99999 PR PBB SHADOW E&M-EST. PATIENT-LVL III: CPT | Mod: PBBFAC,HCNC,, | Performed by: OPHTHALMOLOGY

## 2019-01-30 PROCEDURE — 99024 PR POST-OP FOLLOW-UP VISIT: ICD-10-PCS | Mod: HCNC,S$GLB,, | Performed by: OPHTHALMOLOGY

## 2019-01-30 NOTE — PROGRESS NOTES
HPI     Post-op Evaluation      Additional comments: OS: Durezol QD              Comments     Pt here for 4 wk PCIOL OS PO. No pain or discomfort. VA stable. 100%   compliant with gtts.     1. +DM  2. PCIOL OS +22.5 SN60WF / CDE: 20.00 /12-14-18  3. Aphakia OD (WORK INJURY EARLY 60S)  4. Iriodialysis OD  5. Dry Eyes OU  6. Exotropia    OS: Durezol QD          Last edited by Vic Mckeon, Patient Care Assistant on 1/30/2019 10:51   AM. (History)            Assessment /Plan     For exam results, see Encounter Report.      ICD-10-CM ICD-9-CM    1. Cataract extraction status, left Z98.42 V45.61 Doing well   durezol QD x 1 week then stop    2. Aphakia of eye, right H27.01 379.31    3. Nuclear senile cataract of left eye H25.12 366.16        Moct Done today and no edema   RETURN TO CLINIC 6 months with [facility] to resume care   Disp MR

## 2019-02-04 RX ORDER — FINASTERIDE 5 MG/1
TABLET, FILM COATED ORAL
Qty: 30 TABLET | Refills: 11 | Status: SHIPPED | OUTPATIENT
Start: 2019-02-04 | End: 2019-12-09 | Stop reason: SDUPTHER

## 2019-02-18 ENCOUNTER — OFFICE VISIT (OUTPATIENT)
Dept: UROLOGY | Facility: CLINIC | Age: 84
End: 2019-02-18
Payer: MEDICARE

## 2019-02-18 VITALS
HEART RATE: 73 BPM | SYSTOLIC BLOOD PRESSURE: 128 MMHG | BODY MASS INDEX: 31.95 KG/M2 | HEIGHT: 72 IN | DIASTOLIC BLOOD PRESSURE: 78 MMHG | WEIGHT: 235.88 LBS

## 2019-02-18 DIAGNOSIS — N40.0 BENIGN PROSTATIC HYPERPLASIA, UNSPECIFIED WHETHER LOWER URINARY TRACT SYMPTOMS PRESENT: ICD-10-CM

## 2019-02-18 DIAGNOSIS — N32.81 OAB (OVERACTIVE BLADDER): Primary | ICD-10-CM

## 2019-02-18 LAB
BILIRUB SERPL-MCNC: NORMAL MG/DL
BLOOD URINE, POC: 250
COLOR, POC UA: YELLOW
GLUCOSE UR QL STRIP: NORMAL
KETONES UR QL STRIP: NORMAL
LEUKOCYTE ESTERASE URINE, POC: NORMAL
NITRITE, POC UA: NORMAL
PH, POC UA: 6
PROTEIN, POC: NORMAL
SPECIFIC GRAVITY, POC UA: 1
UROBILINOGEN, POC UA: NORMAL

## 2019-02-18 PROCEDURE — 1101F PT FALLS ASSESS-DOCD LE1/YR: CPT | Mod: HCNC,CPTII,S$GLB, | Performed by: UROLOGY

## 2019-02-18 PROCEDURE — 99999 PR PBB SHADOW E&M-EST. PATIENT-LVL III: ICD-10-PCS | Mod: PBBFAC,HCNC,, | Performed by: UROLOGY

## 2019-02-18 PROCEDURE — 1101F PR PT FALLS ASSESS DOC 0-1 FALLS W/OUT INJ PAST YR: ICD-10-PCS | Mod: HCNC,CPTII,S$GLB, | Performed by: UROLOGY

## 2019-02-18 PROCEDURE — 81002 URINALYSIS NONAUTO W/O SCOPE: CPT | Mod: HCNC,S$GLB,, | Performed by: UROLOGY

## 2019-02-18 PROCEDURE — 51798 PR MEAS,POST-VOID RES,US,NON-IMAGING: ICD-10-PCS | Mod: HCNC,S$GLB,, | Performed by: UROLOGY

## 2019-02-18 PROCEDURE — 51798 US URINE CAPACITY MEASURE: CPT | Mod: HCNC,S$GLB,, | Performed by: UROLOGY

## 2019-02-18 PROCEDURE — 81002 POCT URINE DIPSTICK WITHOUT MICROSCOPE: ICD-10-PCS | Mod: HCNC,S$GLB,, | Performed by: UROLOGY

## 2019-02-18 PROCEDURE — 99999 PR PBB SHADOW E&M-EST. PATIENT-LVL III: CPT | Mod: PBBFAC,HCNC,, | Performed by: UROLOGY

## 2019-02-18 PROCEDURE — 99214 PR OFFICE/OUTPT VISIT, EST, LEVL IV, 30-39 MIN: ICD-10-PCS | Mod: HCNC,25,S$GLB, | Performed by: UROLOGY

## 2019-02-18 PROCEDURE — 99214 OFFICE O/P EST MOD 30 MIN: CPT | Mod: HCNC,25,S$GLB, | Performed by: UROLOGY

## 2019-02-18 RX ORDER — OXYBUTYNIN CHLORIDE 5 MG/1
5 TABLET ORAL NIGHTLY
Qty: 30 TABLET | Refills: 11 | Status: SHIPPED | OUTPATIENT
Start: 2019-02-18 | End: 2021-12-29

## 2019-02-18 NOTE — PROGRESS NOTES
Chief Complaint: Retention    HPI:   2/18/19: Stream good.  Passed some stones a few months after TURP with some blood then.  Month ago had a strip of small stones pass.  Still taking flomax/finasteride.  Has to get up 3-4 times at night to make sure his diaper doesn't overflow.  3-4 diapers during the day.  Lots of water and one cup coffee in AM.  Tea.   ml.  Not constipated.  7/17/18: Stream is good.  Gets a little splatter some mornings.  Wets pad only some days.  Lots better than when we started.  6/13/18: Stream better after TURP.  Gets some OAB when he arises.  Wearing a little pad for safety.  5/16/18: Had to have riggs replaced after last visit.  Tolerated two surgeries for arterial restoration in last year, held coumadin for them no problem.  2/15/18: Laws: One month f/u for urinary retention. Riggs still in place; wants another voiding trial. Home health would be able to replace it if he cannot void. Still taking Flomax and Finasteride.   1/16/18: Laws: Went to the ER here at [facility] last night following riggs removal yesterday for voiding trial. Riggs was replaced in ER and he was started on Bactrim.   1/15/18: Has been taking flomax/finasteride for the interval. No upcoming surgeries.  No new problems.  Reviewed history in detail.  8/14/17: Riggs is working okay for him.  Has taken finasteride on that time.  Riggs changed one week ago.  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  Tolerated right surgery reasonably well.  2/13/17: Cysto today shows severe BPH with trilobar hypertrophy.    2/1/17: 86 yo man went to the ER for a few complaints and he was found to be in retention and a rigsg was placed; bag filled/emptied 1.5 times shortly thereafter.  Is no no BPH meds.  Had double voiding prior, nocturia x2.  No abd/pelvic pain and no exac/rel factors.  No hematuria.  No urolithiasis.      Allergies:  Patient has no known  allergies.    Medications:  has a current medication list which includes the following prescription(s): allopurinol, docusate sodium, durezol, finasteride, glipizide, hydrocodone-acetaminophen, lisinopril-hydrochlorothiazide, lovastatin, metformin, metoprolol tartrate, oxycodone-acetaminophen, ssd, tamsulosin, and warfarin.    Review of Systems:  General: No fever, chills, fatigability, or weight loss.  Skin: No rashes, itching, or changes in color or texture of skin.  Chest: Denies [place], cyanosis, wheezing, cough, and sputum production.  Abdomen: Appetite fine. No weight loss. Denies diarrhea, abdominal pain, hematemesis, or blood in stool.  Musculoskeletal: No joint stiffness or swelling. Denies back pain.  : As above.  All other review of systems negative.    PMH:   has a past medical history of Aphakia of right eye, DM (diabetes mellitus), type 2 with complications, Hearing loss of aging, History of gout, History of pulmonary embolism (2011), Hypertension associated with diabetes, Iridodialysis of right eye, Obesity, unspecified, and Type 2 diabetes mellitus with polyneuropathy.    PSH:   has a past surgical history that includes Vascular surgery; Transurethral resection of prostate (N/A, 5/31/2018); Cataract extraction; and Cataract extraction w/  intraocular lens implant (Left, 12/13/2018).    FamHx: family history includes Diabetes in his brother.    SocHx:  reports that  has never smoked. he has never used smokeless tobacco. He reports that he does not drink alcohol or use drugs.      Physical Exam:  Vitals:    02/18/19 1558   BP: 128/78   Pulse: 73     General: A&Ox3, no apparent distress, no deformities  Neck: No masses, normal thyroid  Lungs: normal inspiration, no use of accessory muscles  Heart: normal pulse, no arrhythmias  Abdomen: Soft, NT, ND  Skin: The skin is warm and dry. No jaundice.  Ext: No c/c/e.  :   1/15/18: Cuevas in good position, removed for voiding trial today  2/17: ALVA normal,  penis/test normal.    Labs/Studies:   Bladder Scan performed in office:  ml.    Impression/Plan:   1. Mild retention but wetting diapers.  OAB but concern for retention.  Will try oxybutinin PM only.  RTC 6 mo

## 2019-02-26 ENCOUNTER — ANTI-COAG VISIT (OUTPATIENT)
Dept: CARDIOLOGY | Facility: CLINIC | Age: 84
End: 2019-02-26
Payer: MEDICARE

## 2019-02-26 DIAGNOSIS — Z79.01 LONG TERM (CURRENT) USE OF ANTICOAGULANTS: Primary | ICD-10-CM

## 2019-02-26 LAB — INR PPP: 2.6 (ref 2–3)

## 2019-02-26 PROCEDURE — 85610 PROTHROMBIN TIME: CPT | Mod: QW,HCNC,S$GLB, | Performed by: NUCLEAR MEDICINE

## 2019-02-26 PROCEDURE — 85610 POCT INR: ICD-10-PCS | Mod: QW,HCNC,S$GLB, | Performed by: NUCLEAR MEDICINE

## 2019-02-26 NOTE — PROGRESS NOTES
Patient's INR is therapeutic at 2.6.  Reports no recent changes.  Instructed to maintain current dose of Warfarin 5 mg every Sunday and Saturday; and 7 mg on all other days per week.  Dose calendar - given.  Recheck in 1 month.

## 2019-03-26 ENCOUNTER — ANTI-COAG VISIT (OUTPATIENT)
Dept: CARDIOLOGY | Facility: CLINIC | Age: 84
End: 2019-03-26
Payer: MEDICARE

## 2019-03-26 DIAGNOSIS — Z79.01 LONG TERM (CURRENT) USE OF ANTICOAGULANTS: Primary | ICD-10-CM

## 2019-03-26 LAB — INR PPP: 2.7 (ref 2–3)

## 2019-03-26 PROCEDURE — 85610 POCT INR: ICD-10-PCS | Mod: QW,HCNC,S$GLB, | Performed by: NUCLEAR MEDICINE

## 2019-03-26 PROCEDURE — 85610 PROTHROMBIN TIME: CPT | Mod: QW,HCNC,S$GLB, | Performed by: NUCLEAR MEDICINE

## 2019-03-26 RX ORDER — WARFARIN 4 MG/1
2 TABLET ORAL
COMMUNITY
End: 2021-04-05

## 2019-03-26 NOTE — PROGRESS NOTES
Patient's INR is therapeutic at 2.7.  Reports no recent changes.  Instructed to maintain current dose of Warfarin 5 mg every Sunday and Saturday; and 7 mg Monday through Friday.  Dose calendar - given.  Recheck in 1 month.

## 2019-04-01 RX ORDER — LISINOPRIL AND HYDROCHLOROTHIAZIDE 12.5; 2 MG/1; MG/1
TABLET ORAL
Qty: 60 TABLET | Refills: 11 | Status: SHIPPED | OUTPATIENT
Start: 2019-04-01 | End: 2020-05-02

## 2019-04-23 ENCOUNTER — ANTI-COAG VISIT (OUTPATIENT)
Dept: CARDIOLOGY | Facility: CLINIC | Age: 84
End: 2019-04-23
Payer: MEDICARE

## 2019-04-23 DIAGNOSIS — Z79.01 LONG TERM (CURRENT) USE OF ANTICOAGULANTS: Primary | ICD-10-CM

## 2019-04-23 LAB — INR PPP: 2.5 (ref 2–3)

## 2019-04-23 PROCEDURE — 85610 POCT INR: ICD-10-PCS | Mod: QW,HCNC,S$GLB, | Performed by: NUCLEAR MEDICINE

## 2019-04-23 PROCEDURE — 93793 ANTICOAG MGMT PT WARFARIN: CPT | Mod: HCNC,S$GLB,,

## 2019-04-23 PROCEDURE — 85610 PROTHROMBIN TIME: CPT | Mod: QW,HCNC,S$GLB, | Performed by: NUCLEAR MEDICINE

## 2019-04-23 PROCEDURE — 93793 PR ANTICOAGULANT MGMT FOR PT TAKING WARFARIN: ICD-10-PCS | Mod: HCNC,S$GLB,,

## 2019-04-23 NOTE — PROGRESS NOTES
Patient's INR is therapeutic at 2.5.  Reports no recent changes.  Instructed to maintain current dose of Warfarin 5 mg every Sunday and Saturday; and 7 mg Monday through Friday.  Dose calendar - given.  Recheck in 1 month.

## 2019-05-06 DIAGNOSIS — Z79.01 LONG TERM (CURRENT) USE OF ANTICOAGULANTS: ICD-10-CM

## 2019-05-06 RX ORDER — WARFARIN SODIUM 5 MG/1
TABLET ORAL
Qty: 30 TABLET | Refills: 11 | Status: SHIPPED | OUTPATIENT
Start: 2019-05-06 | End: 2020-05-02

## 2019-05-28 ENCOUNTER — ANTI-COAG VISIT (OUTPATIENT)
Dept: CARDIOLOGY | Facility: CLINIC | Age: 84
End: 2019-05-28
Payer: MEDICARE

## 2019-05-28 DIAGNOSIS — Z86.711 HISTORY OF PULMONARY EMBOLISM: ICD-10-CM

## 2019-05-28 DIAGNOSIS — Z79.01 LONG TERM (CURRENT) USE OF ANTICOAGULANTS: Primary | ICD-10-CM

## 2019-05-28 LAB — INR PPP: 2.2 (ref 2–3)

## 2019-05-28 PROCEDURE — 85610 POCT INR: ICD-10-PCS | Mod: QW,HCNC,S$GLB, | Performed by: NUCLEAR MEDICINE

## 2019-05-28 PROCEDURE — 85610 PROTHROMBIN TIME: CPT | Mod: QW,HCNC,S$GLB, | Performed by: NUCLEAR MEDICINE

## 2019-05-28 PROCEDURE — 93793 ANTICOAG MGMT PT WARFARIN: CPT | Mod: HCNC,S$GLB,,

## 2019-05-28 PROCEDURE — 93793 PR ANTICOAGULANT MGMT FOR PT TAKING WARFARIN: ICD-10-PCS | Mod: HCNC,S$GLB,,

## 2019-06-20 ENCOUNTER — LAB VISIT (OUTPATIENT)
Dept: LAB | Facility: HOSPITAL | Age: 84
End: 2019-06-20
Attending: INTERNAL MEDICINE
Payer: MEDICARE

## 2019-06-20 DIAGNOSIS — E11.8 TYPE 2 DIABETES MELLITUS WITH COMPLICATION, WITHOUT LONG-TERM CURRENT USE OF INSULIN: ICD-10-CM

## 2019-06-20 LAB
ALBUMIN SERPL BCP-MCNC: 3.5 G/DL (ref 3.5–5.2)
ALP SERPL-CCNC: 80 U/L (ref 55–135)
ALT SERPL W/O P-5'-P-CCNC: 19 U/L (ref 10–44)
ANION GAP SERPL CALC-SCNC: 10 MMOL/L (ref 8–16)
AST SERPL-CCNC: 21 U/L (ref 10–40)
BASOPHILS # BLD AUTO: 0.04 K/UL (ref 0–0.2)
BASOPHILS NFR BLD: 0.6 % (ref 0–1.9)
BILIRUB SERPL-MCNC: 0.5 MG/DL (ref 0.1–1)
BUN SERPL-MCNC: 19 MG/DL (ref 8–23)
CALCIUM SERPL-MCNC: 10.3 MG/DL (ref 8.7–10.5)
CHLORIDE SERPL-SCNC: 104 MMOL/L (ref 95–110)
CHOLEST SERPL-MCNC: 194 MG/DL (ref 120–199)
CHOLEST/HDLC SERPL: 4.9 {RATIO} (ref 2–5)
CO2 SERPL-SCNC: 27 MMOL/L (ref 23–29)
CREAT SERPL-MCNC: 1.1 MG/DL (ref 0.5–1.4)
DIFFERENTIAL METHOD: ABNORMAL
EOSINOPHIL # BLD AUTO: 0.2 K/UL (ref 0–0.5)
EOSINOPHIL NFR BLD: 2.5 % (ref 0–8)
ERYTHROCYTE [DISTWIDTH] IN BLOOD BY AUTOMATED COUNT: 17.9 % (ref 11.5–14.5)
EST. GFR  (AFRICAN AMERICAN): >60 ML/MIN/1.73 M^2
EST. GFR  (NON AFRICAN AMERICAN): 59.2 ML/MIN/1.73 M^2
ESTIMATED AVG GLUCOSE: 117 MG/DL (ref 68–131)
GLUCOSE SERPL-MCNC: 87 MG/DL (ref 70–110)
HBA1C MFR BLD HPLC: 5.7 % (ref 4–5.6)
HCT VFR BLD AUTO: 51.7 % (ref 40–54)
HDLC SERPL-MCNC: 40 MG/DL (ref 40–75)
HDLC SERPL: 20.6 % (ref 20–50)
HGB BLD-MCNC: 16 G/DL (ref 14–18)
IMM GRANULOCYTES # BLD AUTO: 0.03 K/UL (ref 0–0.04)
IMM GRANULOCYTES NFR BLD AUTO: 0.4 % (ref 0–0.5)
LDLC SERPL CALC-MCNC: 108.4 MG/DL (ref 63–159)
LYMPHOCYTES # BLD AUTO: 1.4 K/UL (ref 1–4.8)
LYMPHOCYTES NFR BLD: 21.1 % (ref 18–48)
MCH RBC QN AUTO: 28.3 PG (ref 27–31)
MCHC RBC AUTO-ENTMCNC: 30.9 G/DL (ref 32–36)
MCV RBC AUTO: 92 FL (ref 82–98)
MONOCYTES # BLD AUTO: 0.9 K/UL (ref 0.3–1)
MONOCYTES NFR BLD: 12.9 % (ref 4–15)
NEUTROPHILS # BLD AUTO: 4.2 K/UL (ref 1.8–7.7)
NEUTROPHILS NFR BLD: 62.5 % (ref 38–73)
NONHDLC SERPL-MCNC: 154 MG/DL
NRBC BLD-RTO: 0 /100 WBC
PLATELET # BLD AUTO: 201 K/UL (ref 150–350)
PMV BLD AUTO: 12.2 FL (ref 9.2–12.9)
POTASSIUM SERPL-SCNC: 4.2 MMOL/L (ref 3.5–5.1)
PROT SERPL-MCNC: 7.8 G/DL (ref 6–8.4)
RBC # BLD AUTO: 5.65 M/UL (ref 4.6–6.2)
SODIUM SERPL-SCNC: 141 MMOL/L (ref 136–145)
TRIGL SERPL-MCNC: 228 MG/DL (ref 30–150)
TSH SERPL DL<=0.005 MIU/L-ACNC: 2.34 UIU/ML (ref 0.4–4)
WBC # BLD AUTO: 6.69 K/UL (ref 3.9–12.7)

## 2019-06-20 PROCEDURE — 80053 COMPREHEN METABOLIC PANEL: CPT | Mod: HCNC

## 2019-06-20 PROCEDURE — 85025 COMPLETE CBC W/AUTO DIFF WBC: CPT | Mod: HCNC

## 2019-06-20 PROCEDURE — 83036 HEMOGLOBIN GLYCOSYLATED A1C: CPT | Mod: HCNC

## 2019-06-20 PROCEDURE — 80061 LIPID PANEL: CPT | Mod: HCNC

## 2019-06-20 PROCEDURE — 36415 COLL VENOUS BLD VENIPUNCTURE: CPT | Mod: HCNC

## 2019-06-20 PROCEDURE — 84443 ASSAY THYROID STIM HORMONE: CPT | Mod: HCNC

## 2019-06-27 ENCOUNTER — OFFICE VISIT (OUTPATIENT)
Dept: INTERNAL MEDICINE | Facility: CLINIC | Age: 84
End: 2019-06-27
Payer: MEDICARE

## 2019-06-27 VITALS
SYSTOLIC BLOOD PRESSURE: 124 MMHG | DIASTOLIC BLOOD PRESSURE: 62 MMHG | OXYGEN SATURATION: 97 % | WEIGHT: 238.13 LBS | TEMPERATURE: 98 F | HEIGHT: 71 IN | BODY MASS INDEX: 33.34 KG/M2 | HEART RATE: 71 BPM

## 2019-06-27 DIAGNOSIS — Z87.39 HISTORY OF GOUT: ICD-10-CM

## 2019-06-27 DIAGNOSIS — Z86.711 HISTORY OF PULMONARY EMBOLISM: ICD-10-CM

## 2019-06-27 DIAGNOSIS — E11.59 HYPERTENSION ASSOCIATED WITH DIABETES: ICD-10-CM

## 2019-06-27 DIAGNOSIS — E66.9 CLASS 1 OBESITY WITH BODY MASS INDEX (BMI) OF 33.0 TO 33.9 IN ADULT, UNSPECIFIED OBESITY TYPE, UNSPECIFIED WHETHER SERIOUS COMORBIDITY PRESENT: ICD-10-CM

## 2019-06-27 DIAGNOSIS — I70.0 ATHEROSCLEROSIS OF AORTA: ICD-10-CM

## 2019-06-27 DIAGNOSIS — I15.2 HYPERTENSION ASSOCIATED WITH DIABETES: ICD-10-CM

## 2019-06-27 DIAGNOSIS — I72.4 ANEURYSM ARTERY, POPLITEAL: ICD-10-CM

## 2019-06-27 DIAGNOSIS — E11.8 TYPE 2 DIABETES MELLITUS WITH COMPLICATION, WITHOUT LONG-TERM CURRENT USE OF INSULIN: ICD-10-CM

## 2019-06-27 DIAGNOSIS — E11.42 TYPE 2 DIABETES MELLITUS WITH POLYNEUROPATHY: Primary | ICD-10-CM

## 2019-06-27 DIAGNOSIS — R33.9 URINARY RETENTION: ICD-10-CM

## 2019-06-27 PROCEDURE — 99499 RISK ADDL DX/OHS AUDIT: ICD-10-PCS | Mod: HCNC,S$GLB,, | Performed by: INTERNAL MEDICINE

## 2019-06-27 PROCEDURE — 99999 PR PBB SHADOW E&M-EST. PATIENT-LVL III: ICD-10-PCS | Mod: PBBFAC,HCNC,, | Performed by: INTERNAL MEDICINE

## 2019-06-27 PROCEDURE — 99499 UNLISTED E&M SERVICE: CPT | Mod: HCNC,S$GLB,, | Performed by: INTERNAL MEDICINE

## 2019-06-27 PROCEDURE — 1101F PR PT FALLS ASSESS DOC 0-1 FALLS W/OUT INJ PAST YR: ICD-10-PCS | Mod: HCNC,CPTII,S$GLB, | Performed by: INTERNAL MEDICINE

## 2019-06-27 PROCEDURE — 99214 PR OFFICE/OUTPT VISIT, EST, LEVL IV, 30-39 MIN: ICD-10-PCS | Mod: HCNC,S$GLB,, | Performed by: INTERNAL MEDICINE

## 2019-06-27 PROCEDURE — 1101F PT FALLS ASSESS-DOCD LE1/YR: CPT | Mod: HCNC,CPTII,S$GLB, | Performed by: INTERNAL MEDICINE

## 2019-06-27 PROCEDURE — 99999 PR PBB SHADOW E&M-EST. PATIENT-LVL III: CPT | Mod: PBBFAC,HCNC,, | Performed by: INTERNAL MEDICINE

## 2019-06-27 PROCEDURE — 99214 OFFICE O/P EST MOD 30 MIN: CPT | Mod: HCNC,S$GLB,, | Performed by: INTERNAL MEDICINE

## 2019-06-27 RX ORDER — HYDROCODONE BITARTRATE AND ACETAMINOPHEN 7.5; 325 MG/1; MG/1
1 TABLET ORAL EVERY 8 HOURS PRN
Qty: 90 TABLET | Refills: 0 | Status: SHIPPED | OUTPATIENT
Start: 2019-06-27 | End: 2020-01-10 | Stop reason: SDUPTHER

## 2019-06-27 NOTE — PROGRESS NOTES
"HPI:  Patient is an 89-year-old man who comes today for follow-up of his diabetes, hypertension, lipids, gout, and history of pulmonary embolus.  He is doing extremely well.  He denies any problems with hypoglycemia.  His blood pressures been well controlled.  There have been no other new complaints.  He does have typical osteoarthritis problems primarily in his knees and hips.    Current meds have been verified and updated per the EMR  Exam:/62   Pulse 71   Temp 97.8 °F (36.6 °C) (Tympanic)   Ht 5' 11" (1.803 m)   Wt 108 kg (238 lb 1.6 oz)   SpO2 97%   BMI 33.21 kg/m²   Carotids 2+ equal without bruits  Chest clear  Cardiovascular regular rate and rhythm without murmur gallop or rub  Extremities without edema    Lab Results   Component Value Date    WBC 6.69 06/20/2019    HGB 16.0 06/20/2019    HCT 51.7 06/20/2019     06/20/2019    CHOL 194 06/20/2019    TRIG 228 (H) 06/20/2019    HDL 40 06/20/2019    ALT 19 06/20/2019    AST 21 06/20/2019     06/20/2019    K 4.2 06/20/2019     06/20/2019    CREATININE 1.1 06/20/2019    BUN 19 06/20/2019    CO2 27 06/20/2019    TSH 2.339 06/20/2019    PSA 14 (H) 08/10/2011    INR 2.2 05/28/2019    HGBA1C 5.7 (H) 06/20/2019       Impression:  Multiple medical problems below, stable  Patient Active Problem List   Diagnosis    Obesity    Hearing loss of aging    History of pulmonary embolism    DM (diabetes mellitus), type 2 with complications    Hypertension associated with diabetes    History of gout    Type 2 diabetes mellitus with polyneuropathy    Urinary retention    Aneurysm artery, popliteal    Atherosclerosis of aorta    Benign prostatic hyperplasia       Plan:  Orders Placed This Encounter    Hemoglobin A1c    Comprehensive metabolic panel    Lipid panel    Protein / creatinine ratio, urine    TSH    CBC auto differential    Uric acid    HYDROcodone-acetaminophen (NORCO) 7.5-325 mg per tablet    He was given refills of his " hydrocodone.  He will see me again in 6 months with above lab work.  His medications remain the same      This note is generated with speech recognition software and is subject to transcription error and sound alike phrases that may be missed by proofreading.

## 2019-07-02 ENCOUNTER — ANTI-COAG VISIT (OUTPATIENT)
Dept: CARDIOLOGY | Facility: CLINIC | Age: 84
End: 2019-07-02
Payer: MEDICARE

## 2019-07-02 DIAGNOSIS — Z86.711 HISTORY OF PULMONARY EMBOLISM: ICD-10-CM

## 2019-07-02 DIAGNOSIS — Z79.01 LONG TERM (CURRENT) USE OF ANTICOAGULANTS: Primary | ICD-10-CM

## 2019-07-02 LAB — INR PPP: 1.9 (ref 2–3)

## 2019-07-02 PROCEDURE — 93793 PR ANTICOAGULANT MGMT FOR PT TAKING WARFARIN: ICD-10-PCS | Mod: HCNC,S$GLB,,

## 2019-07-02 PROCEDURE — 85610 POCT INR: ICD-10-PCS | Mod: QW,HCNC,S$GLB, | Performed by: NUCLEAR MEDICINE

## 2019-07-02 PROCEDURE — 85610 PROTHROMBIN TIME: CPT | Mod: QW,HCNC,S$GLB, | Performed by: NUCLEAR MEDICINE

## 2019-07-02 PROCEDURE — 93793 ANTICOAG MGMT PT WARFARIN: CPT | Mod: HCNC,S$GLB,,

## 2019-07-02 NOTE — PROGRESS NOTES
Patient's INR is sub-therapeutic at 1.9.  Mr. Lara reports eating cabbage over the weekend.  No abnormal pain, swelling or SOB noted.  Instructions given for patient to take warfarin 9mg on today; then resume current dose of 7mg Monday through Friday and 5mg on Saturdays and Sundays on all other days of the week.  Patient voiced understanding.  Follow-up in 4 weeks.

## 2019-07-11 ENCOUNTER — TELEPHONE (OUTPATIENT)
Dept: INTERNAL MEDICINE | Facility: CLINIC | Age: 84
End: 2019-07-11

## 2019-07-11 NOTE — TELEPHONE ENCOUNTER
Pt is having a procedure (extractions) on 7/20/019. Fax number given to April to fax release over.

## 2019-08-02 ENCOUNTER — ANTI-COAG VISIT (OUTPATIENT)
Dept: CARDIOLOGY | Facility: CLINIC | Age: 84
End: 2019-08-02
Payer: MEDICARE

## 2019-08-02 ENCOUNTER — OFFICE VISIT (OUTPATIENT)
Dept: OPHTHALMOLOGY | Facility: CLINIC | Age: 84
End: 2019-08-02
Payer: MEDICARE

## 2019-08-02 DIAGNOSIS — Z79.01 LONG TERM (CURRENT) USE OF ANTICOAGULANTS: Primary | ICD-10-CM

## 2019-08-02 DIAGNOSIS — H50.111 EXOTROPIA OF RIGHT EYE: ICD-10-CM

## 2019-08-02 DIAGNOSIS — E11.9 DIABETES MELLITUS TYPE 2 WITHOUT RETINOPATHY: Primary | ICD-10-CM

## 2019-08-02 DIAGNOSIS — Z86.711 HISTORY OF PULMONARY EMBOLISM: ICD-10-CM

## 2019-08-02 DIAGNOSIS — H27.01 APHAKIA OF EYE, RIGHT: ICD-10-CM

## 2019-08-02 DIAGNOSIS — H52.223 REGULAR ASTIGMATISM OF BOTH EYES: ICD-10-CM

## 2019-08-02 DIAGNOSIS — H21.531: ICD-10-CM

## 2019-08-02 DIAGNOSIS — H52.4 PRESBYOPIA: ICD-10-CM

## 2019-08-02 DIAGNOSIS — Z96.1 PSEUDOPHAKIA OF LEFT EYE: ICD-10-CM

## 2019-08-02 LAB — INR PPP: 2.8 (ref 2–3)

## 2019-08-02 PROCEDURE — 99999 PR PBB SHADOW E&M-EST. PATIENT-LVL II: ICD-10-PCS | Mod: PBBFAC,HCNC,, | Performed by: OPTOMETRIST

## 2019-08-02 PROCEDURE — 92014 PR EYE EXAM, EST PATIENT,COMPREHESV: ICD-10-PCS | Mod: HCNC,S$GLB,, | Performed by: OPTOMETRIST

## 2019-08-02 PROCEDURE — 93793 ANTICOAG MGMT PT WARFARIN: CPT | Mod: HCNC,S$GLB,,

## 2019-08-02 PROCEDURE — 99999 PR PBB SHADOW E&M-EST. PATIENT-LVL II: CPT | Mod: PBBFAC,HCNC,, | Performed by: OPTOMETRIST

## 2019-08-02 PROCEDURE — 85610 PROTHROMBIN TIME: CPT | Mod: QW,HCNC,S$GLB, | Performed by: INTERNAL MEDICINE

## 2019-08-02 PROCEDURE — 92014 COMPRE OPH EXAM EST PT 1/>: CPT | Mod: HCNC,S$GLB,, | Performed by: OPTOMETRIST

## 2019-08-02 PROCEDURE — 85610 POCT INR: ICD-10-PCS | Mod: QW,HCNC,S$GLB, | Performed by: INTERNAL MEDICINE

## 2019-08-02 PROCEDURE — 93793 PR ANTICOAGULANT MGMT FOR PT TAKING WARFARIN: ICD-10-PCS | Mod: HCNC,S$GLB,,

## 2019-08-02 NOTE — PROGRESS NOTES
HPI     Last [facility] exam 01/05/2018  Diabetic/NIDDM  6 month PCIOL check   PCIOL OS 12/18/2018  Aphakia, OD   iriodialysis OD   Dry eye OU  Exotropia         Last edited by Henry London MA on 8/2/2019 10:07 AM. (History)            Assessment /Plan     For exam results, see Encounter Report.    Diabetes mellitus type 2 without retinopathy    Aphakia of eye, right    Pseudophakia of left eye    Iridodialysis, right    Exotropia of right eye    Regular astigmatism of both eyes    Presbyopia      No diabetic retinopathy OU.  Aphakia with iridodialysis OD - stable.  PIOL OS - stable.  Exotropia OD - stable.  Spec Rx given.  RTC one year.

## 2019-08-02 NOTE — PROGRESS NOTES
Patient's INR is therapeutic at 2.8.  Reports no recent changes.  Instructed to maintain current dose of Warfarin 5 mg every Sunday and Saturday; and 7 mg on all other days per week.  Dose calendar given and reviewed with patient.  Recheck in 4 weeks.

## 2019-08-30 ENCOUNTER — ANTI-COAG VISIT (OUTPATIENT)
Dept: CARDIOLOGY | Facility: CLINIC | Age: 84
End: 2019-08-30
Payer: MEDICARE

## 2019-08-30 DIAGNOSIS — Z79.01 LONG TERM (CURRENT) USE OF ANTICOAGULANTS: Primary | ICD-10-CM

## 2019-08-30 DIAGNOSIS — Z86.711 HISTORY OF PULMONARY EMBOLISM: ICD-10-CM

## 2019-08-30 LAB — INR PPP: 3.2 (ref 2–3)

## 2019-08-30 PROCEDURE — 93793 ANTICOAG MGMT PT WARFARIN: CPT | Mod: HCNC,S$GLB,,

## 2019-08-30 PROCEDURE — 85610 PROTHROMBIN TIME: CPT | Mod: QW,HCNC,S$GLB, | Performed by: INTERNAL MEDICINE

## 2019-08-30 PROCEDURE — 85610 POCT INR: ICD-10-PCS | Mod: QW,HCNC,S$GLB, | Performed by: INTERNAL MEDICINE

## 2019-08-30 PROCEDURE — 93793 PR ANTICOAGULANT MGMT FOR PT TAKING WARFARIN: ICD-10-PCS | Mod: HCNC,S$GLB,,

## 2019-08-30 NOTE — PROGRESS NOTES
Patient's INR is slightly elevated at 3.2.  Reports no medication or diet changes.  No signs of any abnormal bleeding reported.  Instructed to take a lower dose of Warfarin 5 mg today (Friday) - only, then resume on regular scheduled dose of Warfarin 7 mg Monday through Friday and 5 mg every Saturday and Sunday.  Dose calendar given and reviewed with patient.  Recheck in 3 weeks.  Advised to seek medical attention (ED) for any signs of abnormal bleeding, if needed.  Patient repeated back instructions and voiced understanding.

## 2019-09-20 ENCOUNTER — ANTI-COAG VISIT (OUTPATIENT)
Dept: CARDIOLOGY | Facility: CLINIC | Age: 84
End: 2019-09-20
Payer: MEDICARE

## 2019-09-20 DIAGNOSIS — Z79.01 LONG TERM (CURRENT) USE OF ANTICOAGULANTS: Primary | ICD-10-CM

## 2019-09-20 DIAGNOSIS — Z86.711 HISTORY OF PULMONARY EMBOLISM: ICD-10-CM

## 2019-09-20 LAB — INR PPP: 2.7 (ref 2–3)

## 2019-09-20 PROCEDURE — 85610 POCT INR: ICD-10-PCS | Mod: QW,HCNC,S$GLB, | Performed by: INTERNAL MEDICINE

## 2019-09-20 PROCEDURE — 85610 PROTHROMBIN TIME: CPT | Mod: QW,HCNC,S$GLB, | Performed by: INTERNAL MEDICINE

## 2019-09-20 PROCEDURE — 93793 ANTICOAG MGMT PT WARFARIN: CPT | Mod: HCNC,S$GLB,,

## 2019-09-20 PROCEDURE — 93793 PR ANTICOAGULANT MGMT FOR PT TAKING WARFARIN: ICD-10-PCS | Mod: HCNC,S$GLB,,

## 2019-09-20 NOTE — PROGRESS NOTES
Patient's INR is therapeutic at 2.7.  Reports no recent changes.  Instructed to maintain current dose of Warfarin 7 mg Monday through Friday and 5 mg on Saturday and Sunday.  Dose calendar given and reviewed with patient.  Recheck in 1 month.

## 2019-10-05 ENCOUNTER — HOSPITAL ENCOUNTER (EMERGENCY)
Facility: HOSPITAL | Age: 84
Discharge: HOME | End: 2019-10-05
Attending: EMERGENCY MEDICINE
Payer: MEDICARE

## 2019-10-05 VITALS
HEIGHT: 71 IN | TEMPERATURE: 98 F | BODY MASS INDEX: 33.21 KG/M2 | HEART RATE: 61 BPM | OXYGEN SATURATION: 98 % | DIASTOLIC BLOOD PRESSURE: 70 MMHG | SYSTOLIC BLOOD PRESSURE: 157 MMHG | RESPIRATION RATE: 19 BRPM

## 2019-10-05 DIAGNOSIS — R10.84 GENERALIZED ABDOMINAL PAIN: Primary | ICD-10-CM

## 2019-10-05 DIAGNOSIS — N30.01 ACUTE CYSTITIS WITH HEMATURIA: ICD-10-CM

## 2019-10-05 LAB
ALBUMIN SERPL BCP-MCNC: 3.5 G/DL (ref 3.5–5.2)
ALP SERPL-CCNC: 68 U/L (ref 55–135)
ALT SERPL W/O P-5'-P-CCNC: 13 U/L (ref 10–44)
ANION GAP SERPL CALC-SCNC: 9 MMOL/L (ref 8–16)
AST SERPL-CCNC: 15 U/L (ref 10–40)
BACTERIA #/AREA URNS HPF: ABNORMAL /HPF
BASOPHILS # BLD AUTO: 0.04 K/UL (ref 0–0.2)
BASOPHILS NFR BLD: 0.5 % (ref 0–1.9)
BILIRUB SERPL-MCNC: 1 MG/DL (ref 0.1–1)
BILIRUB UR QL STRIP: NEGATIVE
BUN SERPL-MCNC: 21 MG/DL (ref 8–23)
CALCIUM SERPL-MCNC: 9.9 MG/DL (ref 8.7–10.5)
CHLORIDE SERPL-SCNC: 104 MMOL/L (ref 95–110)
CLARITY UR: ABNORMAL
CO2 SERPL-SCNC: 27 MMOL/L (ref 23–29)
COLOR UR: YELLOW
CREAT SERPL-MCNC: 1.2 MG/DL (ref 0.5–1.4)
DIFFERENTIAL METHOD: ABNORMAL
EOSINOPHIL # BLD AUTO: 0.1 K/UL (ref 0–0.5)
EOSINOPHIL NFR BLD: 1.2 % (ref 0–8)
ERYTHROCYTE [DISTWIDTH] IN BLOOD BY AUTOMATED COUNT: 16.9 % (ref 11.5–14.5)
EST. GFR  (AFRICAN AMERICAN): >60 ML/MIN/1.73 M^2
EST. GFR  (NON AFRICAN AMERICAN): 53 ML/MIN/1.73 M^2
GLUCOSE SERPL-MCNC: 92 MG/DL (ref 70–110)
GLUCOSE UR QL STRIP: NEGATIVE
HCT VFR BLD AUTO: 49.2 % (ref 40–54)
HGB BLD-MCNC: 15.4 G/DL (ref 14–18)
HGB UR QL STRIP: ABNORMAL
IMM GRANULOCYTES # BLD AUTO: 0.09 K/UL (ref 0–0.04)
IMM GRANULOCYTES NFR BLD AUTO: 1.2 % (ref 0–0.5)
KETONES UR QL STRIP: NEGATIVE
LEUKOCYTE ESTERASE UR QL STRIP: ABNORMAL
LIPASE SERPL-CCNC: 36 U/L (ref 4–60)
LYMPHOCYTES # BLD AUTO: 1.4 K/UL (ref 1–4.8)
LYMPHOCYTES NFR BLD: 18.4 % (ref 18–48)
MCH RBC QN AUTO: 28.1 PG (ref 27–31)
MCHC RBC AUTO-ENTMCNC: 31.3 G/DL (ref 32–36)
MCV RBC AUTO: 90 FL (ref 82–98)
MICROSCOPIC COMMENT: ABNORMAL
MONOCYTES # BLD AUTO: 0.9 K/UL (ref 0.3–1)
MONOCYTES NFR BLD: 11.4 % (ref 4–15)
NEUTROPHILS # BLD AUTO: 5.1 K/UL (ref 1.8–7.7)
NEUTROPHILS NFR BLD: 67.3 % (ref 38–73)
NITRITE UR QL STRIP: NEGATIVE
NRBC BLD-RTO: 0 /100 WBC
PH UR STRIP: 6 [PH] (ref 5–8)
PLATELET # BLD AUTO: 177 K/UL (ref 150–350)
PMV BLD AUTO: 11.3 FL (ref 9.2–12.9)
POTASSIUM SERPL-SCNC: 3.8 MMOL/L (ref 3.5–5.1)
PROT SERPL-MCNC: 7.7 G/DL (ref 6–8.4)
PROT UR QL STRIP: NEGATIVE
RBC # BLD AUTO: 5.49 M/UL (ref 4.6–6.2)
RBC #/AREA URNS HPF: 50 /HPF (ref 0–4)
SODIUM SERPL-SCNC: 140 MMOL/L (ref 136–145)
SP GR UR STRIP: 1.01 (ref 1–1.03)
SQUAMOUS #/AREA URNS HPF: 0 /HPF
URN SPEC COLLECT METH UR: ABNORMAL
UROBILINOGEN UR STRIP-ACNC: 1 EU/DL
WBC # BLD AUTO: 7.62 K/UL (ref 3.9–12.7)
WBC #/AREA URNS HPF: >100 /HPF (ref 0–5)
WBC CLUMPS URNS QL MICRO: ABNORMAL
YEAST URNS QL MICRO: ABNORMAL

## 2019-10-05 PROCEDURE — 87086 URINE CULTURE/COLONY COUNT: CPT | Mod: HCNC

## 2019-10-05 PROCEDURE — 81000 URINALYSIS NONAUTO W/SCOPE: CPT | Mod: HCNC

## 2019-10-05 PROCEDURE — 25500020 PHARM REV CODE 255: Mod: HCNC | Performed by: EMERGENCY MEDICINE

## 2019-10-05 PROCEDURE — 83690 ASSAY OF LIPASE: CPT | Mod: HCNC

## 2019-10-05 PROCEDURE — 80053 COMPREHEN METABOLIC PANEL: CPT | Mod: HCNC

## 2019-10-05 PROCEDURE — 63600175 PHARM REV CODE 636 W HCPCS: Mod: HCNC | Performed by: PHYSICIAN ASSISTANT

## 2019-10-05 PROCEDURE — 99285 EMERGENCY DEPT VISIT HI MDM: CPT | Mod: 25,HCNC

## 2019-10-05 PROCEDURE — 85025 COMPLETE CBC W/AUTO DIFF WBC: CPT | Mod: HCNC

## 2019-10-05 RX ORDER — MORPHINE SULFATE 4 MG/ML
4 INJECTION, SOLUTION INTRAMUSCULAR; INTRAVENOUS
Status: DISCONTINUED | OUTPATIENT
Start: 2019-10-05 | End: 2019-10-05 | Stop reason: HOSPADM

## 2019-10-05 RX ORDER — ONDANSETRON 2 MG/ML
4 INJECTION INTRAMUSCULAR; INTRAVENOUS
Status: DISCONTINUED | OUTPATIENT
Start: 2019-10-05 | End: 2019-10-05 | Stop reason: HOSPADM

## 2019-10-05 RX ORDER — CEFUROXIME AXETIL 500 MG/1
500 TABLET ORAL 2 TIMES DAILY
Qty: 20 TABLET | Refills: 0 | Status: SHIPPED | OUTPATIENT
Start: 2019-10-05 | End: 2019-10-15

## 2019-10-05 RX ADMIN — IOHEXOL 75 ML: 350 INJECTION, SOLUTION INTRAVENOUS at 11:10

## 2019-10-05 NOTE — ED PROVIDER NOTES
"SCRIBE #1 NOTE: I, Mable Montoya, am scribing for, and in the presence of, Darwin Nuno MD. I have scribed the entire note.       History     Chief Complaint   Patient presents with    Abdominal Pain     +abd pain (RLQ pain/groin pain), back pain that started yesterday.      Review of patient's allergies indicates:   Allergen Reactions    Morphine Other (See Comments)     Pt states, "It only makes my pain worse."         History of Present Illness     HPI    10/5/2019, 10:13 AM  History obtained from the patient      History of Present Illness: Amauri Lara is a 89 y.o. male patient with a PMHx of PE, DM, gout, HTN, and obesity who presents to the Emergency Department for evaluation of mid lover abd pain which onset gradually yesterday. Symptoms are constant and moderate in severity. No mitigating or exacerbating factors reported. Associated sxs include groin pain and back pain. Patient denies any dysuria, hematuria, blood in stool, frequency, difficulty urinating, fever, chills, numbness, weakness, bladder/bowel incontinence, n/v/d, CP, SOB, and all other sxs at this time. No prior tx reported. No further complaints or concerns at this time.       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    Obesity, unspecified     Type 2 diabetes mellitus with polyneuropathy        Past Surgical History:  Past Surgical History:   Procedure Laterality Date    CATARACT EXTRACTION      aphakic od    CATARACT EXTRACTION W/  INTRAOCULAR LENS IMPLANT Left 12/13/2018    TRANSURETHRAL RESECTION OF PROSTATE N/A 5/31/2018    Procedure: PROSTATECTOMY-TRANSURETHRAL; "  Surgeon: Michael Westbrook IV, MD;  Location: [facility];  Service: Urology;  Laterality: N/A;    VASCULAR SURGERY           Family History:  Family History   Problem Relation Age of Onset    Diabetes Brother        Social History:  Social History     Tobacco Use    Smoking status: Never Smoker    Smokeless tobacco: Never Used   Substance and Sexual Activity    Alcohol use: No    Drug use: No    Sexual activity: Never        Review of Systems     Review of Systems   Constitutional: Negative for chills and fever.   HENT: Negative for sore throat.    Respiratory: Negative for shortness of breath.    Cardiovascular: Negative for chest pain.   Gastrointestinal: Positive for abdominal pain (mid lower). Negative for blood in stool, diarrhea, nausea and vomiting.   Genitourinary: Negative for difficulty urinating, dysuria, frequency and hematuria.        (+) groin pain   Musculoskeletal: Positive for back pain.   Skin: Negative for rash.   Neurological: Negative for weakness and numbness.        (-) saddle anesthesia  (-)bladder/ bowel incontinence      Hematological: Does not bruise/bleed easily.   All other systems reviewed and are negative.     Physical Exam     Initial Vitals   BP Pulse Resp Temp SpO2   10/05/19 1001 10/05/19 1002 10/05/19 1001 10/05/19 1001 10/05/19 1001   114/69 62 19 97.6 °F (36.4 °C) 97 %      MAP       --                 Physical Exam  Nursing Notes and Vital Signs Reviewed.  Constitutional: Patient is in no acute distress. Well-developed and well-nourished. Elderly.  Head: Atraumatic. Normocephalic.  Eyes: PERRL. EOM intact. Conjunctivae are not pale. No scleral icterus.  ENT: Mucous membranes are moist. Oropharynx is clear and symmetric.    Neck: Supple. Full ROM. No lymphadenopathy.  Cardiovascular: Regular rate. Regular rhythm. No murmurs, rubs, or gallops. Distal pulses are 2+ and symmetric.  Pulmonary/Chest: No respiratory distress. Clear to auscultation bilaterally. No wheezing or  "rales.  Abdominal: Soft and non-distended.  There is no tenderness.  No rebound, guarding, or rigidity. Good bowel sounds.  Genitourinary: No CVA tenderness  Musculoskeletal: Moves all extremities. No obvious deformities. No edema. No calf tenderness.  Skin: Warm and dry.  Neurological:  Alert, awake, and appropriate.  Normal speech.  No acute focal neurological deficits are appreciated.  Psychiatric: Normal affect. Good eye contact. Appropriate in content.     ED Course   Procedures  ED Vital Signs:  Vitals:    10/05/19 1001 10/05/19 1002 10/05/19 1019 10/05/19 1030   BP: 114/69   (!) 148/70   Pulse:  62 64 60   Resp: 19   20   Temp: 97.6 °F (36.4 °C)      TempSrc: Oral      SpO2: 97%   98%   Height:  5' 11" (1.803 m)      10/05/19 1100 10/05/19 1130   BP: 126/63 121/64   Pulse: (!) 56 (!) 54   Resp: 12 17   Temp:     TempSrc:     SpO2: 95% 96%   Height:         Abnormal Lab Results:  Labs Reviewed   CBC W/ AUTO DIFFERENTIAL - Abnormal; Notable for the following components:       Result Value    Mean Corpuscular Hemoglobin Conc 31.3 (*)     RDW 16.9 (*)     Immature Granulocytes 1.2 (*)     Immature Grans (Abs) 0.09 (*)     All other components within normal limits   COMPREHENSIVE METABOLIC PANEL - Abnormal; Notable for the following components:    eGFR if non  53 (*)     All other components within normal limits   URINALYSIS, REFLEX TO URINE CULTURE - Abnormal; Notable for the following components:    Appearance, UA Hazy (*)     Occult Blood UA 3+ (*)     Leukocytes, UA 3+ (*)     All other components within normal limits    Narrative:     Preferred Collection Type->Urine, Clean Catch   URINALYSIS MICROSCOPIC - Abnormal; Notable for the following components:    RBC, UA 50 (*)     WBC, UA >100 (*)     WBC Clumps, UA Few (*)     All other components within normal limits    Narrative:     Preferred Collection Type->Urine, Clean Catch   CULTURE, URINE   LIPASE        All Lab Results:  Results for " orders placed or performed during the hospital encounter of 10/05/19   CBC W/ AUTO DIFFERENTIAL   Result Value Ref Range    WBC 7.62 3.90 - 12.70 K/uL    RBC 5.49 4.60 - 6.20 M/uL    Hemoglobin 15.4 14.0 - 18.0 g/dL    Hematocrit 49.2 40.0 - 54.0 %    Mean Corpuscular Volume 90 82 - 98 fL    Mean Corpuscular Hemoglobin 28.1 27.0 - 31.0 pg    Mean Corpuscular Hemoglobin Conc 31.3 (L) 32.0 - 36.0 g/dL    RDW 16.9 (H) 11.5 - 14.5 %    Platelets 177 150 - 350 K/uL    MPV 11.3 9.2 - 12.9 fL    Immature Granulocytes 1.2 (H) 0.0 - 0.5 %    Gran # (ANC) 5.1 1.8 - 7.7 K/uL    Immature Grans (Abs) 0.09 (H) 0.00 - 0.04 K/uL    Lymph # 1.4 1.0 - 4.8 K/uL    Mono # 0.9 0.3 - 1.0 K/uL    Eos # 0.1 0.0 - 0.5 K/uL    Baso # 0.04 0.00 - 0.20 K/uL    nRBC 0 0 /100 WBC    Gran% 67.3 38.0 - 73.0 %    Lymph% 18.4 18.0 - 48.0 %    Mono% 11.4 4.0 - 15.0 %    Eosinophil% 1.2 0.0 - 8.0 %    Basophil% 0.5 0.0 - 1.9 %    Differential Method Automated    Comp. Metabolic Panel   Result Value Ref Range    Sodium 140 136 - 145 mmol/L    Potassium 3.8 3.5 - 5.1 mmol/L    Chloride 104 95 - 110 mmol/L    CO2 27 23 - 29 mmol/L    Glucose 92 70 - 110 mg/dL    BUN, Bld 21 8 - 23 mg/dL    Creatinine 1.2 0.5 - 1.4 mg/dL    Calcium 9.9 8.7 - 10.5 mg/dL    Total Protein 7.7 6.0 - 8.4 g/dL    Albumin 3.5 3.5 - 5.2 g/dL    Total Bilirubin 1.0 0.1 - 1.0 mg/dL    Alkaline Phosphatase 68 55 - 135 U/L    AST 15 10 - 40 U/L    ALT 13 10 - 44 U/L    Anion Gap 9 8 - 16 mmol/L    eGFR if African American >60 >60 mL/min/1.73 m^2    eGFR if non African American 53 (A) >60 mL/min/1.73 m^2   Lipase   Result Value Ref Range    Lipase 36 4 - 60 U/L   Urinalysis, Reflex to Urine Culture Urine, Clean Catch   Result Value Ref Range    Specimen UA Urine, Clean Catch     Color, UA Yellow Yellow, Straw, Jennifer    Appearance, UA Hazy (A) Clear    pH, UA 6.0 5.0 - 8.0    Specific Gravity, UA 1.010 1.005 - 1.030    Protein, UA Negative Negative    Glucose, UA Negative Negative     Ketones, UA Negative Negative    Bilirubin (UA) Negative Negative    Occult Blood UA 3+ (A) Negative    Nitrite, UA Negative Negative    Urobilinogen, UA 1.0 <2.0 EU/dL    Leukocytes, UA 3+ (A) Negative   Urinalysis Microscopic   Result Value Ref Range    RBC, UA 50 (H) 0 - 4 /hpf    WBC, UA >100 (H) 0 - 5 /hpf    WBC Clumps, UA Few (A) None-Rare    Bacteria None None-Occ /hpf    Yeast, UA None None    Squam Epithel, UA 0 /hpf    Microscopic Comment SEE COMMENT          Imaging Results:  Imaging Results          CT Abdomen Pelvis With Contrast (Final result)  Result time 10/05/19 12:18:39    Final result by Jeny Pedro MD (Timothy) (10/05/19 12:18:39)                 Impression:      No acute bowel abnormalities.  There is evidence of moderate to severe hydronephrosis involving the kidneys bilaterally prior related to chronic bladder outlet obstruction the patient has had a previous TURP.    There is umbilical hernia containing fat measuring 5 cm.    All CT scans at this facility use dose modulation, iterative reconstructions, and/or weight base dosing when appropriate to reduce radiation dose to as low as reasonably achievable      Electronically signed by: Jeny Pedro MD  Date:    10/05/2019  Time:    12:18             Narrative:    EXAMINATION:  CT ABDOMEN PELVIS WITH CONTRAST    CLINICAL HISTORY:  abd pain;    COMPARISON:  CT abdomen pelvis 01/27/2017    FINDINGS:  Lung bases are clear.    Thin layer gallstones is present.  No gallbladder wall thickening.  No bile duct dilatation.    The liver, the spleen, and the pancreas appear normal.    Adrenal glands appear normal.  There is moderate to marked hydronephrosis bilaterally.  No obstructing stones.  The ureters are distended to the level of the bladder.  There is trabeculation of the bladder wall.  Patient I suspect is had a previous TURP.  Prostate gland is decreased in size compared to previous exam.  There is a stable 2 cm cyst involving the midpole  of the left kidney.    There are no acute bowel abnormalities.  No evidence of appendicitis.  No evidence of bowel obstruction.  No evidence of diverticulitis.    There is a umbilical hernia containing fat measuring about 5 cm.  No abnormal masses or fluid collections in the pelvis.    There are degenerative changes involving the lumbar spine with severe bilateral facet arthropathy.                                       The Emergency Provider reviewed the vital signs and test results, which are outlined above.     ED Discussion     12:25 PM: Reassessed pt at this time.  Pt states his condition has improved at this time. Discussed with pt all pertinent ED information and results. Discussed pt dx and plan of tx. Gave pt all f/u and return to the ED instructions. All questions and concerns were addressed at this time. Pt expresses understanding of information and instructions, and is comfortable with plan to discharge. Pt is stable for discharge.    I discussed with patient and/or family/caretaker that evaluation in the ED does not suggest any emergent or life threatening medical conditions requiring immediate intervention beyond what was provided in the ED, and I believe patient is safe for discharge.  Regardless, an unremarkable evaluation in the ED does not preclude the development or presence of a serious of life threatening condition. As such, patient was instructed to return immediately for any worsening or change in current symptoms.         Medical Decision Making:   Clinical Tests:   Lab Tests: Reviewed and Ordered  Radiological Study: Reviewed and Ordered           ED Medication(s):  Medications   morphine injection 4 mg (4 mg Intravenous Not Given 10/5/19 1027)   ondansetron injection 4 mg (4 mg Intravenous Not Given 10/5/19 1027)   iohexol (OMNIPAQUE 350) injection 100 mL (75 mLs Intravenous Given 10/5/19 1158)       New Prescriptions    CEFUROXIME (CEFTIN) 500 MG TABLET    Take 1 tablet (500 mg total) by  mouth 2 (two) times daily. for 10 days       Follow-up Information     Stefan Vera MD.    Specialty:  Internal Medicine  Contact information:  [address]  [address]  [facility] [address]  [phone]                       Scribe Attestation:   Scribe #1: I performed the above scribed service and the documentation accurately describes the services I performed. I attest to the accuracy of the note.     Attending:   Physician Attestation Statement for Scribe #1: I, Darwin Nuno MD, personally performed the services described in this documentation, as scribed by Mable Montoya, in my presence, and it is both accurate and complete.           Clinical Impression       ICD-10-CM ICD-9-CM   1. Generalized abdominal pain R10.84 789.07   2. Acute cystitis with hematuria N30.01 595.0       Disposition:   Disposition: Discharged  Condition: Stable       Darwin Nuno MD  10/05/19 [id number]

## 2019-10-06 LAB — BACTERIA UR CULT: NORMAL

## 2019-10-18 ENCOUNTER — ANTI-COAG VISIT (OUTPATIENT)
Dept: CARDIOLOGY | Facility: CLINIC | Age: 84
End: 2019-10-18
Payer: MEDICARE

## 2019-10-18 DIAGNOSIS — Z79.01 LONG TERM (CURRENT) USE OF ANTICOAGULANTS: Primary | ICD-10-CM

## 2019-10-18 DIAGNOSIS — Z86.711 HISTORY OF PULMONARY EMBOLISM: ICD-10-CM

## 2019-10-18 LAB — INR PPP: 2 (ref 2–3)

## 2019-10-18 PROCEDURE — 85610 PROTHROMBIN TIME: CPT | Mod: QW,HCNC,S$GLB, | Performed by: INTERNAL MEDICINE

## 2019-10-18 PROCEDURE — 85610 POCT INR: ICD-10-PCS | Mod: QW,HCNC,S$GLB, | Performed by: INTERNAL MEDICINE

## 2019-10-18 PROCEDURE — 93793 PR ANTICOAGULANT MGMT FOR PT TAKING WARFARIN: ICD-10-PCS | Mod: HCNC,S$GLB,,

## 2019-10-18 PROCEDURE — 93793 ANTICOAG MGMT PT WARFARIN: CPT | Mod: HCNC,S$GLB,,

## 2019-10-18 NOTE — PROGRESS NOTES
Patient's INR is therapeutic at 2.0.  Recent ER visit on 10/05/2019.  Discharged on Cefuroxime 500 mg BID -  Patient reports completion yesterday.  No other changes reported.  Instructions given to maintain current dose of Warfarin 5 mg every Sunday and Saturday; and 7 mg on all other days per week.  Recheck in 1 month.  Advised to contact the [facility] in the future with any medication changes.  Patient repeated back instructions and voiced understanding.

## 2019-11-04 RX ORDER — METFORMIN HYDROCHLORIDE 850 MG/1
TABLET ORAL
Qty: 30 TABLET | Refills: 11 | Status: SHIPPED | OUTPATIENT
Start: 2019-11-04 | End: 2020-11-04

## 2019-11-04 RX ORDER — LOVASTATIN 40 MG/1
TABLET ORAL
Qty: 30 TABLET | Refills: 11 | Status: SHIPPED | OUTPATIENT
Start: 2019-11-04 | End: 2020-11-13

## 2019-11-15 ENCOUNTER — ANTI-COAG VISIT (OUTPATIENT)
Dept: CARDIOLOGY | Facility: CLINIC | Age: 84
End: 2019-11-15
Payer: MEDICARE

## 2019-11-15 DIAGNOSIS — Z79.01 LONG TERM (CURRENT) USE OF ANTICOAGULANTS: Primary | ICD-10-CM

## 2019-11-15 DIAGNOSIS — Z86.711 HISTORY OF PULMONARY EMBOLISM: ICD-10-CM

## 2019-11-15 LAB — INR PPP: 2.2 (ref 2–3)

## 2019-11-15 PROCEDURE — 85610 PROTHROMBIN TIME: CPT | Mod: QW,HCNC,S$GLB, | Performed by: INTERNAL MEDICINE

## 2019-11-15 PROCEDURE — 93793 PR ANTICOAGULANT MGMT FOR PT TAKING WARFARIN: ICD-10-PCS | Mod: HCNC,S$GLB,,

## 2019-11-15 PROCEDURE — 93793 ANTICOAG MGMT PT WARFARIN: CPT | Mod: HCNC,S$GLB,,

## 2019-11-15 PROCEDURE — 85610 POCT INR: ICD-10-PCS | Mod: QW,HCNC,S$GLB, | Performed by: INTERNAL MEDICINE

## 2019-11-15 NOTE — PROGRESS NOTES
Patient's INR is therapeutic at 2.2.  No recent changes reported.  Instructions given to maintain current dose of Warfarin 5 mg every Sunday and Saturday; and 7 mg on all other days per week.  Recheck in 1 month.  Patient repeated back instructions and voiced understanding.

## 2019-11-19 ENCOUNTER — PATIENT OUTREACH (OUTPATIENT)
Dept: ADMINISTRATIVE | Facility: HOSPITAL | Age: 84
End: 2019-11-19

## 2019-12-05 ENCOUNTER — HOSPITAL ENCOUNTER (EMERGENCY)
Facility: HOSPITAL | Age: 84
Discharge: HOME | End: 2019-12-05
Attending: EMERGENCY MEDICINE
Payer: MEDICARE

## 2019-12-05 VITALS
DIASTOLIC BLOOD PRESSURE: 53 MMHG | TEMPERATURE: 98 F | OXYGEN SATURATION: 95 % | WEIGHT: 228.63 LBS | HEART RATE: 82 BPM | HEIGHT: 72 IN | SYSTOLIC BLOOD PRESSURE: 97 MMHG | BODY MASS INDEX: 30.97 KG/M2 | RESPIRATION RATE: 18 BRPM

## 2019-12-05 DIAGNOSIS — R53.1 WEAKNESS: ICD-10-CM

## 2019-12-05 DIAGNOSIS — N39.0 URINARY TRACT INFECTION WITHOUT HEMATURIA, SITE UNSPECIFIED: Primary | ICD-10-CM

## 2019-12-05 LAB
ALBUMIN SERPL BCP-MCNC: 3.7 G/DL (ref 3.5–5.2)
ALP SERPL-CCNC: 62 U/L (ref 55–135)
ALT SERPL W/O P-5'-P-CCNC: 18 U/L (ref 10–44)
ANION GAP SERPL CALC-SCNC: 9 MMOL/L (ref 8–16)
ANISOCYTOSIS BLD QL SMEAR: SLIGHT
AST SERPL-CCNC: 22 U/L (ref 10–40)
BACTERIA #/AREA URNS HPF: ABNORMAL /HPF
BASOPHILS # BLD AUTO: 0 K/UL (ref 0–0.2)
BASOPHILS NFR BLD: 0 % (ref 0–1.9)
BILIRUB SERPL-MCNC: 1.8 MG/DL (ref 0.1–1)
BILIRUB UR QL STRIP: NEGATIVE
BUN SERPL-MCNC: 21 MG/DL (ref 8–23)
CALCIUM SERPL-MCNC: 10.2 MG/DL (ref 8.7–10.5)
CHLORIDE SERPL-SCNC: 103 MMOL/L (ref 95–110)
CLARITY UR: CLEAR
CO2 SERPL-SCNC: 26 MMOL/L (ref 23–29)
COLOR UR: YELLOW
CREAT SERPL-MCNC: 1.4 MG/DL (ref 0.5–1.4)
DACRYOCYTES BLD QL SMEAR: ABNORMAL
DIFFERENTIAL METHOD: ABNORMAL
EOSINOPHIL # BLD AUTO: 0 K/UL (ref 0–0.5)
EOSINOPHIL NFR BLD: 0 % (ref 0–8)
ERYTHROCYTE [DISTWIDTH] IN BLOOD BY AUTOMATED COUNT: 16.6 % (ref 11.5–14.5)
EST. GFR  (AFRICAN AMERICAN): 51 ML/MIN/1.73 M^2
EST. GFR  (NON AFRICAN AMERICAN): 44 ML/MIN/1.73 M^2
GLUCOSE SERPL-MCNC: 116 MG/DL (ref 70–110)
GLUCOSE UR QL STRIP: NEGATIVE
HCT VFR BLD AUTO: 50.6 % (ref 40–54)
HGB BLD-MCNC: 16.2 G/DL (ref 14–18)
HGB UR QL STRIP: ABNORMAL
HYALINE CASTS #/AREA URNS LPF: 0 /LPF
IMM GRANULOCYTES # BLD AUTO: 0.05 K/UL (ref 0–0.04)
IMM GRANULOCYTES NFR BLD AUTO: 0.8 % (ref 0–0.5)
INFLUENZA A, MOLECULAR: NEGATIVE
INFLUENZA B, MOLECULAR: NEGATIVE
KETONES UR QL STRIP: NEGATIVE
LACTATE SERPL-SCNC: 2 MMOL/L (ref 0.5–2.2)
LEUKOCYTE ESTERASE UR QL STRIP: ABNORMAL
LYMPHOCYTES # BLD AUTO: 0 K/UL (ref 1–4.8)
LYMPHOCYTES NFR BLD: 0 % (ref 18–48)
MCH RBC QN AUTO: 28.3 PG (ref 27–31)
MCHC RBC AUTO-ENTMCNC: 32 G/DL (ref 32–36)
MCV RBC AUTO: 89 FL (ref 82–98)
MICROSCOPIC COMMENT: ABNORMAL
MONOCYTES # BLD AUTO: 1.4 K/UL (ref 0.3–1)
MONOCYTES NFR BLD: 22 % (ref 4–15)
NEUTROPHILS # BLD AUTO: 4.8 K/UL (ref 1.8–7.7)
NEUTROPHILS NFR BLD: 77.2 % (ref 38–73)
NITRITE UR QL STRIP: NEGATIVE
NRBC BLD-RTO: 100 /100 WBC
OVALOCYTES BLD QL SMEAR: ABNORMAL
PH UR STRIP: 6 [PH] (ref 5–8)
PLATELET # BLD AUTO: 146 K/UL (ref 150–350)
PLATELET BLD QL SMEAR: ABNORMAL
PMV BLD AUTO: 10.9 FL (ref 9.2–12.9)
POIKILOCYTOSIS BLD QL SMEAR: SLIGHT
POTASSIUM SERPL-SCNC: 3.7 MMOL/L (ref 3.5–5.1)
PROCALCITONIN SERPL IA-MCNC: 0.18 NG/ML
PROT SERPL-MCNC: 8.1 G/DL (ref 6–8.4)
PROT UR QL STRIP: ABNORMAL
RBC # BLD AUTO: 5.72 M/UL (ref 4.6–6.2)
RBC #/AREA URNS HPF: 4 /HPF (ref 0–4)
SODIUM SERPL-SCNC: 138 MMOL/L (ref 136–145)
SP GR UR STRIP: 1.01 (ref 1–1.03)
SPECIMEN SOURCE: NORMAL
URN SPEC COLLECT METH UR: ABNORMAL
UROBILINOGEN UR STRIP-ACNC: NEGATIVE EU/DL
WBC # BLD AUTO: 6.18 K/UL (ref 3.9–12.7)
WBC #/AREA URNS HPF: 15 /HPF (ref 0–5)

## 2019-12-05 PROCEDURE — 87086 URINE CULTURE/COLONY COUNT: CPT | Mod: HCNC

## 2019-12-05 PROCEDURE — 99285 EMERGENCY DEPT VISIT HI MDM: CPT | Mod: 25,HCNC

## 2019-12-05 PROCEDURE — 85025 COMPLETE CBC W/AUTO DIFF WBC: CPT | Mod: HCNC

## 2019-12-05 PROCEDURE — 93010 ELECTROCARDIOGRAM REPORT: CPT | Mod: HCNC,,, | Performed by: INTERNAL MEDICINE

## 2019-12-05 PROCEDURE — 63600175 PHARM REV CODE 636 W HCPCS: Mod: HCNC | Performed by: EMERGENCY MEDICINE

## 2019-12-05 PROCEDURE — 87502 INFLUENZA DNA AMP PROBE: CPT | Mod: HCNC

## 2019-12-05 PROCEDURE — 93010 EKG 12-LEAD: ICD-10-PCS | Mod: HCNC,,, | Performed by: INTERNAL MEDICINE

## 2019-12-05 PROCEDURE — 84145 PROCALCITONIN (PCT): CPT | Mod: HCNC

## 2019-12-05 PROCEDURE — 96365 THER/PROPH/DIAG IV INF INIT: CPT | Mod: HCNC

## 2019-12-05 PROCEDURE — 93005 ELECTROCARDIOGRAM TRACING: CPT | Mod: HCNC

## 2019-12-05 PROCEDURE — 87088 URINE BACTERIA CULTURE: CPT | Mod: HCNC

## 2019-12-05 PROCEDURE — 83605 ASSAY OF LACTIC ACID: CPT | Mod: HCNC

## 2019-12-05 PROCEDURE — 80053 COMPREHEN METABOLIC PANEL: CPT | Mod: HCNC

## 2019-12-05 PROCEDURE — 87040 BLOOD CULTURE FOR BACTERIA: CPT | Mod: HCNC

## 2019-12-05 PROCEDURE — 81000 URINALYSIS NONAUTO W/SCOPE: CPT | Mod: HCNC

## 2019-12-05 PROCEDURE — 96361 HYDRATE IV INFUSION ADD-ON: CPT | Mod: HCNC

## 2019-12-05 PROCEDURE — 25000003 PHARM REV CODE 250: Mod: HCNC | Performed by: EMERGENCY MEDICINE

## 2019-12-05 RX ORDER — SODIUM CHLORIDE 9 MG/ML
1000 INJECTION, SOLUTION INTRAVENOUS
Status: COMPLETED | OUTPATIENT
Start: 2019-12-05 | End: 2019-12-05

## 2019-12-05 RX ORDER — IBUPROFEN 800 MG/1
800 TABLET ORAL
Status: COMPLETED | OUTPATIENT
Start: 2019-12-05 | End: 2019-12-05

## 2019-12-05 RX ORDER — CEFDINIR 300 MG/1
300 CAPSULE ORAL 2 TIMES DAILY
Qty: 14 CAPSULE | Refills: 0 | Status: SHIPPED | OUTPATIENT
Start: 2019-12-05 | End: 2019-12-05 | Stop reason: SDUPTHER

## 2019-12-05 RX ORDER — IBUPROFEN 600 MG/1
600 TABLET ORAL EVERY 6 HOURS PRN
Qty: 20 TABLET | Refills: 0 | Status: SHIPPED | OUTPATIENT
Start: 2019-12-05 | End: 2019-12-05 | Stop reason: SDUPTHER

## 2019-12-05 RX ORDER — CEFDINIR 300 MG/1
300 CAPSULE ORAL 2 TIMES DAILY
Qty: 14 CAPSULE | Refills: 0 | Status: SHIPPED | OUTPATIENT
Start: 2019-12-05 | End: 2019-12-12

## 2019-12-05 RX ORDER — WARFARIN 4 MG/1
TABLET ORAL
Qty: 30 TABLET | Refills: 11 | Status: SHIPPED | OUTPATIENT
Start: 2019-12-05 | End: 2020-01-07

## 2019-12-05 RX ORDER — ACETAMINOPHEN 500 MG
1000 TABLET ORAL
Status: COMPLETED | OUTPATIENT
Start: 2019-12-05 | End: 2019-12-05

## 2019-12-05 RX ORDER — IBUPROFEN 600 MG/1
600 TABLET ORAL EVERY 6 HOURS PRN
Qty: 20 TABLET | Refills: 0 | Status: SHIPPED | OUTPATIENT
Start: 2019-12-05 | End: 2019-12-05 | Stop reason: DRUGHIGH

## 2019-12-05 RX ORDER — ALLOPURINOL 300 MG/1
TABLET ORAL
Qty: 30 TABLET | Refills: 11 | Status: SHIPPED | OUTPATIENT
Start: 2019-12-05 | End: 2020-12-04

## 2019-12-05 RX ADMIN — SODIUM CHLORIDE 1000 ML: 0.9 INJECTION, SOLUTION INTRAVENOUS at 04:12

## 2019-12-05 RX ADMIN — ACETAMINOPHEN 1000 MG: 500 TABLET ORAL at 03:12

## 2019-12-05 RX ADMIN — CEFTRIAXONE 1 G: 1 INJECTION, SOLUTION INTRAVENOUS at 04:12

## 2019-12-05 RX ADMIN — IBUPROFEN 800 MG: 800 TABLET ORAL at 03:12

## 2019-12-05 NOTE — ED PROVIDER NOTES
"SCRIBE #1 NOTE: I, Myke Keller, am scribing for, and in the presence of, Tania Atkins Do, MD. I have scribed the entire note.       History     Chief Complaint   Patient presents with    Fever     Pt c/o fever, weakness, diarrhea x 2 days; fell to floor with no LOC or injury; possible UTI     Review of patient's allergies indicates:   Allergen Reactions    Morphine Other (See Comments)     Pt states, "It only makes my pain worse."         History of Present Illness     HPI    12/5/2019, 3:20 AM  History obtained from the patient and family      History of Present Illness: Amauri Lara is a 89 y.o. male patient who presents to the Emergency Department for evaluation s/p fall. Pt was found on the bedroom floor by family. Pt c/o fever (Tnow 100.2) which onset 2 days ago. Symptoms are constant and moderate in severity. No mitigating or exacerbating factors reported. Associated sxs include generalized weakness and diarrhea. Patient denies any head injury, LOC, HA, dizziness, neck/back pain, abd pain, CP, SOB, and all other sxs at this time. No prior Tx reported. No further complaints or concerns at this time.       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    Obesity, unspecified     Type 2 diabetes mellitus with polyneuropathy        Past Surgical History:  Past Surgical History:   Procedure Laterality Date    CATARACT EXTRACTION      aphakic od    CATARACT EXTRACTION W/  INTRAOCULAR LENS IMPLANT Left 12/13/2018    TRANSURETHRAL RESECTION OF PROSTATE N/A 5/31/2018    Procedure: PROSTATECTOMY-TRANSURETHRAL;  Surgeon: Michael Westbrook IV, MD;  Location: " [facility] OR;  Service: Urology;  Laterality: N/A;    VASCULAR SURGERY           Family History:  Family History   Problem Relation Age of Onset    Diabetes Brother        Social History:  Social History     Tobacco Use    Smoking status: Never Smoker    Smokeless tobacco: Never Used   Substance and Sexual Activity    Alcohol use: No    Drug use: No    Sexual activity: Never        Review of Systems     Review of Systems   Constitutional: Positive for fever.        + generalized weakness   HENT: Negative for sore throat.    Respiratory: Negative for shortness of breath.    Cardiovascular: Negative for chest pain.   Gastrointestinal: Positive for diarrhea. Negative for abdominal pain and nausea.   Genitourinary: Negative for dysuria.   Musculoskeletal: Negative for back pain and neck pain.   Skin: Negative for rash.   Neurological: Negative for dizziness, syncope, weakness and headaches.   Hematological: Does not bruise/bleed easily.   All other systems reviewed and are negative.       Physical Exam     Initial Vitals [12/05/19 0318]   BP Pulse Resp Temp SpO2   117/79 94 18 100.2 °F (37.9 °C) 98 %      MAP       --          Physical Exam  Nursing Notes and Vital Signs Reviewed.  Constitutional: Elderly. Appears stated age. NAD.  Head: Atraumatic. Normocephalic.  Eyes: PERRL. EOM intact. Conjunctivae are not pale. No scleral icterus.  ENT: Mucous membranes are moist. Oropharynx is clear and symmetric.  Right sided cochlea implant.  Neck: Supple. Full ROM. No lymphadenopathy.  Cardiovascular: Regular rate. Regular rhythm. No murmurs, rubs, or gallops. Distal pulses are 2+ and symmetric.  Pulmonary/Chest: No respiratory distress. Clear to auscultation bilaterally. No wheezing or rales.  Abdominal: Soft and non-distended.  There is no tenderness.  No rebound, guarding, or rigidity. Good bowel sounds.  Genitourinary: No CVA tenderness  Musculoskeletal: Moves all extremities. No obvious deformities. No calf  tenderness.  Skin: Warm and dry.  Neurological:  Demented. Awake and alert.  Normal speech.  No acute focal neurological deficits are appreciated.  Psychiatric: Normal affect. Good eye contact. Appropriate in content.     ED Course   Procedures  ED Vital Signs:  Vitals:    12/05/19 0318 12/05/19 0330 12/05/19 0332 12/05/19 0407   BP: 117/79   130/66   Pulse: 94 96  89   Resp: 18   20   Temp: 100.2 °F (37.9 °C)      TempSrc: Oral      SpO2: 98%   97%   Weight:   103.7 kg (228 lb 9.9 oz)    Height: 6' (1.829 m)       12/05/19 0422 12/05/19 0500   BP:     Pulse: 87 89   Resp:  20   Temp:     TempSrc:     SpO2:  95%   Weight:     Height:         Abnormal Lab Results:  Labs Reviewed   CBC W/ AUTO DIFFERENTIAL - Abnormal; Notable for the following components:       Result Value    RDW 16.6 (*)     Platelets 146 (*)     Immature Granulocytes 0.8 (*)     Immature Grans (Abs) 0.05 (*)     Lymph # 0.0 (*)     Mono # 1.4 (*)     nRBC 100 (*)     Gran% 77.2 (*)     Lymph% 0.0 (*)     Mono% 22.0 (*)     All other components within normal limits   COMPREHENSIVE METABOLIC PANEL - Abnormal; Notable for the following components:    Glucose 116 (*)     Total Bilirubin 1.8 (*)     eGFR if  51 (*)     eGFR if non  44 (*)     All other components within normal limits   URINALYSIS, REFLEX TO URINE CULTURE - Abnormal; Notable for the following components:    Protein, UA 1+ (*)     Occult Blood UA 3+ (*)     Leukocytes, UA 1+ (*)     All other components within normal limits    Narrative:     Preferred Collection Type->Urine, Clean Catch   URINALYSIS MICROSCOPIC - Abnormal; Notable for the following components:    WBC, UA 15 (*)     All other components within normal limits    Narrative:     Preferred Collection Type->Urine, Clean Catch   INFLUENZA A & B BY MOLECULAR   CULTURE, BLOOD   CULTURE, BLOOD   CULTURE, URINE   LACTIC ACID, PLASMA   PROCALCITONIN   LACTIC ACID, PLASMA        All Lab Results:  Results  for orders placed or performed during the hospital encounter of 12/05/19   Influenza A & B by Molecular   Result Value Ref Range    Influenza A, Molecular Negative Negative    Influenza B, Molecular Negative Negative    Flu A & B Source Nasal swab    CBC auto differential   Result Value Ref Range    WBC 6.18 3.90 - 12.70 K/uL    RBC 5.72 4.60 - 6.20 M/uL    Hemoglobin 16.2 14.0 - 18.0 g/dL    Hematocrit 50.6 40.0 - 54.0 %    Mean Corpuscular Volume 89 82 - 98 fL    Mean Corpuscular Hemoglobin 28.3 27.0 - 31.0 pg    Mean Corpuscular Hemoglobin Conc 32.0 32.0 - 36.0 g/dL    RDW 16.6 (H) 11.5 - 14.5 %    Platelets 146 (L) 150 - 350 K/uL    MPV 10.9 9.2 - 12.9 fL    Immature Granulocytes 0.8 (H) 0.0 - 0.5 %    Gran # (ANC) 4.8 1.8 - 7.7 K/uL    Immature Grans (Abs) 0.05 (H) 0.00 - 0.04 K/uL    Lymph # 0.0 (L) 1.0 - 4.8 K/uL    Mono # 1.4 (H) 0.3 - 1.0 K/uL    Eos # 0.0 0.0 - 0.5 K/uL    Baso # 0.00 0.00 - 0.20 K/uL    nRBC 100 (A) 0 /100 WBC    Gran% 77.2 (H) 38.0 - 73.0 %    Lymph% 0.0 (L) 18.0 - 48.0 %    Mono% 22.0 (H) 4.0 - 15.0 %    Eosinophil% 0.0 0.0 - 8.0 %    Basophil% 0.0 0.0 - 1.9 %    Platelet Estimate Appears normal     Aniso Slight     Poik Slight     Ovalocytes Occasional     Tear Drop Cells Occasional     Differential Method Automated    Comprehensive metabolic panel   Result Value Ref Range    Sodium 138 136 - 145 mmol/L    Potassium 3.7 3.5 - 5.1 mmol/L    Chloride 103 95 - 110 mmol/L    CO2 26 23 - 29 mmol/L    Glucose 116 (H) 70 - 110 mg/dL    BUN, Bld 21 8 - 23 mg/dL    Creatinine 1.4 0.5 - 1.4 mg/dL    Calcium 10.2 8.7 - 10.5 mg/dL    Total Protein 8.1 6.0 - 8.4 g/dL    Albumin 3.7 3.5 - 5.2 g/dL    Total Bilirubin 1.8 (H) 0.1 - 1.0 mg/dL    Alkaline Phosphatase 62 55 - 135 U/L    AST 22 10 - 40 U/L    ALT 18 10 - 44 U/L    Anion Gap 9 8 - 16 mmol/L    eGFR if African American 51 (A) >60 mL/min/1.73 m^2    eGFR if non African American 44 (A) >60 mL/min/1.73 m^2   Lactic acid, plasma #1   Result  Value Ref Range    Lactate (Lactic Acid) 2.0 0.5 - 2.2 mmol/L   Urinalysis, Reflex to Urine Culture Urine, Clean Catch   Result Value Ref Range    Specimen UA Urine, Clean Catch     Color, UA Yellow Yellow, Straw, Jennifer    Appearance, UA Clear Clear    pH, UA 6.0 5.0 - 8.0    Specific Gravity, UA 1.015 1.005 - 1.030    Protein, UA 1+ (A) Negative    Glucose, UA Negative Negative    Ketones, UA Negative Negative    Bilirubin (UA) Negative Negative    Occult Blood UA 3+ (A) Negative    Nitrite, UA Negative Negative    Urobilinogen, UA Negative <2.0 EU/dL    Leukocytes, UA 1+ (A) Negative   Procalcitonin   Result Value Ref Range    Procalcitonin 0.18 <0.25 ng/mL   Urinalysis Microscopic   Result Value Ref Range    RBC, UA 4 0 - 4 /hpf    WBC, UA 15 (H) 0 - 5 /hpf    Bacteria Occasional None-Occ /hpf    Hyaline Casts, UA 0 0-1/lpf /lpf    Microscopic Comment SEE COMMENT          Imaging Results:  Imaging Results          CT Head Without Contrast (In process)                X-Ray Chest AP Portable (In process)              5:10 AM: Per ED physician, pt's CXR results: no obvious focal infiltrate.    5:46 AM: Per STAT radiology, pt's CT head results: no acute findings in the head/brain. Right partial mastoidectomy with retained secretion versus inflammatory change.      The EKG was ordered, reviewed, and independently interpreted by the ED provider.  Interpretation time: 0338  Rate: 94 BPM  Rhythm: sinus rhythm with premature atrial complexes  Interpretation: left axis deviation. Septal infarct. No STEMI.      The Emergency Provider reviewed the vital signs and test results, which are outlined above.     ED Discussion       5:47 AM: Reassessed pt at this time.  Pt states his condition has improved at this time. Discussed with pt all pertinent ED information and results. Discussed pt dx and plan of tx. Gave pt all f/u and return to the ED instructions. All questions and concerns were addressed at this time. Pt expresses  understanding of information and instructions, and is comfortable with plan to discharge. Pt is stable for discharge.    I discussed with patient and/or family/caretaker that evaluation in the ED does not suggest any emergent or life threatening medical conditions requiring immediate intervention beyond what was provided in the ED, and I believe patient is safe for discharge.  Regardless, an unremarkable evaluation in the ED does not preclude the development or presence of a serious of life threatening condition. As such, patient was instructed to return immediately for any worsening or change in current symptoms.       MDM        Medical Decision Making:   Clinical Tests:   Lab Tests: Reviewed and Ordered  Radiological Study: Reviewed and Ordered  Medical Tests: Reviewed and Ordered           ED Medication(s):  Medications   ibuprofen tablet 800 mg (800 mg Oral Given 12/5/19 0333)   acetaminophen tablet 1,000 mg (1,000 mg Oral Given 12/5/19 0333)   sodium chloride 0.9% bolus 1,000 mL (0 mLs Intravenous Stopped 12/5/19 0500)   cefTRIAXone (ROCEPHIN) 1 g in dextrose 5 % 50 mL IVPB (0 g Intravenous Stopped 12/5/19 0500)   0.9%  NaCl infusion (1,000 mLs Intravenous New Bag 12/5/19 0419)       Current Discharge Medication List      START taking these medications    Details   cefdinir (OMNICEF) 300 MG capsule Take 1 capsule (300 mg total) by mouth 2 (two) times daily. for 7 days  Qty: 14 capsule, Refills: 0              Medication List      START taking these medications    cefdinir 300 MG capsule  Commonly known as:  OMNICEF  Take 1 capsule (300 mg total) by mouth 2 (two) times daily. for 7 days        ASK your doctor about these medications    allopurinol 300 MG tablet  Commonly known as:  ZYLOPRIM  TAKE ONE TABLET BY MOUTH EVERY DAY     docusate sodium 100 MG capsule  Commonly known as:  Colace  Take 1 capsule (100 mg total) by mouth 2 (two) times daily.     Durezol 0.05 % Drop ophthalmic solution  Generic drug:   difluprednate  Place 1 drop into the left eye 4 (four) times daily.     finasteride 5 mg tablet  Commonly known as:  PROSCAR  TAKE ONE CAPSULE BY MOUTH DAILY     glipiZIDE 10 MG Tr24  Commonly known as:  GLUCOTROL     HYDROcodone-acetaminophen 7.5-325 mg per tablet  Commonly known as:  NORCO  Take 1 tablet by mouth every 8 (eight) hours as needed for Pain.     lisinopril-hydrochlorothiazide 20-12.5 mg per tablet  Commonly known as:  PRINZIDE,ZESTORETIC  TAKE ONE TABLET BY MOUTH TWICE DAILY     lovastatin 40 MG tablet  Commonly known as:  MEVACOR  TAKE ONE TABLET BY MOUTH EVERY EVENING     metFORMIN 850 MG tablet  Commonly known as:  GLUCOPHAGE  TAKE ONE TABLET BY MOUTH EVERY DAY     metoprolol tartrate 25 MG tablet  Commonly known as:  LOPRESSOR     oxybutynin 5 MG Tab  Commonly known as:  DITROPAN  Take 1 tablet (5 mg total) by mouth every evening.     SSD 1 % cream  Generic drug:  silver sulfADIAZINE 1%     tamsulosin 0.4 mg Cap  Commonly known as:  FLOMAX  TAKE ONE CAPSULE BY MOUTH DAILY     * warfarin 4 MG tablet  Commonly known as:  COUMADIN     * warfarin 5 MG tablet  Commonly known as:  COUMADIN  Take 1 tablet by mouth every evening along with 2 mg (one-half of 4mg) Monday through Friday and 1 tablet on Saturday and Sunday as directed by the [facility].         * This list has 2 medication(s) that are the same as other medications prescribed for you. Read the directions carefully, and ask your doctor or other care provider to review them with you.               Where to Get Your Medications      You can get these medications from any pharmacy    Bring a paper prescription for each of these medications  · cefdinir 300 MG capsule         Follow-up Information     Stefan Vera MD In 2 days.    Specialty:  Internal Medicine  Contact information:  [address]  [address]  [facility] [address]  [phone]                       Scribe Attestation:   Scribe #1: I performed the above scribed service and  the documentation accurately describes the services I performed. I attest to the accuracy of the note.     Attending:   Physician Attestation Statement for Scribe #1: I, Tania Atkins Do, MD, personally performed the services described in this documentation, as scribed by Myke Keller, in my presence, and it is both accurate and complete.           Clinical Impression       ICD-10-CM ICD-9-CM   1. Urinary tract infection without hematuria, site unspecified N39.0 599.0   2. Weakness R53.1 780.79       Disposition:   Disposition: Discharged  Condition: Stable         Tania Atkins Do, MD  12/05/19 0600

## 2019-12-06 LAB — BACTERIA UR CULT: ABNORMAL

## 2019-12-09 ENCOUNTER — OFFICE VISIT (OUTPATIENT)
Dept: UROLOGY | Facility: CLINIC | Age: 84
End: 2019-12-09
Payer: MEDICARE

## 2019-12-09 VITALS
BODY MASS INDEX: 30.75 KG/M2 | HEIGHT: 72 IN | HEART RATE: 52 BPM | DIASTOLIC BLOOD PRESSURE: 58 MMHG | SYSTOLIC BLOOD PRESSURE: 118 MMHG | WEIGHT: 227.06 LBS

## 2019-12-09 DIAGNOSIS — N40.0 BENIGN PROSTATIC HYPERPLASIA, UNSPECIFIED WHETHER LOWER URINARY TRACT SYMPTOMS PRESENT: ICD-10-CM

## 2019-12-09 DIAGNOSIS — R33.9 URINARY RETENTION: Primary | ICD-10-CM

## 2019-12-09 LAB — POC RESIDUAL URINE VOLUME: 116 ML (ref 0–100)

## 2019-12-09 PROCEDURE — 1100F PTFALLS ASSESS-DOCD GE2>/YR: CPT | Mod: HCNC,CPTII,S$GLB, | Performed by: UROLOGY

## 2019-12-09 PROCEDURE — 1159F PR MEDICATION LIST DOCUMENTED IN MEDICAL RECORD: ICD-10-PCS | Mod: HCNC,S$GLB,, | Performed by: UROLOGY

## 2019-12-09 PROCEDURE — 1100F PR PT FALLS ASSESS DOC 2+ FALLS/FALL W/INJURY/YR: ICD-10-PCS | Mod: HCNC,CPTII,S$GLB, | Performed by: UROLOGY

## 2019-12-09 PROCEDURE — 99999 PR PBB SHADOW E&M-EST. PATIENT-LVL III: ICD-10-PCS | Mod: PBBFAC,HCNC,, | Performed by: UROLOGY

## 2019-12-09 PROCEDURE — 51798 POCT BLADDER SCAN: ICD-10-PCS | Mod: HCNC,S$GLB,, | Performed by: UROLOGY

## 2019-12-09 PROCEDURE — 99213 PR OFFICE/OUTPT VISIT, EST, LEVL III, 20-29 MIN: ICD-10-PCS | Mod: HCNC,S$GLB,, | Performed by: UROLOGY

## 2019-12-09 PROCEDURE — 3288F FALL RISK ASSESSMENT DOCD: CPT | Mod: HCNC,CPTII,S$GLB, | Performed by: UROLOGY

## 2019-12-09 PROCEDURE — 1159F MED LIST DOCD IN RCRD: CPT | Mod: HCNC,S$GLB,, | Performed by: UROLOGY

## 2019-12-09 PROCEDURE — 1126F AMNT PAIN NOTED NONE PRSNT: CPT | Mod: HCNC,S$GLB,, | Performed by: UROLOGY

## 2019-12-09 PROCEDURE — 1126F PR PAIN SEVERITY QUANTIFIED, NO PAIN PRESENT: ICD-10-PCS | Mod: HCNC,S$GLB,, | Performed by: UROLOGY

## 2019-12-09 PROCEDURE — 99999 PR PBB SHADOW E&M-EST. PATIENT-LVL III: CPT | Mod: PBBFAC,HCNC,, | Performed by: UROLOGY

## 2019-12-09 PROCEDURE — 51798 US URINE CAPACITY MEASURE: CPT | Mod: HCNC,S$GLB,, | Performed by: UROLOGY

## 2019-12-09 PROCEDURE — 3288F PR FALLS RISK ASSESSMENT DOCUMENTED: ICD-10-PCS | Mod: HCNC,CPTII,S$GLB, | Performed by: UROLOGY

## 2019-12-09 PROCEDURE — 99213 OFFICE O/P EST LOW 20 MIN: CPT | Mod: HCNC,S$GLB,, | Performed by: UROLOGY

## 2019-12-09 RX ORDER — FINASTERIDE 5 MG/1
TABLET, FILM COATED ORAL
Qty: 30 TABLET | Refills: 11 | Status: SHIPPED | OUTPATIENT
Start: 2019-12-09 | End: 2020-12-16 | Stop reason: SDUPTHER

## 2019-12-09 RX ORDER — TAMSULOSIN HYDROCHLORIDE 0.4 MG/1
1 CAPSULE ORAL DAILY
Qty: 30 CAPSULE | Refills: 11 | Status: SHIPPED | OUTPATIENT
Start: 2019-12-09 | End: 2020-12-16 | Stop reason: SDUPTHER

## 2019-12-09 NOTE — PROGRESS NOTES
Chief Complaint: Retention    HPI:   12/9/19: Was in the ER last week for presumed UTI.  DC with Abx.  Still wearing pullups but less of them.  CT 10/19 renal cysts and BPH no more worrisome findings.  Uncirc.  2/18/19: Stream good.  Passed some stones a few months after TURP with some blood then.  Month ago had a strip of small stones pass.  Still taking flomax/finasteride.  Has to get up 3-4 times at night to make sure his diaper doesn't overflow.  3-4 diapers during the day.  Lots of water and one cup coffee in AM.  Tea.   ml.  Not constipated.  7/17/18: Stream is good.  Gets a little splatter some mornings.  Wets pad only some days.  Lots better than when we started.  6/13/18: Stream better after TURP.  Gets some OAB when he arises.  Wearing a little pad for safety.  5/16/18: Had to have riggs replaced after last visit.  Tolerated two surgeries for arterial restoration in last year, held coumadin for them no problem.  2/15/18: Laws: One month f/u for urinary retention. Riggs still in place; wants another voiding trial. Home health would be able to replace it if he cannot void. Still taking Flomax and Finasteride.   1/16/18: Laws: Went to the ER here at [facility] last night following riggs removal yesterday for voiding trial. Riggs was replaced in ER and he was started on Bactrim.   1/15/18: Has been taking flomax/finasteride for the interval. No upcoming surgeries.  No new problems.  Reviewed history in detail.  8/14/17: Riggs is working okay for him.  Has taken finasteride on that time.  Riggs changed one week ago.  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  Tolerated right surgery reasonably well.  2/13/17: Cysto today shows severe BPH with trilobar hypertrophy.    2/1/17: 86 yo man went to the ER for a few complaints and he was found to be in retention and a riggs was placed; bag filled/emptied 1.5 times shortly thereafter.  Is no no BPH meds.  Had double  voiding prior, nocturia x2.  No abd/pelvic pain and no exac/rel factors.  No hematuria.  No urolithiasis.      Allergies:  Morphine    Medications:  has a current medication list which includes the following prescription(s): allopurinol, cefdinir, docusate sodium, durezol, finasteride, glipizide, hydrocodone-acetaminophen, lisinopril-hydrochlorothiazide, lovastatin, metformin, metoprolol tartrate, oxybutynin, ssd, tamsulosin, warfarin, warfarin, and warfarin.    Review of Systems:  General: No fever, chills, fatigability, or weight loss.  Skin: No rashes, itching, or changes in color or texture of skin.  Chest: Denies DELGADILLO, cyanosis, wheezing, cough, and sputum production.  Abdomen: Appetite fine. No weight loss. Denies diarrhea, abdominal pain, hematemesis, or blood in stool.  Musculoskeletal: No joint stiffness or swelling. Denies back pain.  : As above.  All other review of systems negative.    PMH:   has a past medical history of Aphakia of right eye, DM (diabetes mellitus), type 2 with complications, Hearing loss of aging, History of gout, History of pulmonary embolism (2011), Hypertension associated with diabetes, Iridodialysis of right eye, Obesity, unspecified, and Type 2 diabetes mellitus with polyneuropathy.    PSH:   has a past surgical history that includes Vascular surgery; Transurethral resection of prostate (N/A, 5/31/2018); Cataract extraction; and Cataract extraction w/  intraocular lens implant (Left, 12/13/2018).    FamHx: family history includes Diabetes in his brother.    SocHx:  reports that he has never smoked. He has never used smokeless tobacco. He reports that he does not drink alcohol or use drugs.      Physical Exam:  Vitals:    12/09/19 1633   BP: (!) 118/58   Pulse: (!) 52     General: A&Ox3, no apparent distress, no deformities  Neck: No masses, normal thyroid  Lungs: normal inspiration, no use of accessory muscles  Heart: normal pulse, no arrhythmias  Abdomen: Soft, NT, ND  Skin: The  skin is warm and dry. No jaundice.  Ext: No c/c/e.  :   1/15/18: Cuevas in good position, removed for voiding trial today  2/17: ALVA normal, penis/test normal.    Labs/Studies:   Bladder Scan performed in office:     2/19:  ml.    12/19: PVR 0 ml    Impression/Plan:   1.BPH/OAB combination but emptying completely.  Mobility forces need for diapers.  Hygeine leads to UTI.  Will do the best we can.  Refills.

## 2019-12-10 LAB
BACTERIA BLD CULT: NORMAL
BACTERIA BLD CULT: NORMAL

## 2019-12-13 ENCOUNTER — ANTI-COAG VISIT (OUTPATIENT)
Dept: CARDIOLOGY | Facility: CLINIC | Age: 84
End: 2019-12-13
Payer: MEDICARE

## 2019-12-13 DIAGNOSIS — Z79.01 LONG TERM (CURRENT) USE OF ANTICOAGULANTS: Primary | ICD-10-CM

## 2019-12-13 DIAGNOSIS — Z86.711 HISTORY OF PULMONARY EMBOLISM: ICD-10-CM

## 2019-12-13 LAB — INR PPP: 1.9 (ref 2–3)

## 2019-12-13 PROCEDURE — 85610 POCT INR: ICD-10-PCS | Mod: QW,HCNC,S$GLB, | Performed by: INTERNAL MEDICINE

## 2019-12-13 PROCEDURE — 93793 PR ANTICOAGULANT MGMT FOR PT TAKING WARFARIN: ICD-10-PCS | Mod: HCNC,S$GLB,,

## 2019-12-13 PROCEDURE — 85610 PROTHROMBIN TIME: CPT | Mod: QW,HCNC,S$GLB, | Performed by: INTERNAL MEDICINE

## 2019-12-13 PROCEDURE — 93793 ANTICOAG MGMT PT WARFARIN: CPT | Mod: HCNC,S$GLB,,

## 2019-12-30 ENCOUNTER — LAB VISIT (OUTPATIENT)
Dept: LAB | Facility: HOSPITAL | Age: 84
End: 2019-12-30
Attending: INTERNAL MEDICINE
Payer: MEDICARE

## 2019-12-30 DIAGNOSIS — Z51.81 ENCOUNTER FOR THERAPEUTIC DRUG MONITORING: ICD-10-CM

## 2019-12-30 DIAGNOSIS — Z87.39 HISTORY OF GOUT: ICD-10-CM

## 2019-12-30 DIAGNOSIS — E11.42 TYPE 2 DIABETES MELLITUS WITH POLYNEUROPATHY: ICD-10-CM

## 2019-12-30 LAB
ALBUMIN SERPL BCP-MCNC: 3.3 G/DL (ref 3.5–5.2)
ALP SERPL-CCNC: 66 U/L (ref 55–135)
ALT SERPL W/O P-5'-P-CCNC: 11 U/L (ref 10–44)
ANION GAP SERPL CALC-SCNC: 9 MMOL/L (ref 8–16)
AST SERPL-CCNC: 13 U/L (ref 10–40)
BASOPHILS # BLD AUTO: 0.03 K/UL (ref 0–0.2)
BASOPHILS NFR BLD: 0.4 % (ref 0–1.9)
BILIRUB SERPL-MCNC: 0.9 MG/DL (ref 0.1–1)
BUN SERPL-MCNC: 23 MG/DL (ref 8–23)
CALCIUM SERPL-MCNC: 9.8 MG/DL (ref 8.7–10.5)
CHLORIDE SERPL-SCNC: 103 MMOL/L (ref 95–110)
CHOLEST SERPL-MCNC: 203 MG/DL (ref 120–199)
CHOLEST/HDLC SERPL: 5 {RATIO} (ref 2–5)
CO2 SERPL-SCNC: 29 MMOL/L (ref 23–29)
CREAT SERPL-MCNC: 1.5 MG/DL (ref 0.5–1.4)
DIFFERENTIAL METHOD: ABNORMAL
EOSINOPHIL # BLD AUTO: 0.1 K/UL (ref 0–0.5)
EOSINOPHIL NFR BLD: 0.7 % (ref 0–8)
ERYTHROCYTE [DISTWIDTH] IN BLOOD BY AUTOMATED COUNT: 17.3 % (ref 11.5–14.5)
EST. GFR  (AFRICAN AMERICAN): 46.7 ML/MIN/1.73 M^2
EST. GFR  (NON AFRICAN AMERICAN): 40.4 ML/MIN/1.73 M^2
ESTIMATED AVG GLUCOSE: 123 MG/DL (ref 68–131)
GLUCOSE SERPL-MCNC: 100 MG/DL (ref 70–110)
HBA1C MFR BLD HPLC: 5.9 % (ref 4–5.6)
HCT VFR BLD AUTO: 50.9 % (ref 40–54)
HDLC SERPL-MCNC: 41 MG/DL (ref 40–75)
HDLC SERPL: 20.2 % (ref 20–50)
HGB BLD-MCNC: 15.4 G/DL (ref 14–18)
IMM GRANULOCYTES # BLD AUTO: 0.03 K/UL (ref 0–0.04)
IMM GRANULOCYTES NFR BLD AUTO: 0.4 % (ref 0–0.5)
INR PPP: 1.8 (ref 0.8–1.2)
LDLC SERPL CALC-MCNC: 127.8 MG/DL (ref 63–159)
LYMPHOCYTES # BLD AUTO: 1.2 K/UL (ref 1–4.8)
LYMPHOCYTES NFR BLD: 17 % (ref 18–48)
MCH RBC QN AUTO: 28.1 PG (ref 27–31)
MCHC RBC AUTO-ENTMCNC: 30.3 G/DL (ref 32–36)
MCV RBC AUTO: 93 FL (ref 82–98)
MONOCYTES # BLD AUTO: 1.5 K/UL (ref 0.3–1)
MONOCYTES NFR BLD: 21.7 % (ref 4–15)
NEUTROPHILS # BLD AUTO: 4.1 K/UL (ref 1.8–7.7)
NEUTROPHILS NFR BLD: 59.8 % (ref 38–73)
NONHDLC SERPL-MCNC: 162 MG/DL
NRBC BLD-RTO: 0 /100 WBC
PLATELET # BLD AUTO: 203 K/UL (ref 150–350)
PMV BLD AUTO: 12.2 FL (ref 9.2–12.9)
POTASSIUM SERPL-SCNC: 4.1 MMOL/L (ref 3.5–5.1)
PROT SERPL-MCNC: 7.6 G/DL (ref 6–8.4)
PROTHROMBIN TIME: 17.3 SEC (ref 9–12.5)
RBC # BLD AUTO: 5.49 M/UL (ref 4.6–6.2)
SODIUM SERPL-SCNC: 141 MMOL/L (ref 136–145)
TRIGL SERPL-MCNC: 171 MG/DL (ref 30–150)
TSH SERPL DL<=0.005 MIU/L-ACNC: 3.57 UIU/ML (ref 0.4–4)
URATE SERPL-MCNC: 5 MG/DL (ref 3.4–7)
WBC # BLD AUTO: 6.83 K/UL (ref 3.9–12.7)

## 2019-12-30 PROCEDURE — 85610 PROTHROMBIN TIME: CPT | Mod: HCNC

## 2019-12-30 PROCEDURE — 84550 ASSAY OF BLOOD/URIC ACID: CPT | Mod: HCNC

## 2019-12-30 PROCEDURE — 85025 COMPLETE CBC W/AUTO DIFF WBC: CPT | Mod: HCNC

## 2019-12-30 PROCEDURE — 36415 COLL VENOUS BLD VENIPUNCTURE: CPT | Mod: HCNC

## 2019-12-30 PROCEDURE — 80061 LIPID PANEL: CPT | Mod: HCNC

## 2019-12-30 PROCEDURE — 80053 COMPREHEN METABOLIC PANEL: CPT | Mod: HCNC

## 2019-12-30 PROCEDURE — 83036 HEMOGLOBIN GLYCOSYLATED A1C: CPT | Mod: HCNC

## 2019-12-30 PROCEDURE — 84443 ASSAY THYROID STIM HORMONE: CPT | Mod: HCNC

## 2020-01-07 ENCOUNTER — OFFICE VISIT (OUTPATIENT)
Dept: INTERNAL MEDICINE | Facility: CLINIC | Age: 85
End: 2020-01-07
Payer: MEDICARE

## 2020-01-07 VITALS
OXYGEN SATURATION: 97 % | HEIGHT: 72 IN | HEART RATE: 70 BPM | WEIGHT: 231.06 LBS | SYSTOLIC BLOOD PRESSURE: 104 MMHG | DIASTOLIC BLOOD PRESSURE: 52 MMHG | TEMPERATURE: 98 F | BODY MASS INDEX: 31.3 KG/M2

## 2020-01-07 DIAGNOSIS — Z86.711 HISTORY OF PULMONARY EMBOLISM: ICD-10-CM

## 2020-01-07 DIAGNOSIS — I15.2 HYPERTENSION ASSOCIATED WITH DIABETES: ICD-10-CM

## 2020-01-07 DIAGNOSIS — E11.59 HYPERTENSION ASSOCIATED WITH DIABETES: ICD-10-CM

## 2020-01-07 DIAGNOSIS — Z87.39 HISTORY OF GOUT: ICD-10-CM

## 2020-01-07 DIAGNOSIS — E11.42 TYPE 2 DIABETES MELLITUS WITH POLYNEUROPATHY: ICD-10-CM

## 2020-01-07 DIAGNOSIS — I70.0 ATHEROSCLEROSIS OF AORTA: ICD-10-CM

## 2020-01-07 DIAGNOSIS — Z00.00 ROUTINE GENERAL MEDICAL EXAMINATION AT A HEALTH CARE FACILITY: Primary | ICD-10-CM

## 2020-01-07 DIAGNOSIS — H91.13 PRESBYCUSIS OF BOTH EARS: ICD-10-CM

## 2020-01-07 DIAGNOSIS — E11.8 DM (DIABETES MELLITUS), TYPE 2 WITH COMPLICATIONS: ICD-10-CM

## 2020-01-07 PROCEDURE — 99397 PER PM REEVAL EST PAT 65+ YR: CPT | Mod: 25,HCNC,S$GLB, | Performed by: INTERNAL MEDICINE

## 2020-01-07 PROCEDURE — 90662 IIV NO PRSV INCREASED AG IM: CPT | Mod: HCNC,S$GLB,, | Performed by: INTERNAL MEDICINE

## 2020-01-07 PROCEDURE — 90662 FLU VACCINE - HIGH DOSE (65+) PRESERVATIVE FREE IM: ICD-10-PCS | Mod: HCNC,S$GLB,, | Performed by: INTERNAL MEDICINE

## 2020-01-07 PROCEDURE — G0008 FLU VACCINE - HIGH DOSE (65+) PRESERVATIVE FREE IM: ICD-10-PCS | Mod: HCNC,S$GLB,, | Performed by: INTERNAL MEDICINE

## 2020-01-07 PROCEDURE — 99397 PR PREVENTIVE VISIT,EST,65 & OVER: ICD-10-PCS | Mod: 25,HCNC,S$GLB, | Performed by: INTERNAL MEDICINE

## 2020-01-07 PROCEDURE — 99499 RISK ADDL DX/OHS AUDIT: ICD-10-PCS | Mod: HCNC,S$GLB,, | Performed by: INTERNAL MEDICINE

## 2020-01-07 PROCEDURE — 99499 UNLISTED E&M SERVICE: CPT | Mod: HCNC,S$GLB,, | Performed by: INTERNAL MEDICINE

## 2020-01-07 PROCEDURE — G0008 ADMIN INFLUENZA VIRUS VAC: HCPCS | Mod: HCNC,S$GLB,, | Performed by: INTERNAL MEDICINE

## 2020-01-07 PROCEDURE — 99999 PR PBB SHADOW E&M-EST. PATIENT-LVL IV: ICD-10-PCS | Mod: PBBFAC,HCNC,, | Performed by: INTERNAL MEDICINE

## 2020-01-07 PROCEDURE — 99999 PR PBB SHADOW E&M-EST. PATIENT-LVL IV: CPT | Mod: PBBFAC,HCNC,, | Performed by: INTERNAL MEDICINE

## 2020-01-07 NOTE — PROGRESS NOTES
Pt given high dose influenza vaccine IM right deltoid. Pt advised to wait 15 minutes to observe for adverse reaction. Pt tolerated well.

## 2020-01-07 NOTE — PROGRESS NOTES
HPI:  Patient is a 89-year-old man who comes today for follow-up of his diabetes, hypertension, lipids, and for his annual physical exam.  He is getting over a recent chest cold.  He otherwise has been doing well.  He has had no problems with his blood pressure or blood sugar at home.  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    Obesity, unspecified     Type 2 diabetes mellitus with polyneuropathy        Past Surgical History:   Procedure Laterality Date    CATARACT EXTRACTION      aphakic od    CATARACT EXTRACTION W/  INTRAOCULAR LENS IMPLANT Left 12/13/2018    TRANSURETHRAL RESECTION OF PROSTATE N/A 5/31/2018    Procedure: PROSTATECTOMY-TRANSURETHRAL;  Surgeon: Michael Westbrook IV, MD;  [facility]: [facility];  Service: Urology;  Laterality: N/A;    VASCULAR SURGERY         SHx: per the electronic medical record    FHx: recorded in the electronic medical record    ROS:    denies any chest pains or shortness of breath. Denies any nausea, vomiting or diarrhea. Denies any fever, chills or sweats. Denies any change in weight, voice, stool, skin or hair. Denies any dysuria, dyspepsia or dysphagia. Denies any change in vision, hearing or headaches. Denies any swollen lymph nodes or loss of memory.    PE:  BP (!) 104/52 (BP Location: Right arm)   Pulse 70   Temp 97.7 °F (36.5 °C) (Tympanic)   Ht 6' (1.829 m)   Wt 104.8 kg (231 lb 0.7 oz)   SpO2 97%   BMI 31.33 kg/m²   Gen: Well-developed, well-nourished, male, in no acute distress, oriented x3  HEENT: neck is supple, no adenopathy,  carotids 2+ equal without bruits, thyroid exam normal size without nodules.  CHEST: clear to auscultation and percussion  CVS: regular rate and rhythm without significant murmur, gallop, or rubs  ABD: soft, benign, no rebound no guarding, no distention.  Bowel sounds are normal.     nontender.  No palpable masses.  No organomegaly and no audible bruits.  RECTAL:  Deferred  EXT: no clubbing, cyanosis, or edema  LYMPH: no cervical, inguinal, or axillary adenopathy  FEET: no loss of sensation.  No ulcers or pressure sores. Monofilament testing normal  NEURO: gait normal.  Cranial nerves II- XII intact. No nystagmus.  Speech normal.   Gross motor and sensory unremarkable.    Lab Results   Component Value Date    WBC 6.83 12/30/2019    HGB 15.4 12/30/2019    HCT 50.9 12/30/2019     12/30/2019    CHOL 203 (H) 12/30/2019    TRIG 171 (H) 12/30/2019    HDL 41 12/30/2019    ALT 11 12/30/2019    AST 13 12/30/2019     12/30/2019    K 4.1 12/30/2019     12/30/2019    CREATININE 1.5 (H) 12/30/2019    BUN 23 12/30/2019    CO2 29 12/30/2019    TSH 3.573 12/30/2019    PSA 14 (H) 08/10/2011    INR 1.8 (H) 12/30/2019    HGBA1C 5.9 (H) 12/30/2019       Impression:  Diabetes, hypertension, lipids all well controlled  Other medical problems below, stable  Patient Active Problem List   Diagnosis    Obesity    Hearing loss of aging    History of pulmonary embolism    DM (diabetes mellitus), type 2 with complications    Hypertension associated with diabetes    History of gout    Type 2 diabetes mellitus with polyneuropathy    Urinary retention    Aneurysm artery, popliteal    Atherosclerosis of aorta    Benign prostatic hyperplasia       Plan:   Orders Placed This Encounter    Influenza - High Dose (65+) (PF) (IM)    Hemoglobin A1c    Comprehensive metabolic panel    Lipid panel    TSH    CBC auto differential     He was given high-dose influenza vaccine today.  He will be seen again in 6 months with  above lab work.  Medications remain the same  This note is generated with speech recognition software and is subject to transcription error and sound alike phrases that may be missed by proofreading.

## 2020-01-10 ENCOUNTER — ANTI-COAG VISIT (OUTPATIENT)
Dept: CARDIOLOGY | Facility: CLINIC | Age: 85
End: 2020-01-10
Payer: MEDICARE

## 2020-01-10 ENCOUNTER — TELEPHONE (OUTPATIENT)
Dept: INTERNAL MEDICINE | Facility: CLINIC | Age: 85
End: 2020-01-10

## 2020-01-10 DIAGNOSIS — Z79.01 LONG TERM (CURRENT) USE OF ANTICOAGULANTS: Primary | ICD-10-CM

## 2020-01-10 DIAGNOSIS — Z86.711 HISTORY OF PULMONARY EMBOLISM: ICD-10-CM

## 2020-01-10 LAB — INR PPP: 2.3 (ref 2–3)

## 2020-01-10 PROCEDURE — 85610 PROTHROMBIN TIME: CPT | Mod: QW,HCNC,S$GLB, | Performed by: INTERNAL MEDICINE

## 2020-01-10 PROCEDURE — 85610 POCT INR: ICD-10-PCS | Mod: QW,HCNC,S$GLB, | Performed by: INTERNAL MEDICINE

## 2020-01-10 PROCEDURE — 93793 ANTICOAG MGMT PT WARFARIN: CPT | Mod: HCNC,S$GLB,,

## 2020-01-10 PROCEDURE — 93793 PR ANTICOAGULANT MGMT FOR PT TAKING WARFARIN: ICD-10-PCS | Mod: HCNC,S$GLB,,

## 2020-01-10 RX ORDER — HYDROCODONE BITARTRATE AND ACETAMINOPHEN 7.5; 325 MG/1; MG/1
1 TABLET ORAL EVERY 8 HOURS PRN
Qty: 90 TABLET | Refills: 0 | Status: SHIPPED | OUTPATIENT
Start: 2020-01-10 | End: 2021-02-10 | Stop reason: SDUPTHER

## 2020-01-10 RX ORDER — HYDROCODONE BITARTRATE AND ACETAMINOPHEN 7.5; 325 MG/1; MG/1
1 TABLET ORAL EVERY 8 HOURS PRN
Qty: 90 TABLET | Refills: 0 | Status: SHIPPED | OUTPATIENT
Start: 2020-01-10 | End: 2020-01-10 | Stop reason: SDUPTHER

## 2020-01-10 NOTE — TELEPHONE ENCOUNTER
----- Message from Montrell Gleason sent at 1/10/2020  8:11 AM CST -----  Contact: Pat ( [employer])   Pharmacy states pt came in and was looking for pain medication that dr. Vera was supposed to be calling in for pt. [facility] return call.       [phone]       .  [facility] [place] - [place], [place] - [address] [place] [address]  [address] [facility] [address]  Phone: [phone] Fax: [phone]

## 2020-01-10 NOTE — PROGRESS NOTES
Accompanied by granddaughter.Patient's INR is therapeutic at 2.3.  Reports no recent medication changes or upcoming procedures.  Instructions were given to maintain current dose of Warfarin 5 mg every Sunday and Saturday; and 7 mg on all other days per week.  Would like to recheck INR in 2 weeks, but patient requested 4 weeks, because of transportation.  Follow up has been scheduled for 2/07/2020 at 0745a.  Dose calendar given and reviewed with patient and patient's granddaughter - both voiced understanding.  Advised to contact the [facility] at [phone], if he should have any questions or concerns.

## 2020-02-07 ENCOUNTER — ANTI-COAG VISIT (OUTPATIENT)
Dept: CARDIOLOGY | Facility: CLINIC | Age: 85
End: 2020-02-07
Payer: MEDICARE

## 2020-02-07 DIAGNOSIS — Z86.711 HISTORY OF PULMONARY EMBOLISM: ICD-10-CM

## 2020-02-07 DIAGNOSIS — Z79.01 LONG TERM (CURRENT) USE OF ANTICOAGULANTS: Primary | ICD-10-CM

## 2020-02-07 LAB — INR PPP: 3 (ref 2–3)

## 2020-02-07 PROCEDURE — 93793 ANTICOAG MGMT PT WARFARIN: CPT | Mod: HCNC,S$GLB,,

## 2020-02-07 PROCEDURE — 85610 POCT INR: ICD-10-PCS | Mod: QW,HCNC,S$GLB, | Performed by: INTERNAL MEDICINE

## 2020-02-07 PROCEDURE — 85610 PROTHROMBIN TIME: CPT | Mod: QW,HCNC,S$GLB, | Performed by: INTERNAL MEDICINE

## 2020-02-07 PROCEDURE — 93793 PR ANTICOAGULANT MGMT FOR PT TAKING WARFARIN: ICD-10-PCS | Mod: HCNC,S$GLB,,

## 2020-02-07 NOTE — PROGRESS NOTES
Accompanied by granddaughter. Patient's INR is therapeutic at 3.0.  Reports no recent medication changes.  Instructions were given to maintain current dose of Warfarin 5 mg every Sunday and Saturday; and 7 mg on all other days per week.  Recheck in 1 month.  Dose calendar given and reviewed with patient and patient's granddaughter - both voiced understanding.

## 2020-03-06 ENCOUNTER — ANTI-COAG VISIT (OUTPATIENT)
Dept: CARDIOLOGY | Facility: CLINIC | Age: 85
End: 2020-03-06
Payer: MEDICARE

## 2020-03-06 DIAGNOSIS — Z86.711 HISTORY OF PULMONARY EMBOLISM: ICD-10-CM

## 2020-03-06 DIAGNOSIS — Z79.01 LONG TERM (CURRENT) USE OF ANTICOAGULANTS: Primary | ICD-10-CM

## 2020-03-06 LAB — INR PPP: 2.4 (ref 2–3)

## 2020-03-06 PROCEDURE — 85610 POCT INR: ICD-10-PCS | Mod: QW,HCNC,S$GLB, | Performed by: INTERNAL MEDICINE

## 2020-03-06 PROCEDURE — 93793 PR ANTICOAGULANT MGMT FOR PT TAKING WARFARIN: ICD-10-PCS | Mod: HCNC,S$GLB,,

## 2020-03-06 PROCEDURE — 85610 PROTHROMBIN TIME: CPT | Mod: QW,HCNC,S$GLB, | Performed by: INTERNAL MEDICINE

## 2020-03-06 PROCEDURE — 93793 ANTICOAG MGMT PT WARFARIN: CPT | Mod: HCNC,S$GLB,,

## 2020-03-06 NOTE — PROGRESS NOTES
Accompanied by granddaughter. Patient's INR is therapeutic at 2.4.  Reports no recent medication changes or upcoming procedures.  Instructions were given to maintain current dose of Warfarin 5 mg every Sunday and Saturday; and 7 mg on all other days per week.  Recheck in 1 month.  Dose calendar given and reviewed with patient and patient's granddaughter - both voiced understanding.

## 2020-03-27 DIAGNOSIS — Z79.01 LONG TERM (CURRENT) USE OF ANTICOAGULANTS: Primary | ICD-10-CM

## 2020-04-07 ENCOUNTER — ANTI-COAG VISIT (OUTPATIENT)
Dept: CARDIOLOGY | Facility: CLINIC | Age: 85
End: 2020-04-07
Payer: MEDICARE

## 2020-04-07 ENCOUNTER — LAB VISIT (OUTPATIENT)
Dept: LAB | Facility: HOSPITAL | Age: 85
End: 2020-04-07
Attending: INTERNAL MEDICINE
Payer: MEDICARE

## 2020-04-07 DIAGNOSIS — Z79.01 LONG TERM (CURRENT) USE OF ANTICOAGULANTS: ICD-10-CM

## 2020-04-07 DIAGNOSIS — Z86.711 HISTORY OF PULMONARY EMBOLISM: ICD-10-CM

## 2020-04-07 LAB
INR PPP: 2.1 (ref 0.8–1.2)
PROTHROMBIN TIME: 20.2 SEC (ref 9–12.5)

## 2020-04-07 PROCEDURE — 93793 ANTICOAG MGMT PT WARFARIN: CPT | Mod: S$GLB,,,

## 2020-04-07 PROCEDURE — 36415 COLL VENOUS BLD VENIPUNCTURE: CPT | Mod: HCNC,PO

## 2020-04-07 PROCEDURE — 85610 PROTHROMBIN TIME: CPT | Mod: HCNC

## 2020-04-07 PROCEDURE — 93793 PR ANTICOAGULANT MGMT FOR PT TAKING WARFARIN: ICD-10-PCS | Mod: S$GLB,,,

## 2020-04-07 NOTE — PROGRESS NOTES
Patient's INR is therapeutic at 2.1. Spoke with patient's daughter Kat. She reports no recent medication changes.  Instructions were given to maintain current dose of Warfarin 5 mg every  Saturday and Sunday; and 7 mg on all other days per week.  Recheck in 1 month.  Kat repeated back correctly and voiced understanding.

## 2020-05-02 DIAGNOSIS — Z79.01 LONG TERM (CURRENT) USE OF ANTICOAGULANTS: ICD-10-CM

## 2020-05-02 RX ORDER — WARFARIN SODIUM 5 MG/1
TABLET ORAL
Qty: 30 TABLET | Refills: 11 | Status: SHIPPED | OUTPATIENT
Start: 2020-05-02 | End: 2021-05-05

## 2020-05-02 RX ORDER — LISINOPRIL AND HYDROCHLOROTHIAZIDE 12.5; 2 MG/1; MG/1
TABLET ORAL
Qty: 60 TABLET | Refills: 11 | Status: SHIPPED | OUTPATIENT
Start: 2020-05-02 | End: 2021-05-05

## 2020-05-05 ENCOUNTER — LAB VISIT (OUTPATIENT)
Dept: LAB | Facility: HOSPITAL | Age: 85
End: 2020-05-05
Attending: INTERNAL MEDICINE
Payer: MEDICARE

## 2020-05-05 DIAGNOSIS — Z79.01 LONG TERM (CURRENT) USE OF ANTICOAGULANTS: ICD-10-CM

## 2020-05-05 LAB
INR PPP: 2.2 (ref 0.8–1.2)
PROTHROMBIN TIME: 20.5 SEC (ref 9–12.5)

## 2020-05-05 PROCEDURE — 36415 COLL VENOUS BLD VENIPUNCTURE: CPT | Mod: HCNC,PO

## 2020-05-05 PROCEDURE — 85610 PROTHROMBIN TIME: CPT | Mod: HCNC

## 2020-05-06 ENCOUNTER — ANTI-COAG VISIT (OUTPATIENT)
Dept: CARDIOLOGY | Facility: CLINIC | Age: 85
End: 2020-05-06
Payer: MEDICARE

## 2020-05-06 DIAGNOSIS — Z79.01 LONG TERM (CURRENT) USE OF ANTICOAGULANTS: ICD-10-CM

## 2020-05-06 DIAGNOSIS — Z86.711 HISTORY OF PULMONARY EMBOLISM: ICD-10-CM

## 2020-05-06 PROCEDURE — 93793 PR ANTICOAGULANT MGMT FOR PT TAKING WARFARIN: ICD-10-PCS | Mod: S$GLB,,,

## 2020-05-06 PROCEDURE — 93793 ANTICOAG MGMT PT WARFARIN: CPT | Mod: S$GLB,,,

## 2020-05-06 NOTE — PROGRESS NOTES
Patient contacted - spoke with daughter, Nel.  INR is therapeutic at 2.2.  Reports no recent changes for patient.  Instructions given:  Maintain current dose of warfarin 5 mg every Sunday and Saturday; and 7 mg on all other days per week. Recheck at the [facility] on 6/02/2020 at 0800a.  Ms. Neumann repeated back instructions for patient and voiced understanding.

## 2020-06-02 ENCOUNTER — ANTI-COAG VISIT (OUTPATIENT)
Dept: CARDIOLOGY | Facility: CLINIC | Age: 85
End: 2020-06-02
Payer: MEDICARE

## 2020-06-02 DIAGNOSIS — Z86.711 HISTORY OF PULMONARY EMBOLISM: ICD-10-CM

## 2020-06-02 DIAGNOSIS — Z79.01 LONG TERM (CURRENT) USE OF ANTICOAGULANTS: Primary | ICD-10-CM

## 2020-06-02 LAB — INR PPP: 2.3 (ref 2–3)

## 2020-06-02 PROCEDURE — 93793 PR ANTICOAGULANT MGMT FOR PT TAKING WARFARIN: ICD-10-PCS | Mod: HCNC,S$GLB,,

## 2020-06-02 PROCEDURE — 85610 PROTHROMBIN TIME: CPT | Mod: QW,HCNC,S$GLB, | Performed by: NUCLEAR MEDICINE

## 2020-06-02 PROCEDURE — 85610 POCT INR: ICD-10-PCS | Mod: QW,HCNC,S$GLB, | Performed by: NUCLEAR MEDICINE

## 2020-06-02 PROCEDURE — 93793 ANTICOAG MGMT PT WARFARIN: CPT | Mod: HCNC,S$GLB,,

## 2020-06-02 NOTE — PROGRESS NOTES
Patient's INR is therapeutic at 2.3.  No recent changes reported.  Instructions given:  Maintain current dose of warfarin 5 mg every Sunday and Saturday; and 7 mg on all other days per week.  Recheck in 1 month.  Dose calendar  given and reviewed with patient.  Patient repeated back instructions for patient and voiced understanding.

## 2020-07-07 ENCOUNTER — ANTI-COAG VISIT (OUTPATIENT)
Dept: CARDIOLOGY | Facility: CLINIC | Age: 85
End: 2020-07-07
Payer: MEDICARE

## 2020-07-07 DIAGNOSIS — Z86.711 HISTORY OF PULMONARY EMBOLISM: ICD-10-CM

## 2020-07-07 DIAGNOSIS — Z79.01 LONG TERM (CURRENT) USE OF ANTICOAGULANTS: Primary | ICD-10-CM

## 2020-07-07 LAB — INR PPP: 3.2 (ref 2–3)

## 2020-07-07 PROCEDURE — 85610 PROTHROMBIN TIME: CPT | Mod: QW,HCNC,S$GLB, | Performed by: NUCLEAR MEDICINE

## 2020-07-07 PROCEDURE — 93793 PR ANTICOAGULANT MGMT FOR PT TAKING WARFARIN: ICD-10-PCS | Mod: HCNC,S$GLB,,

## 2020-07-07 PROCEDURE — 93793 ANTICOAG MGMT PT WARFARIN: CPT | Mod: HCNC,S$GLB,,

## 2020-07-07 PROCEDURE — 85610 POCT INR: ICD-10-PCS | Mod: QW,HCNC,S$GLB, | Performed by: NUCLEAR MEDICINE

## 2020-07-07 NOTE — PROGRESS NOTES
Patient's INR is slightly elevated at 3.2.  Reports no recent medication/diet changes.  No bleeding issues reported.  Instructions given:  Will lower today's (Tuesday) warfarin dose to 5 mg - only, then rechallenge current dose of 5 mg every Sunday and Saturday; and 7 mg on all other days per week.  Advised on bleeding precautions - ED, if needed.  Recheck in 1 month.  Dose calendar given and reviewed with patient.  Patient repeated back instructions and verbalized understanding.

## 2020-08-04 ENCOUNTER — ANTI-COAG VISIT (OUTPATIENT)
Dept: CARDIOLOGY | Facility: CLINIC | Age: 85
End: 2020-08-04
Payer: MEDICARE

## 2020-08-04 DIAGNOSIS — Z86.711 HISTORY OF PULMONARY EMBOLISM: ICD-10-CM

## 2020-08-04 DIAGNOSIS — Z79.01 LONG TERM (CURRENT) USE OF ANTICOAGULANTS: Primary | ICD-10-CM

## 2020-08-04 LAB — INR PPP: 1.9 (ref 2–3)

## 2020-08-04 PROCEDURE — 93793 ANTICOAG MGMT PT WARFARIN: CPT | Mod: HCNC,S$GLB,,

## 2020-08-04 PROCEDURE — 85610 PROTHROMBIN TIME: CPT | Mod: QW,HCNC,S$GLB, | Performed by: NUCLEAR MEDICINE

## 2020-08-04 PROCEDURE — 85610 POCT INR: ICD-10-PCS | Mod: QW,HCNC,S$GLB, | Performed by: NUCLEAR MEDICINE

## 2020-08-04 PROCEDURE — 93793 PR ANTICOAGULANT MGMT FOR PT TAKING WARFARIN: ICD-10-PCS | Mod: HCNC,S$GLB,,

## 2020-08-04 NOTE — PROGRESS NOTES
Patient's INR is slightly low (subtherapeutic) at 1.9.  Reports an recent intake of cabbage.  Vitmain K education - given.  Instructions given to maintain current dose of warfarin 5 mg every Sunday and Saturday; and 7 mg on all other days per week - will rechallenge.  Advised to decrease vitamin K in diet - patient agrees.  Recheck in 1 month.  Dose calendar given and reviewed with patient.  Patient verbalizes understanding.

## 2020-08-06 ENCOUNTER — PATIENT OUTREACH (OUTPATIENT)
Dept: ADMINISTRATIVE | Facility: OTHER | Age: 85
End: 2020-08-06

## 2020-08-06 NOTE — PROGRESS NOTES
Requested updates within Care Everywhere.  Patient's chart was reviewed for overdue MICHELINE topics.  Immunizations reconciled.    Eye exam scheduled 8/1020.

## 2020-08-10 ENCOUNTER — OFFICE VISIT (OUTPATIENT)
Dept: OPHTHALMOLOGY | Facility: CLINIC | Age: 85
End: 2020-08-10
Payer: MEDICARE

## 2020-08-10 DIAGNOSIS — E11.9 DIABETES MELLITUS TYPE 2 WITHOUT RETINOPATHY: Primary | ICD-10-CM

## 2020-08-10 DIAGNOSIS — H26.492 PCO (POSTERIOR CAPSULAR OPACIFICATION), LEFT: ICD-10-CM

## 2020-08-10 DIAGNOSIS — Z96.1 PSEUDOPHAKIA OF BOTH EYES: ICD-10-CM

## 2020-08-10 DIAGNOSIS — H52.4 BILATERAL PRESBYOPIA: ICD-10-CM

## 2020-08-10 PROCEDURE — 99499 RISK ADDL DX/OHS AUDIT: ICD-10-PCS | Mod: S$GLB,,, | Performed by: OPTOMETRIST

## 2020-08-10 PROCEDURE — 99999 PR PBB SHADOW E&M-EST. PATIENT-LVL II: CPT | Mod: PBBFAC,HCNC,, | Performed by: OPTOMETRIST

## 2020-08-10 PROCEDURE — 92015 PR REFRACTION: ICD-10-PCS | Mod: HCNC,S$GLB,, | Performed by: OPTOMETRIST

## 2020-08-10 PROCEDURE — 99999 PR PBB SHADOW E&M-EST. PATIENT-LVL II: ICD-10-PCS | Mod: PBBFAC,HCNC,, | Performed by: OPTOMETRIST

## 2020-08-10 PROCEDURE — 99499 UNLISTED E&M SERVICE: CPT | Mod: S$GLB,,, | Performed by: OPTOMETRIST

## 2020-08-10 PROCEDURE — 92015 DETERMINE REFRACTIVE STATE: CPT | Mod: HCNC,S$GLB,, | Performed by: OPTOMETRIST

## 2020-08-10 PROCEDURE — 92014 PR EYE EXAM, EST PATIENT,COMPREHESV: ICD-10-PCS | Mod: HCNC,S$GLB,, | Performed by: OPTOMETRIST

## 2020-08-10 PROCEDURE — 92014 COMPRE OPH EXAM EST PT 1/>: CPT | Mod: HCNC,S$GLB,, | Performed by: OPTOMETRIST

## 2020-08-10 NOTE — PROGRESS NOTES
HPI     NIDDM exam.  Decrease near and distance visual acuity.  Last eye exam 08/02/2019 [facility].  New patient to [facility].  Update glasses RX.  HX of cataract SX  Lab Results       Component                Value               Date                       HGBA1C                   5.9 (H)             12/30/2019              Last edited by Nabeel Mera, OD on 8/10/2020 10:22 AM. (History)            Assessment /Plan     For exam results, see Encounter Report.    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

## 2020-08-17 ENCOUNTER — TELEPHONE (OUTPATIENT)
Dept: OPHTHALMOLOGY | Facility: CLINIC | Age: 85
End: 2020-08-17

## 2020-08-18 ENCOUNTER — ANTI-COAG VISIT (OUTPATIENT)
Dept: CARDIOLOGY | Facility: CLINIC | Age: 85
End: 2020-08-18
Payer: MEDICARE

## 2020-08-18 ENCOUNTER — LAB VISIT (OUTPATIENT)
Dept: LAB | Facility: HOSPITAL | Age: 85
End: 2020-08-18
Attending: INTERNAL MEDICINE
Payer: MEDICARE

## 2020-08-18 ENCOUNTER — TELEPHONE (OUTPATIENT)
Dept: CARDIOLOGY | Facility: CLINIC | Age: 85
End: 2020-08-18

## 2020-08-18 DIAGNOSIS — E11.8 DM (DIABETES MELLITUS), TYPE 2 WITH COMPLICATIONS: ICD-10-CM

## 2020-08-18 DIAGNOSIS — Z79.01 LONG TERM (CURRENT) USE OF ANTICOAGULANTS: ICD-10-CM

## 2020-08-18 LAB
ALBUMIN SERPL BCP-MCNC: 3.5 G/DL (ref 3.5–5.2)
ALP SERPL-CCNC: 73 U/L (ref 55–135)
ALT SERPL W/O P-5'-P-CCNC: 9 U/L (ref 10–44)
ANION GAP SERPL CALC-SCNC: 8 MMOL/L (ref 8–16)
AST SERPL-CCNC: 15 U/L (ref 10–40)
BASOPHILS # BLD AUTO: 0.03 K/UL (ref 0–0.2)
BASOPHILS NFR BLD: 0.4 % (ref 0–1.9)
BILIRUB SERPL-MCNC: 0.7 MG/DL (ref 0.1–1)
BUN SERPL-MCNC: 21 MG/DL (ref 8–23)
CALCIUM SERPL-MCNC: 10.2 MG/DL (ref 8.7–10.5)
CHLORIDE SERPL-SCNC: 105 MMOL/L (ref 95–110)
CHOLEST SERPL-MCNC: 177 MG/DL (ref 120–199)
CHOLEST/HDLC SERPL: 4.2 {RATIO} (ref 2–5)
CO2 SERPL-SCNC: 27 MMOL/L (ref 23–29)
CREAT SERPL-MCNC: 1.4 MG/DL (ref 0.5–1.4)
DIFFERENTIAL METHOD: ABNORMAL
EOSINOPHIL # BLD AUTO: 0.1 K/UL (ref 0–0.5)
EOSINOPHIL NFR BLD: 1.8 % (ref 0–8)
ERYTHROCYTE [DISTWIDTH] IN BLOOD BY AUTOMATED COUNT: 16.3 % (ref 11.5–14.5)
EST. GFR  (AFRICAN AMERICAN): 51 ML/MIN/1.73 M^2
EST. GFR  (NON AFRICAN AMERICAN): 44 ML/MIN/1.73 M^2
GLUCOSE SERPL-MCNC: 104 MG/DL (ref 70–110)
HCT VFR BLD AUTO: 48.2 % (ref 40–54)
HDLC SERPL-MCNC: 42 MG/DL (ref 40–75)
HDLC SERPL: 23.7 % (ref 20–50)
HGB BLD-MCNC: 15.2 G/DL (ref 14–18)
IMM GRANULOCYTES # BLD AUTO: 0.02 K/UL (ref 0–0.04)
IMM GRANULOCYTES NFR BLD AUTO: 0.3 % (ref 0–0.5)
INR PPP: 2.5 (ref 0.8–1.2)
LDLC SERPL CALC-MCNC: 104.6 MG/DL (ref 63–159)
LYMPHOCYTES # BLD AUTO: 1.2 K/UL (ref 1–4.8)
LYMPHOCYTES NFR BLD: 16.8 % (ref 18–48)
MCH RBC QN AUTO: 27.8 PG (ref 27–31)
MCHC RBC AUTO-ENTMCNC: 31.5 G/DL (ref 32–36)
MCV RBC AUTO: 88 FL (ref 82–98)
MONOCYTES # BLD AUTO: 0.8 K/UL (ref 0.3–1)
MONOCYTES NFR BLD: 11.5 % (ref 4–15)
NEUTROPHILS # BLD AUTO: 5.1 K/UL (ref 1.8–7.7)
NEUTROPHILS NFR BLD: 69.2 % (ref 38–73)
NONHDLC SERPL-MCNC: 135 MG/DL
NRBC BLD-RTO: 0 /100 WBC
PLATELET # BLD AUTO: 235 K/UL (ref 150–350)
PMV BLD AUTO: 11 FL (ref 9.2–12.9)
POTASSIUM SERPL-SCNC: 3.8 MMOL/L (ref 3.5–5.1)
PROT SERPL-MCNC: 8.2 G/DL (ref 6–8.4)
PROTHROMBIN TIME: 25.4 SEC (ref 9–12.5)
RBC # BLD AUTO: 5.46 M/UL (ref 4.6–6.2)
SODIUM SERPL-SCNC: 140 MMOL/L (ref 136–145)
TRIGL SERPL-MCNC: 152 MG/DL (ref 30–150)
TSH SERPL DL<=0.005 MIU/L-ACNC: 2.37 UIU/ML (ref 0.4–4)
WBC # BLD AUTO: 7.33 K/UL (ref 3.9–12.7)

## 2020-08-18 PROCEDURE — 80053 COMPREHEN METABOLIC PANEL: CPT | Mod: HCNC

## 2020-08-18 PROCEDURE — 85610 PROTHROMBIN TIME: CPT | Mod: HCNC

## 2020-08-18 PROCEDURE — 36415 COLL VENOUS BLD VENIPUNCTURE: CPT | Mod: HCNC

## 2020-08-18 PROCEDURE — 84443 ASSAY THYROID STIM HORMONE: CPT | Mod: HCNC

## 2020-08-18 PROCEDURE — 80061 LIPID PANEL: CPT | Mod: HCNC

## 2020-08-18 PROCEDURE — 85025 COMPLETE CBC W/AUTO DIFF WBC: CPT | Mod: HCNC

## 2020-08-18 PROCEDURE — 93793 PR ANTICOAGULANT MGMT FOR PT TAKING WARFARIN: ICD-10-PCS | Mod: S$GLB,,,

## 2020-08-18 PROCEDURE — 93793 ANTICOAG MGMT PT WARFARIN: CPT | Mod: S$GLB,,,

## 2020-08-18 PROCEDURE — 83036 HEMOGLOBIN GLYCOSYLATED A1C: CPT | Mod: HCNC

## 2020-08-18 NOTE — TELEPHONE ENCOUNTER
Attempted to reach patient's daughter (caregiver) to give instructions for warfarin dosage--left voice message to call our office back at [phone].

## 2020-08-19 LAB
ESTIMATED AVG GLUCOSE: 117 MG/DL (ref 68–131)
HBA1C MFR BLD HPLC: 5.7 % (ref 4–5.6)

## 2020-08-25 ENCOUNTER — OFFICE VISIT (OUTPATIENT)
Dept: INTERNAL MEDICINE | Facility: CLINIC | Age: 85
End: 2020-08-25
Payer: MEDICARE

## 2020-08-25 VITALS
BODY MASS INDEX: 33 KG/M2 | TEMPERATURE: 98 F | HEIGHT: 71 IN | WEIGHT: 235.69 LBS | DIASTOLIC BLOOD PRESSURE: 76 MMHG | HEART RATE: 79 BPM | SYSTOLIC BLOOD PRESSURE: 112 MMHG

## 2020-08-25 DIAGNOSIS — H91.13 PRESBYCUSIS OF BOTH EARS: ICD-10-CM

## 2020-08-25 DIAGNOSIS — Z87.39 HISTORY OF GOUT: ICD-10-CM

## 2020-08-25 DIAGNOSIS — Z86.711 HISTORY OF PULMONARY EMBOLISM: ICD-10-CM

## 2020-08-25 DIAGNOSIS — I87.2 CHRONIC VENOUS INSUFFICIENCY: ICD-10-CM

## 2020-08-25 DIAGNOSIS — I70.0 ATHEROSCLEROSIS OF AORTA: ICD-10-CM

## 2020-08-25 DIAGNOSIS — E11.42 TYPE 2 DIABETES MELLITUS WITH POLYNEUROPATHY: Primary | ICD-10-CM

## 2020-08-25 DIAGNOSIS — E11.8 DM (DIABETES MELLITUS), TYPE 2 WITH COMPLICATIONS: ICD-10-CM

## 2020-08-25 DIAGNOSIS — I15.2 HYPERTENSION ASSOCIATED WITH DIABETES: ICD-10-CM

## 2020-08-25 DIAGNOSIS — E11.59 HYPERTENSION ASSOCIATED WITH DIABETES: ICD-10-CM

## 2020-08-25 PROCEDURE — 99214 OFFICE O/P EST MOD 30 MIN: CPT | Mod: HCNC,S$GLB,, | Performed by: INTERNAL MEDICINE

## 2020-08-25 PROCEDURE — 99999 PR PBB SHADOW E&M-EST. PATIENT-LVL III: ICD-10-PCS | Mod: PBBFAC,HCNC,, | Performed by: INTERNAL MEDICINE

## 2020-08-25 PROCEDURE — 1101F PT FALLS ASSESS-DOCD LE1/YR: CPT | Mod: HCNC,CPTII,S$GLB, | Performed by: INTERNAL MEDICINE

## 2020-08-25 PROCEDURE — 99999 PR PBB SHADOW E&M-EST. PATIENT-LVL III: CPT | Mod: PBBFAC,HCNC,, | Performed by: INTERNAL MEDICINE

## 2020-08-25 PROCEDURE — 99214 PR OFFICE/OUTPT VISIT, EST, LEVL IV, 30-39 MIN: ICD-10-PCS | Mod: HCNC,S$GLB,, | Performed by: INTERNAL MEDICINE

## 2020-08-25 PROCEDURE — 1159F PR MEDICATION LIST DOCUMENTED IN MEDICAL RECORD: ICD-10-PCS | Mod: HCNC,S$GLB,, | Performed by: INTERNAL MEDICINE

## 2020-08-25 PROCEDURE — 1159F MED LIST DOCD IN RCRD: CPT | Mod: HCNC,S$GLB,, | Performed by: INTERNAL MEDICINE

## 2020-08-25 PROCEDURE — 1101F PR PT FALLS ASSESS DOC 0-1 FALLS W/OUT INJ PAST YR: ICD-10-PCS | Mod: HCNC,CPTII,S$GLB, | Performed by: INTERNAL MEDICINE

## 2020-08-25 NOTE — PROGRESS NOTES
"HPI:  Patient is a 89-year-old man who comes today for follow-up of his diabetes, hypertension, lipids, and history of pulmonary embolus.  He gets his INR monitored through the [facility].  There has been steady.  He denies any hypoglycemia.  He does not check his blood sugars regularly.  He denies any chest pains, palpitations or shortness of breath.    Current meds have been verified and updated per the EMR  Exam:/76 (BP Location: Left arm)   Pulse 79   Temp 97.7 °F (36.5 °C) (Temporal)   Ht 5' 11" (1.803 m)   Wt 106.9 kg (235 lb 10.8 oz)   BMI 32.87 kg/m²   Carotids 2+ equal without bruits  Chest clear  Cardiovascular regular rate and rhythm with a 1/6 systolic murmur.  Abdomen soft benign no rebound or guarding or distention bowel sounds normal  Extremities with 1 to 2+ bilateral lower extremity edema.  He has changes consistent with chronic venous insufficiency.    Lab Results   Component Value Date    WBC 7.33 08/18/2020    HGB 15.2 08/18/2020    HCT 48.2 08/18/2020     08/18/2020    CHOL 177 08/18/2020    TRIG 152 (H) 08/18/2020    HDL 42 08/18/2020    ALT 9 (L) 08/18/2020    AST 15 08/18/2020     08/18/2020    K 3.8 08/18/2020     08/18/2020    CREATININE 1.4 08/18/2020    BUN 21 08/18/2020    CO2 27 08/18/2020    TSH 2.370 08/18/2020    PSA 14 (H) 08/10/2011    INR 2.5 (H) 08/18/2020    HGBA1C 5.7 (H) 08/18/2020       Impression:  Diabetes, too tight of control  Other medical problems below, stable  Patient Active Problem List   Diagnosis    Obesity    Hearing loss of aging    History of pulmonary embolism    DM (diabetes mellitus), type 2 with complications    Hypertension associated with diabetes    History of gout    Type 2 diabetes mellitus with polyneuropathy    Urinary retention    Aneurysm artery, popliteal    Atherosclerosis of aorta    Benign prostatic hyperplasia    Chronic venous insufficiency       Plan:  Orders Placed This Encounter    Hemoglobin " A1C    Lipid Panel    Basic metabolic panel     Patient was told to stop the glipizide.  Patient will see me again in 6 months with above lab work.  He has been reminded to wear knee-high support hose every day.  This note is generated with speech recognition software and is subject to transcription error and sound alike phrases that may be missed by proofreading.

## 2020-09-08 ENCOUNTER — ANTI-COAG VISIT (OUTPATIENT)
Dept: CARDIOLOGY | Facility: CLINIC | Age: 85
End: 2020-09-08
Payer: MEDICARE

## 2020-09-08 ENCOUNTER — OFFICE VISIT (OUTPATIENT)
Dept: OPHTHALMOLOGY | Facility: CLINIC | Age: 85
End: 2020-09-08
Payer: MEDICARE

## 2020-09-08 DIAGNOSIS — H27.01 APHAKIA OF EYE, RIGHT: ICD-10-CM

## 2020-09-08 DIAGNOSIS — Z79.01 LONG TERM (CURRENT) USE OF ANTICOAGULANTS: Primary | ICD-10-CM

## 2020-09-08 DIAGNOSIS — Z96.1 PSEUDOPHAKIA OF BOTH EYES: ICD-10-CM

## 2020-09-08 DIAGNOSIS — Z86.711 HISTORY OF PULMONARY EMBOLISM: ICD-10-CM

## 2020-09-08 DIAGNOSIS — E11.9 DIABETES MELLITUS TYPE 2 WITHOUT RETINOPATHY: ICD-10-CM

## 2020-09-08 DIAGNOSIS — H21.531: ICD-10-CM

## 2020-09-08 DIAGNOSIS — H50.111 EXOTROPIA OF RIGHT EYE: ICD-10-CM

## 2020-09-08 DIAGNOSIS — H26.492 PCO (POSTERIOR CAPSULAR OPACIFICATION), LEFT: Primary | ICD-10-CM

## 2020-09-08 LAB — INR PPP: 2.3 (ref 2–3)

## 2020-09-08 PROCEDURE — 93793 ANTICOAG MGMT PT WARFARIN: CPT | Mod: HCNC,S$GLB,,

## 2020-09-08 PROCEDURE — 92014 COMPRE OPH EXAM EST PT 1/>: CPT | Mod: 25,HCNC,S$GLB, | Performed by: OPHTHALMOLOGY

## 2020-09-08 PROCEDURE — 99999 PR PBB SHADOW E&M-EST. PATIENT-LVL III: CPT | Mod: PBBFAC,HCNC,, | Performed by: OPHTHALMOLOGY

## 2020-09-08 PROCEDURE — 66821 YAG CAPSULOTOMY - OS - LEFT EYE: ICD-10-PCS | Mod: HCNC,LT,S$GLB, | Performed by: OPHTHALMOLOGY

## 2020-09-08 PROCEDURE — 66821 AFTER CATARACT LASER SURGERY: CPT | Mod: HCNC,LT,S$GLB, | Performed by: OPHTHALMOLOGY

## 2020-09-08 PROCEDURE — 92014 PR EYE EXAM, EST PATIENT,COMPREHESV: ICD-10-PCS | Mod: 25,HCNC,S$GLB, | Performed by: OPHTHALMOLOGY

## 2020-09-08 PROCEDURE — 99999 PR PBB SHADOW E&M-EST. PATIENT-LVL III: ICD-10-PCS | Mod: PBBFAC,HCNC,, | Performed by: OPHTHALMOLOGY

## 2020-09-08 PROCEDURE — 85610 PROTHROMBIN TIME: CPT | Mod: QW,HCNC,S$GLB, | Performed by: NUCLEAR MEDICINE

## 2020-09-08 PROCEDURE — 85610 POCT INR: ICD-10-PCS | Mod: QW,HCNC,S$GLB, | Performed by: NUCLEAR MEDICINE

## 2020-09-08 PROCEDURE — 93793 PR ANTICOAGULANT MGMT FOR PT TAKING WARFARIN: ICD-10-PCS | Mod: HCNC,S$GLB,,

## 2020-09-08 RX ORDER — PREDNISOLONE SODIUM PHOSPHATE 10 MG/ML
1 SOLUTION/ DROPS OPHTHALMIC 4 TIMES DAILY
Qty: 5 ML | Refills: 0 | Status: CANCELLED | OUTPATIENT
Start: 2020-09-08 | End: 2020-09-15

## 2020-09-08 RX ORDER — PREDNISOLONE ACETATE 10 MG/ML
1 SUSPENSION/ DROPS OPHTHALMIC 4 TIMES DAILY
Qty: 5 ML | Refills: 0 | Status: SHIPPED | OUTPATIENT
Start: 2020-09-08

## 2020-09-08 NOTE — PROGRESS NOTES
Patient's INR is therapeutic at 2.3.  No recent changes reported.  Previous warfarin instructions verified.  Instructions given:  Maintain current dose of warfarin 5 mg every Sunday and Saturday; and 7 mg on all other days per week.  Recheck in 1 month. Dose calendar  given and reviewed with patient.  Patient repeated back instructions for patient and voiced understanding.

## 2020-09-08 NOTE — PROGRESS NOTES
HPI     The patient was last seen by [facility] and referred back to  due to   Glare and blurred vision in his left eye.    1. +DM  2. PCIOL OS +22.5 SN60WF / CDE: 20.00 /12-14-18  PCO OS  3. Aphakia OD (WORK INJURY EARLY 60S)  4. Iriodialysis OD  5. Dry Eyes OU  6. Exotropia    Last edited by Lizeth Chavarria on 9/8/2020  2:30 PM. (History)            Assessment /Plan     For exam results, see Encounter Report.      ICD-10-CM ICD-9-CM    1. PCO (posterior capsular opacification), left  H26.492 366.50 Yag Capsulotomy - OS - Left Eye      prednisoLONE acetate (PRED FORTE) 1 % DrpS  Yag Operative Procedure Note    89 y.o. year old patient experiencing a symptomatic decrease in vision OS with inability to perform activities of daily living including reading.      SLE: Posterior intraocular lens implant with capsular fibrosis     Risks, benefits and alternatives of Yag Laser Capsulotomy discussed. Including risks of retinal detachment (1-3%), macular edema, dislocated implant, pain, inflammation elevated intraocular pressure and vision loss. Consent signed.  Time out procedure form completed prior to laser.    Medications given:  Alphagan 0.15%  Tetracaine    Laser energy settings:    2.5 energy per shot  115 bursts  1 to 1 ratio per shot     Central capsular opening created without difficulty     2. Diabetes mellitus type 2 without retinopathy  E11.9 250.00 Diabetes with no diabetic retinopathy on dilated exam.   Reviewed diabetic eye precautions including excellent blood sugar control, and importance of regular follow up.          3. Pseudophakia of both eyes  Z96.1 V43.1 Follow   4. Aphakia of eye, right  H27.01 379.31 Follow   5. Exotropia of right eye  H50.10 378.10 Follow   6. Iridodialysis, right  H21.531 364.76 Follow     OS- Pred Ace. QID     Return to clinic 2 weeks

## 2020-09-23 ENCOUNTER — OFFICE VISIT (OUTPATIENT)
Dept: OPHTHALMOLOGY | Facility: CLINIC | Age: 85
End: 2020-09-23
Payer: MEDICARE

## 2020-09-23 DIAGNOSIS — Z98.890 S/P YAG CAPSULOTOMY, LEFT: ICD-10-CM

## 2020-09-23 DIAGNOSIS — Z98.890 POST-OPERATIVE STATE: Primary | ICD-10-CM

## 2020-09-23 DIAGNOSIS — E11.9 DIABETES MELLITUS TYPE 2 WITHOUT RETINOPATHY: ICD-10-CM

## 2020-09-23 PROCEDURE — 99999 PR PBB SHADOW E&M-EST. PATIENT-LVL III: ICD-10-PCS | Mod: PBBFAC,HCNC,, | Performed by: OPHTHALMOLOGY

## 2020-09-23 PROCEDURE — 99999 PR PBB SHADOW E&M-EST. PATIENT-LVL III: CPT | Mod: PBBFAC,HCNC,, | Performed by: OPHTHALMOLOGY

## 2020-09-23 PROCEDURE — 99024 POSTOP FOLLOW-UP VISIT: CPT | Mod: HCNC,S$GLB,, | Performed by: OPHTHALMOLOGY

## 2020-09-23 PROCEDURE — 99024 PR POST-OP FOLLOW-UP VISIT: ICD-10-PCS | Mod: HCNC,S$GLB,, | Performed by: OPHTHALMOLOGY

## 2020-09-23 NOTE — PROGRESS NOTES
HPI     Post-op Evaluation     Comments: 2 wk PO Yag OS              Comments     2 wk PO Yag OS  VA improving OS  Used Pred QID x 1 wk    1. +DM  2. PCIOL OS +22.5 SN60WF / CDE: 20.00 /12-14-18  Yag OS 9-8-20    3. Aphakia OD (WORK INJURY EARLY 60S)  4. Iriodialysis OD  5. Dry Eyes OU  6. Exotropia          Last edited by Madisyn Haney on 9/23/2020  4:08 PM. (History)            Assessment /Plan     For exam results, see Encounter Report.      ICD-10-CM ICD-9-CM    1. Post-operative state  Z98.890 V45.89    2. S/P YAG capsulotomy, left  Z98.890 V45.89    3. Diabetes mellitus type 2 without retinopathy  E11.9 250.00        S/p Yag OS  Return to clinic 9 months for diabetic exam with TF

## 2020-10-06 ENCOUNTER — ANTI-COAG VISIT (OUTPATIENT)
Dept: CARDIOLOGY | Facility: CLINIC | Age: 85
End: 2020-10-06
Payer: MEDICARE

## 2020-10-06 DIAGNOSIS — Z79.01 LONG TERM (CURRENT) USE OF ANTICOAGULANTS: Primary | ICD-10-CM

## 2020-10-06 DIAGNOSIS — Z86.711 HISTORY OF PULMONARY EMBOLISM: ICD-10-CM

## 2020-10-06 LAB — INR PPP: 2.9 (ref 2–3)

## 2020-10-06 PROCEDURE — 93793 ANTICOAG MGMT PT WARFARIN: CPT | Mod: HCNC,S$GLB,,

## 2020-10-06 PROCEDURE — 85610 POCT INR: ICD-10-PCS | Mod: QW,HCNC,S$GLB, | Performed by: NUCLEAR MEDICINE

## 2020-10-06 PROCEDURE — 93793 PR ANTICOAGULANT MGMT FOR PT TAKING WARFARIN: ICD-10-PCS | Mod: HCNC,S$GLB,,

## 2020-10-06 PROCEDURE — 85610 PROTHROMBIN TIME: CPT | Mod: QW,HCNC,S$GLB, | Performed by: NUCLEAR MEDICINE

## 2020-10-06 NOTE — PROGRESS NOTES
Patient's INR is therapeutic at 2.9.  Patient reports no recent changes.  Instructions given to maintain scheduled dose of warfarin 5 mg every Sunday, Saturday; and 7 mg on all other days per week.  Recheck in 1 month.  Dose calendar given.  Patient verbalizes understanding.

## 2020-10-14 ENCOUNTER — IMMUNIZATION (OUTPATIENT)
Dept: INTERNAL MEDICINE | Facility: CLINIC | Age: 85
End: 2020-10-14
Payer: MEDICARE

## 2020-10-14 PROCEDURE — 90694 VACC AIIV4 NO PRSRV 0.5ML IM: CPT | Mod: HCNC,S$GLB,, | Performed by: INTERNAL MEDICINE

## 2020-10-14 PROCEDURE — G0008 FLU VACCINE - QUADRIVALENT - ADJUVANTED: ICD-10-PCS | Mod: HCNC,S$GLB,, | Performed by: INTERNAL MEDICINE

## 2020-10-14 PROCEDURE — 90694 FLU VACCINE - QUADRIVALENT - ADJUVANTED: ICD-10-PCS | Mod: HCNC,S$GLB,, | Performed by: INTERNAL MEDICINE

## 2020-10-14 PROCEDURE — G0008 ADMIN INFLUENZA VIRUS VAC: HCPCS | Mod: HCNC,S$GLB,, | Performed by: INTERNAL MEDICINE

## 2020-11-03 ENCOUNTER — ANTI-COAG VISIT (OUTPATIENT)
Dept: CARDIOLOGY | Facility: CLINIC | Age: 85
End: 2020-11-03
Payer: MEDICARE

## 2020-11-03 DIAGNOSIS — Z79.01 LONG TERM (CURRENT) USE OF ANTICOAGULANTS: Primary | ICD-10-CM

## 2020-11-03 DIAGNOSIS — Z86.711 HISTORY OF PULMONARY EMBOLISM: ICD-10-CM

## 2020-11-03 LAB — INR PPP: 2.8 (ref 2–3)

## 2020-11-03 PROCEDURE — 93793 ANTICOAG MGMT PT WARFARIN: CPT | Mod: HCNC,S$GLB,,

## 2020-11-03 PROCEDURE — 93793 PR ANTICOAGULANT MGMT FOR PT TAKING WARFARIN: ICD-10-PCS | Mod: HCNC,S$GLB,,

## 2020-11-03 PROCEDURE — 85610 POCT INR: ICD-10-PCS | Mod: QW,HCNC,S$GLB, | Performed by: NUCLEAR MEDICINE

## 2020-11-03 PROCEDURE — 85610 PROTHROMBIN TIME: CPT | Mod: QW,HCNC,S$GLB, | Performed by: NUCLEAR MEDICINE

## 2020-11-03 NOTE — PROGRESS NOTES
Patient's INR is therapeutic at 2.8.  Reports no recent changes.  Medication list - reviewed.  Instructions given to continue same dose of warfarin 5 mg every Sunday, Saturday; and 7 mg on all other days.  Recheck in 1 month.  Dose calendar given.  Patient verbalized understanding.

## 2020-11-04 RX ORDER — METFORMIN HYDROCHLORIDE 850 MG/1
TABLET ORAL
Qty: 30 TABLET | Refills: 3 | Status: SHIPPED | OUTPATIENT
Start: 2020-11-04 | End: 2021-03-05

## 2020-12-03 ENCOUNTER — ANTI-COAG VISIT (OUTPATIENT)
Dept: CARDIOLOGY | Facility: CLINIC | Age: 85
End: 2020-12-03
Payer: MEDICARE

## 2020-12-03 DIAGNOSIS — Z79.01 LONG TERM (CURRENT) USE OF ANTICOAGULANTS: Primary | ICD-10-CM

## 2020-12-03 DIAGNOSIS — Z86.711 HISTORY OF PULMONARY EMBOLISM: ICD-10-CM

## 2020-12-03 LAB — INR PPP: 2.8 (ref 2–3)

## 2020-12-03 PROCEDURE — 93793 ANTICOAG MGMT PT WARFARIN: CPT | Mod: HCNC,S$GLB,,

## 2020-12-03 PROCEDURE — 85610 POCT INR: ICD-10-PCS | Mod: QW,HCNC,S$GLB, | Performed by: INTERNAL MEDICINE

## 2020-12-03 PROCEDURE — 93793 PR ANTICOAGULANT MGMT FOR PT TAKING WARFARIN: ICD-10-PCS | Mod: HCNC,S$GLB,,

## 2020-12-03 PROCEDURE — 85610 PROTHROMBIN TIME: CPT | Mod: QW,HCNC,S$GLB, | Performed by: INTERNAL MEDICINE

## 2020-12-03 NOTE — PROGRESS NOTES
Patient's INR remains therapeutic at 2.8.  Reports no recent changes.  Current warfarin regimen verified.  No change in dose - patient to maintain scheduled dose of 5 mg every Sunday, Saturday; and 7 mg on all other days per week.  Recheck on 1/05/2021.  Dose calendar given and reviewed with patient.  Patient verbalized understanding.

## 2020-12-16 ENCOUNTER — OFFICE VISIT (OUTPATIENT)
Dept: UROLOGY | Facility: CLINIC | Age: 85
End: 2020-12-16
Payer: MEDICARE

## 2020-12-16 VITALS
HEIGHT: 71 IN | BODY MASS INDEX: 33 KG/M2 | SYSTOLIC BLOOD PRESSURE: 126 MMHG | DIASTOLIC BLOOD PRESSURE: 74 MMHG | WEIGHT: 235.69 LBS | TEMPERATURE: 98 F

## 2020-12-16 DIAGNOSIS — Z12.5 PROSTATE CANCER SCREENING: Primary | ICD-10-CM

## 2020-12-16 DIAGNOSIS — N40.0 BENIGN PROSTATIC HYPERPLASIA, UNSPECIFIED WHETHER LOWER URINARY TRACT SYMPTOMS PRESENT: ICD-10-CM

## 2020-12-16 LAB
BILIRUB SERPL-MCNC: NORMAL MG/DL
BLOOD URINE, POC: 250
CLARITY, POC UA: NORMAL
COLOR, POC UA: YELLOW
GLUCOSE UR QL STRIP: NORMAL
KETONES UR QL STRIP: NORMAL
LEUKOCYTE ESTERASE URINE, POC: NORMAL
NITRITE, POC UA: NORMAL
PH, POC UA: 6
POC RESIDUAL URINE VOLUME: 129 ML (ref 0–100)
PROTEIN, POC: NORMAL
SPECIFIC GRAVITY, POC UA: 1.01
UROBILINOGEN, POC UA: NORMAL

## 2020-12-16 PROCEDURE — 51798 US URINE CAPACITY MEASURE: CPT | Mod: HCNC,S$GLB,, | Performed by: UROLOGY

## 2020-12-16 PROCEDURE — 51798 POCT BLADDER SCAN: ICD-10-PCS | Mod: HCNC,S$GLB,, | Performed by: UROLOGY

## 2020-12-16 PROCEDURE — 3072F PR LOW RISK FOR RETINOPATHY: ICD-10-PCS | Mod: HCNC,S$GLB,, | Performed by: UROLOGY

## 2020-12-16 PROCEDURE — 99214 PR OFFICE/OUTPT VISIT, EST, LEVL IV, 30-39 MIN: ICD-10-PCS | Mod: HCNC,25,S$GLB, | Performed by: UROLOGY

## 2020-12-16 PROCEDURE — 99999 PR PBB SHADOW E&M-EST. PATIENT-LVL III: CPT | Mod: PBBFAC,HCNC,, | Performed by: UROLOGY

## 2020-12-16 PROCEDURE — 81002 POCT URINE DIPSTICK WITHOUT MICROSCOPE: ICD-10-PCS | Mod: HCNC,S$GLB,, | Performed by: UROLOGY

## 2020-12-16 PROCEDURE — 1126F PR PAIN SEVERITY QUANTIFIED, NO PAIN PRESENT: ICD-10-PCS | Mod: HCNC,S$GLB,, | Performed by: UROLOGY

## 2020-12-16 PROCEDURE — 1159F PR MEDICATION LIST DOCUMENTED IN MEDICAL RECORD: ICD-10-PCS | Mod: HCNC,S$GLB,, | Performed by: UROLOGY

## 2020-12-16 PROCEDURE — 3288F FALL RISK ASSESSMENT DOCD: CPT | Mod: HCNC,CPTII,S$GLB, | Performed by: UROLOGY

## 2020-12-16 PROCEDURE — 1101F PT FALLS ASSESS-DOCD LE1/YR: CPT | Mod: HCNC,CPTII,S$GLB, | Performed by: UROLOGY

## 2020-12-16 PROCEDURE — 99999 PR PBB SHADOW E&M-EST. PATIENT-LVL III: ICD-10-PCS | Mod: PBBFAC,HCNC,, | Performed by: UROLOGY

## 2020-12-16 PROCEDURE — 1159F MED LIST DOCD IN RCRD: CPT | Mod: HCNC,S$GLB,, | Performed by: UROLOGY

## 2020-12-16 PROCEDURE — 99214 OFFICE O/P EST MOD 30 MIN: CPT | Mod: HCNC,25,S$GLB, | Performed by: UROLOGY

## 2020-12-16 PROCEDURE — 1101F PR PT FALLS ASSESS DOC 0-1 FALLS W/OUT INJ PAST YR: ICD-10-PCS | Mod: HCNC,CPTII,S$GLB, | Performed by: UROLOGY

## 2020-12-16 PROCEDURE — 81002 URINALYSIS NONAUTO W/O SCOPE: CPT | Mod: HCNC,S$GLB,, | Performed by: UROLOGY

## 2020-12-16 PROCEDURE — 3072F LOW RISK FOR RETINOPATHY: CPT | Mod: HCNC,S$GLB,, | Performed by: UROLOGY

## 2020-12-16 PROCEDURE — 3288F PR FALLS RISK ASSESSMENT DOCUMENTED: ICD-10-PCS | Mod: HCNC,CPTII,S$GLB, | Performed by: UROLOGY

## 2020-12-16 PROCEDURE — 1126F AMNT PAIN NOTED NONE PRSNT: CPT | Mod: HCNC,S$GLB,, | Performed by: UROLOGY

## 2020-12-16 RX ORDER — TAMSULOSIN HYDROCHLORIDE 0.4 MG/1
1 CAPSULE ORAL DAILY
Qty: 30 CAPSULE | Refills: 11 | Status: SHIPPED | OUTPATIENT
Start: 2020-12-16 | End: 2021-12-29 | Stop reason: SDUPTHER

## 2020-12-16 RX ORDER — METOPROLOL TARTRATE 50 MG/1
50 TABLET ORAL 2 TIMES DAILY
COMMUNITY
Start: 2020-12-03 | End: 2021-12-29 | Stop reason: SDUPTHER

## 2020-12-16 RX ORDER — FINASTERIDE 5 MG/1
TABLET, FILM COATED ORAL
Qty: 30 TABLET | Refills: 11 | Status: SHIPPED | OUTPATIENT
Start: 2020-12-16 | End: 2022-01-06 | Stop reason: SDUPTHER

## 2020-12-16 NOTE — PROGRESS NOTES
Chief Complaint: Retention    HPI:   12/16/20: No interval complaints. Taking flomax/finasteride/oxybutinin.  Frequency still a problem. No UTI in the interim.  UA shows ++leuk and 250 blood.  12/9/19: Was in the ER last week for presumed UTI.  DC with Abx.  Still wearing pullups but less of them.  CT 10/19 renal cysts and BPH no more worrisome findings.  Uncirc.  2/18/19: Stream good.  Passed some stones a few months after TURP with some blood then.  Month ago had a strip of small stones pass.  Still taking flomax/finasteride.  Has to get up 3-4 times at night to make sure his diaper doesn't overflow.  3-4 diapers during the day.  Lots of water and one cup coffee in AM.  Tea.   ml.  Not constipated.  7/17/18: Stream is good.  Gets a little splatter some mornings.  Wets pad only some days.  Lots better than when we started.  6/13/18: Stream better after TURP.  Gets some OAB when he arises.  Wearing a little pad for safety.  5/16/18: Had to have riggs replaced after last visit.  Tolerated two surgeries for arterial restoration in last year, held coumadin for them no problem.  2/15/18: Laws: One month f/u for urinary retention. Riggs still in place; wants another voiding trial. Home health would be able to replace it if he cannot void. Still taking Flomax and Finasteride.   1/16/18: Laws: Went to the ER here at [facility] last night following riggs removal yesterday for voiding trial. Riggs was replaced in ER and he was started on Bactrim.   1/15/18: Has been taking flomax/finasteride for the interval. No upcoming surgeries.  No new problems.  Reviewed history in detail.  8/14/17: Riggs is working okay for him.  Has taken finasteride on that time.  Riggs changed one week ago.  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  Tolerated right surgery reasonably well.  2/13/17: Cysto today shows severe BPH with trilobar hypertrophy.    2/1/17: 88 yo man went to the ER for a  few complaints and he was found to be in retention and a riggs was placed; bag filled/emptied 1.5 times shortly thereafter.  Is no no BPH meds.  Had double voiding prior, nocturia x2.  No abd/pelvic pain and no exac/rel factors.  No hematuria.  No urolithiasis.      Allergies:  Morphine    Medications:  has a current medication list which includes the following prescription(s): allopurinol, docusate sodium, finasteride, hydrocodone-acetaminophen, lisinopril-hydrochlorothiazide, lovastatin, metformin, metoprolol tartrate, prednisolone acetate, ssd, tamsulosin, warfarin, warfarin, metoprolol tartrate, and oxybutynin.    Review of Systems:  General: No fever, chills, fatigability, or weight loss.  Skin: No rashes, itching, or changes in color or texture of skin.  Chest: Denies DELG[place]O, cyanosis, wheezing, cough, and sputum production.  Abdomen: Appetite fine. No weight loss. Denies diarrhea, abdominal pain, hematemesis, or blood in stool.  Musculoskeletal: No joint stiffness or swelling. Denies back pain.  : As above.  All other review of systems negative.    PMH:   has a past medical history of Aphakia of right eye, Chronic venous insufficiency, DM (diabetes mellitus), type 2 with complications, Hearing loss of aging, History of gout, History of pulmonary embolism (2011), Hypertension associated with diabetes, Iridodialysis of right eye, and Obesity, unspecified.    PSH:   has a past surgical history that includes Vascular surgery; Transurethral resection of prostate (N/A, 5/31/2018); Cataract extraction; Cataract extraction w/  intraocular lens implant (Left, 12/13/2018); and yag laser capsulotomy (Left, 09/08/2020).    FamHx: family history includes Diabetes in his brother.    SocHx:  reports that he has never smoked. He has never used smokeless tobacco. He reports that he does not drink alcohol or use drugs.      Physical Exam:  Vitals:    12/16/20 1317   Temp: 98.2 °F (36.8 °C)     General: A&Ox3, no apparent  distress, no deformities  Neck: No masses, normal thyroid  Lungs: normal inspiration, no use of accessory muscles  Heart: normal pulse, no arrhythmias  Abdomen: Soft, NT, ND  Skin: The skin is warm and dry. No jaundice.  Ext: No c/c/e.  :   12/16/20: ALVA shows >50gm prostate, symm/smooth  1/15/18: Cuevas in good position, removed for voiding trial today  2/17: ALVA normal, penis/test normal.    Labs/Studies:   Bladder Scan performed in office:     2/19:  ml.    12/19: PVR 0 ml    12/16/20:  ml    Impression/Plan:   1. BPH/OAB combination but emptying completely.  Mobility forces need for diapers.  Hygeine leads to UTI.  Will do the best we can.  Refills.  2. US to check no upper tract problems, KUB to check for stones.  3. RTC 6 mo  4. PSA to check for prostate cancer; has always been elev

## 2021-01-05 ENCOUNTER — ANTI-COAG VISIT (OUTPATIENT)
Dept: CARDIOLOGY | Facility: CLINIC | Age: 86
End: 2021-01-05
Payer: MEDICARE

## 2021-01-05 DIAGNOSIS — Z86.711 HISTORY OF PULMONARY EMBOLISM: ICD-10-CM

## 2021-01-05 DIAGNOSIS — Z79.01 LONG TERM (CURRENT) USE OF ANTICOAGULANTS: Primary | ICD-10-CM

## 2021-01-05 LAB — INR PPP: 2 (ref 2–3)

## 2021-01-05 PROCEDURE — 93793 ANTICOAG MGMT PT WARFARIN: CPT | Mod: HCNC,S$GLB,,

## 2021-01-05 PROCEDURE — 85610 POCT INR: ICD-10-PCS | Mod: QW,HCNC,S$GLB, | Performed by: NUCLEAR MEDICINE

## 2021-01-05 PROCEDURE — 85610 PROTHROMBIN TIME: CPT | Mod: QW,HCNC,S$GLB, | Performed by: NUCLEAR MEDICINE

## 2021-01-05 PROCEDURE — 93793 PR ANTICOAGULANT MGMT FOR PT TAKING WARFARIN: ICD-10-PCS | Mod: HCNC,S$GLB,,

## 2021-02-04 ENCOUNTER — LAB VISIT (OUTPATIENT)
Dept: LAB | Facility: HOSPITAL | Age: 86
End: 2021-02-04
Attending: INTERNAL MEDICINE
Payer: MEDICARE

## 2021-02-04 ENCOUNTER — ANTI-COAG VISIT (OUTPATIENT)
Dept: CARDIOLOGY | Facility: CLINIC | Age: 86
End: 2021-02-04
Payer: MEDICARE

## 2021-02-04 DIAGNOSIS — Z79.01 LONG TERM (CURRENT) USE OF ANTICOAGULANTS: Primary | ICD-10-CM

## 2021-02-04 DIAGNOSIS — Z86.711 HISTORY OF PULMONARY EMBOLISM: ICD-10-CM

## 2021-02-04 DIAGNOSIS — E11.8 DM (DIABETES MELLITUS), TYPE 2 WITH COMPLICATIONS: ICD-10-CM

## 2021-02-04 LAB — INR PPP: 2.8 (ref 2–3)

## 2021-02-04 PROCEDURE — 85610 POCT INR: ICD-10-PCS | Mod: QW,S$GLB,, | Performed by: INTERNAL MEDICINE

## 2021-02-04 PROCEDURE — 85610 PROTHROMBIN TIME: CPT | Mod: QW,S$GLB,, | Performed by: INTERNAL MEDICINE

## 2021-02-04 PROCEDURE — 36415 COLL VENOUS BLD VENIPUNCTURE: CPT

## 2021-02-04 PROCEDURE — 80061 LIPID PANEL: CPT

## 2021-02-04 PROCEDURE — 80048 BASIC METABOLIC PNL TOTAL CA: CPT

## 2021-02-04 PROCEDURE — 83036 HEMOGLOBIN GLYCOSYLATED A1C: CPT

## 2021-02-04 PROCEDURE — 93793 ANTICOAG MGMT PT WARFARIN: CPT | Mod: S$GLB,,,

## 2021-02-04 PROCEDURE — 93793 PR ANTICOAGULANT MGMT FOR PT TAKING WARFARIN: ICD-10-PCS | Mod: S$GLB,,,

## 2021-02-05 LAB
ANION GAP SERPL CALC-SCNC: 9 MMOL/L (ref 8–16)
BUN SERPL-MCNC: 25 MG/DL (ref 10–30)
CALCIUM SERPL-MCNC: 9.6 MG/DL (ref 8.7–10.5)
CHLORIDE SERPL-SCNC: 106 MMOL/L (ref 95–110)
CHOLEST SERPL-MCNC: 194 MG/DL (ref 120–199)
CHOLEST/HDLC SERPL: 6.1 {RATIO} (ref 2–5)
CO2 SERPL-SCNC: 27 MMOL/L (ref 23–29)
CREAT SERPL-MCNC: 1.3 MG/DL (ref 0.5–1.4)
EST. GFR  (AFRICAN AMERICAN): 55.1 ML/MIN/1.73 M^2
EST. GFR  (NON AFRICAN AMERICAN): 47.7 ML/MIN/1.73 M^2
ESTIMATED AVG GLUCOSE: 111 MG/DL (ref 68–131)
GLUCOSE SERPL-MCNC: 89 MG/DL (ref 70–110)
HBA1C MFR BLD: 5.5 % (ref 4–5.6)
HDLC SERPL-MCNC: 32 MG/DL (ref 40–75)
HDLC SERPL: 16.5 % (ref 20–50)
LDLC SERPL CALC-MCNC: 111 MG/DL (ref 63–159)
NONHDLC SERPL-MCNC: 162 MG/DL
POTASSIUM SERPL-SCNC: 4.3 MMOL/L (ref 3.5–5.1)
SODIUM SERPL-SCNC: 142 MMOL/L (ref 136–145)
TRIGL SERPL-MCNC: 255 MG/DL (ref 30–150)

## 2021-02-10 ENCOUNTER — OFFICE VISIT (OUTPATIENT)
Dept: INTERNAL MEDICINE | Facility: CLINIC | Age: 86
End: 2021-02-10
Payer: MEDICARE

## 2021-02-10 VITALS
TEMPERATURE: 98 F | BODY MASS INDEX: 32.9 KG/M2 | OXYGEN SATURATION: 98 % | SYSTOLIC BLOOD PRESSURE: 128 MMHG | HEIGHT: 71 IN | HEART RATE: 71 BPM | WEIGHT: 235 LBS | DIASTOLIC BLOOD PRESSURE: 80 MMHG

## 2021-02-10 DIAGNOSIS — Z00.00 ROUTINE GENERAL MEDICAL EXAMINATION AT A HEALTH CARE FACILITY: Primary | ICD-10-CM

## 2021-02-10 DIAGNOSIS — E66.9 CLASS 1 OBESITY WITH BODY MASS INDEX (BMI) OF 33.0 TO 33.9 IN ADULT, UNSPECIFIED OBESITY TYPE, UNSPECIFIED WHETHER SERIOUS COMORBIDITY PRESENT: ICD-10-CM

## 2021-02-10 DIAGNOSIS — I15.2 HYPERTENSION ASSOCIATED WITH DIABETES: ICD-10-CM

## 2021-02-10 DIAGNOSIS — E11.42 TYPE 2 DIABETES MELLITUS WITH POLYNEUROPATHY: ICD-10-CM

## 2021-02-10 DIAGNOSIS — I70.0 ATHEROSCLEROSIS OF AORTA: ICD-10-CM

## 2021-02-10 DIAGNOSIS — I87.2 CHRONIC VENOUS INSUFFICIENCY: ICD-10-CM

## 2021-02-10 DIAGNOSIS — E11.8 DM (DIABETES MELLITUS), TYPE 2 WITH COMPLICATIONS: ICD-10-CM

## 2021-02-10 DIAGNOSIS — E11.59 HYPERTENSION ASSOCIATED WITH DIABETES: ICD-10-CM

## 2021-02-10 DIAGNOSIS — N40.0 BENIGN PROSTATIC HYPERPLASIA, UNSPECIFIED WHETHER LOWER URINARY TRACT SYMPTOMS PRESENT: ICD-10-CM

## 2021-02-10 DIAGNOSIS — Z87.39 HISTORY OF GOUT: ICD-10-CM

## 2021-02-10 PROCEDURE — 1126F AMNT PAIN NOTED NONE PRSNT: CPT | Mod: S$GLB,,, | Performed by: INTERNAL MEDICINE

## 2021-02-10 PROCEDURE — 99397 PR PREVENTIVE VISIT,EST,65 & OVER: ICD-10-PCS | Mod: S$GLB,,, | Performed by: INTERNAL MEDICINE

## 2021-02-10 PROCEDURE — 99499 RISK ADDL DX/OHS AUDIT: ICD-10-PCS | Mod: S$GLB,,, | Performed by: INTERNAL MEDICINE

## 2021-02-10 PROCEDURE — 3072F LOW RISK FOR RETINOPATHY: CPT | Mod: S$GLB,,, | Performed by: INTERNAL MEDICINE

## 2021-02-10 PROCEDURE — 1101F PR PT FALLS ASSESS DOC 0-1 FALLS W/OUT INJ PAST YR: ICD-10-PCS | Mod: CPTII,S$GLB,, | Performed by: INTERNAL MEDICINE

## 2021-02-10 PROCEDURE — 3288F PR FALLS RISK ASSESSMENT DOCUMENTED: ICD-10-PCS | Mod: CPTII,S$GLB,, | Performed by: INTERNAL MEDICINE

## 2021-02-10 PROCEDURE — 99499 UNLISTED E&M SERVICE: CPT | Mod: S$GLB,,, | Performed by: INTERNAL MEDICINE

## 2021-02-10 PROCEDURE — 1126F PR PAIN SEVERITY QUANTIFIED, NO PAIN PRESENT: ICD-10-PCS | Mod: S$GLB,,, | Performed by: INTERNAL MEDICINE

## 2021-02-10 PROCEDURE — 99999 PR PBB SHADOW E&M-EST. PATIENT-LVL IV: ICD-10-PCS | Mod: PBBFAC,,, | Performed by: INTERNAL MEDICINE

## 2021-02-10 PROCEDURE — 3072F PR LOW RISK FOR RETINOPATHY: ICD-10-PCS | Mod: S$GLB,,, | Performed by: INTERNAL MEDICINE

## 2021-02-10 PROCEDURE — 99999 PR PBB SHADOW E&M-EST. PATIENT-LVL IV: CPT | Mod: PBBFAC,,, | Performed by: INTERNAL MEDICINE

## 2021-02-10 PROCEDURE — 1101F PT FALLS ASSESS-DOCD LE1/YR: CPT | Mod: CPTII,S$GLB,, | Performed by: INTERNAL MEDICINE

## 2021-02-10 PROCEDURE — 99397 PER PM REEVAL EST PAT 65+ YR: CPT | Mod: S$GLB,,, | Performed by: INTERNAL MEDICINE

## 2021-02-10 PROCEDURE — 3288F FALL RISK ASSESSMENT DOCD: CPT | Mod: CPTII,S$GLB,, | Performed by: INTERNAL MEDICINE

## 2021-02-10 RX ORDER — HYDROCODONE BITARTRATE AND ACETAMINOPHEN 7.5; 325 MG/1; MG/1
1 TABLET ORAL EVERY 8 HOURS PRN
Qty: 90 TABLET | Refills: 0 | Status: SHIPPED | OUTPATIENT
Start: 2021-02-10 | End: 2021-12-29 | Stop reason: SDUPTHER

## 2021-03-10 ENCOUNTER — ANTI-COAG VISIT (OUTPATIENT)
Dept: CARDIOLOGY | Facility: CLINIC | Age: 86
End: 2021-03-10
Payer: MEDICARE

## 2021-03-10 DIAGNOSIS — Z86.711 HISTORY OF PULMONARY EMBOLISM: ICD-10-CM

## 2021-03-10 DIAGNOSIS — Z79.01 LONG TERM (CURRENT) USE OF ANTICOAGULANTS: Primary | ICD-10-CM

## 2021-03-10 LAB — INR PPP: 1.8 (ref 2–3)

## 2021-03-10 PROCEDURE — 85610 POCT INR: ICD-10-PCS | Mod: QW,S$GLB,, | Performed by: NUCLEAR MEDICINE

## 2021-03-10 PROCEDURE — 85610 PROTHROMBIN TIME: CPT | Mod: QW,S$GLB,, | Performed by: NUCLEAR MEDICINE

## 2021-03-10 PROCEDURE — 93793 PR ANTICOAGULANT MGMT FOR PT TAKING WARFARIN: ICD-10-PCS | Mod: S$GLB,,,

## 2021-03-10 PROCEDURE — 93793 ANTICOAG MGMT PT WARFARIN: CPT | Mod: S$GLB,,,

## 2021-04-07 ENCOUNTER — ANTI-COAG VISIT (OUTPATIENT)
Dept: CARDIOLOGY | Facility: CLINIC | Age: 86
End: 2021-04-07
Payer: MEDICARE

## 2021-04-07 DIAGNOSIS — Z86.711 HISTORY OF PULMONARY EMBOLISM: ICD-10-CM

## 2021-04-07 DIAGNOSIS — Z79.01 LONG TERM (CURRENT) USE OF ANTICOAGULANTS: Primary | ICD-10-CM

## 2021-04-07 LAB — INR PPP: 2 (ref 2–3)

## 2021-04-07 PROCEDURE — 85610 PROTHROMBIN TIME: CPT | Mod: QW,S$GLB,, | Performed by: NUCLEAR MEDICINE

## 2021-04-07 PROCEDURE — 85610 POCT INR: ICD-10-PCS | Mod: QW,S$GLB,, | Performed by: NUCLEAR MEDICINE

## 2021-04-07 PROCEDURE — 93793 PR ANTICOAGULANT MGMT FOR PT TAKING WARFARIN: ICD-10-PCS | Mod: S$GLB,,,

## 2021-04-07 PROCEDURE — 93793 ANTICOAG MGMT PT WARFARIN: CPT | Mod: S$GLB,,,

## 2021-04-21 ENCOUNTER — TELEPHONE (OUTPATIENT)
Dept: UROLOGY | Facility: CLINIC | Age: 86
End: 2021-04-21

## 2021-05-05 ENCOUNTER — ANTI-COAG VISIT (OUTPATIENT)
Dept: CARDIOLOGY | Facility: CLINIC | Age: 86
End: 2021-05-05
Payer: MEDICARE

## 2021-05-05 DIAGNOSIS — Z79.01 LONG TERM (CURRENT) USE OF ANTICOAGULANTS: Primary | ICD-10-CM

## 2021-05-05 DIAGNOSIS — Z86.711 HISTORY OF PULMONARY EMBOLISM: ICD-10-CM

## 2021-05-05 LAB — INR PPP: 2.5 (ref 2–3)

## 2021-05-05 PROCEDURE — 93793 PR ANTICOAGULANT MGMT FOR PT TAKING WARFARIN: ICD-10-PCS | Mod: S$GLB,,,

## 2021-05-05 PROCEDURE — 85610 PROTHROMBIN TIME: CPT | Mod: QW,S$GLB,, | Performed by: INTERNAL MEDICINE

## 2021-05-05 PROCEDURE — 93793 ANTICOAG MGMT PT WARFARIN: CPT | Mod: S$GLB,,,

## 2021-05-05 PROCEDURE — 85610 POCT INR: ICD-10-PCS | Mod: QW,S$GLB,, | Performed by: INTERNAL MEDICINE

## 2021-06-09 ENCOUNTER — ANTI-COAG VISIT (OUTPATIENT)
Dept: CARDIOLOGY | Facility: CLINIC | Age: 86
End: 2021-06-09
Payer: MEDICARE

## 2021-06-09 DIAGNOSIS — Z86.711 HISTORY OF PULMONARY EMBOLISM: ICD-10-CM

## 2021-06-09 DIAGNOSIS — Z79.01 LONG TERM (CURRENT) USE OF ANTICOAGULANTS: Primary | ICD-10-CM

## 2021-06-09 LAB — INR PPP: 2.1 (ref 2–3)

## 2021-06-09 PROCEDURE — 85610 POCT INR: ICD-10-PCS | Mod: QW,S$GLB,, | Performed by: NUCLEAR MEDICINE

## 2021-06-09 PROCEDURE — 93793 PR ANTICOAGULANT MGMT FOR PT TAKING WARFARIN: ICD-10-PCS | Mod: S$GLB,,,

## 2021-06-09 PROCEDURE — 85610 PROTHROMBIN TIME: CPT | Mod: QW,S$GLB,, | Performed by: NUCLEAR MEDICINE

## 2021-06-09 PROCEDURE — 93793 ANTICOAG MGMT PT WARFARIN: CPT | Mod: S$GLB,,,

## 2021-06-11 ENCOUNTER — TELEPHONE (OUTPATIENT)
Dept: INTERNAL MEDICINE | Facility: CLINIC | Age: 86
End: 2021-06-11

## 2021-06-14 ENCOUNTER — HOSPITAL ENCOUNTER (OUTPATIENT)
Dept: RADIOLOGY | Facility: HOSPITAL | Age: 86
Discharge: HOME | End: 2021-06-14
Attending: UROLOGY
Payer: MEDICARE

## 2021-06-14 DIAGNOSIS — N40.0 BENIGN PROSTATIC HYPERPLASIA, UNSPECIFIED WHETHER LOWER URINARY TRACT SYMPTOMS PRESENT: ICD-10-CM

## 2021-06-14 DIAGNOSIS — Z12.5 PROSTATE CANCER SCREENING: ICD-10-CM

## 2021-06-14 PROCEDURE — 74018 XR ABDOMEN AP 1 VIEW: ICD-10-PCS | Mod: 26,,, | Performed by: RADIOLOGY

## 2021-06-14 PROCEDURE — 74018 RADEX ABDOMEN 1 VIEW: CPT | Mod: TC

## 2021-06-14 PROCEDURE — 76770 US EXAM ABDO BACK WALL COMP: CPT | Mod: TC

## 2021-06-14 PROCEDURE — 74018 RADEX ABDOMEN 1 VIEW: CPT | Mod: 26,,, | Performed by: RADIOLOGY

## 2021-06-14 PROCEDURE — 76770 US RETROPERITONEAL COMPLETE: ICD-10-PCS | Mod: 26,,, | Performed by: RADIOLOGY

## 2021-06-14 PROCEDURE — 76770 US EXAM ABDO BACK WALL COMP: CPT | Mod: 26,,, | Performed by: RADIOLOGY

## 2021-06-16 NOTE — PROGRESS NOTES
INR at goal. Medications and chart reviewed. No changes noted to necessitate adjustment of warfarin or follow-up plan. See calendar.         Curettage Prior To Excision?: Yes

## 2021-06-24 ENCOUNTER — OFFICE VISIT (OUTPATIENT)
Dept: UROLOGY | Facility: CLINIC | Age: 86
End: 2021-06-24
Payer: MEDICARE

## 2021-06-24 VITALS
WEIGHT: 233.69 LBS | SYSTOLIC BLOOD PRESSURE: 132 MMHG | DIASTOLIC BLOOD PRESSURE: 86 MMHG | BODY MASS INDEX: 32.59 KG/M2

## 2021-06-24 DIAGNOSIS — N40.1 BENIGN PROSTATIC HYPERPLASIA WITH URINARY RETENTION: Primary | ICD-10-CM

## 2021-06-24 DIAGNOSIS — R33.8 BENIGN PROSTATIC HYPERPLASIA WITH URINARY RETENTION: Primary | ICD-10-CM

## 2021-06-24 PROCEDURE — 3288F PR FALLS RISK ASSESSMENT DOCUMENTED: ICD-10-PCS | Mod: CPTII,S$GLB,, | Performed by: UROLOGY

## 2021-06-24 PROCEDURE — 1126F PR PAIN SEVERITY QUANTIFIED, NO PAIN PRESENT: ICD-10-PCS | Mod: S$GLB,,, | Performed by: UROLOGY

## 2021-06-24 PROCEDURE — 99999 PR PBB SHADOW E&M-EST. PATIENT-LVL III: CPT | Mod: PBBFAC,,, | Performed by: UROLOGY

## 2021-06-24 PROCEDURE — 3072F LOW RISK FOR RETINOPATHY: CPT | Mod: S$GLB,,, | Performed by: UROLOGY

## 2021-06-24 PROCEDURE — 3072F PR LOW RISK FOR RETINOPATHY: ICD-10-PCS | Mod: S$GLB,,, | Performed by: UROLOGY

## 2021-06-24 PROCEDURE — 1159F PR MEDICATION LIST DOCUMENTED IN MEDICAL RECORD: ICD-10-PCS | Mod: S$GLB,,, | Performed by: UROLOGY

## 2021-06-24 PROCEDURE — 1101F PR PT FALLS ASSESS DOC 0-1 FALLS W/OUT INJ PAST YR: ICD-10-PCS | Mod: CPTII,S$GLB,, | Performed by: UROLOGY

## 2021-06-24 PROCEDURE — 99214 OFFICE O/P EST MOD 30 MIN: CPT | Mod: S$GLB,,, | Performed by: UROLOGY

## 2021-06-24 PROCEDURE — 99999 PR PBB SHADOW E&M-EST. PATIENT-LVL III: ICD-10-PCS | Mod: PBBFAC,,, | Performed by: UROLOGY

## 2021-06-24 PROCEDURE — 99214 PR OFFICE/OUTPT VISIT, EST, LEVL IV, 30-39 MIN: ICD-10-PCS | Mod: S$GLB,,, | Performed by: UROLOGY

## 2021-06-24 PROCEDURE — 1126F AMNT PAIN NOTED NONE PRSNT: CPT | Mod: S$GLB,,, | Performed by: UROLOGY

## 2021-06-24 PROCEDURE — 1159F MED LIST DOCD IN RCRD: CPT | Mod: S$GLB,,, | Performed by: UROLOGY

## 2021-06-24 PROCEDURE — 3288F FALL RISK ASSESSMENT DOCD: CPT | Mod: CPTII,S$GLB,, | Performed by: UROLOGY

## 2021-06-24 PROCEDURE — 1101F PT FALLS ASSESS-DOCD LE1/YR: CPT | Mod: CPTII,S$GLB,, | Performed by: UROLOGY

## 2021-06-28 ENCOUNTER — PES CALL (OUTPATIENT)
Dept: ADMINISTRATIVE | Facility: CLINIC | Age: 86
End: 2021-06-28

## 2021-07-09 ENCOUNTER — OFFICE VISIT (OUTPATIENT)
Dept: INTERNAL MEDICINE | Facility: CLINIC | Age: 86
End: 2021-07-09
Payer: MEDICARE

## 2021-07-09 VITALS
RESPIRATION RATE: 20 BRPM | DIASTOLIC BLOOD PRESSURE: 64 MMHG | HEIGHT: 67 IN | OXYGEN SATURATION: 97 % | HEART RATE: 76 BPM | BODY MASS INDEX: 35.22 KG/M2 | SYSTOLIC BLOOD PRESSURE: 108 MMHG | WEIGHT: 224.44 LBS | TEMPERATURE: 98 F

## 2021-07-09 DIAGNOSIS — E11.42 TYPE 2 DIABETES MELLITUS WITH POLYNEUROPATHY: Primary | ICD-10-CM

## 2021-07-09 DIAGNOSIS — Z91.81 AT HIGH RISK FOR FALLS: ICD-10-CM

## 2021-07-09 DIAGNOSIS — M15.9 PRIMARY OSTEOARTHRITIS INVOLVING MULTIPLE JOINTS: ICD-10-CM

## 2021-07-09 DIAGNOSIS — I15.2 HYPERTENSION ASSOCIATED WITH DIABETES: ICD-10-CM

## 2021-07-09 DIAGNOSIS — E11.59 HYPERTENSION ASSOCIATED WITH DIABETES: ICD-10-CM

## 2021-07-09 DIAGNOSIS — E11.8 DM (DIABETES MELLITUS), TYPE 2 WITH COMPLICATIONS: ICD-10-CM

## 2021-07-09 PROCEDURE — 99214 PR OFFICE/OUTPT VISIT, EST, LEVL IV, 30-39 MIN: ICD-10-PCS | Mod: S$GLB,,, | Performed by: INTERNAL MEDICINE

## 2021-07-09 PROCEDURE — 99499 UNLISTED E&M SERVICE: CPT | Mod: S$PBB,,, | Performed by: INTERNAL MEDICINE

## 2021-07-09 PROCEDURE — 99499 RISK ADDL DX/OHS AUDIT: ICD-10-PCS | Mod: S$PBB,,, | Performed by: INTERNAL MEDICINE

## 2021-07-09 PROCEDURE — 99999 PR PBB SHADOW E&M-EST. PATIENT-LVL IV: ICD-10-PCS | Mod: PBBFAC,,, | Performed by: INTERNAL MEDICINE

## 2021-07-09 PROCEDURE — 99214 OFFICE O/P EST MOD 30 MIN: CPT | Mod: PBBFAC,PO | Performed by: INTERNAL MEDICINE

## 2021-07-09 PROCEDURE — 99999 PR PBB SHADOW E&M-EST. PATIENT-LVL IV: CPT | Mod: PBBFAC,,, | Performed by: INTERNAL MEDICINE

## 2021-07-09 PROCEDURE — 99214 OFFICE O/P EST MOD 30 MIN: CPT | Mod: S$GLB,,, | Performed by: INTERNAL MEDICINE

## 2021-07-09 RX ORDER — FLUTICASONE PROPIONATE 50 MCG
2 SPRAY, SUSPENSION (ML) NASAL DAILY
Qty: 16 G | Refills: 11 | Status: SHIPPED | OUTPATIENT
Start: 2021-07-09 | End: 2022-08-07

## 2021-07-14 ENCOUNTER — ANTI-COAG VISIT (OUTPATIENT)
Dept: CARDIOLOGY | Facility: CLINIC | Age: 86
End: 2021-07-14
Payer: MEDICARE

## 2021-07-14 DIAGNOSIS — Z79.01 LONG TERM (CURRENT) USE OF ANTICOAGULANTS: Primary | ICD-10-CM

## 2021-07-14 DIAGNOSIS — Z86.711 HISTORY OF PULMONARY EMBOLISM: ICD-10-CM

## 2021-07-14 LAB — INR PPP: 2.8 (ref 2–3)

## 2021-07-14 PROCEDURE — 93793 ANTICOAG MGMT PT WARFARIN: CPT | Mod: S$GLB,,,

## 2021-07-14 PROCEDURE — 85610 POCT INR: ICD-10-PCS | Mod: QW,S$GLB,, | Performed by: INTERNAL MEDICINE

## 2021-07-14 PROCEDURE — 93793 PR ANTICOAGULANT MGMT FOR PT TAKING WARFARIN: ICD-10-PCS | Mod: S$GLB,,,

## 2021-07-14 PROCEDURE — 85610 PROTHROMBIN TIME: CPT | Mod: QW,S$GLB,, | Performed by: INTERNAL MEDICINE

## 2021-08-02 DIAGNOSIS — Z79.01 LONG TERM (CURRENT) USE OF ANTICOAGULANTS: Primary | ICD-10-CM

## 2021-08-04 ENCOUNTER — LAB VISIT (OUTPATIENT)
Dept: LAB | Facility: HOSPITAL | Age: 86
End: 2021-08-04
Attending: INTERNAL MEDICINE
Payer: MEDICARE

## 2021-08-04 DIAGNOSIS — Z79.01 LONG TERM (CURRENT) USE OF ANTICOAGULANTS: ICD-10-CM

## 2021-08-04 DIAGNOSIS — E11.42 TYPE 2 DIABETES MELLITUS WITH POLYNEUROPATHY: ICD-10-CM

## 2021-08-04 LAB
ALBUMIN SERPL BCP-MCNC: 3.5 G/DL (ref 3.5–5.2)
ALP SERPL-CCNC: 71 U/L (ref 55–135)
ALT SERPL W/O P-5'-P-CCNC: 14 U/L (ref 10–44)
ANION GAP SERPL CALC-SCNC: 9 MMOL/L (ref 8–16)
AST SERPL-CCNC: 15 U/L (ref 10–40)
BASOPHILS # BLD AUTO: 0.04 K/UL (ref 0–0.2)
BASOPHILS NFR BLD: 0.5 % (ref 0–1.9)
BILIRUB SERPL-MCNC: 0.6 MG/DL (ref 0.1–1)
BUN SERPL-MCNC: 26 MG/DL (ref 10–30)
CALCIUM SERPL-MCNC: 10.1 MG/DL (ref 8.7–10.5)
CHLORIDE SERPL-SCNC: 106 MMOL/L (ref 95–110)
CHOLEST SERPL-MCNC: 165 MG/DL (ref 120–199)
CHOLEST/HDLC SERPL: 4.2 {RATIO} (ref 2–5)
CO2 SERPL-SCNC: 27 MMOL/L (ref 23–29)
CREAT SERPL-MCNC: 1.2 MG/DL (ref 0.5–1.4)
DIFFERENTIAL METHOD: ABNORMAL
EOSINOPHIL # BLD AUTO: 0.2 K/UL (ref 0–0.5)
EOSINOPHIL NFR BLD: 2.2 % (ref 0–8)
ERYTHROCYTE [DISTWIDTH] IN BLOOD BY AUTOMATED COUNT: 17.2 % (ref 11.5–14.5)
EST. GFR  (AFRICAN AMERICAN): >60 ML/MIN/1.73 M^2
EST. GFR  (NON AFRICAN AMERICAN): 53 ML/MIN/1.73 M^2
GLUCOSE SERPL-MCNC: 94 MG/DL (ref 70–110)
HCT VFR BLD AUTO: 47.5 % (ref 40–54)
HDLC SERPL-MCNC: 39 MG/DL (ref 40–75)
HDLC SERPL: 23.6 % (ref 20–50)
HGB BLD-MCNC: 14.9 G/DL (ref 14–18)
IMM GRANULOCYTES # BLD AUTO: 0.09 K/UL (ref 0–0.04)
IMM GRANULOCYTES NFR BLD AUTO: 1.2 % (ref 0–0.5)
INR PPP: 2.5 (ref 0.8–1.2)
LDLC SERPL CALC-MCNC: 95.6 MG/DL (ref 63–159)
LYMPHOCYTES # BLD AUTO: 1.3 K/UL (ref 1–4.8)
LYMPHOCYTES NFR BLD: 17.2 % (ref 18–48)
MCH RBC QN AUTO: 28.3 PG (ref 27–31)
MCHC RBC AUTO-ENTMCNC: 31.4 G/DL (ref 32–36)
MCV RBC AUTO: 90 FL (ref 82–98)
MONOCYTES # BLD AUTO: 0.9 K/UL (ref 0.3–1)
MONOCYTES NFR BLD: 12.4 % (ref 4–15)
NEUTROPHILS # BLD AUTO: 5.1 K/UL (ref 1.8–7.7)
NEUTROPHILS NFR BLD: 66.5 % (ref 38–73)
NONHDLC SERPL-MCNC: 126 MG/DL
NRBC BLD-RTO: 0 /100 WBC
PLATELET # BLD AUTO: 208 K/UL (ref 150–450)
PMV BLD AUTO: 10.4 FL (ref 9.2–12.9)
POTASSIUM SERPL-SCNC: 4.3 MMOL/L (ref 3.5–5.1)
PROT SERPL-MCNC: 7.5 G/DL (ref 6–8.4)
PROTHROMBIN TIME: 26 SEC (ref 9–12.5)
RBC # BLD AUTO: 5.27 M/UL (ref 4.6–6.2)
SODIUM SERPL-SCNC: 142 MMOL/L (ref 136–145)
TRIGL SERPL-MCNC: 152 MG/DL (ref 30–150)
TSH SERPL DL<=0.005 MIU/L-ACNC: 2.32 UIU/ML (ref 0.4–4)
WBC # BLD AUTO: 7.61 K/UL (ref 3.9–12.7)

## 2021-08-04 PROCEDURE — 80053 COMPREHEN METABOLIC PANEL: CPT | Performed by: INTERNAL MEDICINE

## 2021-08-04 PROCEDURE — 85610 PROTHROMBIN TIME: CPT | Performed by: NUCLEAR MEDICINE

## 2021-08-04 PROCEDURE — 84443 ASSAY THYROID STIM HORMONE: CPT | Performed by: INTERNAL MEDICINE

## 2021-08-04 PROCEDURE — 83036 HEMOGLOBIN GLYCOSYLATED A1C: CPT | Performed by: INTERNAL MEDICINE

## 2021-08-04 PROCEDURE — 85025 COMPLETE CBC W/AUTO DIFF WBC: CPT | Performed by: INTERNAL MEDICINE

## 2021-08-04 PROCEDURE — 80061 LIPID PANEL: CPT | Performed by: INTERNAL MEDICINE

## 2021-08-04 PROCEDURE — 36415 COLL VENOUS BLD VENIPUNCTURE: CPT | Performed by: NUCLEAR MEDICINE

## 2021-08-05 ENCOUNTER — ANTI-COAG VISIT (OUTPATIENT)
Dept: CARDIOLOGY | Facility: CLINIC | Age: 86
End: 2021-08-05
Payer: MEDICARE

## 2021-08-05 ENCOUNTER — TELEPHONE (OUTPATIENT)
Dept: INTERNAL MEDICINE | Facility: CLINIC | Age: 86
End: 2021-08-05

## 2021-08-05 DIAGNOSIS — Z79.01 LONG TERM (CURRENT) USE OF ANTICOAGULANTS: ICD-10-CM

## 2021-08-05 DIAGNOSIS — Z86.711 HISTORY OF PULMONARY EMBOLISM: ICD-10-CM

## 2021-08-05 LAB
ESTIMATED AVG GLUCOSE: 114 MG/DL (ref 68–131)
HBA1C MFR BLD: 5.6 % (ref 4–5.6)

## 2021-08-05 PROCEDURE — 93793 ANTICOAG MGMT PT WARFARIN: CPT | Mod: S$GLB,,,

## 2021-08-05 PROCEDURE — 93793 PR ANTICOAGULANT MGMT FOR PT TAKING WARFARIN: ICD-10-PCS | Mod: S$GLB,,,

## 2021-08-11 ENCOUNTER — TELEPHONE (OUTPATIENT)
Dept: INTERNAL MEDICINE | Facility: CLINIC | Age: 86
End: 2021-08-11

## 2021-08-11 ENCOUNTER — OFFICE VISIT (OUTPATIENT)
Dept: INTERNAL MEDICINE | Facility: CLINIC | Age: 86
End: 2021-08-11
Payer: MEDICARE

## 2021-08-11 DIAGNOSIS — E11.8 DM (DIABETES MELLITUS), TYPE 2 WITH COMPLICATIONS: ICD-10-CM

## 2021-08-11 DIAGNOSIS — Z87.39 HISTORY OF GOUT: ICD-10-CM

## 2021-08-11 DIAGNOSIS — E11.42 TYPE 2 DIABETES MELLITUS WITH POLYNEUROPATHY: Primary | ICD-10-CM

## 2021-08-11 DIAGNOSIS — Z91.81 AT HIGH RISK FOR FALLS: ICD-10-CM

## 2021-08-11 DIAGNOSIS — H91.13 PRESBYCUSIS OF BOTH EARS: ICD-10-CM

## 2021-08-11 DIAGNOSIS — E11.59 HYPERTENSION ASSOCIATED WITH DIABETES: ICD-10-CM

## 2021-08-11 DIAGNOSIS — I15.2 HYPERTENSION ASSOCIATED WITH DIABETES: ICD-10-CM

## 2021-08-11 DIAGNOSIS — I70.0 ATHEROSCLEROSIS OF AORTA: ICD-10-CM

## 2021-08-11 DIAGNOSIS — I87.2 CHRONIC VENOUS INSUFFICIENCY: ICD-10-CM

## 2021-08-11 PROCEDURE — 1159F MED LIST DOCD IN RCRD: CPT | Mod: CPTII,95,, | Performed by: INTERNAL MEDICINE

## 2021-08-11 PROCEDURE — 99441 PR PHYSICIAN TELEPHONE EVALUATION 5-10 MIN: ICD-10-PCS | Mod: 95,,, | Performed by: INTERNAL MEDICINE

## 2021-08-11 PROCEDURE — 3072F LOW RISK FOR RETINOPATHY: CPT | Mod: CPTII,95,, | Performed by: INTERNAL MEDICINE

## 2021-08-11 PROCEDURE — 3072F PR LOW RISK FOR RETINOPATHY: ICD-10-PCS | Mod: CPTII,95,, | Performed by: INTERNAL MEDICINE

## 2021-08-11 PROCEDURE — 1160F RVW MEDS BY RX/DR IN RCRD: CPT | Mod: CPTII,95,, | Performed by: INTERNAL MEDICINE

## 2021-08-11 PROCEDURE — 1160F PR REVIEW ALL MEDS BY PRESCRIBER/CLIN PHARMACIST DOCUMENTED: ICD-10-PCS | Mod: CPTII,95,, | Performed by: INTERNAL MEDICINE

## 2021-08-11 PROCEDURE — 1159F PR MEDICATION LIST DOCUMENTED IN MEDICAL RECORD: ICD-10-PCS | Mod: CPTII,95,, | Performed by: INTERNAL MEDICINE

## 2021-08-11 PROCEDURE — 99441 PR PHYSICIAN TELEPHONE EVALUATION 5-10 MIN: CPT | Mod: 95,,, | Performed by: INTERNAL MEDICINE

## 2021-08-27 ENCOUNTER — PES CALL (OUTPATIENT)
Dept: ADMINISTRATIVE | Facility: CLINIC | Age: 86
End: 2021-08-27

## 2021-09-09 ENCOUNTER — ANTI-COAG VISIT (OUTPATIENT)
Dept: CARDIOLOGY | Facility: CLINIC | Age: 86
End: 2021-09-09
Payer: MEDICARE

## 2021-09-09 DIAGNOSIS — Z86.711 HISTORY OF PULMONARY EMBOLISM: ICD-10-CM

## 2021-09-09 DIAGNOSIS — Z79.01 LONG TERM (CURRENT) USE OF ANTICOAGULANTS: Primary | ICD-10-CM

## 2021-09-09 LAB — INR PPP: 2.5 (ref 2–3)

## 2021-09-09 PROCEDURE — 85610 POCT INR: ICD-10-PCS | Mod: QW,S$GLB,, | Performed by: INTERNAL MEDICINE

## 2021-09-09 PROCEDURE — 93793 PR ANTICOAGULANT MGMT FOR PT TAKING WARFARIN: ICD-10-PCS | Mod: S$GLB,,,

## 2021-09-09 PROCEDURE — 93793 ANTICOAG MGMT PT WARFARIN: CPT | Mod: S$GLB,,,

## 2021-09-09 PROCEDURE — 85610 PROTHROMBIN TIME: CPT | Mod: QW,S$GLB,, | Performed by: INTERNAL MEDICINE

## 2021-09-13 ENCOUNTER — TELEPHONE (OUTPATIENT)
Dept: INTERNAL MEDICINE | Facility: CLINIC | Age: 86
End: 2021-09-13

## 2021-09-14 ENCOUNTER — TELEPHONE (OUTPATIENT)
Dept: INTERNAL MEDICINE | Facility: CLINIC | Age: 86
End: 2021-09-14

## 2021-09-15 ENCOUNTER — OFFICE VISIT (OUTPATIENT)
Dept: INTERNAL MEDICINE | Facility: CLINIC | Age: 86
End: 2021-09-15
Payer: MEDICARE

## 2021-09-15 VITALS
TEMPERATURE: 99 F | SYSTOLIC BLOOD PRESSURE: 112 MMHG | WEIGHT: 222 LBS | HEIGHT: 67 IN | HEART RATE: 52 BPM | DIASTOLIC BLOOD PRESSURE: 60 MMHG | OXYGEN SATURATION: 98 % | BODY MASS INDEX: 34.84 KG/M2

## 2021-09-15 DIAGNOSIS — E11.59 HYPERTENSION ASSOCIATED WITH DIABETES: ICD-10-CM

## 2021-09-15 DIAGNOSIS — E11.8 DM (DIABETES MELLITUS), TYPE 2 WITH COMPLICATIONS: ICD-10-CM

## 2021-09-15 DIAGNOSIS — I15.2 HYPERTENSION ASSOCIATED WITH DIABETES: ICD-10-CM

## 2021-09-15 DIAGNOSIS — R30.0 DYSURIA: Primary | ICD-10-CM

## 2021-09-15 LAB
BACTERIA #/AREA URNS AUTO: ABNORMAL /HPF
BILIRUB SERPL-MCNC: 1 MG/DL
BILIRUB UR QL STRIP: NEGATIVE
BLOOD URINE, POC: 250
CLARITY UR REFRACT.AUTO: ABNORMAL
CLARITY, POC UA: ABNORMAL
COLOR UR AUTO: YELLOW
COLOR, POC UA: ABNORMAL
GLUCOSE UR QL STRIP: NEGATIVE
GLUCOSE UR QL STRIP: NORMAL
HGB UR QL STRIP: ABNORMAL
KETONES UR QL STRIP: NEGATIVE
KETONES UR QL STRIP: NEGATIVE
LEUKOCYTE ESTERASE UR QL STRIP: ABNORMAL
LEUKOCYTE ESTERASE URINE, POC: ABNORMAL
MICROSCOPIC COMMENT: ABNORMAL
NITRITE UR QL STRIP: NEGATIVE
NITRITE, POC UA: NEGATIVE
PH UR STRIP: 7 [PH] (ref 5–8)
PH, POC UA: 7
PROT UR QL STRIP: NEGATIVE
PROTEIN, POC: 30
RBC #/AREA URNS AUTO: 20 /HPF (ref 0–4)
SP GR UR STRIP: 1.01 (ref 1–1.03)
SPECIFIC GRAVITY, POC UA: 1.01
SQUAMOUS #/AREA URNS AUTO: 1 /HPF
URN SPEC COLLECT METH UR: ABNORMAL
UROBILINOGEN, POC UA: 1
WBC #/AREA URNS AUTO: >100 /HPF (ref 0–5)
WBC CLUMPS UR QL AUTO: ABNORMAL

## 2021-09-15 PROCEDURE — 99213 OFFICE O/P EST LOW 20 MIN: CPT | Mod: S$GLB,,, | Performed by: INTERNAL MEDICINE

## 2021-09-15 PROCEDURE — 3288F PR FALLS RISK ASSESSMENT DOCUMENTED: ICD-10-PCS | Mod: CPTII,S$GLB,, | Performed by: INTERNAL MEDICINE

## 2021-09-15 PROCEDURE — 99213 PR OFFICE/OUTPT VISIT, EST, LEVL III, 20-29 MIN: ICD-10-PCS | Mod: S$GLB,,, | Performed by: INTERNAL MEDICINE

## 2021-09-15 PROCEDURE — 3288F FALL RISK ASSESSMENT DOCD: CPT | Mod: CPTII,S$GLB,, | Performed by: INTERNAL MEDICINE

## 2021-09-15 PROCEDURE — 81002 URINALYSIS NONAUTO W/O SCOPE: CPT | Mod: QW,S$GLB,, | Performed by: INTERNAL MEDICINE

## 2021-09-15 PROCEDURE — 87077 CULTURE AEROBIC IDENTIFY: CPT | Performed by: INTERNAL MEDICINE

## 2021-09-15 PROCEDURE — 1101F PT FALLS ASSESS-DOCD LE1/YR: CPT | Mod: CPTII,S$GLB,, | Performed by: INTERNAL MEDICINE

## 2021-09-15 PROCEDURE — 87086 URINE CULTURE/COLONY COUNT: CPT | Performed by: INTERNAL MEDICINE

## 2021-09-15 PROCEDURE — 87186 SC STD MICRODIL/AGAR DIL: CPT | Performed by: INTERNAL MEDICINE

## 2021-09-15 PROCEDURE — 1126F AMNT PAIN NOTED NONE PRSNT: CPT | Mod: CPTII,S$GLB,, | Performed by: INTERNAL MEDICINE

## 2021-09-15 PROCEDURE — 1159F PR MEDICATION LIST DOCUMENTED IN MEDICAL RECORD: ICD-10-PCS | Mod: CPTII,S$GLB,, | Performed by: INTERNAL MEDICINE

## 2021-09-15 PROCEDURE — 99999 PR PBB SHADOW E&M-EST. PATIENT-LVL IV: ICD-10-PCS | Mod: PBBFAC,,, | Performed by: INTERNAL MEDICINE

## 2021-09-15 PROCEDURE — 3072F PR LOW RISK FOR RETINOPATHY: ICD-10-PCS | Mod: CPTII,S$GLB,, | Performed by: INTERNAL MEDICINE

## 2021-09-15 PROCEDURE — 3072F LOW RISK FOR RETINOPATHY: CPT | Mod: CPTII,S$GLB,, | Performed by: INTERNAL MEDICINE

## 2021-09-15 PROCEDURE — 81001 URINALYSIS AUTO W/SCOPE: CPT | Performed by: INTERNAL MEDICINE

## 2021-09-15 PROCEDURE — 81002 POCT URINE DIPSTICK WITHOUT MICROSCOPE: ICD-10-PCS | Mod: QW,S$GLB,, | Performed by: INTERNAL MEDICINE

## 2021-09-15 PROCEDURE — 1101F PR PT FALLS ASSESS DOC 0-1 FALLS W/OUT INJ PAST YR: ICD-10-PCS | Mod: CPTII,S$GLB,, | Performed by: INTERNAL MEDICINE

## 2021-09-15 PROCEDURE — 87088 URINE BACTERIA CULTURE: CPT | Performed by: INTERNAL MEDICINE

## 2021-09-15 PROCEDURE — 1126F PR PAIN SEVERITY QUANTIFIED, NO PAIN PRESENT: ICD-10-PCS | Mod: CPTII,S$GLB,, | Performed by: INTERNAL MEDICINE

## 2021-09-15 PROCEDURE — 1159F MED LIST DOCD IN RCRD: CPT | Mod: CPTII,S$GLB,, | Performed by: INTERNAL MEDICINE

## 2021-09-15 PROCEDURE — 99999 PR PBB SHADOW E&M-EST. PATIENT-LVL IV: CPT | Mod: PBBFAC,,, | Performed by: INTERNAL MEDICINE

## 2021-09-15 RX ORDER — CIPROFLOXACIN 500 MG/1
500 TABLET ORAL 2 TIMES DAILY
Qty: 20 TABLET | Refills: 0 | Status: SHIPPED | OUTPATIENT
Start: 2021-09-15 | End: 2021-12-29

## 2021-09-18 LAB — BACTERIA UR CULT: ABNORMAL

## 2021-09-24 NOTE — TELEPHONE ENCOUNTER
Returned call to daughter who would like orders for monthly cath changes to be done through his home health agency.  Msg left to call us back.   Warm/Dry

## 2021-10-14 ENCOUNTER — ANTI-COAG VISIT (OUTPATIENT)
Dept: CARDIOLOGY | Facility: CLINIC | Age: 86
End: 2021-10-14
Payer: MEDICARE

## 2021-10-14 DIAGNOSIS — Z79.01 LONG TERM (CURRENT) USE OF ANTICOAGULANTS: Primary | ICD-10-CM

## 2021-10-14 DIAGNOSIS — Z86.711 HISTORY OF PULMONARY EMBOLISM: ICD-10-CM

## 2021-10-14 LAB — INR PPP: 2.2 (ref 2–3)

## 2021-10-14 PROCEDURE — 93793 PR ANTICOAGULANT MGMT FOR PT TAKING WARFARIN: ICD-10-PCS | Mod: HCNC,S$GLB,,

## 2021-10-14 PROCEDURE — 85610 POCT INR: ICD-10-PCS | Mod: QW,HCNC,S$GLB, | Performed by: INTERNAL MEDICINE

## 2021-10-14 PROCEDURE — 85610 PROTHROMBIN TIME: CPT | Mod: QW,HCNC,S$GLB, | Performed by: INTERNAL MEDICINE

## 2021-10-14 PROCEDURE — 93793 ANTICOAG MGMT PT WARFARIN: CPT | Mod: HCNC,S$GLB,,

## 2021-11-18 ENCOUNTER — ANTI-COAG VISIT (OUTPATIENT)
Dept: CARDIOLOGY | Facility: CLINIC | Age: 86
End: 2021-11-18
Payer: MEDICARE

## 2021-11-18 DIAGNOSIS — Z86.711 HISTORY OF PULMONARY EMBOLISM: ICD-10-CM

## 2021-11-18 DIAGNOSIS — Z79.01 LONG TERM (CURRENT) USE OF ANTICOAGULANTS: Primary | ICD-10-CM

## 2021-11-18 LAB — INR PPP: 2.2 (ref 2–3)

## 2021-11-18 PROCEDURE — 93793 ANTICOAG MGMT PT WARFARIN: CPT | Mod: HCNC,S$GLB,,

## 2021-11-18 PROCEDURE — 93793 PR ANTICOAGULANT MGMT FOR PT TAKING WARFARIN: ICD-10-PCS | Mod: HCNC,S$GLB,,

## 2021-11-18 PROCEDURE — 85610 PROTHROMBIN TIME: CPT | Mod: QW,HCNC,S$GLB, | Performed by: INTERNAL MEDICINE

## 2021-11-18 PROCEDURE — 85610 POCT INR: ICD-10-PCS | Mod: QW,HCNC,S$GLB, | Performed by: INTERNAL MEDICINE

## 2021-12-23 ENCOUNTER — ANTI-COAG VISIT (OUTPATIENT)
Dept: CARDIOLOGY | Facility: CLINIC | Age: 86
End: 2021-12-23
Payer: MEDICARE

## 2021-12-29 ENCOUNTER — OFFICE VISIT (OUTPATIENT)
Dept: INTERNAL MEDICINE | Facility: CLINIC | Age: 86
End: 2021-12-29
Payer: MEDICARE

## 2021-12-29 VITALS
DIASTOLIC BLOOD PRESSURE: 66 MMHG | HEART RATE: 79 BPM | TEMPERATURE: 99 F | OXYGEN SATURATION: 98 % | HEIGHT: 67 IN | BODY MASS INDEX: 34.05 KG/M2 | SYSTOLIC BLOOD PRESSURE: 122 MMHG | WEIGHT: 216.94 LBS

## 2021-12-29 DIAGNOSIS — I15.2 HYPERTENSION ASSOCIATED WITH DIABETES: ICD-10-CM

## 2021-12-29 DIAGNOSIS — E11.22 CKD STAGE 3 DUE TO TYPE 2 DIABETES MELLITUS: ICD-10-CM

## 2021-12-29 DIAGNOSIS — E11.59 HYPERTENSION ASSOCIATED WITH DIABETES: ICD-10-CM

## 2021-12-29 DIAGNOSIS — Z87.39 HISTORY OF GOUT: ICD-10-CM

## 2021-12-29 DIAGNOSIS — N18.30 CKD STAGE 3 DUE TO TYPE 2 DIABETES MELLITUS: ICD-10-CM

## 2021-12-29 DIAGNOSIS — E11.8 DM (DIABETES MELLITUS), TYPE 2 WITH COMPLICATIONS: Primary | ICD-10-CM

## 2021-12-29 DIAGNOSIS — E11.42 TYPE 2 DIABETES MELLITUS WITH POLYNEUROPATHY: ICD-10-CM

## 2021-12-29 DIAGNOSIS — Z91.81 AT HIGH RISK FOR FALLS: ICD-10-CM

## 2021-12-29 PROCEDURE — 3072F PR LOW RISK FOR RETINOPATHY: ICD-10-PCS | Mod: HCNC,CPTII,S$GLB, | Performed by: INTERNAL MEDICINE

## 2021-12-29 PROCEDURE — 3288F PR FALLS RISK ASSESSMENT DOCUMENTED: ICD-10-PCS | Mod: HCNC,CPTII,S$GLB, | Performed by: INTERNAL MEDICINE

## 2021-12-29 PROCEDURE — 99499 RISK ADDL DX/OHS AUDIT: ICD-10-PCS | Mod: S$GLB,,, | Performed by: INTERNAL MEDICINE

## 2021-12-29 PROCEDURE — 99999 PR PBB SHADOW E&M-EST. PATIENT-LVL III: CPT | Mod: PBBFAC,HCNC,, | Performed by: INTERNAL MEDICINE

## 2021-12-29 PROCEDURE — 3288F FALL RISK ASSESSMENT DOCD: CPT | Mod: HCNC,CPTII,S$GLB, | Performed by: INTERNAL MEDICINE

## 2021-12-29 PROCEDURE — 1126F PR PAIN SEVERITY QUANTIFIED, NO PAIN PRESENT: ICD-10-PCS | Mod: HCNC,CPTII,S$GLB, | Performed by: INTERNAL MEDICINE

## 2021-12-29 PROCEDURE — 1159F MED LIST DOCD IN RCRD: CPT | Mod: HCNC,CPTII,S$GLB, | Performed by: INTERNAL MEDICINE

## 2021-12-29 PROCEDURE — 1101F PR PT FALLS ASSESS DOC 0-1 FALLS W/OUT INJ PAST YR: ICD-10-PCS | Mod: HCNC,CPTII,S$GLB, | Performed by: INTERNAL MEDICINE

## 2021-12-29 PROCEDURE — 99499 UNLISTED E&M SERVICE: CPT | Mod: S$GLB,,, | Performed by: INTERNAL MEDICINE

## 2021-12-29 PROCEDURE — 1159F PR MEDICATION LIST DOCUMENTED IN MEDICAL RECORD: ICD-10-PCS | Mod: HCNC,CPTII,S$GLB, | Performed by: INTERNAL MEDICINE

## 2021-12-29 PROCEDURE — 99214 OFFICE O/P EST MOD 30 MIN: CPT | Mod: HCNC,S$GLB,, | Performed by: INTERNAL MEDICINE

## 2021-12-29 PROCEDURE — 99999 PR PBB SHADOW E&M-EST. PATIENT-LVL III: ICD-10-PCS | Mod: PBBFAC,HCNC,, | Performed by: INTERNAL MEDICINE

## 2021-12-29 PROCEDURE — 3072F LOW RISK FOR RETINOPATHY: CPT | Mod: HCNC,CPTII,S$GLB, | Performed by: INTERNAL MEDICINE

## 2021-12-29 PROCEDURE — 1101F PT FALLS ASSESS-DOCD LE1/YR: CPT | Mod: HCNC,CPTII,S$GLB, | Performed by: INTERNAL MEDICINE

## 2021-12-29 PROCEDURE — 1126F AMNT PAIN NOTED NONE PRSNT: CPT | Mod: HCNC,CPTII,S$GLB, | Performed by: INTERNAL MEDICINE

## 2021-12-29 PROCEDURE — 99214 PR OFFICE/OUTPT VISIT, EST, LEVL IV, 30-39 MIN: ICD-10-PCS | Mod: HCNC,S$GLB,, | Performed by: INTERNAL MEDICINE

## 2021-12-29 RX ORDER — METOPROLOL TARTRATE 50 MG/1
50 TABLET ORAL DAILY
Qty: 90 TABLET | Refills: 3 | Status: SHIPPED | OUTPATIENT
Start: 2021-12-29 | End: 2022-11-03

## 2021-12-29 RX ORDER — DOCUSATE SODIUM 100 MG/1
100 CAPSULE, LIQUID FILLED ORAL 2 TIMES DAILY
Qty: 180 CAPSULE | Refills: 3 | Status: SHIPPED | OUTPATIENT
Start: 2021-12-29 | End: 2023-11-07

## 2021-12-29 RX ORDER — TAMSULOSIN HYDROCHLORIDE 0.4 MG/1
1 CAPSULE ORAL DAILY
Qty: 90 CAPSULE | Refills: 3 | Status: SHIPPED | OUTPATIENT
Start: 2021-12-29 | End: 2022-10-20 | Stop reason: SDUPTHER

## 2021-12-29 RX ORDER — HYDROCODONE BITARTRATE AND ACETAMINOPHEN 7.5; 325 MG/1; MG/1
1 TABLET ORAL EVERY 8 HOURS PRN
Qty: 90 TABLET | Refills: 0 | Status: SHIPPED | OUTPATIENT
Start: 2021-12-29 | End: 2022-12-28 | Stop reason: SDUPTHER

## 2022-01-06 DIAGNOSIS — N40.1 BENIGN PROSTATIC HYPERPLASIA WITH URINARY RETENTION: Primary | ICD-10-CM

## 2022-01-06 DIAGNOSIS — R33.8 BENIGN PROSTATIC HYPERPLASIA WITH URINARY RETENTION: Primary | ICD-10-CM

## 2022-01-06 RX ORDER — FINASTERIDE 5 MG/1
5 TABLET, FILM COATED ORAL DAILY
Qty: 30 TABLET | Refills: 11 | Status: SHIPPED | OUTPATIENT
Start: 2022-01-06 | End: 2023-01-11

## 2022-01-06 NOTE — TELEPHONE ENCOUNTER
Message sent to provider         ----- Message from Salma Rai sent at 1/5/2022  3:30 PM CST -----  Contact: self [phone]  Requesting an RX refill or new RX.  Is this a refill or new RX: refill  RX name and strength (copy/paste from chart):finasteride (PROSCAR) 5 mg tablet   Is this a 30 day or 90 day RX:   Patient advised that in the future they can use their [facility] account to request a refill?:  yes  Pharmacy name and phone # (copy/paste from chart):    [facility] - [place], [place] - [address]  [address] [address]  Phone: [phone] Fax: [phone]      Comments:     Please call and advise

## 2022-02-11 ENCOUNTER — LAB VISIT (OUTPATIENT)
Dept: LAB | Facility: HOSPITAL | Age: 87
End: 2022-02-11
Attending: INTERNAL MEDICINE
Payer: MEDICARE

## 2022-02-11 DIAGNOSIS — E11.8 DM (DIABETES MELLITUS), TYPE 2 WITH COMPLICATIONS: ICD-10-CM

## 2022-02-11 DIAGNOSIS — E11.42 TYPE 2 DIABETES MELLITUS WITH POLYNEUROPATHY: ICD-10-CM

## 2022-02-11 LAB
BASOPHILS # BLD AUTO: 0.04 K/UL (ref 0–0.2)
BASOPHILS NFR BLD: 0.5 % (ref 0–1.9)
DIFFERENTIAL METHOD: ABNORMAL
EOSINOPHIL # BLD AUTO: 0.2 K/UL (ref 0–0.5)
EOSINOPHIL NFR BLD: 2.1 % (ref 0–8)
ERYTHROCYTE [DISTWIDTH] IN BLOOD BY AUTOMATED COUNT: 17.9 % (ref 11.5–14.5)
HCT VFR BLD AUTO: 50.8 % (ref 40–54)
HGB BLD-MCNC: 15.2 G/DL (ref 14–18)
IMM GRANULOCYTES # BLD AUTO: 0.02 K/UL (ref 0–0.04)
IMM GRANULOCYTES NFR BLD AUTO: 0.3 % (ref 0–0.5)
LYMPHOCYTES # BLD AUTO: 1.2 K/UL (ref 1–4.8)
LYMPHOCYTES NFR BLD: 15.8 % (ref 18–48)
MCH RBC QN AUTO: 27.7 PG (ref 27–31)
MCHC RBC AUTO-ENTMCNC: 29.9 G/DL (ref 32–36)
MCV RBC AUTO: 93 FL (ref 82–98)
MONOCYTES # BLD AUTO: 0.8 K/UL (ref 0.3–1)
MONOCYTES NFR BLD: 10.3 % (ref 4–15)
NEUTROPHILS # BLD AUTO: 5.3 K/UL (ref 1.8–7.7)
NEUTROPHILS NFR BLD: 71 % (ref 38–73)
NRBC BLD-RTO: 0 /100 WBC
PLATELET # BLD AUTO: 211 K/UL (ref 150–450)
PMV BLD AUTO: 12 FL (ref 9.2–12.9)
RBC # BLD AUTO: 5.48 M/UL (ref 4.6–6.2)
WBC # BLD AUTO: 7.48 K/UL (ref 3.9–12.7)

## 2022-02-11 PROCEDURE — 84443 ASSAY THYROID STIM HORMONE: CPT | Mod: HCNC | Performed by: INTERNAL MEDICINE

## 2022-02-11 PROCEDURE — 80053 COMPREHEN METABOLIC PANEL: CPT | Mod: HCNC | Performed by: INTERNAL MEDICINE

## 2022-02-11 PROCEDURE — 83036 HEMOGLOBIN GLYCOSYLATED A1C: CPT | Mod: HCNC | Performed by: INTERNAL MEDICINE

## 2022-02-11 PROCEDURE — 36415 COLL VENOUS BLD VENIPUNCTURE: CPT | Mod: HCNC | Performed by: INTERNAL MEDICINE

## 2022-02-11 PROCEDURE — 85025 COMPLETE CBC W/AUTO DIFF WBC: CPT | Mod: HCNC | Performed by: INTERNAL MEDICINE

## 2022-02-11 PROCEDURE — 80061 LIPID PANEL: CPT | Mod: HCNC | Performed by: INTERNAL MEDICINE

## 2022-02-12 LAB
ALBUMIN SERPL BCP-MCNC: 3.5 G/DL (ref 3.5–5.2)
ALP SERPL-CCNC: 71 U/L (ref 55–135)
ALT SERPL W/O P-5'-P-CCNC: 10 U/L (ref 10–44)
ANION GAP SERPL CALC-SCNC: 9 MMOL/L (ref 8–16)
AST SERPL-CCNC: 15 U/L (ref 10–40)
BILIRUB SERPL-MCNC: 0.9 MG/DL (ref 0.1–1)
BUN SERPL-MCNC: 17 MG/DL (ref 10–30)
CALCIUM SERPL-MCNC: 10.4 MG/DL (ref 8.7–10.5)
CHLORIDE SERPL-SCNC: 106 MMOL/L (ref 95–110)
CHOLEST SERPL-MCNC: 158 MG/DL (ref 120–199)
CHOLEST/HDLC SERPL: 3.7 {RATIO} (ref 2–5)
CO2 SERPL-SCNC: 26 MMOL/L (ref 23–29)
CREAT SERPL-MCNC: 1 MG/DL (ref 0.5–1.4)
EST. GFR  (AFRICAN AMERICAN): >60 ML/MIN/1.73 M^2
EST. GFR  (NON AFRICAN AMERICAN): >60 ML/MIN/1.73 M^2
ESTIMATED AVG GLUCOSE: 105 MG/DL (ref 68–131)
GLUCOSE SERPL-MCNC: 94 MG/DL (ref 70–110)
HBA1C MFR BLD: 5.3 % (ref 4–5.6)
HDLC SERPL-MCNC: 43 MG/DL (ref 40–75)
HDLC SERPL: 27.2 % (ref 20–50)
LDLC SERPL CALC-MCNC: 84.2 MG/DL (ref 63–159)
NONHDLC SERPL-MCNC: 115 MG/DL
POTASSIUM SERPL-SCNC: 3.9 MMOL/L (ref 3.5–5.1)
PROT SERPL-MCNC: 7.9 G/DL (ref 6–8.4)
SODIUM SERPL-SCNC: 141 MMOL/L (ref 136–145)
TRIGL SERPL-MCNC: 154 MG/DL (ref 30–150)
TSH SERPL DL<=0.005 MIU/L-ACNC: 2.69 UIU/ML (ref 0.4–4)

## 2022-02-18 ENCOUNTER — OFFICE VISIT (OUTPATIENT)
Dept: INTERNAL MEDICINE | Facility: CLINIC | Age: 87
End: 2022-02-18
Payer: MEDICARE

## 2022-02-18 VITALS
OXYGEN SATURATION: 97 % | SYSTOLIC BLOOD PRESSURE: 120 MMHG | HEART RATE: 74 BPM | TEMPERATURE: 98 F | WEIGHT: 216.94 LBS | BODY MASS INDEX: 34.05 KG/M2 | HEIGHT: 67 IN | DIASTOLIC BLOOD PRESSURE: 64 MMHG

## 2022-02-18 DIAGNOSIS — H91.13 PRESBYCUSIS OF BOTH EARS: ICD-10-CM

## 2022-02-18 DIAGNOSIS — I15.2 HYPERTENSION ASSOCIATED WITH DIABETES: ICD-10-CM

## 2022-02-18 DIAGNOSIS — E11.8 DM (DIABETES MELLITUS), TYPE 2 WITH COMPLICATIONS: ICD-10-CM

## 2022-02-18 DIAGNOSIS — Z87.39 HISTORY OF GOUT: ICD-10-CM

## 2022-02-18 DIAGNOSIS — I70.0 ATHEROSCLEROSIS OF AORTA: ICD-10-CM

## 2022-02-18 DIAGNOSIS — M15.9 PRIMARY OSTEOARTHRITIS INVOLVING MULTIPLE JOINTS: ICD-10-CM

## 2022-02-18 DIAGNOSIS — E11.22 CKD STAGE 3 DUE TO TYPE 2 DIABETES MELLITUS: ICD-10-CM

## 2022-02-18 DIAGNOSIS — Z00.00 ROUTINE GENERAL MEDICAL EXAMINATION AT A HEALTH CARE FACILITY: Primary | ICD-10-CM

## 2022-02-18 DIAGNOSIS — E11.42 TYPE 2 DIABETES MELLITUS WITH POLYNEUROPATHY: ICD-10-CM

## 2022-02-18 DIAGNOSIS — E11.59 HYPERTENSION ASSOCIATED WITH DIABETES: ICD-10-CM

## 2022-02-18 DIAGNOSIS — N18.30 CKD STAGE 3 DUE TO TYPE 2 DIABETES MELLITUS: ICD-10-CM

## 2022-02-18 DIAGNOSIS — N40.1 BENIGN PROSTATIC HYPERPLASIA WITH URINARY RETENTION: ICD-10-CM

## 2022-02-18 DIAGNOSIS — Z91.81 AT HIGH RISK FOR FALLS: ICD-10-CM

## 2022-02-18 DIAGNOSIS — I87.2 CHRONIC VENOUS INSUFFICIENCY: ICD-10-CM

## 2022-02-18 DIAGNOSIS — R33.8 BENIGN PROSTATIC HYPERPLASIA WITH URINARY RETENTION: ICD-10-CM

## 2022-02-18 PROCEDURE — 3288F FALL RISK ASSESSMENT DOCD: CPT | Mod: HCNC,CPTII,S$GLB, | Performed by: INTERNAL MEDICINE

## 2022-02-18 PROCEDURE — 99397 PER PM REEVAL EST PAT 65+ YR: CPT | Mod: HCNC,S$GLB,, | Performed by: INTERNAL MEDICINE

## 2022-02-18 PROCEDURE — 1126F AMNT PAIN NOTED NONE PRSNT: CPT | Mod: HCNC,CPTII,S$GLB, | Performed by: INTERNAL MEDICINE

## 2022-02-18 PROCEDURE — 99999 PR PBB SHADOW E&M-EST. PATIENT-LVL IV: ICD-10-PCS | Mod: PBBFAC,HCNC,, | Performed by: INTERNAL MEDICINE

## 2022-02-18 PROCEDURE — 99999 PR PBB SHADOW E&M-EST. PATIENT-LVL IV: CPT | Mod: PBBFAC,HCNC,, | Performed by: INTERNAL MEDICINE

## 2022-02-18 PROCEDURE — 99397 PR PREVENTIVE VISIT,EST,65 & OVER: ICD-10-PCS | Mod: HCNC,S$GLB,, | Performed by: INTERNAL MEDICINE

## 2022-02-18 PROCEDURE — 3288F PR FALLS RISK ASSESSMENT DOCUMENTED: ICD-10-PCS | Mod: HCNC,CPTII,S$GLB, | Performed by: INTERNAL MEDICINE

## 2022-02-18 PROCEDURE — 1159F MED LIST DOCD IN RCRD: CPT | Mod: HCNC,CPTII,S$GLB, | Performed by: INTERNAL MEDICINE

## 2022-02-18 PROCEDURE — 1101F PR PT FALLS ASSESS DOC 0-1 FALLS W/OUT INJ PAST YR: ICD-10-PCS | Mod: HCNC,CPTII,S$GLB, | Performed by: INTERNAL MEDICINE

## 2022-02-18 PROCEDURE — 1159F PR MEDICATION LIST DOCUMENTED IN MEDICAL RECORD: ICD-10-PCS | Mod: HCNC,CPTII,S$GLB, | Performed by: INTERNAL MEDICINE

## 2022-02-18 PROCEDURE — 1126F PR PAIN SEVERITY QUANTIFIED, NO PAIN PRESENT: ICD-10-PCS | Mod: HCNC,CPTII,S$GLB, | Performed by: INTERNAL MEDICINE

## 2022-02-18 PROCEDURE — 1101F PT FALLS ASSESS-DOCD LE1/YR: CPT | Mod: HCNC,CPTII,S$GLB, | Performed by: INTERNAL MEDICINE

## 2022-02-18 NOTE — PROGRESS NOTES
"HPI:  Patient is a 89-year-old man comes in today for follow-up of his diabetes, hypertension, lipids, chronic kidney disease, gout for his annual physical.  Patient been doing quite well at home.  He gets around with a cane or walker.  He still has good cognition.  He has had no problems with hypoglycemia.  He has had no flares of gout.  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    Obesity, unspecified     Primary osteoarthritis involving multiple joints        Past Surgical History:   Procedure Laterality Date    CATARACT EXTRACTION      aphakic od    CATARACT EXTRACTION W/  INTRAOCULAR LENS IMPLANT Left 12/13/2018    TRANSURETHRAL RESECTION OF PROSTATE N/A 5/31/2018    Procedure: PROSTATECTOMY-TRANSURETHRAL;  Surgeon: Michael Westbrook IV, MD;  Location: [facility];  Service: Urology;  Laterality: N/A;    VASCULAR SURGERY      yag laser capsulotomy Left 09/08/2020       SHx: per the electronic medical record    FHx: recorded in the electronic medical record    ROS:    denies any chest pains or shortness of breath. Denies any nausea, vomiting or diarrhea. Denies any fever, chills or sweats. Denies any change in weight, voice, stool, skin or hair. Denies any dysuria, dyspepsia or dysphagia. Denies any change in vision, hearing or headaches. Denies any swollen lymph nodes or loss of memory.    PE:  /64   Pulse 74   Temp 97.9 °F (36.6 °C) (Temporal)   Ht 5' 7" (1.702 m)   Wt 98.4 kg (216 lb 14.9 oz)   " SpO2 97%   BMI 33.98 kg/m²   Gen: Well-developed, well-nourished, male, in no acute distress, oriented x3  HEENT: neck is supple, no adenopathy, carotids 2+ equal without bruits, thyroid exam normal size without nodules.  CHEST: clear to auscultation and percussion  CVS: regular rate and rhythm without significant murmur, gallop, or rubs  ABD: soft, benign, no rebound no guarding, no distention.  Bowel sounds are normal.     nontender.  No palpable masses.  No organomegaly and no audible bruits.  RECTAL:  Deferred.  EXT: no clubbing, cyanosis, or edema  LYMPH: no cervical, inguinal, or axillary adenopathy  FEET: no loss of sensation.  No ulcers or pressure sores.  Monofilament testing normal  NEURO: gait normal.  Cranial nerves II- XII intact. No nystagmus.  Speech normal.   Gross motor and sensory unremarkable.    Lab Results   Component Value Date    WBC 7.48 02/11/2022    HGB 15.2 02/11/2022    HCT 50.8 02/11/2022     02/11/2022    CHOL 158 02/11/2022    TRIG 154 (H) 02/11/2022    HDL 43 02/11/2022    ALT 10 02/11/2022    AST 15 02/11/2022     02/11/2022    K 3.9 02/11/2022     02/11/2022    CREATININE 1.0 02/11/2022    BUN 17 02/11/2022    CO2 26 02/11/2022    TSH 2.689 02/11/2022    PSA 4.4 (H) 06/14/2021    INR 2.2 11/18/2021    HGBA1C 5.3 02/11/2022       Impression:  Multiple medical problems below, stable  Patient Active Problem List   Diagnosis    Obesity    Hearing loss of aging    History of pulmonary embolism    DM (diabetes mellitus), type 2 with complications    Hypertension associated with diabetes    History of gout    Type 2 diabetes mellitus with polyneuropathy    Urinary retention    Aneurysm artery, popliteal    Atherosclerosis of aorta    Benign prostatic hyperplasia    Chronic venous insufficiency    Primary osteoarthritis involving multiple joints    At high risk for falls    CKD stage 3 due to type 2 diabetes mellitus       Plan:   Orders Placed This  Encounter    Hemoglobin A1C    Lipid Panel    Basic Metabolic Panel    Microalbumin/Creatinine Ratio, Urine   Medications remain the same.  He will be seen again in 6 months with the above lab work.    This note is generated with speech recognition software and is subject to transcription error and sound alike phrases that may be missed by proofreading.

## 2022-02-23 ENCOUNTER — DOCUMENT SCAN (OUTPATIENT)
Dept: HOME HEALTH SERVICES | Facility: HOSPITAL | Age: 87
End: 2022-02-23
Payer: MEDICARE

## 2022-03-09 ENCOUNTER — TELEPHONE (OUTPATIENT)
Dept: INTERNAL MEDICINE | Facility: CLINIC | Age: 87
End: 2022-03-09
Payer: MEDICARE

## 2022-03-09 NOTE — TELEPHONE ENCOUNTER
----- Message from Lexy Chris sent at 3/8/2022  4:04 PM CST -----  Contact: Pt daughter  .Type:  Patient Returning Call    Who Called: Nel     Does the patient know what this is regarding?: [facility] needs a letter saying pt can handle on finances he is competent/ Nel pays the bills and she signs his name on check due to his shaking- letter needs to be on the hospitals letterhead        Would the patient rather a call back or a response via [facility]? Callback   Best Call Back Number [phone]    Additional Information:

## 2022-03-14 ENCOUNTER — OFFICE VISIT (OUTPATIENT)
Dept: INTERNAL MEDICINE | Facility: CLINIC | Age: 87
End: 2022-03-14
Payer: MEDICARE

## 2022-03-14 VITALS
HEIGHT: 71 IN | SYSTOLIC BLOOD PRESSURE: 128 MMHG | OXYGEN SATURATION: 98 % | WEIGHT: 222.25 LBS | DIASTOLIC BLOOD PRESSURE: 70 MMHG | HEART RATE: 73 BPM | BODY MASS INDEX: 31.11 KG/M2

## 2022-03-14 DIAGNOSIS — E11.22 CKD STAGE 3 DUE TO TYPE 2 DIABETES MELLITUS: ICD-10-CM

## 2022-03-14 DIAGNOSIS — E11.42 TYPE 2 DIABETES MELLITUS WITH POLYNEUROPATHY: Primary | ICD-10-CM

## 2022-03-14 DIAGNOSIS — E11.8 DM (DIABETES MELLITUS), TYPE 2 WITH COMPLICATIONS: ICD-10-CM

## 2022-03-14 DIAGNOSIS — N18.30 CKD STAGE 3 DUE TO TYPE 2 DIABETES MELLITUS: ICD-10-CM

## 2022-03-14 DIAGNOSIS — I15.2 HYPERTENSION ASSOCIATED WITH DIABETES: ICD-10-CM

## 2022-03-14 DIAGNOSIS — I87.2 CHRONIC VENOUS INSUFFICIENCY: ICD-10-CM

## 2022-03-14 DIAGNOSIS — E11.59 HYPERTENSION ASSOCIATED WITH DIABETES: ICD-10-CM

## 2022-03-14 PROCEDURE — 1159F PR MEDICATION LIST DOCUMENTED IN MEDICAL RECORD: ICD-10-PCS | Mod: CPTII,S$GLB,, | Performed by: INTERNAL MEDICINE

## 2022-03-14 PROCEDURE — 99214 OFFICE O/P EST MOD 30 MIN: CPT | Mod: S$GLB,,, | Performed by: INTERNAL MEDICINE

## 2022-03-14 PROCEDURE — 99214 PR OFFICE/OUTPT VISIT, EST, LEVL IV, 30-39 MIN: ICD-10-PCS | Mod: S$GLB,,, | Performed by: INTERNAL MEDICINE

## 2022-03-14 PROCEDURE — 99499 RISK ADDL DX/OHS AUDIT: ICD-10-PCS | Mod: S$GLB,,, | Performed by: INTERNAL MEDICINE

## 2022-03-14 PROCEDURE — 1101F PR PT FALLS ASSESS DOC 0-1 FALLS W/OUT INJ PAST YR: ICD-10-PCS | Mod: CPTII,S$GLB,, | Performed by: INTERNAL MEDICINE

## 2022-03-14 PROCEDURE — 99999 PR PBB SHADOW E&M-EST. PATIENT-LVL III: ICD-10-PCS | Mod: PBBFAC,,, | Performed by: INTERNAL MEDICINE

## 2022-03-14 PROCEDURE — 1101F PT FALLS ASSESS-DOCD LE1/YR: CPT | Mod: CPTII,S$GLB,, | Performed by: INTERNAL MEDICINE

## 2022-03-14 PROCEDURE — 3288F PR FALLS RISK ASSESSMENT DOCUMENTED: ICD-10-PCS | Mod: CPTII,S$GLB,, | Performed by: INTERNAL MEDICINE

## 2022-03-14 PROCEDURE — 99499 UNLISTED E&M SERVICE: CPT | Mod: S$GLB,,, | Performed by: INTERNAL MEDICINE

## 2022-03-14 PROCEDURE — 3288F FALL RISK ASSESSMENT DOCD: CPT | Mod: CPTII,S$GLB,, | Performed by: INTERNAL MEDICINE

## 2022-03-14 PROCEDURE — 1126F AMNT PAIN NOTED NONE PRSNT: CPT | Mod: CPTII,S$GLB,, | Performed by: INTERNAL MEDICINE

## 2022-03-14 PROCEDURE — 1126F PR PAIN SEVERITY QUANTIFIED, NO PAIN PRESENT: ICD-10-PCS | Mod: CPTII,S$GLB,, | Performed by: INTERNAL MEDICINE

## 2022-03-14 PROCEDURE — 99999 PR PBB SHADOW E&M-EST. PATIENT-LVL III: CPT | Mod: PBBFAC,,, | Performed by: INTERNAL MEDICINE

## 2022-03-14 PROCEDURE — 1159F MED LIST DOCD IN RCRD: CPT | Mod: CPTII,S$GLB,, | Performed by: INTERNAL MEDICINE

## 2022-03-14 NOTE — PROGRESS NOTES
"HPI:  Patient is 89-year-old man who comes today because the [facility] is requesting a letter from his doctor stating he is competent to handle his own financial affairs.  Apparently his [facility] check his BP currently going to his daughter and he wants it to come to itself.  I have known him for over to decades and he has no significant mental declined at all.  I had no concerned about his competency to handle his own financial affairs.    Current meds have been verified and updated per the EMR  Exam:/70 (BP Location: Left arm)   Pulse 73   Ht 5' 11" (1.803 m)   Wt 100.8 kg (222 lb 3.6 oz)   SpO2 98%   BMI 30.99 kg/m²   Exam deferred    Lab Results   Component Value Date    WBC 7.48 02/11/2022    HGB 15.2 02/11/2022    HCT 50.8 02/11/2022     02/11/2022    CHOL 158 02/11/2022    TRIG 154 (H) 02/11/2022    HDL 43 02/11/2022    ALT 10 02/11/2022    AST 15 02/11/2022     02/11/2022    K 3.9 02/11/2022     02/11/2022    CREATININE 1.0 02/11/2022    BUN 17 02/11/2022    CO2 26 02/11/2022    TSH 2.689 02/11/2022    PSA 4.4 (H) 06/14/2021    INR 2.2 11/18/2021    HGBA1C 5.3 02/11/2022       Impression:  Stable problems below  Patient Active Problem List   Diagnosis    Obesity    Hearing loss of aging    History of pulmonary embolism    DM (diabetes mellitus), type 2 with complications    Hypertension associated with diabetes    History of gout    Type 2 diabetes mellitus with polyneuropathy    Urinary retention    Aneurysm artery, popliteal    Atherosclerosis of aorta    Benign prostatic hyperplasia    Chronic venous insufficiency    Primary osteoarthritis involving multiple joints    At high risk for falls    CKD stage 3 due to type 2 diabetes mellitus       Plan:     Letter was written and given to the patient.    This note is generated with speech recognition software and is subject to transcription error and sound alike phrases that may be missed by proofreading.      "

## 2022-04-07 ENCOUNTER — DOCUMENTATION ONLY (OUTPATIENT)
Dept: AUDIOLOGY | Facility: CLINIC | Age: 87
End: 2022-04-07
Payer: MEDICARE

## 2022-04-07 NOTE — PROGRESS NOTES
[employer] Implant Registration Form    Right Ear    [employer]   Implant Model CI24RE(CA)   Internal Serial Number [id number]   Date of Implantation 10/27/2011   Date of Initial Stimulation 11/17/2011

## 2022-05-04 NOTE — TELEPHONE ENCOUNTER
Care Due:                  Date            Visit Type   Department     Provider  --------------------------------------------------------------------------------                                EP -                              PRIMARY      [facility] INTERNAL  Last Visit: 03-      CARE ([facility])   [facility]       Stefan Vera                              [facility]                              PRIMARY      [facility] INTERNAL  Next Visit: 08-      CARE ([facility])   [facility]       Stefan Vera                                                            Last  Test          Frequency    Reason                     Performed    Due Date  --------------------------------------------------------------------------------    Uric Acid...  12 months..  allopurinoL..............  Not Found    Overdue    [employer] Embedded Care Gaps. Reference number: [id number]. 5/04/2022   5:55:17 PM CDT

## 2022-05-06 RX ORDER — METFORMIN HYDROCHLORIDE 850 MG/1
TABLET ORAL
Qty: 90 TABLET | Refills: 3 | Status: SHIPPED | OUTPATIENT
Start: 2022-05-06 | End: 2023-05-08

## 2022-05-06 NOTE — TELEPHONE ENCOUNTER
Refill Routing Note   Medication(s) are not appropriate for processing by [facility] for the following reason(s):      - Drug-Disease Interaction (CKD stage 3 due to type 2 diabetes mellitus)    [facility] action(s):  Defer Medication-related problems identified: Drug-disease interaction        Medication reconciliation completed: No     Appointments  past 12m or future 3m with PCP    Date Provider   Last Visit   3/14/2022 Stefan Vera MD   Next Visit   8/22/2022 Stefan Vera MD   ED visits in past 90 days: 0        Note composed:7:46 PM 05/05/2022

## 2022-08-06 NOTE — TELEPHONE ENCOUNTER
Care Due:                  Date            Visit Type   Department     Provider  --------------------------------------------------------------------------------                                EP -                              PRIMARY      [facility] INTERNAL  Last Visit: 03-      CARE ([facility])   JENIFER Vera                              [facility]                              PRIMARY      [facility] INTERNAL  Next Visit: 08-      CARE ([facility])   [facility]       Stefan Vera                                                            Last  Test          Frequency    Reason                     Performed    Due Date  --------------------------------------------------------------------------------    HBA1C.......  6 months...  metFORMIN................  02- 08-    Uric Acid...  12 months..  allopurinoL..............  Not Found    Overdue    [employer] Embedded Care Gaps. Reference number: [id number]. 8/06/2022   11:00:36 AM CDT

## 2022-08-07 RX ORDER — FLUTICASONE PROPIONATE 50 MCG
SPRAY, SUSPENSION (ML) NASAL
Qty: 16 G | Refills: 3 | Status: SHIPPED | OUTPATIENT
Start: 2022-08-07 | End: 2024-01-16

## 2022-08-07 NOTE — TELEPHONE ENCOUNTER
Refill Decision Note   Amauri Lara  is requesting a refill authorization.  Brief Assessment and Rationale for Refill:  Approve    -Medication-Related Problems Identified: Requires labs  Medication Therapy Plan:       Medication Reconciliation Completed: No   Comments:     No Care Gaps recommended.     Note composed:2:51 PM 08/07/2022

## 2022-08-15 ENCOUNTER — LAB VISIT (OUTPATIENT)
Dept: LAB | Facility: HOSPITAL | Age: 87
End: 2022-08-15
Attending: INTERNAL MEDICINE
Payer: MEDICARE

## 2022-08-15 DIAGNOSIS — E11.8 DM (DIABETES MELLITUS), TYPE 2 WITH COMPLICATIONS: ICD-10-CM

## 2022-08-15 LAB
ALBUMIN/CREAT UR: 219 UG/MG (ref 0–30)
CREAT UR-MCNC: 58 MG/DL (ref 23–375)
MICROALBUMIN UR DL<=1MG/L-MCNC: 127 UG/ML

## 2022-08-15 PROCEDURE — 82043 UR ALBUMIN QUANTITATIVE: CPT | Performed by: INTERNAL MEDICINE

## 2022-08-15 PROCEDURE — 82570 ASSAY OF URINE CREATININE: CPT | Performed by: INTERNAL MEDICINE

## 2022-08-22 ENCOUNTER — OFFICE VISIT (OUTPATIENT)
Dept: INTERNAL MEDICINE | Facility: CLINIC | Age: 87
End: 2022-08-22
Payer: MEDICARE

## 2022-08-22 VITALS
SYSTOLIC BLOOD PRESSURE: 132 MMHG | BODY MASS INDEX: 30.47 KG/M2 | TEMPERATURE: 97 F | OXYGEN SATURATION: 98 % | WEIGHT: 217.63 LBS | HEIGHT: 71 IN | DIASTOLIC BLOOD PRESSURE: 84 MMHG | HEART RATE: 66 BPM

## 2022-08-22 DIAGNOSIS — H91.13 PRESBYCUSIS OF BOTH EARS: ICD-10-CM

## 2022-08-22 DIAGNOSIS — I70.0 ATHEROSCLEROSIS OF AORTA: ICD-10-CM

## 2022-08-22 DIAGNOSIS — E11.8 DM (DIABETES MELLITUS), TYPE 2 WITH COMPLICATIONS: ICD-10-CM

## 2022-08-22 DIAGNOSIS — E11.42 TYPE 2 DIABETES MELLITUS WITH POLYNEUROPATHY: Primary | ICD-10-CM

## 2022-08-22 DIAGNOSIS — I15.2 HYPERTENSION ASSOCIATED WITH DIABETES: ICD-10-CM

## 2022-08-22 DIAGNOSIS — N18.30 CKD STAGE 3 DUE TO TYPE 2 DIABETES MELLITUS: ICD-10-CM

## 2022-08-22 DIAGNOSIS — Z87.39 HISTORY OF GOUT: ICD-10-CM

## 2022-08-22 DIAGNOSIS — E11.59 HYPERTENSION ASSOCIATED WITH DIABETES: ICD-10-CM

## 2022-08-22 DIAGNOSIS — I72.4 ANEURYSM ARTERY, POPLITEAL: ICD-10-CM

## 2022-08-22 DIAGNOSIS — E11.22 CKD STAGE 3 DUE TO TYPE 2 DIABETES MELLITUS: ICD-10-CM

## 2022-08-22 PROCEDURE — 99214 OFFICE O/P EST MOD 30 MIN: CPT | Mod: PBBFAC,HCNC,PO | Performed by: INTERNAL MEDICINE

## 2022-08-22 PROCEDURE — 99999 PR PBB SHADOW E&M-EST. PATIENT-LVL IV: CPT | Mod: PBBFAC,HCNC,, | Performed by: INTERNAL MEDICINE

## 2022-08-22 PROCEDURE — 3288F PR FALLS RISK ASSESSMENT DOCUMENTED: ICD-10-PCS | Mod: HCNC,CPTII,S$GLB, | Performed by: INTERNAL MEDICINE

## 2022-08-22 PROCEDURE — 99999 PR PBB SHADOW E&M-EST. PATIENT-LVL IV: ICD-10-PCS | Mod: PBBFAC,HCNC,, | Performed by: INTERNAL MEDICINE

## 2022-08-22 PROCEDURE — 1100F PR PT FALLS ASSESS DOC 2+ FALLS/FALL W/INJURY/YR: ICD-10-PCS | Mod: HCNC,CPTII,S$GLB, | Performed by: INTERNAL MEDICINE

## 2022-08-22 PROCEDURE — 1159F MED LIST DOCD IN RCRD: CPT | Mod: HCNC,CPTII,, | Performed by: INTERNAL MEDICINE

## 2022-08-22 PROCEDURE — 99214 OFFICE O/P EST MOD 30 MIN: CPT | Mod: HCNC,S$GLB,, | Performed by: INTERNAL MEDICINE

## 2022-08-22 PROCEDURE — 1100F PTFALLS ASSESS-DOCD GE2>/YR: CPT | Mod: HCNC,CPTII,S$GLB, | Performed by: INTERNAL MEDICINE

## 2022-08-22 PROCEDURE — 1160F RVW MEDS BY RX/DR IN RCRD: CPT | Mod: HCNC,CPTII,, | Performed by: INTERNAL MEDICINE

## 2022-08-22 PROCEDURE — 1159F PR MEDICATION LIST DOCUMENTED IN MEDICAL RECORD: ICD-10-PCS | Mod: HCNC,CPTII,, | Performed by: INTERNAL MEDICINE

## 2022-08-22 PROCEDURE — 1160F PR REVIEW ALL MEDS BY PRESCRIBER/CLIN PHARMACIST DOCUMENTED: ICD-10-PCS | Mod: HCNC,CPTII,, | Performed by: INTERNAL MEDICINE

## 2022-08-22 PROCEDURE — 3288F FALL RISK ASSESSMENT DOCD: CPT | Mod: HCNC,CPTII,S$GLB, | Performed by: INTERNAL MEDICINE

## 2022-08-22 PROCEDURE — 99214 PR OFFICE/OUTPT VISIT, EST, LEVL IV, 30-39 MIN: ICD-10-PCS | Mod: HCNC,S$GLB,, | Performed by: INTERNAL MEDICINE

## 2022-08-22 NOTE — PROGRESS NOTES
"HPI:  Patient is a 89-year-old man who comes today for follow-up of his diabetes, hypertension, lipids, chronic kidney disease.  Patient only complains of his arthritis and primarily in his knees.  Patient states otherwise he is doing about the same.  There has been no other significant problems or complaints he has had 2 recent kidney stones.  He has not seen his urologist in over year.    Current meds have been verified and updated per the EMR  Exam:/84   Pulse 66   Temp 97 °F (36.1 °C) (Temporal)   Ht 5' 11" (1.803 m)   Wt 98.7 kg (217 lb 9.5 oz)   SpO2 98%   BMI 30.35 kg/m²   Carotids 2+ equal without bruits  Chest clear  Cardiovascular regular rate and rhythm with a 2/6 systolic murmur  Lab Results   Component Value Date    WBC 7.48 02/11/2022    HGB 15.2 02/11/2022    HCT 50.8 02/11/2022     02/11/2022    CHOL 150 08/15/2022    TRIG 109 08/15/2022    HDL 49 08/15/2022    ALT 10 02/11/2022    AST 15 02/11/2022     08/15/2022    K 4.2 08/15/2022     08/15/2022    CREATININE 1.2 08/15/2022    BUN 22 08/15/2022    CO2 27 08/15/2022    TSH 2.689 02/11/2022    PSA 4.4 (H) 06/14/2021    INR 2.2 11/18/2021    HGBA1C 5.3 08/15/2022       Impression:  Nephrolithiasis, he states he has passed 2 kidney stones in the last month.  Patient needs follow-up with his urologist.  Diabetes, hypertension, lipids all very well controlled  Chronic kidney disease, stable  Patient Active Problem List   Diagnosis    Obesity    Hearing loss of aging    History of pulmonary embolism    DM (diabetes mellitus), type 2 with complications    Hypertension associated with diabetes    History of gout    Type 2 diabetes mellitus with polyneuropathy    Urinary retention    Aneurysm artery, popliteal    Atherosclerosis of aorta    Benign prostatic hyperplasia    Chronic venous insufficiency    Primary osteoarthritis involving multiple joints    At high risk for falls    CKD stage 3 due to type 2 " diabetes mellitus       Plan:  Orders Placed This Encounter    Hemoglobin A1C    Comprehensive Metabolic Panel    Lipid Panel    TSH    CBC Auto Differential    Uric Acid     Patient will be set up to have the above lab work and see me 6 months.  He has been encouraged to follow-up with his urologist and also with his ophthalmologist.    This note is generated with speech recognition software and is subject to transcription error and sound alike phrases that may be missed by proofreading.

## 2022-10-17 ENCOUNTER — OFFICE VISIT (OUTPATIENT)
Dept: OPHTHALMOLOGY | Facility: CLINIC | Age: 87
End: 2022-10-17
Payer: MEDICARE

## 2022-10-17 DIAGNOSIS — H27.01 APHAKIA OF RIGHT EYE: ICD-10-CM

## 2022-10-17 DIAGNOSIS — Z98.42 CATARACT EXTRACTION STATUS, LEFT: ICD-10-CM

## 2022-10-17 DIAGNOSIS — H17.9 CORNEAL OPACITY: ICD-10-CM

## 2022-10-17 DIAGNOSIS — E11.8 DM (DIABETES MELLITUS), TYPE 2 WITH COMPLICATIONS: Primary | ICD-10-CM

## 2022-10-17 PROCEDURE — 2023F PR DILATED RETINAL EXAM W/O EVID OF RETINOPATHY: ICD-10-PCS | Mod: CPTII,S$GLB,, | Performed by: OPHTHALMOLOGY

## 2022-10-17 PROCEDURE — 1160F PR REVIEW ALL MEDS BY PRESCRIBER/CLIN PHARMACIST DOCUMENTED: ICD-10-PCS | Mod: CPTII,S$GLB,, | Performed by: OPHTHALMOLOGY

## 2022-10-17 PROCEDURE — 92014 PR EYE EXAM, EST PATIENT,COMPREHESV: ICD-10-PCS | Mod: S$GLB,,, | Performed by: OPHTHALMOLOGY

## 2022-10-17 PROCEDURE — 2023F DILAT RTA XM W/O RTNOPTHY: CPT | Mod: CPTII,S$GLB,, | Performed by: OPHTHALMOLOGY

## 2022-10-17 PROCEDURE — 99999 PR PBB SHADOW E&M-EST. PATIENT-LVL I: ICD-10-PCS | Mod: PBBFAC,,, | Performed by: OPHTHALMOLOGY

## 2022-10-17 PROCEDURE — 99999 PR PBB SHADOW E&M-EST. PATIENT-LVL I: CPT | Mod: PBBFAC,,, | Performed by: OPHTHALMOLOGY

## 2022-10-17 PROCEDURE — 1159F MED LIST DOCD IN RCRD: CPT | Mod: CPTII,S$GLB,, | Performed by: OPHTHALMOLOGY

## 2022-10-17 PROCEDURE — 1159F PR MEDICATION LIST DOCUMENTED IN MEDICAL RECORD: ICD-10-PCS | Mod: CPTII,S$GLB,, | Performed by: OPHTHALMOLOGY

## 2022-10-17 PROCEDURE — 92014 COMPRE OPH EXAM EST PT 1/>: CPT | Mod: S$GLB,,, | Performed by: OPHTHALMOLOGY

## 2022-10-17 PROCEDURE — 1160F RVW MEDS BY RX/DR IN RCRD: CPT | Mod: CPTII,S$GLB,, | Performed by: OPHTHALMOLOGY

## 2022-10-17 NOTE — PROGRESS NOTES
"HPI     Diabetic Eye Exam            Comments: Yearly DM           Comments    Patient states that he is having trouble with vision OD - was told he   needed "lens cleaned" but never scheduled laser - occas pain in OD - using   OTC tears to help - denies issues with OS - blood sugar is stable - latest   HgbA1c was 5.3     Lab Results       Component                Value               Date                       HGBA1C                   5.3                 08/15/2022                  1. +DM  2. PCIOL OS +22.5 SN60WF / CDE: 20.00 /12-14-18  Yag OS 9-8-20  3. Aphakia OD (WORK INJURY EARLY 60S)  4. Iriodialysis OD  5. Dry Eyes OU  6. Exotropia            Last edited by Mayito Davis MD on 10/17/2022  9:28 AM.            Assessment /Plan     For exam results, see Encounter Report.      ICD-10-CM ICD-9-CM    1. DM (diabetes mellitus), type 2 with complications  E11.8 250.90 Diabetes with no diabetic retinopathy on dilated exam.   Reviewed diabetic eye precautions including excellent blood sugar control, and importance of regular follow up.           2. Cataract extraction status, left  Z98.42 V45.61 Follow       3. Aphakia of right eye  H27.01 379.31 Stable       4. 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

## 2022-10-20 ENCOUNTER — OFFICE VISIT (OUTPATIENT)
Dept: UROLOGY | Facility: CLINIC | Age: 87
End: 2022-10-20
Payer: MEDICARE

## 2022-10-20 VITALS
SYSTOLIC BLOOD PRESSURE: 177 MMHG | RESPIRATION RATE: 18 BRPM | HEART RATE: 68 BPM | DIASTOLIC BLOOD PRESSURE: 76 MMHG | HEIGHT: 71 IN | BODY MASS INDEX: 30.45 KG/M2 | WEIGHT: 217.5 LBS

## 2022-10-20 DIAGNOSIS — N40.1 BENIGN PROSTATIC HYPERPLASIA WITH URINARY RETENTION: Primary | ICD-10-CM

## 2022-10-20 DIAGNOSIS — R33.8 BENIGN PROSTATIC HYPERPLASIA WITH URINARY RETENTION: Primary | ICD-10-CM

## 2022-10-20 LAB
BILIRUB SERPL-MCNC: NORMAL MG/DL
BLOOD URINE, POC: 250
CLARITY, POC UA: NORMAL
COLOR, POC UA: YELLOW
GLUCOSE UR QL STRIP: NORMAL
KETONES UR QL STRIP: NORMAL
LEUKOCYTE ESTERASE URINE, POC: 2
NITRITE, POC UA: NORMAL
PH, POC UA: 7
PROTEIN, POC: 30
SPECIFIC GRAVITY, POC UA: 1.01
UROBILINOGEN, POC UA: NORMAL

## 2022-10-20 PROCEDURE — 1100F PR PT FALLS ASSESS DOC 2+ FALLS/FALL W/INJURY/YR: ICD-10-PCS | Mod: CPTII,S$GLB,, | Performed by: UROLOGY

## 2022-10-20 PROCEDURE — 81002 URINALYSIS NONAUTO W/O SCOPE: CPT | Mod: S$GLB,,, | Performed by: UROLOGY

## 2022-10-20 PROCEDURE — 99999 PR PBB SHADOW E&M-EST. PATIENT-LVL IV: CPT | Mod: PBBFAC,,, | Performed by: UROLOGY

## 2022-10-20 PROCEDURE — 1100F PTFALLS ASSESS-DOCD GE2>/YR: CPT | Mod: CPTII,S$GLB,, | Performed by: UROLOGY

## 2022-10-20 PROCEDURE — 1126F AMNT PAIN NOTED NONE PRSNT: CPT | Mod: CPTII,S$GLB,, | Performed by: UROLOGY

## 2022-10-20 PROCEDURE — 99214 PR OFFICE/OUTPT VISIT, EST, LEVL IV, 30-39 MIN: ICD-10-PCS | Mod: S$GLB,,, | Performed by: UROLOGY

## 2022-10-20 PROCEDURE — 99214 OFFICE O/P EST MOD 30 MIN: CPT | Mod: S$GLB,,, | Performed by: UROLOGY

## 2022-10-20 PROCEDURE — 87088 URINE BACTERIA CULTURE: CPT | Performed by: UROLOGY

## 2022-10-20 PROCEDURE — 87086 URINE CULTURE/COLONY COUNT: CPT | Performed by: UROLOGY

## 2022-10-20 PROCEDURE — 1159F MED LIST DOCD IN RCRD: CPT | Mod: CPTII,S$GLB,, | Performed by: UROLOGY

## 2022-10-20 PROCEDURE — 3288F FALL RISK ASSESSMENT DOCD: CPT | Mod: CPTII,S$GLB,, | Performed by: UROLOGY

## 2022-10-20 PROCEDURE — 3288F PR FALLS RISK ASSESSMENT DOCUMENTED: ICD-10-PCS | Mod: CPTII,S$GLB,, | Performed by: UROLOGY

## 2022-10-20 PROCEDURE — 1159F PR MEDICATION LIST DOCUMENTED IN MEDICAL RECORD: ICD-10-PCS | Mod: CPTII,S$GLB,, | Performed by: UROLOGY

## 2022-10-20 PROCEDURE — 81002 POCT URINE DIPSTICK WITHOUT MICROSCOPE: ICD-10-PCS | Mod: S$GLB,,, | Performed by: UROLOGY

## 2022-10-20 PROCEDURE — 1160F PR REVIEW ALL MEDS BY PRESCRIBER/CLIN PHARMACIST DOCUMENTED: ICD-10-PCS | Mod: CPTII,S$GLB,, | Performed by: UROLOGY

## 2022-10-20 PROCEDURE — 1126F PR PAIN SEVERITY QUANTIFIED, NO PAIN PRESENT: ICD-10-PCS | Mod: CPTII,S$GLB,, | Performed by: UROLOGY

## 2022-10-20 PROCEDURE — 1160F RVW MEDS BY RX/DR IN RCRD: CPT | Mod: CPTII,S$GLB,, | Performed by: UROLOGY

## 2022-10-20 PROCEDURE — 99999 PR PBB SHADOW E&M-EST. PATIENT-LVL IV: ICD-10-PCS | Mod: PBBFAC,,, | Performed by: UROLOGY

## 2022-10-20 RX ORDER — TAMSULOSIN HYDROCHLORIDE 0.4 MG/1
1 CAPSULE ORAL DAILY
Qty: 90 CAPSULE | Refills: 3 | Status: SHIPPED | OUTPATIENT
Start: 2022-10-20 | End: 2023-01-26 | Stop reason: SDUPTHER

## 2022-10-20 RX ORDER — NITROFURANTOIN 25; 75 MG/1; MG/1
100 CAPSULE ORAL 2 TIMES DAILY
Qty: 10 CAPSULE | Refills: 0 | Status: SHIPPED | OUTPATIENT
Start: 2022-10-20 | End: 2022-10-25

## 2022-10-20 NOTE — PROGRESS NOTES
Chief Complaint: f/u LUTS    HPI:   10/20/2022 - patient returns today for follow-up, notes that he passed three stones in his urine back in June, had another stone in August, he denies any flank pain during these episodes, did have a little bit of blood after the August episode, also notes increased nocturia q.1 hour, also notes recent increase in number of UTIs for which he got antibiotics from his PCP, does not think he has been taking his Flomax    06/24/2021 - presents today for follow-up, notes that he continues to take the Flomax, finasteride, but patient's daughter does not think he has been taking the oxybutynin, urgency and frequency still occurring, wakes up once an hour at night, less during the day, does not feel like he empties all the way, denies any recent UTIs or gross hematuria    12/16/20: No interval complaints. Taking flomax/finasteride/oxybutinin.  Frequency still a problem. No UTI in the interim.  UA shows ++leuk and 250 blood.  12/9/19: Was in the ER last week for presumed UTI.  DC with Abx.  Still wearing pullups but less of them.  CT 10/19 renal cysts and BPH no more worrisome findings.  Uncirc.  2/18/19: Stream good.  Passed some stones a few months after TURP with some blood then.  Month ago had a strip of small stones pass.  Still taking flomax/finasteride.  Has to get up 3-4 times at night to make sure his diaper doesn't overflow.  3-4 diapers during the day.  Lots of water and one cup coffee in AM.  Tea.   ml.  Not constipated.  7/17/18: Stream is good.  Gets a little splatter some mornings.  Wets pad only some days.  Lots better than when we started.  6/13/18: Stream better after TURP.  Gets some OAB when he arises.  Wearing a little pad for safety.  5/16/18: Had to have riggs replaced after last visit.  Tolerated two surgeries for arterial restoration in last year, held coumadin for them no problem.  2/15/18: Laws: One month f/u for urinary retention. Riggs still in place;  wants another voiding trial. Home health would be able to replace it if he cannot void. Still taking Flomax and Finasteride.   1/16/18: Laws: Went to the ER here at [facility] last night following riggs removal yesterday for voiding trial. Riggs was replaced in ER and he was started on Bactrim.   1/15/18: Has been taking flomax/finasteride for the interval. No upcoming surgeries.  No new problems.  Reviewed history in detail.  8/14/17: Riggs is working okay for him.  Has taken finasteride on that time.  Riggs changed one week ago.  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  Tolerated right surgery reasonably well.  2/13/17: Cysto today shows severe BPH with trilobar hypertrophy.    2/1/17: 86 yo man went to the ER for a few complaints and he was found to be in retention and a riggs was placed; bag filled/emptied 1.5 times shortly thereafter.  Is no no BPH meds.  Had double voiding prior, nocturia x2.  No abd/pelvic pain and no exac/rel factors.  No hematuria.  No urolithiasis.      Allergies:  Morphine    Medications:  has a current medication list which includes the following prescription(s): allopurinol, blood sugar diagnostic, blood-glucose meter, docusate sodium, finasteride, fluticasone propionate, hydrocodone-acetaminophen, lancets, lisinopril-hydrochlorothiazide, lovastatin, metformin, metoprolol tartrate, prednisolone acetate, ssd, tamsulosin, and nitrofurantoin (macrocrystal-monohydrate).    Review of Systems:  General: No fever, chills  Skin: No rashes  Chest:  Denies cough and sputum production  Heart: Denies chest pain  Resp: Denies dyspnea  Abdomen: Denies diarrhea, abdominal pain, hematemesis, or blood in stool.  Musculoskeletal: No joint stiffness or swelling. Denies back pain.  : see HPI  Neuro: no dizziness or weakness      PMH:   has a past medical history of Aphakia of right eye, Chronic venous insufficiency, CKD stage 3 due to type 2 diabetes  mellitus, DM (diabetes mellitus), type 2 with complications, Hearing loss of aging, History of gout, History of pulmonary embolism (2011), Hypertension associated with diabetes, Iridodialysis of right eye, Obesity, unspecified, and Primary osteoarthritis involving multiple joints.    PSH:   has a past surgical history that includes Vascular surgery; Transurethral resection of prostate (N/A, 5/31/2018); Cataract extraction; Cataract extraction w/  intraocular lens implant (Left, 12/13/2018); and yag laser capsulotomy (Left, 09/08/2020).    FamHx: family history includes Diabetes in his brother.    SocHx:  reports that he has never smoked. He has never used smokeless tobacco. He reports that he does not drink alcohol and does not use drugs.      Physical Exam:  Vitals:    10/20/22 1055   BP: (!) 177/76   Pulse: 68   Resp: 18     General: awake, alert, cooperative  Head: NC/AT  Ears: external ears normal  Eyes: sclera normal  Lungs: normal inspiration, NAD  Heart: well-perfused  Skin: The skin is warm and dry  Ext: No c/c/e.  Neuro: grossly intact, AOx3  :   12/16/20: ALVA shows >50gm prostate, symm/smooth  1/15/18: Cuevas in good position, removed for voiding trial today  2/17: ALVA normal, penis/test normal.    Labs/Studies:   Urinalysis performed in clinic today specific gravity 1.010 pH seven 2+ leukocyte esterase positive nitrites 250 blood    Impression/Plan:   BPH - restart Flomax, continue finasteride    UTI - start Macrobid, urine for culture, will adjust antibiotics as necessary    Stones - likely bladder stones and most likely due to poor emptying/UTIs, restarting Flomax should help him empty his bladder better and decrease recurrence of UTIs    - f/u 6 weeks    Mani Holman MD

## 2022-10-21 LAB — BACTERIA UR CULT: ABNORMAL

## 2022-10-22 ENCOUNTER — PATIENT MESSAGE (OUTPATIENT)
Dept: UROLOGY | Facility: CLINIC | Age: 87
End: 2022-10-22
Payer: MEDICARE

## 2022-10-22 DIAGNOSIS — N39.0 ACUTE UTI: Primary | ICD-10-CM

## 2022-10-22 RX ORDER — AMOXICILLIN 500 MG/1
500 TABLET, FILM COATED ORAL EVERY 12 HOURS
Qty: 10 TABLET | Refills: 0 | Status: SHIPPED | OUTPATIENT
Start: 2022-10-22 | End: 2022-10-27

## 2022-12-02 ENCOUNTER — OFFICE VISIT (OUTPATIENT)
Dept: UROLOGY | Facility: CLINIC | Age: 87
End: 2022-12-02
Payer: MEDICARE

## 2022-12-02 VITALS
DIASTOLIC BLOOD PRESSURE: 74 MMHG | HEART RATE: 71 BPM | HEIGHT: 71 IN | SYSTOLIC BLOOD PRESSURE: 135 MMHG | BODY MASS INDEX: 30.38 KG/M2 | WEIGHT: 217 LBS

## 2022-12-02 DIAGNOSIS — R39.12 BENIGN PROSTATIC HYPERPLASIA WITH WEAK URINARY STREAM: Primary | ICD-10-CM

## 2022-12-02 DIAGNOSIS — N40.1 BENIGN PROSTATIC HYPERPLASIA WITH WEAK URINARY STREAM: Primary | ICD-10-CM

## 2022-12-02 PROCEDURE — 1126F AMNT PAIN NOTED NONE PRSNT: CPT | Mod: HCNC,CPTII,S$GLB, | Performed by: UROLOGY

## 2022-12-02 PROCEDURE — 99999 PR PBB SHADOW E&M-EST. PATIENT-LVL IV: CPT | Mod: PBBFAC,HCNC,, | Performed by: UROLOGY

## 2022-12-02 PROCEDURE — 99214 OFFICE O/P EST MOD 30 MIN: CPT | Mod: PBBFAC,HCNC | Performed by: UROLOGY

## 2022-12-02 PROCEDURE — 99999 PR PBB SHADOW E&M-EST. PATIENT-LVL IV: ICD-10-PCS | Mod: PBBFAC,HCNC,, | Performed by: UROLOGY

## 2022-12-02 PROCEDURE — 1101F PR PT FALLS ASSESS DOC 0-1 FALLS W/OUT INJ PAST YR: ICD-10-PCS | Mod: HCNC,CPTII,S$GLB, | Performed by: UROLOGY

## 2022-12-02 PROCEDURE — 1101F PT FALLS ASSESS-DOCD LE1/YR: CPT | Mod: HCNC,CPTII,S$GLB, | Performed by: UROLOGY

## 2022-12-02 PROCEDURE — 3288F FALL RISK ASSESSMENT DOCD: CPT | Mod: HCNC,CPTII,S$GLB, | Performed by: UROLOGY

## 2022-12-02 PROCEDURE — 1159F PR MEDICATION LIST DOCUMENTED IN MEDICAL RECORD: ICD-10-PCS | Mod: HCNC,CPTII,S$GLB, | Performed by: UROLOGY

## 2022-12-02 PROCEDURE — 1159F MED LIST DOCD IN RCRD: CPT | Mod: HCNC,CPTII,S$GLB, | Performed by: UROLOGY

## 2022-12-02 PROCEDURE — 99214 OFFICE O/P EST MOD 30 MIN: CPT | Mod: HCNC,S$GLB,, | Performed by: UROLOGY

## 2022-12-02 PROCEDURE — 1160F RVW MEDS BY RX/DR IN RCRD: CPT | Mod: HCNC,CPTII,S$GLB, | Performed by: UROLOGY

## 2022-12-02 PROCEDURE — 1160F PR REVIEW ALL MEDS BY PRESCRIBER/CLIN PHARMACIST DOCUMENTED: ICD-10-PCS | Mod: HCNC,CPTII,S$GLB, | Performed by: UROLOGY

## 2022-12-02 PROCEDURE — 3288F PR FALLS RISK ASSESSMENT DOCUMENTED: ICD-10-PCS | Mod: HCNC,CPTII,S$GLB, | Performed by: UROLOGY

## 2022-12-02 PROCEDURE — 1126F PR PAIN SEVERITY QUANTIFIED, NO PAIN PRESENT: ICD-10-PCS | Mod: HCNC,CPTII,S$GLB, | Performed by: UROLOGY

## 2022-12-02 PROCEDURE — 99214 PR OFFICE/OUTPT VISIT, EST, LEVL IV, 30-39 MIN: ICD-10-PCS | Mod: HCNC,S$GLB,, | Performed by: UROLOGY

## 2022-12-02 NOTE — PROGRESS NOTES
Chief Complaint: f/u LUTS    HPI:   12/02/2022 - returns for follow-up, no returns for follow-up, completed his antibiotics and his symptoms have resolved and recurred, also believes that he is restarted the Flomax and feels like it is helping, still having some OAB but improved, no further gross hematuria    10/20/2022 - patient returns today for follow-up, notes that he passed three stones in his urine back in June, had another stone in August, he denies any flank pain during these episodes, did have a little bit of blood after the August episode, also notes increased nocturia q.1 hour, also notes recent increase in number of UTIs for which he got antibiotics from his PCP, does not think he has been taking his Flomax    06/24/2021 - presents today for follow-up, notes that he continues to take the Flomax, finasteride, but patient's daughter does not think he has been taking the oxybutynin, urgency and frequency still occurring, wakes up once an hour at night, less during the day, does not feel like he empties all the way, denies any recent UTIs or gross hematuria    12/16/20: No interval complaints. Taking flomax/finasteride/oxybutinin.  Frequency still a problem. No UTI in the interim.  UA shows ++leuk and 250 blood.  12/9/19: Was in the ER last week for presumed UTI.  DC with Abx.  Still wearing pullups but less of them.  CT 10/19 renal cysts and BPH no more worrisome findings.  Uncirc.  2/18/19: Stream good.  Passed some stones a few months after TURP with some blood then.  Month ago had a strip of small stones pass.  Still taking flomax/finasteride.  Has to get up 3-4 times at night to make sure his diaper doesn't overflow.  3-4 diapers during the day.  Lots of water and one cup coffee in AM.  Tea.   ml.  Not constipated.  7/17/18: Stream is good.  Gets a little splatter some mornings.  Wets pad only some days.  Lots better than when we started.  6/13/18: Stream better after TURP.  Gets some OAB when he  arises.  Wearing a little pad for safety.  5/16/18: Had to have riggs replaced after last visit.  Tolerated two surgeries for arterial restoration in last year, held coumadin for them no problem.  2/15/18: Laws: One month f/u for urinary retention. Riggs still in place; wants another voiding trial. Home health would be able to replace it if he cannot void. Still taking Flomax and Finasteride.   1/16/18: Laws: Went to the ER here at [facility] last night following riggs removal yesterday for voiding trial. Riggs was replaced in ER and he was started on Bactrim.   1/15/18: Has been taking flomax/finasteride for the interval. No upcoming surgeries.  No new problems.  Reviewed history in detail.  8/14/17: Riggs is working okay for him.  Has taken finasteride on that time.  Riggs changed one week ago.  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  Tolerated right surgery reasonably well.  2/13/17: Cysto today shows severe BPH with trilobar hypertrophy.    2/1/17: 86 yo man went to the ER for a few complaints and he was found to be in retention and a riggs was placed; bag filled/emptied 1.5 times shortly thereafter.  Is no no BPH meds.  Had double voiding prior, nocturia x2.  No abd/pelvic pain and no exac/rel factors.  No hematuria.  No urolithiasis.      Allergies:  Morphine    Medications:  has a current medication list which includes the following prescription(s): allopurinol, blood sugar diagnostic, blood-glucose meter, docusate sodium, finasteride, fluticasone propionate, hydrocodone-acetaminophen, lancets, lisinopril-hydrochlorothiazide, lovastatin, metformin, metoprolol tartrate, prednisolone acetate, ssd, and tamsulosin.    Review of Systems:  General: No fever, chills  Skin: No rashes  Chest:  Denies cough and sputum production  Heart: Denies chest pain  Resp: Denies dyspnea  Abdomen: Denies diarrhea, abdominal pain, hematemesis, or blood in stool.  Musculoskeletal: No  joint stiffness or swelling. Denies back pain.  : see HPI  Neuro: no dizziness or weakness      PMH:   has a past medical history of Aphakia of right eye, Chronic venous insufficiency, CKD stage 3 due to type 2 diabetes mellitus, DM (diabetes mellitus), type 2 with complications, Hearing loss of aging, History of gout, History of pulmonary embolism (2011), Hypertension associated with diabetes, Iridodialysis of right eye, Obesity, unspecified, and Primary osteoarthritis involving multiple joints.    PSH:   has a past surgical history that includes Vascular surgery; Transurethral resection of prostate (N/A, 5/31/2018); Cataract extraction; Cataract extraction w/  intraocular lens implant (Left, 12/13/2018); and yag laser capsulotomy (Left, 09/08/2020).    FamHx: family history includes Diabetes in his brother.    SocHx:  reports that he has never smoked. He has never used smokeless tobacco. He reports that he does not drink alcohol and does not use drugs.      Physical Exam:  Vitals:    12/02/22 1522   BP: 135/74   Pulse: 71     General: awake, alert, cooperative  Head: NC/AT  Ears: external ears normal  Eyes: sclera normal  Lungs: normal inspiration, NAD  Heart: well-perfused  Skin: The skin is warm and dry  Ext: No c/c/e.  Neuro: grossly intact, AOx3  :   12/16/20: ALVA shows >50gm prostate, symm/smooth  1/15/18: Cuevas in good position, removed for voiding trial today  2/17: ALVA normal, penis/test normal.    Labs/Studies:   Lab Results   Component Value Date    WBC 7.48 02/11/2022    HGB 15.2 02/11/2022    HCT 50.8 02/11/2022     02/11/2022     08/15/2022    K 4.2 08/15/2022     08/15/2022    CREATININE 1.2 08/15/2022    BUN 22 08/15/2022    CO2 27 08/15/2022    TSH 2.689 02/11/2022    PSA 4.4 (H) 06/14/2021    INR 2.2 11/18/2021    HGBA1C 5.3 08/15/2022    CHOL 150 08/15/2022    TRIG 109 08/15/2022    HDL 49 08/15/2022    ALT 10 02/11/2022    AST 15 02/11/2022     Impression/Plan:   BPH -  continue Flomax and finasteride    UTI - cleared with Macrobid    Stones - likely bladder stones and most likely due to poor emptying/UTIs, restarting Flomax should help him empty his bladder better and decrease recurrence of UTIs    - f/u 6 months    Mani Holman MD

## 2022-12-28 ENCOUNTER — OFFICE VISIT (OUTPATIENT)
Dept: INTERNAL MEDICINE | Facility: CLINIC | Age: 87
End: 2022-12-28
Payer: MEDICARE

## 2022-12-28 VITALS
DIASTOLIC BLOOD PRESSURE: 92 MMHG | HEIGHT: 71 IN | WEIGHT: 210 LBS | HEART RATE: 87 BPM | OXYGEN SATURATION: 96 % | TEMPERATURE: 98 F | BODY MASS INDEX: 29.4 KG/M2 | SYSTOLIC BLOOD PRESSURE: 142 MMHG

## 2022-12-28 DIAGNOSIS — J06.9 VIRAL URI: Primary | ICD-10-CM

## 2022-12-28 DIAGNOSIS — K59.09 CHRONIC CONSTIPATION: ICD-10-CM

## 2022-12-28 PROCEDURE — 3288F FALL RISK ASSESSMENT DOCD: CPT | Mod: HCNC,CPTII,S$GLB, | Performed by: INTERNAL MEDICINE

## 2022-12-28 PROCEDURE — 99999 PR PBB SHADOW E&M-EST. PATIENT-LVL III: CPT | Mod: PBBFAC,HCNC,, | Performed by: INTERNAL MEDICINE

## 2022-12-28 PROCEDURE — 1101F PR PT FALLS ASSESS DOC 0-1 FALLS W/OUT INJ PAST YR: ICD-10-PCS | Mod: HCNC,CPTII,S$GLB, | Performed by: INTERNAL MEDICINE

## 2022-12-28 PROCEDURE — 3288F PR FALLS RISK ASSESSMENT DOCUMENTED: ICD-10-PCS | Mod: HCNC,CPTII,S$GLB, | Performed by: INTERNAL MEDICINE

## 2022-12-28 PROCEDURE — 1159F MED LIST DOCD IN RCRD: CPT | Mod: HCNC,CPTII,S$GLB, | Performed by: INTERNAL MEDICINE

## 2022-12-28 PROCEDURE — 99213 PR OFFICE/OUTPT VISIT, EST, LEVL III, 20-29 MIN: ICD-10-PCS | Mod: HCNC,S$GLB,, | Performed by: INTERNAL MEDICINE

## 2022-12-28 PROCEDURE — 1101F PT FALLS ASSESS-DOCD LE1/YR: CPT | Mod: HCNC,CPTII,S$GLB, | Performed by: INTERNAL MEDICINE

## 2022-12-28 PROCEDURE — 1159F PR MEDICATION LIST DOCUMENTED IN MEDICAL RECORD: ICD-10-PCS | Mod: HCNC,CPTII,S$GLB, | Performed by: INTERNAL MEDICINE

## 2022-12-28 PROCEDURE — 99213 OFFICE O/P EST LOW 20 MIN: CPT | Mod: HCNC,S$GLB,, | Performed by: INTERNAL MEDICINE

## 2022-12-28 PROCEDURE — 99999 PR PBB SHADOW E&M-EST. PATIENT-LVL III: ICD-10-PCS | Mod: PBBFAC,HCNC,, | Performed by: INTERNAL MEDICINE

## 2022-12-28 RX ORDER — HYDROCODONE BITARTRATE AND ACETAMINOPHEN 7.5; 325 MG/1; MG/1
1 TABLET ORAL EVERY 8 HOURS PRN
Qty: 90 TABLET | Refills: 0 | Status: SHIPPED | OUTPATIENT
Start: 2022-12-28 | End: 2023-11-27 | Stop reason: SDUPTHER

## 2022-12-28 NOTE — PROGRESS NOTES
"HPI:  Patient is a 89-year-old man who comes today with complaints of constipation and also chest congestion and scratchy throat.  Patient states he has been constipated for for 5 days now.  He states that his chest congestion scratchy throat is been within the last 24-48 hours.  He states he felt feverish but did not take his temperature.    Current meds have been verified and updated per the EMR  Exam:BP (!) 142/92   Pulse 87   Temp 98.4 °F (36.9 °C) (Temporal)   Ht 5' 11" (1.803 m)   Wt 95.3 kg (210 lb)   SpO2 96%   BMI 29.29 kg/m²   Chest is perfectly clear  Cardiovascular regular rate and rhythm without murmur gallop or rub  Throat shows no erythema or exudate.  Neck is supple without adenopathy    Lab Results   Component Value Date    WBC 7.48 02/11/2022    HGB 15.2 02/11/2022    HCT 50.8 02/11/2022     02/11/2022    CHOL 150 08/15/2022    TRIG 109 08/15/2022    HDL 49 08/15/2022    ALT 10 02/11/2022    AST 15 02/11/2022     08/15/2022    K 4.2 08/15/2022     08/15/2022    CREATININE 1.2 08/15/2022    BUN 22 08/15/2022    CO2 27 08/15/2022    TSH 2.689 02/11/2022    PSA 4.4 (H) 06/14/2021    INR 2.2 11/18/2021    HGBA1C 5.3 08/15/2022       Impression:  Chronic constipation  Viral URI  Patient Active Problem List   Diagnosis    Obesity    Hearing loss of aging    History of pulmonary embolism    DM (diabetes mellitus), type 2 with complications    Hypertension associated with diabetes    History of gout    Type 2 diabetes mellitus with polyneuropathy    Urinary retention    Aneurysm artery, popliteal    Atherosclerosis of aorta    Benign prostatic hyperplasia    Chronic venous insufficiency    Primary osteoarthritis involving multiple joints    At high risk for falls    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  Patient should take over-the-counter Mucinex DM for his chest congestion.  He should make sure he is drinking plenty of fluids.  He was given refills of his hydrocodone.    This note is generated with speech recognition software and is subject to transcription error and sound alike phrases that may be missed by proofreading.

## 2022-12-29 ENCOUNTER — HOSPITAL ENCOUNTER (OUTPATIENT)
Facility: HOSPITAL | Age: 87
Discharge: HOME | End: 2022-12-31
Attending: STUDENT IN AN ORGANIZED HEALTH CARE EDUCATION/TRAINING PROGRAM | Admitting: STUDENT IN AN ORGANIZED HEALTH CARE EDUCATION/TRAINING PROGRAM
Payer: MEDICARE

## 2022-12-29 ENCOUNTER — HOSPITAL ENCOUNTER (EMERGENCY)
Facility: HOSPITAL | Age: 87
Discharge: OTHER INSTITUTION | End: 2022-12-29
Attending: EMERGENCY MEDICINE
Payer: MEDICARE

## 2022-12-29 ENCOUNTER — OFFICE VISIT (OUTPATIENT)
Dept: URGENT CARE | Facility: CLINIC | Age: 87
End: 2022-12-29
Payer: MEDICARE

## 2022-12-29 VITALS
DIASTOLIC BLOOD PRESSURE: 73 MMHG | WEIGHT: 210 LBS | HEIGHT: 71 IN | BODY MASS INDEX: 29.4 KG/M2 | SYSTOLIC BLOOD PRESSURE: 135 MMHG | HEART RATE: 72 BPM | TEMPERATURE: 98 F | OXYGEN SATURATION: 95 % | RESPIRATION RATE: 18 BRPM

## 2022-12-29 VITALS
HEART RATE: 69 BPM | DIASTOLIC BLOOD PRESSURE: 78 MMHG | OXYGEN SATURATION: 96 % | TEMPERATURE: 99 F | SYSTOLIC BLOOD PRESSURE: 173 MMHG | RESPIRATION RATE: 17 BRPM

## 2022-12-29 DIAGNOSIS — R31.9 URINARY TRACT INFECTION WITH HEMATURIA, SITE UNSPECIFIED: Primary | ICD-10-CM

## 2022-12-29 DIAGNOSIS — R79.89 ELEVATED LFTS: ICD-10-CM

## 2022-12-29 DIAGNOSIS — N39.0 URINARY TRACT INFECTION WITH HEMATURIA, SITE UNSPECIFIED: Primary | ICD-10-CM

## 2022-12-29 DIAGNOSIS — R30.0 DYSURIA: ICD-10-CM

## 2022-12-29 DIAGNOSIS — K83.8 DILATED BILE DUCT: ICD-10-CM

## 2022-12-29 DIAGNOSIS — K83.8 COMMON BILE DUCT DILATION: ICD-10-CM

## 2022-12-29 LAB
ALBUMIN SERPL BCP-MCNC: 3.4 G/DL (ref 3.5–5.2)
ALP SERPL-CCNC: 182 U/L (ref 55–135)
ALT SERPL W/O P-5'-P-CCNC: 393 U/L (ref 10–44)
ANION GAP SERPL CALC-SCNC: 11 MMOL/L (ref 8–16)
AST SERPL-CCNC: 121 U/L (ref 10–40)
BACTERIA #/AREA URNS HPF: ABNORMAL /HPF
BASOPHILS # BLD AUTO: 0.03 K/UL (ref 0–0.2)
BASOPHILS NFR BLD: 0.3 % (ref 0–1.9)
BILIRUB SERPL-MCNC: 2.2 MG/DL (ref 0.1–1)
BILIRUB UR QL STRIP: NEGATIVE
BILIRUB UR QL STRIP: NEGATIVE
BUN SERPL-MCNC: 16 MG/DL (ref 10–30)
CALCIUM SERPL-MCNC: 9.6 MG/DL (ref 8.7–10.5)
CHLORIDE SERPL-SCNC: 105 MMOL/L (ref 95–110)
CK SERPL-CCNC: 132 U/L (ref 20–200)
CLARITY UR: ABNORMAL
CLARITY UR: CLEAR
CO2 SERPL-SCNC: 22 MMOL/L (ref 23–29)
COLOR UR: ABNORMAL
COLOR UR: YELLOW
CREAT SERPL-MCNC: 1.2 MG/DL (ref 0.5–1.4)
DIFFERENTIAL METHOD: ABNORMAL
EOSINOPHIL # BLD AUTO: 0 K/UL (ref 0–0.5)
EOSINOPHIL NFR BLD: 0.1 % (ref 0–8)
ERYTHROCYTE [DISTWIDTH] IN BLOOD BY AUTOMATED COUNT: 15.9 % (ref 11.5–14.5)
EST. GFR  (NO RACE VARIABLE): 56 ML/MIN/1.73 M^2
GLUCOSE SERPL-MCNC: 103 MG/DL (ref 70–110)
GLUCOSE UR QL STRIP: NEGATIVE
GLUCOSE UR QL STRIP: NEGATIVE
HCT VFR BLD AUTO: 49.6 % (ref 40–54)
HGB BLD-MCNC: 16.2 G/DL (ref 14–18)
HGB UR QL STRIP: ABNORMAL
HYALINE CASTS #/AREA URNS LPF: 0 /LPF
IMM GRANULOCYTES # BLD AUTO: 0.03 K/UL (ref 0–0.04)
IMM GRANULOCYTES NFR BLD AUTO: 0.3 % (ref 0–0.5)
KETONES UR QL STRIP: NEGATIVE
KETONES UR QL STRIP: NEGATIVE
LACTATE SERPL-SCNC: 1.7 MMOL/L (ref 0.5–2.2)
LEUKOCYTE ESTERASE UR QL STRIP: ABNORMAL
LEUKOCYTE ESTERASE UR QL STRIP: POSITIVE
LYMPHOCYTES # BLD AUTO: 0.9 K/UL (ref 1–4.8)
LYMPHOCYTES NFR BLD: 9.3 % (ref 18–48)
MCH RBC QN AUTO: 28.4 PG (ref 27–31)
MCHC RBC AUTO-ENTMCNC: 32.7 G/DL (ref 32–36)
MCV RBC AUTO: 87 FL (ref 82–98)
MICROSCOPIC COMMENT: ABNORMAL
MONOCYTES # BLD AUTO: 2 K/UL (ref 0.3–1)
MONOCYTES NFR BLD: 21.8 % (ref 4–15)
NEUTROPHILS # BLD AUTO: 6.3 K/UL (ref 1.8–7.7)
NEUTROPHILS NFR BLD: 68.2 % (ref 38–73)
NITRITE UR QL STRIP: POSITIVE
NRBC BLD-RTO: 0 /100 WBC
PH UR STRIP: 6 [PH] (ref 5–8)
PH, POC UA: 6
PLATELET # BLD AUTO: 165 K/UL (ref 150–450)
PMV BLD AUTO: 11.3 FL (ref 9.2–12.9)
POC BLOOD, URINE: POSITIVE
POC NITRATES, URINE: POSITIVE
POCT GLUCOSE: 98 MG/DL (ref 70–110)
POTASSIUM SERPL-SCNC: 3.9 MMOL/L (ref 3.5–5.1)
PROT SERPL-MCNC: 8 G/DL (ref 6–8.4)
PROT UR QL STRIP: ABNORMAL
PROT UR QL STRIP: POSITIVE
RBC # BLD AUTO: 5.7 M/UL (ref 4.6–6.2)
RBC #/AREA URNS HPF: >100 /HPF (ref 0–4)
SODIUM SERPL-SCNC: 138 MMOL/L (ref 136–145)
SP GR UR STRIP: 1.01 (ref 1–1.03)
SP GR UR STRIP: 1.02 (ref 1–1.03)
URN SPEC COLLECT METH UR: ABNORMAL
UROBILINOGEN UR STRIP-ACNC: ABNORMAL EU/DL
UROBILINOGEN UR STRIP-ACNC: POSITIVE (ref 0.3–2.2)
WBC # BLD AUTO: 9.31 K/UL (ref 3.9–12.7)
WBC #/AREA URNS HPF: >100 /HPF (ref 0–5)
WBC CLUMPS URNS QL MICRO: ABNORMAL
YEAST URNS QL MICRO: ABNORMAL

## 2022-12-29 PROCEDURE — 82550 ASSAY OF CK (CPK): CPT | Mod: HCNC | Performed by: EMERGENCY MEDICINE

## 2022-12-29 PROCEDURE — 25000003 PHARM REV CODE 250: Mod: HCNC

## 2022-12-29 PROCEDURE — 99285 EMERGENCY DEPT VISIT HI MDM: CPT | Mod: 25,HCNC

## 2022-12-29 PROCEDURE — 87088 URINE BACTERIA CULTURE: CPT | Mod: HCNC | Performed by: EMERGENCY MEDICINE

## 2022-12-29 PROCEDURE — 87088 URINE BACTERIA CULTURE: CPT | Mod: 59,HCNC

## 2022-12-29 PROCEDURE — G0379 DIRECT REFER HOSPITAL OBSERV: HCPCS | Mod: HCNC

## 2022-12-29 PROCEDURE — G0378 HOSPITAL OBSERVATION PER HR: HCPCS | Mod: HCNC

## 2022-12-29 PROCEDURE — 1159F PR MEDICATION LIST DOCUMENTED IN MEDICAL RECORD: ICD-10-PCS | Mod: CPTII,S$GLB,,

## 2022-12-29 PROCEDURE — 96365 THER/PROPH/DIAG IV INF INIT: CPT | Mod: HCNC

## 2022-12-29 PROCEDURE — 81000 URINALYSIS NONAUTO W/SCOPE: CPT | Mod: HCNC | Performed by: EMERGENCY MEDICINE

## 2022-12-29 PROCEDURE — 87086 URINE CULTURE/COLONY COUNT: CPT | Mod: 59,HCNC

## 2022-12-29 PROCEDURE — 99215 OFFICE O/P EST HI 40 MIN: CPT | Mod: S$GLB,,,

## 2022-12-29 PROCEDURE — 81003 URINALYSIS AUTO W/O SCOPE: CPT | Mod: QW,S$GLB,,

## 2022-12-29 PROCEDURE — 25000003 PHARM REV CODE 250: Mod: HCNC | Performed by: EMERGENCY MEDICINE

## 2022-12-29 PROCEDURE — 81003 POCT URINALYSIS, DIPSTICK, AUTOMATED, W/O SCOPE: ICD-10-PCS | Mod: QW,S$GLB,,

## 2022-12-29 PROCEDURE — 80053 COMPREHEN METABOLIC PANEL: CPT | Mod: HCNC | Performed by: EMERGENCY MEDICINE

## 2022-12-29 PROCEDURE — 83605 ASSAY OF LACTIC ACID: CPT | Mod: HCNC | Performed by: EMERGENCY MEDICINE

## 2022-12-29 PROCEDURE — 1160F RVW MEDS BY RX/DR IN RCRD: CPT | Mod: CPTII,S$GLB,,

## 2022-12-29 PROCEDURE — 1125F AMNT PAIN NOTED PAIN PRSNT: CPT | Mod: CPTII,S$GLB,,

## 2022-12-29 PROCEDURE — 1160F PR REVIEW ALL MEDS BY PRESCRIBER/CLIN PHARMACIST DOCUMENTED: ICD-10-PCS | Mod: CPTII,S$GLB,,

## 2022-12-29 PROCEDURE — 99215 PR OFFICE/OUTPT VISIT, EST, LEVL V, 40-54 MIN: ICD-10-PCS | Mod: S$GLB,,,

## 2022-12-29 PROCEDURE — 63600175 PHARM REV CODE 636 W HCPCS: Mod: HCNC | Performed by: EMERGENCY MEDICINE

## 2022-12-29 PROCEDURE — 1125F PR PAIN SEVERITY QUANTIFIED, PAIN PRESENT: ICD-10-PCS | Mod: CPTII,S$GLB,,

## 2022-12-29 PROCEDURE — 85025 COMPLETE CBC W/AUTO DIFF WBC: CPT | Mod: HCNC | Performed by: EMERGENCY MEDICINE

## 2022-12-29 PROCEDURE — 87086 URINE CULTURE/COLONY COUNT: CPT | Mod: HCNC | Performed by: EMERGENCY MEDICINE

## 2022-12-29 PROCEDURE — 1159F MED LIST DOCD IN RCRD: CPT | Mod: CPTII,S$GLB,,

## 2022-12-29 RX ORDER — METOPROLOL TARTRATE 50 MG/1
50 TABLET ORAL DAILY
Status: DISCONTINUED | OUTPATIENT
Start: 2022-12-30 | End: 2022-12-31 | Stop reason: HOSPADM

## 2022-12-29 RX ORDER — LISINOPRIL 20 MG/1
20 TABLET ORAL 2 TIMES DAILY
Status: DISCONTINUED | OUTPATIENT
Start: 2022-12-29 | End: 2022-12-31 | Stop reason: HOSPADM

## 2022-12-29 RX ORDER — ATORVASTATIN CALCIUM 10 MG/1
10 TABLET, FILM COATED ORAL DAILY
Status: DISCONTINUED | OUTPATIENT
Start: 2022-12-30 | End: 2022-12-31 | Stop reason: HOSPADM

## 2022-12-29 RX ORDER — TAMSULOSIN HYDROCHLORIDE 0.4 MG/1
1 CAPSULE ORAL DAILY
Status: DISCONTINUED | OUTPATIENT
Start: 2022-12-30 | End: 2022-12-31 | Stop reason: HOSPADM

## 2022-12-29 RX ORDER — INSULIN ASPART 100 [IU]/ML
1-10 INJECTION, SOLUTION INTRAVENOUS; SUBCUTANEOUS
Status: DISCONTINUED | OUTPATIENT
Start: 2022-12-30 | End: 2022-12-31 | Stop reason: HOSPADM

## 2022-12-29 RX ORDER — GLUCAGON 1 MG
1 KIT INJECTION
Status: DISCONTINUED | OUTPATIENT
Start: 2022-12-30 | End: 2022-12-31 | Stop reason: HOSPADM

## 2022-12-29 RX ORDER — CIPROFLOXACIN 500 MG/1
500 TABLET ORAL 2 TIMES DAILY
Qty: 14 TABLET | Refills: 0 | Status: ON HOLD | OUTPATIENT
Start: 2022-12-29 | End: 2022-12-31 | Stop reason: HOSPADM

## 2022-12-29 RX ORDER — HYDROCHLOROTHIAZIDE 12.5 MG/1
12.5 TABLET ORAL 2 TIMES DAILY
Status: DISCONTINUED | OUTPATIENT
Start: 2022-12-29 | End: 2022-12-31 | Stop reason: HOSPADM

## 2022-12-29 RX ORDER — ONDANSETRON 2 MG/ML
4 INJECTION INTRAMUSCULAR; INTRAVENOUS EVERY 8 HOURS PRN
Status: DISCONTINUED | OUTPATIENT
Start: 2022-12-30 | End: 2022-12-31 | Stop reason: HOSPADM

## 2022-12-29 RX ORDER — SODIUM CHLORIDE 0.9 % (FLUSH) 0.9 %
10 SYRINGE (ML) INJECTION EVERY 12 HOURS PRN
Status: DISCONTINUED | OUTPATIENT
Start: 2022-12-30 | End: 2022-12-31 | Stop reason: HOSPADM

## 2022-12-29 RX ORDER — ACETAMINOPHEN 325 MG/1
650 TABLET ORAL EVERY 8 HOURS PRN
Status: DISCONTINUED | OUTPATIENT
Start: 2022-12-30 | End: 2022-12-31 | Stop reason: HOSPADM

## 2022-12-29 RX ORDER — FINASTERIDE 5 MG/1
5 TABLET, FILM COATED ORAL DAILY
Status: DISCONTINUED | OUTPATIENT
Start: 2022-12-30 | End: 2022-12-31 | Stop reason: HOSPADM

## 2022-12-29 RX ORDER — NALOXONE HCL 0.4 MG/ML
0.02 VIAL (ML) INJECTION
Status: DISCONTINUED | OUTPATIENT
Start: 2022-12-30 | End: 2022-12-31 | Stop reason: HOSPADM

## 2022-12-29 RX ORDER — POLYETHYLENE GLYCOL 3350 17 G/17G
17 POWDER, FOR SOLUTION ORAL DAILY
Status: DISCONTINUED | OUTPATIENT
Start: 2022-12-29 | End: 2022-12-31 | Stop reason: HOSPADM

## 2022-12-29 RX ADMIN — HYDROCHLOROTHIAZIDE 12.5 MG: 12.5 TABLET ORAL at 11:12

## 2022-12-29 RX ADMIN — LISINOPRIL 20 MG: 20 TABLET ORAL at 11:12

## 2022-12-29 RX ADMIN — CEFTRIAXONE 1 G: 1 INJECTION, POWDER, FOR SOLUTION INTRAMUSCULAR; INTRAVENOUS at 06:12

## 2022-12-29 RX ADMIN — POLYETHYLENE GLYCOL 3350 17 G: 17 POWDER, FOR SOLUTION ORAL at 11:12

## 2022-12-29 RX ADMIN — HYPROMELLOSE 2910 1 DROP: 5 SOLUTION/ DROPS OPHTHALMIC at 05:12

## 2022-12-29 NOTE — PATIENT INSTRUCTIONS
UTI   If your condition worsens or fails to improve we recommend that you receive another evaluation at the ER immediately or contact your PCP to discuss your concerns or return here. You must understand that you've received an urgent care treatment only and that you may be released before all your medical problems are known or treated. You the patient will arrange for followup care as instructed.   If you were prescribed antibiotics, please take them to full completion.    If you had cultures done it will take 3-5 days to result. We will call you with the result.   Please increase fluid.  If there is any increasing confusion, development of nausea vomiting, slurred speech, passing out altered mental status,

## 2022-12-29 NOTE — ED NOTES
The patient is resting quietly, eyes closed, arouses easily to stimuli. Airway is open and patent, respirations are spontaneous, normal respiratory effort and rate noted, skin warm and dry, appearance: in no acute distress and resting comfortably.]

## 2022-12-29 NOTE — ED NOTES
Bed: [facility] 01  Expected date:   Expected time:   Means of arrival:   Comments:

## 2022-12-29 NOTE — ED PROVIDER NOTES
"SCRIBE #1 NOTE: I, Mable Hamm, am scribing for, and in the presence of, Darwin Nuno MD. I have scribed the HPI, ROS, and PEx.    SCRIBE #2 NOTE: I, Gregoria Jimenez, am scribing for, and in the presence of,  Miles Stiles MD. I have scribed the remaining portions of the note not scribed by Scribe #1.    History     Chief Complaint   Patient presents with    Fall     Pt. Fell and hit his head, was seen at Urgent care. Urgent care dx. With a UTI and sent him for farther evaluation in the ER. Denies LOC, No blood thinners.      Review of patient's allergies indicates:   Allergen Reactions    Morphine Other (See Comments)     Pt states, "It only makes my pain worse."         History of Present Illness     HPI    12/29/2022, 11:15 AM  History obtained from the patient      History of Present Illness: Amauri Lara is a 89 y.o. male patient with a PMHx of HTN, DM, and CKD who presents to the Emergency Department for evaluation after a fall. Pt was seen at  where he was diagnosed with a UTI and sent to ED for further evaluation. Pt has no complaints and denies any pain. Pt denies being on blood thinners. Symptoms are constant and moderate in severity. No mitigating or exacerbating factors reported.  Patient denies any LOC, CP, SOB, N/V, HA, neck pain, and all other sxs at this time.  No further complaints or concerns at this time.       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    Obesity, unspecified     Primary osteoarthritis involving multiple joints        Past Surgical " History:  Past Surgical History:   Procedure Laterality Date    CATARACT EXTRACTION      aphakic od    CATARACT EXTRACTION W/  INTRAOCULAR LENS IMPLANT Left 12/13/2018    TRANSURETHRAL RESECTION OF PROSTATE N/A 5/31/2018    Procedure: PROSTATECTOMY-TRANSURETHRAL;  Surgeon: Michael Westbrook IV, MD;  Location: [facility];  Service: Urology;  Laterality: N/A;    VASCULAR SURGERY      yag laser capsulotomy Left 09/08/2020         Family History:  Family History   Problem Relation Age of Onset    Diabetes Brother        Social History:  Social History     Tobacco Use    Smoking status: Never    Smokeless tobacco: Never   Substance and Sexual Activity    Alcohol use: No    Drug use: No    Sexual activity: Never        Review of Systems     Review of Systems   Constitutional:  Negative for chills and fever.   HENT:  Negative for sore throat.    Respiratory:  Negative for shortness of breath.    Cardiovascular:  Negative for chest pain.   Gastrointestinal:  Negative for nausea and vomiting.   Genitourinary:  Negative for dysuria.   Musculoskeletal:  Negative for back pain and neck pain.   Skin:  Negative for rash.   Neurological:  Negative for weakness and headaches.   Hematological:  Does not bruise/bleed easily.   All other systems reviewed and are negative.   Physical Exam     Initial Vitals [12/29/22 1112]   BP Pulse Resp Temp SpO2   134/68 72 14 98.4 °F (36.9 °C) 96 %      MAP       --          Physical Exam  Nursing Notes and Vital Signs Reviewed.  Constitutional: Patient is in no acute distress. Well-developed and well-nourished.  Head: Atraumatic. Normocephalic.  Eyes: PERRL. EOM intact. Conjunctivae are not pale. No scleral icterus.  ENT: Mucous membranes are moist. Oropharynx is clear and symmetric.    Neck: Supple. Full ROM. No lymphadenopathy.  Cardiovascular: Regular rate. Regular rhythm. No murmurs, rubs, or gallops. Distal pulses are 2+ and symmetric.  Pulmonary/Chest: No respiratory distress. Clear to  auscultation bilaterally. No wheezing or rales.  Abdominal: Soft and non-distended.  There is no tenderness.  No rebound, guarding, or rigidity.   Genitourinary: No CVA tenderness  Musculoskeletal: Moves all extremities. No obvious deformities. No edema.   Skin: Warm and dry.  Neurological:  Alert, awake, and appropriate.  Normal speech.  No acute focal neurological deficits are appreciated.  Psychiatric: Normal affect. Good eye contact. Appropriate in content.     ED Course   Procedures  ED Vital Signs:  Vitals:    12/29/22 1112 12/29/22 1439 12/29/22 1700 12/29/22 1928   BP: 134/68 (!) 162/79 (!) 174/82 (!) 173/78   Pulse: 72 70 71 69   Resp: 14 16 16 17   Temp: 98.4 °F (36.9 °C)   98.9 °F (37.2 °C)   TempSrc: Oral   Oral   SpO2: 96% 98% 97% 96%       Abnormal Lab Results:  Labs Reviewed   CBC W/ AUTO DIFFERENTIAL - Abnormal; Notable for the following components:       Result Value    RDW 15.9 (*)     Lymph # 0.9 (*)     Mono # 2.0 (*)     Lymph % 9.3 (*)     Mono % 21.8 (*)     All other components within normal limits   COMPREHENSIVE METABOLIC PANEL - Abnormal; Notable for the following components:    CO2 22 (*)     Albumin 3.4 (*)     Total Bilirubin 2.2 (*)     Alkaline Phosphatase 182 (*)      (*)      (*)     eGFR 56 (*)     All other components within normal limits   URINALYSIS, REFLEX TO URINE CULTURE - Abnormal; Notable for the following components:    Appearance, UA Cloudy (*)     Protein, UA 2+ (*)     Occult Blood UA 3+ (*)     Nitrite, UA Positive (*)     Urobilinogen, UA 4.0-6.0 (*)     Leukocytes, UA 3+ (*)     All other components within normal limits    Narrative:     Specimen Source->Urine   URINALYSIS MICROSCOPIC - Abnormal; Notable for the following components:    RBC, UA >100 (*)     WBC, UA >100 (*)     WBC Clumps, UA Many (*)     Yeast, UA Few (*)     All other components within normal limits    Narrative:     Specimen Source->Urine   CULTURE, URINE   LACTIC ACID, PLASMA   CK    CK        All Lab Results:  Results for orders placed or performed during the hospital encounter of 12/29/22   CBC Auto Differential   Result Value Ref Range    WBC 9.31 3.90 - 12.70 K/uL    RBC 5.70 4.60 - 6.20 M/uL    Hemoglobin 16.2 14.0 - 18.0 g/dL    Hematocrit 49.6 40.0 - 54.0 %    MCV 87 82 - 98 fL    MCH 28.4 27.0 - 31.0 pg    MCHC 32.7 32.0 - 36.0 g/dL    RDW 15.9 (H) 11.5 - 14.5 %    Platelets 165 150 - 450 K/uL    MPV 11.3 9.2 - 12.9 fL    Immature Granulocytes 0.3 0.0 - 0.5 %    Gran # (ANC) 6.3 1.8 - 7.7 K/uL    Immature Grans (Abs) 0.03 0.00 - 0.04 K/uL    Lymph # 0.9 (L) 1.0 - 4.8 K/uL    Mono # 2.0 (H) 0.3 - 1.0 K/uL    Eos # 0.0 0.0 - 0.5 K/uL    Baso # 0.03 0.00 - 0.20 K/uL    nRBC 0 0 /100 WBC    Gran % 68.2 38.0 - 73.0 %    Lymph % 9.3 (L) 18.0 - 48.0 %    Mono % 21.8 (H) 4.0 - 15.0 %    Eosinophil % 0.1 0.0 - 8.0 %    Basophil % 0.3 0.0 - 1.9 %    Differential Method Automated    Comprehensive Metabolic Panel   Result Value Ref Range    Sodium 138 136 - 145 mmol/L    Potassium 3.9 3.5 - 5.1 mmol/L    Chloride 105 95 - 110 mmol/L    CO2 22 (L) 23 - 29 mmol/L    Glucose 103 70 - 110 mg/dL    BUN 16 10 - 30 mg/dL    Creatinine 1.2 0.5 - 1.4 mg/dL    Calcium 9.6 8.7 - 10.5 mg/dL    Total Protein 8.0 6.0 - 8.4 g/dL    Albumin 3.4 (L) 3.5 - 5.2 g/dL    Total Bilirubin 2.2 (H) 0.1 - 1.0 mg/dL    Alkaline Phosphatase 182 (H) 55 - 135 U/L     (H) 10 - 40 U/L     (H) 10 - 44 U/L    Anion Gap 11 8 - 16 mmol/L    eGFR 56 (A) >60 mL/min/1.73 m^2   Lactic acid, plasma   Result Value Ref Range    Lactate (Lactic Acid) 1.7 0.5 - 2.2 mmol/L   Urinalysis, Reflex to Urine Culture Urine, Clean Catch    Specimen: Urine   Result Value Ref Range    Specimen UA Urine, Clean Catch     Color, UA Yellow Yellow, Straw, Jennifer    Appearance, UA Cloudy (A) Clear    pH, UA 6.0 5.0 - 8.0    Specific Gravity, UA 1.015 1.005 - 1.030    Protein, UA 2+ (A) Negative    Glucose, UA Negative Negative    Ketones, UA  Negative Negative    Bilirubin (UA) Negative Negative    Occult Blood UA 3+ (A) Negative    Nitrite, UA Positive (A) Negative    Urobilinogen, UA 4.0-6.0 (A) <2.0 EU/dL    Leukocytes, UA 3+ (A) Negative   CK   Result Value Ref Range     20 - 200 U/L   Urinalysis Microscopic   Result Value Ref Range    RBC, UA >100 (H) 0 - 4 /hpf    WBC, UA >100 (H) 0 - 5 /hpf    WBC Clumps, UA Many (A) None-Rare    Bacteria None None-Occ /hpf    Yeast, UA Few (A) None    Hyaline Casts, UA 0 0-1/lpf /lpf    Microscopic Comment SEE COMMENT          Imaging Results:  Imaging Results              US Abdomen Limited (Final result)  Result time 12/29/22 14:34:53      Final result by Miles Wolff MD (12/29/22 14:34:53)                   Impression:      See findings above.  MRCP or ERCP can be obtained as clinically warranted.      Electronically signed by: Miles Wolff MD  Date:    12/29/2022  Time:    14:34               Narrative:    EXAMINATION:  US ABDOMEN LIMITED    CLINICAL HISTORY:  elevated bili and liver enzymes;    COMPARISON:  06/14/2021    FINDINGS:  Limited right upper quadrant ultrasound performed.  The liver measures 16.7 cm in length and is homogeneous in echogenicity.  Common bile duct is prominent at 6 mm.  Suspect mild intrahepatic ductal dilatation.  Sludge and stones are seen within the gallbladder.  Negative sonographic Forbes sign mild wall thickening.  The visualized portions of the pancreas and IVC are within normal limits.  Spleen is borderline enlarged measuring 12.2 cm.  Incidental note made of moderate hydronephrosis bilaterally and simple renal cysts.                                       CT Head Without Contrast (Final result)  Result time 12/29/22 12:15:59      Final result by Miles Wolff MD (12/29/22 12:15:59)                   Impression:      Postoperative changes right mastoid with moderate right mastoid effusion.    All CT scans at this facility use dose modulation, iterative  reconstruction, and/or weight based dosing when appropriate to reduce radiation dose to as low as reasonable achievable.      Electronically signed by: Miles Wolff MD  Date:    12/29/2022  Time:    12:15               Narrative:    EXAMINATION:  CT HEAD WITHOUT CONTRAST    CLINICAL HISTORY:  Head trauma, minor (Age >= 65y);    TECHNIQUE:  Low dose axial CT images obtained throughout the head without intravenous contrast. Sagittal and coronal reconstructions were performed.    All CT scans at this facility use dose modulation, iterative reconstruction, and/or weight based dosing when appropriate to reduce radiation dose to as low as reasonable achievable.    COMPARISON:  12/05/2019    FINDINGS:  Intracranial compartment:    The brain parenchyma demonstrates areas of decreased attenuation with mild to moderate periventricular white matter consistent with chronic microvascular ischemic changes..  No parenchymal mass, hemorrhage, edema or major vascular distribution infarct.  Vascular calcifications are noted.    Mild prominence of the sulci and ventricles are consistent with age-related involutional changes.    No extra-axial blood or fluid collections.    Skull/extracranial contents (limited evaluation): No fracture.  Postoperative changes are seen adjacent to the left temporal bone with implant device.  Postoperative changes are seen with prior right mastoidectomy.  Moderate right-sided mastoid effusion noted.  Left mastoid air cells are clear.  Paranasal sinuses are grossly clear.                                       X-Ray Pelvis Routine AP (Final result)  Result time 12/29/22 11:36:29      Final result by Miles Wolff MD (12/29/22 11:36:29)                   Impression:      No acute fracture or dislocation.      Electronically signed by: Miels Wolff MD  Date:    12/29/2022  Time:    11:36               Narrative:    EXAMINATION:  XR PELVIS ROUTINE AP    CLINICAL HISTORY:  XR PELVIS ROUTINE  AP    COMPARISON:  None    FINDINGS:  Single AP views of the pelvis were obtained.    No evidence of acute fracture or dislocation.  Advanced degenerative changes lower lumbar spine and bilateral hip joints.  Diffuse osteopenia limits evaluation for fracture.  Soft tissues are unremarkable. Artifact suspected overlying the lower pelvis.                                                The Emergency Provider reviewed the vital signs and test results, which are outlined above.     ED Discussion     3:55 PM: Discussed pt's case with Dr. Powers (Gastroenterology) who recommends obs or admits and an evaluation by advanced GI to consider an EUS and or ERCP. Dr. Powers states that this facility does not have advanced GI till next week, so she recommends discussing transfer to [facility].     4:00 PM: Dr. Nuno transfers care of patient to Dr. watts pending lab and imaging results.    6:52 PM: Consult with Dr. Diop (Hospital Medicine) at [facility] concerning pt. There are no advanced GI services, which the patient requires, offered at [facility] at this time. Dr. Diop expresses understanding and will accept transfer for advanced GI.  Accepting Facility: [facility]  Accepting Physician: Dr. Diop    6:53 PM: Re-evaluated pt. Informed pt and family that there are no advanced GI services available at this time. I have discussed test results, shared treatment plan, and the need for transfer with patient and family at bedside. All historical, clinical, radiographic, and laboratory findings were reviewed with the patient/family in detail. Patient will be transferred by [facility] with IV abx required en route. Patient understands that there could be unforeseen motor vehicle accidents or loss of vital signs that could result in potential death or permanent disability. Pt and family express understanding at this time and agree with all information. All questions answered.  Pt and family have no further questions or concerns at this time. Pt is ready for transfer.       ED Course as of 12/29/22 2041   Thu Dec 29, 2022   [address] [facility] is at capacity. Patient accepted at [place].  Transfer center discussed with Dr. Lamas with GI states they will be able to treat patient [DP]      ED Course User Index  [DP] Miles Stiles MD     Medical Decision Making:   Clinical Tests:   Lab Tests: Ordered and Reviewed  Radiological Study: Ordered and Reviewed         ED Medication(s):  Medications   artificial tears 0.5 % ophthalmic solution 1 drop (1 drop Both Eyes Given 12/29/22 1732)   cefTRIAXone (ROCEPHIN) 1 g in dextrose 5 % in water (D5W) 5 % 50 mL IVPB (MB+) (0 g Intravenous Stopped 12/29/22 1829)       New Prescriptions    No medications on file               Scribe Attestation:   Scribe #1: I performed the above scribed service and the documentation accurately describes the services I performed. I attest to the accuracy of the note.     Attending:   Physician Attestation Statement for Scribe #1: I, Darwin Nuno MD, personally performed the services described in this documentation, as scribed by Mable Hamm, in my presence, and it is both accurate and complete.       Scribe Attestation:   Scribe #2: I performed the above scribed service and the documentation accurately describes the services I performed. I attest to the accuracy of the note.    Attending Attestation:           Physician Attestation for Scribe:    Physician Attestation Statement for Scribe #2: I, Miles Stiles MD, reviewed documentation, as scribed by Gregoria Jimenez in my presence, and it is both accurate and complete. I also acknowledge and confirm the content of the note done by Scribe #1.         Clinical Impression       ICD-10-CM ICD-9-CM   1. Urinary tract infection with hematuria, site unspecified  N39.0 599.0    R31.9 599.70   2. Elevated LFTs  R79.89 790.6   3. Dilated bile duct  K83.8 576.8       Disposition:    Disposition: Transferred  Condition: Stable       Miles Stiles MD  12/29/22 2041

## 2022-12-29 NOTE — PROGRESS NOTES
"Subjective:       Patient ID: Amauri Lara is a 89 y.o. male.    Vitals:  height is 5' 11" (1.803 m) and weight is 95.3 kg (210 lb). His oral temperature is 97.7 °F (36.5 °C). His blood pressure is 135/73 and his pulse is 72. His respiration is 18 and oxygen saturation is 95%.     Chief Complaint: Dysuria    Patient came in today for dysuria and pelvic pain. Symptoms started 2 days ago.    Dysuria   This is a new problem. The current episode started in the past 7 days. The problem occurs every urination. The problem has been gradually worsening. The quality of the pain is described as aching. The pain is at a severity of 10/10. The pain is severe. There has been no fever. He is Not sexually active. There is No history of pyelonephritis. Associated symptoms include chills and constipation. Pertinent negatives include no behavior changes, discharge, flank pain, frequency, hematuria, hesitancy, nausea, possible pregnancy, sweats, urgency, vomiting, weight loss, bubble bath use, rash or withholding. He has tried nothing for the symptoms. His past medical history is significant for diabetes mellitus. There is no history of catheterization, diabetes insipidus, genitourinary reflux, hypertension, kidney stones, recurrent UTIs, a single kidney, STD, urinary stasis or a urological procedure.     Constitution: Positive for chills.   Gastrointestinal:  Positive for constipation. Negative for nausea and vomiting.   Genitourinary:  Positive for dysuria. Negative for frequency, urgency, flank pain and hematuria.   Skin:  Negative for rash.     Objective:      Physical Exam   Constitutional: He is oriented to person, place, and time. He appears well-developed.   HENT:   Head: Normocephalic and atraumatic. Not macrocephalic and not microcephalic. Head is without raccoon's eyes, without Vincent's sign, without abrasion, without contusion, without laceration and without left periorbital erythema. Hair is normal.   Mouth/Throat: " Mucous membranes are normal.   Eyes: Conjunctivae and lids are normal.   Neck: Trachea normal. Neck supple. No edema present. No erythema present. No decreased range of motion present. No pain with movement present.   Pulmonary/Chest: Effort normal. No respiratory distress.   Abdominal: Normal appearance and bowel sounds are normal. He exhibits no distension, no abdominal bruit, no pulsatile midline mass and no mass. Soft. There is no abdominal tenderness. There is no rebound, no guarding, no tenderness at McBurney's point, negative Forbes's sign, no left CVA tenderness, negative Rovsing's sign and no right CVA tenderness. No hernia. Hernia confirmed negative in the umbilical area, confirmed negative in the ventral area, confirmed negative in the left inguinal area, confirmed negative in the right inguinal area, confirmed negative in the right femoral and confirmed negative in the no left femoral area.   Musculoskeletal: Normal range of motion.         General: Normal range of motion.      Cervical back: He exhibits no tenderness, no bony tenderness and no swelling.      Thoracic back: Normal. He exhibits normal range of motion, no tenderness and no bony tenderness.   Neurological: He is alert and oriented to person, place, and time. He has normal sensation and normal strength. He displays no weakness. No cranial nerve deficit. He displays no seizure activity. GCS eye subscore is 4. GCS verbal subscore is 5. GCS motor subscore is 6.   Skin: Skin is warm, dry, intact, not diaphoretic and not pale.   Psychiatric: His speech is normal and behavior is normal. Judgment and thought content normal.   Nursing note and vitals reviewed.      Results for orders placed or performed in visit on 12/29/22   POCT Urinalysis, Dipstick, Automated, W/O Scope   Result Value Ref Range    POC Blood, Urine Positive (A) Negative    POC Bilirubin, Urine Negative Negative    POC Urobilinogen, Urine positive 0.3 - 2.2    POC Ketones, Urine  Negative Negative    POC Protein, Urine Positive (A) Negative    POC Nitrates, Urine Positive (A) Negative    POC Glucose, Urine Negative Negative    pH, UA 6.0     POC Specific Gravity, Urine 1.020 1.003 - 1.029    POC Leukocytes, Urine Positive (A) Negative    Color, UA Light Yellow Light Yellow, Yellow    Clarity, UA Clear Clear       Assessment:       1. Urinary tract infection with hematuria, site unspecified    2. Dysuria          Plan:         Urinary tract infection with hematuria, site unspecified  -     POCT Urinalysis, Dipstick, Automated, W/O Scope  -     ciprofloxacin HCl (CIPRO) 500 MG tablet; Take 1 tablet (500 mg total) by mouth 2 (two) times daily. for 7 days  Dispense: 14 tablet; Refill: 0    Dysuria  -     CULTURE, URINE  -     Refer to Emergency Dept.           Medical Decision Making:   History:   I obtained history from: someone other than patient.       <> Summary of History: daughter  Initial Assessment:   Patient oriented to time place and person, patient is able to remember fall from earlier today and hitting head on bed post.  No hematoma noted no abrasion noted to posterior scalp where patient reports hitting head.  Urgent Care Management:  Discussed with daughter at bedside concerns due to patient having a fall earlier this morning as well as patient having a urinary tract infection.  Discussed with daughter I will send Cipro to the pharmacy to treat UTI, obtain a urine culture and daughter verbalized will make an appointment for patient to see urologist, since patient has frequent UTIs and last UTI was less than a month ago.  Discussed with daughter conversation with DR. Galeano about possible admission for UTI and questionable urosepsis versus strict home monitoring by family. Precautions included increase confusion, slurred speech, development of nausea vomiting, severe headache passing out.  Patient with no cervical tenderness.  During conversation with patient's daughter, patient's  daughter has concerns for dehydration for patient as well as sepsis and head injury as patient had hit head earlier.  Patient's daughter elected to go to emergency room for further treatment of patient.  Report was called to [facility] ED spoke with Berna.  Patient ambulatory from clinic.  Other:   I have discussed this case with another health care provider.       <> Summary of the Discussion: Dr. Galeano, Discussed PT possibly having Urosepsis and possible admission for treatment or monitoring vs Strict home monitoring by family.

## 2022-12-30 ENCOUNTER — ANESTHESIA (OUTPATIENT)
Dept: ENDOSCOPY | Facility: HOSPITAL | Age: 87
End: 2022-12-30
Payer: MEDICARE

## 2022-12-30 ENCOUNTER — ANESTHESIA EVENT (OUTPATIENT)
Dept: ENDOSCOPY | Facility: HOSPITAL | Age: 87
End: 2022-12-30
Payer: MEDICARE

## 2022-12-30 PROBLEM — N39.0 UTI (URINARY TRACT INFECTION): Status: ACTIVE | Noted: 2022-12-30

## 2022-12-30 PROBLEM — K83.8 COMMON BILE DUCT DILATION: Status: ACTIVE | Noted: 2022-12-30

## 2022-12-30 LAB
ALBUMIN SERPL BCP-MCNC: 2.9 G/DL (ref 3.5–5.2)
ALP SERPL-CCNC: 141 U/L (ref 55–135)
ALT SERPL W/O P-5'-P-CCNC: 274 U/L (ref 10–44)
ANION GAP SERPL CALC-SCNC: 10 MMOL/L (ref 8–16)
AST SERPL-CCNC: 73 U/L (ref 10–40)
BASOPHILS # BLD AUTO: 0.02 K/UL (ref 0–0.2)
BASOPHILS NFR BLD: 0.3 % (ref 0–1.9)
BILIRUB DIRECT SERPL-MCNC: 0.9 MG/DL (ref 0.1–0.3)
BILIRUB SERPL-MCNC: 1.4 MG/DL (ref 0.1–1)
BUN SERPL-MCNC: 16 MG/DL (ref 10–30)
CALCIUM SERPL-MCNC: 9.1 MG/DL (ref 8.7–10.5)
CHLORIDE SERPL-SCNC: 106 MMOL/L (ref 95–110)
CO2 SERPL-SCNC: 23 MMOL/L (ref 23–29)
CREAT SERPL-MCNC: 1.1 MG/DL (ref 0.5–1.4)
DIFFERENTIAL METHOD: ABNORMAL
EOSINOPHIL # BLD AUTO: 0 K/UL (ref 0–0.5)
EOSINOPHIL NFR BLD: 0.1 % (ref 0–8)
ERYTHROCYTE [DISTWIDTH] IN BLOOD BY AUTOMATED COUNT: 15.9 % (ref 11.5–14.5)
EST. GFR  (NO RACE VARIABLE): >60 ML/MIN/1.73 M^2
GLUCOSE SERPL-MCNC: 121 MG/DL (ref 70–110)
HCT VFR BLD AUTO: 47.1 % (ref 40–54)
HGB BLD-MCNC: 15.5 G/DL (ref 14–18)
IMM GRANULOCYTES # BLD AUTO: 0.02 K/UL (ref 0–0.04)
IMM GRANULOCYTES NFR BLD AUTO: 0.3 % (ref 0–0.5)
LYMPHOCYTES # BLD AUTO: 0.8 K/UL (ref 1–4.8)
LYMPHOCYTES NFR BLD: 12.5 % (ref 18–48)
MAGNESIUM SERPL-MCNC: 1.9 MG/DL (ref 1.6–2.6)
MCH RBC QN AUTO: 28.1 PG (ref 27–31)
MCHC RBC AUTO-ENTMCNC: 32.9 G/DL (ref 32–36)
MCV RBC AUTO: 85 FL (ref 82–98)
MONOCYTES # BLD AUTO: 1.3 K/UL (ref 0.3–1)
MONOCYTES NFR BLD: 19.6 % (ref 4–15)
NEUTROPHILS # BLD AUTO: 4.5 K/UL (ref 1.8–7.7)
NEUTROPHILS NFR BLD: 67.2 % (ref 38–73)
NRBC BLD-RTO: 0 /100 WBC
PHOSPHATE SERPL-MCNC: 2.3 MG/DL (ref 2.7–4.5)
PLATELET # BLD AUTO: 167 K/UL (ref 150–450)
PMV BLD AUTO: 11.9 FL (ref 9.2–12.9)
POCT GLUCOSE: 122 MG/DL (ref 70–110)
POCT GLUCOSE: 126 MG/DL (ref 70–110)
POCT GLUCOSE: 126 MG/DL (ref 70–110)
POTASSIUM SERPL-SCNC: 3.5 MMOL/L (ref 3.5–5.1)
PROT SERPL-MCNC: 7.2 G/DL (ref 6–8.4)
RBC # BLD AUTO: 5.52 M/UL (ref 4.6–6.2)
SODIUM SERPL-SCNC: 139 MMOL/L (ref 136–145)
TSH SERPL DL<=0.005 MIU/L-ACNC: 1.4 UIU/ML (ref 0.4–4)
WBC # BLD AUTO: 6.73 K/UL (ref 3.9–12.7)

## 2022-12-30 PROCEDURE — 84443 ASSAY THYROID STIM HORMONE: CPT | Mod: HCNC

## 2022-12-30 PROCEDURE — D9220A PRA ANESTHESIA: ICD-10-PCS | Mod: HCNC,CRNA,, | Performed by: NURSE ANESTHETIST, CERTIFIED REGISTERED

## 2022-12-30 PROCEDURE — 25000003 PHARM REV CODE 250: Mod: HCNC | Performed by: STUDENT IN AN ORGANIZED HEALTH CARE EDUCATION/TRAINING PROGRAM

## 2022-12-30 PROCEDURE — 83735 ASSAY OF MAGNESIUM: CPT | Mod: HCNC

## 2022-12-30 PROCEDURE — 43259 EGD US EXAM DUODENUM/JEJUNUM: CPT | Mod: HCNC,,, | Performed by: INTERNAL MEDICINE

## 2022-12-30 PROCEDURE — 37000008 HC ANESTHESIA 1ST 15 MINUTES: Mod: HCNC | Performed by: INTERNAL MEDICINE

## 2022-12-30 PROCEDURE — 36415 COLL VENOUS BLD VENIPUNCTURE: CPT | Mod: HCNC

## 2022-12-30 PROCEDURE — G0378 HOSPITAL OBSERVATION PER HR: HCPCS | Mod: HCNC

## 2022-12-30 PROCEDURE — 96375 TX/PRO/DX INJ NEW DRUG ADDON: CPT | Mod: 59

## 2022-12-30 PROCEDURE — 99205 PR OFFICE/OUTPT VISIT, NEW, LEVL V, 60-74 MIN: ICD-10-PCS | Mod: 25,HCNC,, | Performed by: INTERNAL MEDICINE

## 2022-12-30 PROCEDURE — 96366 THER/PROPH/DIAG IV INF ADDON: CPT | Mod: 59

## 2022-12-30 PROCEDURE — 84100 ASSAY OF PHOSPHORUS: CPT | Mod: HCNC

## 2022-12-30 PROCEDURE — 96365 THER/PROPH/DIAG IV INF INIT: CPT

## 2022-12-30 PROCEDURE — 63600175 PHARM REV CODE 636 W HCPCS: Mod: HCNC | Performed by: NURSE ANESTHETIST, CERTIFIED REGISTERED

## 2022-12-30 PROCEDURE — 80053 COMPREHEN METABOLIC PANEL: CPT | Mod: HCNC

## 2022-12-30 PROCEDURE — 43259 EGD US EXAM DUODENUM/JEJUNUM: CPT | Mod: HCNC | Performed by: INTERNAL MEDICINE

## 2022-12-30 PROCEDURE — 25000003 PHARM REV CODE 250: Mod: HCNC

## 2022-12-30 PROCEDURE — D9220A PRA ANESTHESIA: ICD-10-PCS | Mod: HCNC,ANES,, | Performed by: ANESTHESIOLOGY

## 2022-12-30 PROCEDURE — 25000003 PHARM REV CODE 250: Mod: HCNC | Performed by: NURSE ANESTHETIST, CERTIFIED REGISTERED

## 2022-12-30 PROCEDURE — 43259 PR ENDOSCOPIC ULTRASOUND EXAM: ICD-10-PCS | Mod: HCNC,,, | Performed by: INTERNAL MEDICINE

## 2022-12-30 PROCEDURE — 85025 COMPLETE CBC W/AUTO DIFF WBC: CPT | Mod: HCNC

## 2022-12-30 PROCEDURE — D9220A PRA ANESTHESIA: Mod: HCNC,CRNA,, | Performed by: NURSE ANESTHETIST, CERTIFIED REGISTERED

## 2022-12-30 PROCEDURE — 63600175 PHARM REV CODE 636 W HCPCS: Mod: HCNC | Performed by: STUDENT IN AN ORGANIZED HEALTH CARE EDUCATION/TRAINING PROGRAM

## 2022-12-30 PROCEDURE — 82248 BILIRUBIN DIRECT: CPT | Mod: HCNC

## 2022-12-30 PROCEDURE — D9220A PRA ANESTHESIA: Mod: HCNC,ANES,, | Performed by: ANESTHESIOLOGY

## 2022-12-30 PROCEDURE — 37000009 HC ANESTHESIA EA ADD 15 MINS: Mod: HCNC | Performed by: INTERNAL MEDICINE

## 2022-12-30 PROCEDURE — 99205 OFFICE O/P NEW HI 60 MIN: CPT | Mod: 25,HCNC,, | Performed by: INTERNAL MEDICINE

## 2022-12-30 RX ORDER — PROPOFOL 10 MG/ML
VIAL (ML) INTRAVENOUS
Status: DISCONTINUED | OUTPATIENT
Start: 2022-12-30 | End: 2022-12-30

## 2022-12-30 RX ORDER — PHENYLEPHRINE HYDROCHLORIDE 10 MG/ML
INJECTION INTRAVENOUS
Status: DISCONTINUED | OUTPATIENT
Start: 2022-12-30 | End: 2022-12-30

## 2022-12-30 RX ORDER — CEFEPIME HYDROCHLORIDE 1 G/50ML
1 INJECTION, SOLUTION INTRAVENOUS
Status: DISCONTINUED | OUTPATIENT
Start: 2022-12-30 | End: 2022-12-31 | Stop reason: HOSPADM

## 2022-12-30 RX ORDER — PROPOFOL 10 MG/ML
VIAL (ML) INTRAVENOUS CONTINUOUS PRN
Status: DISCONTINUED | OUTPATIENT
Start: 2022-12-30 | End: 2022-12-30

## 2022-12-30 RX ORDER — LIDOCAINE HYDROCHLORIDE 20 MG/ML
INJECTION INTRAVENOUS
Status: DISCONTINUED | OUTPATIENT
Start: 2022-12-30 | End: 2022-12-30

## 2022-12-30 RX ADMIN — TAMSULOSIN HYDROCHLORIDE 0.4 MG: 0.4 CAPSULE ORAL at 09:12

## 2022-12-30 RX ADMIN — LIDOCAINE HYDROCHLORIDE 100 MG: 20 INJECTION, SOLUTION INTRAVENOUS at 11:12

## 2022-12-30 RX ADMIN — LISINOPRIL 20 MG: 20 TABLET ORAL at 09:12

## 2022-12-30 RX ADMIN — METOPROLOL TARTRATE 50 MG: 50 TABLET, FILM COATED ORAL at 09:12

## 2022-12-30 RX ADMIN — LISINOPRIL 20 MG: 20 TABLET ORAL at 08:12

## 2022-12-30 RX ADMIN — PHENYLEPHRINE HYDROCHLORIDE 100 MCG: 10 INJECTION INTRAVENOUS at 11:12

## 2022-12-30 RX ADMIN — CEFEPIME HYDROCHLORIDE 1 G: 1 INJECTION, SOLUTION INTRAVENOUS at 06:12

## 2022-12-30 RX ADMIN — FINASTERIDE 5 MG: 5 TABLET, FILM COATED ORAL at 09:12

## 2022-12-30 RX ADMIN — ATORVASTATIN CALCIUM 10 MG: 10 TABLET, FILM COATED ORAL at 09:12

## 2022-12-30 RX ADMIN — HYDROCHLOROTHIAZIDE 12.5 MG: 12.5 TABLET ORAL at 08:12

## 2022-12-30 RX ADMIN — SODIUM CHLORIDE: 0.9 INJECTION, SOLUTION INTRAVENOUS at 10:12

## 2022-12-30 RX ADMIN — HYDROCHLOROTHIAZIDE 12.5 MG: 12.5 TABLET ORAL at 09:12

## 2022-12-30 RX ADMIN — PROPOFOL 150 MCG/KG/MIN: 10 INJECTION, EMULSION INTRAVENOUS at 11:12

## 2022-12-30 RX ADMIN — PROPOFOL 50 MG: 10 INJECTION, EMULSION INTRAVENOUS at 11:12

## 2022-12-30 RX ADMIN — VANCOMYCIN HYDROCHLORIDE 2250 MG: 500 INJECTION, POWDER, LYOPHILIZED, FOR SOLUTION INTRAVENOUS at 08:12

## 2022-12-30 NOTE — SUBJECTIVE & OBJECTIVE
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    Obesity, unspecified     Primary osteoarthritis involving multiple joints        Past Surgical History:   Procedure Laterality Date    CATARACT EXTRACTION      aphakic od    CATARACT EXTRACTION W/  INTRAOCULAR LENS IMPLANT Left 12/13/2018    TRANSURETHRAL RESECTION OF PROSTATE N/A 5/31/2018    Procedure: PROSTATECTOMY-TRANSURETHRAL;  Surgeon: Michael Westbrook IV, MD;  Location: [facility];  Service: Urology;  Laterality: N/A;    VASCULAR SURGERY      yag laser capsulotomy Left 09/08/2020       Review of patient's allergies indicates:   Allergen Reactions    Morphine Other (See Comments)     Pt states, "It only makes my pain worse."       Current Facility-Administered Medications on File Prior to Encounter   Medication    [COMPLETED] artificial tears 0.5 % ophthalmic solution 1 drop    [COMPLETED] cefTRIAXone (ROCEPHIN) 1 g in dextrose 5 % in water (D5W) 5 % 50 mL IVPB (MB+)     Current Outpatient Medications on File Prior to Encounter   Medication Sig    allopurinoL (ZYLOPRIM) 300 MG tablet TAKE ONE TABLET BY MOUTH EVERY DAY    docusate sodium (COLACE) 100 MG capsule Take 1 capsule (100 mg total) by mouth 2 (two) times daily.    finasteride (PROSCAR) 5 mg tablet Take 1 tablet (5 mg total) by mouth once daily.    fluticasone propionate (FLONASE) 50 mcg/actuation nasal spray INHALE 2 SPRAYS IN EACH NOSTRIL ONCE DAILY    HYDROcodone-acetaminophen (NORCO) 7.5-325 mg per tablet Take 1 tablet by mouth every 8 (eight) hours as needed for Pain.    lisinopriL-hydrochlorothiazide (PRINZIDE,ZESTORETIC) 20-12.5 mg per tablet TAKE ONE " TABLET BY MOUTH TWICE DAILY    lovastatin (MEVACOR) 40 MG tablet TAKE ONE TABLET BY MOUTH EVERY EVENING    metFORMIN (GLUCOPHAGE) 850 MG tablet TAKE ONE TABLET BY MOUTH DAILY    prednisoLONE acetate (PRED FORTE) 1 % DrpS Place 1 drop into the left eye 4 (four) times daily.    tamsulosin (FLOMAX) 0.4 mg Cap Take 1 capsule (0.4 mg total) by mouth once daily.    blood sugar diagnostic (TRUE METRIX GLUCOSE TEST STRIP) Strp 1 each by Misc.(Non-Drug; Combo Route) route 4 (four) times daily.    blood-glucose meter (TRUE METRIX GLUCOSE METER) kit Use as instructed    ciprofloxacin HCl (CIPRO) 500 MG tablet Take 1 tablet (500 mg total) by mouth 2 (two) times daily. for 7 days    lancets Misc 1 each by Misc.(Non-Drug; Combo Route) route 4 (four) times daily.    metoprolol tartrate (LOPRESSOR) 50 MG tablet TAKE ONE TABLET BY MOUTH ONCE DAILY    SSD 1 % cream      Family History       Problem Relation (Age of Onset)    Diabetes Brother          Tobacco Use    Smoking status: Never    Smokeless tobacco: Never   Substance and Sexual Activity    Alcohol use: No    Drug use: No    Sexual activity: Never     Review of Systems   Constitutional:  Negative for activity change, appetite change, chills, diaphoresis, fatigue and fever.   HENT:  Negative for ear discharge, ear pain and sore throat.    Eyes:  Negative for photophobia and visual disturbance.   Respiratory:  Negative for chest tightness, shortness of breath and wheezing.    Cardiovascular:  Negative for chest pain and palpitations.   Gastrointestinal:  Negative for abdominal distention, abdominal pain, constipation, diarrhea, nausea and vomiting.   Endocrine: Negative for polydipsia and polyuria.   Genitourinary:  Positive for dysuria and frequency.   Musculoskeletal:  Negative for back pain and myalgias.   Skin:  Negative for color change and wound.   Neurological:  Negative for dizziness, seizures, syncope, weakness, light-headedness and headaches.   Psychiatric/Behavioral:  Negative.     Objective:     Vital Signs (Most Recent):  Temp: 98.2 °F (36.8 °C) (12/30/22 0425)  Pulse: 79 (12/30/22 0425)  Resp: 18 (12/30/22 0425)  BP: 138/74 (12/30/22 0425)  SpO2: 96 % (12/30/22 0425) Vital Signs (24h Range):  Temp:  [97.7 °F (36.5 °C)-98.9 °F (37.2 °C)] 98.2 °F (36.8 °C)  Pulse:  [69-79] 79  Resp:  [14-20] 18  SpO2:  [95 %-98 %] 96 %  BP: (134-179)/(68-92) 138/74     Weight: 92.7 kg (204 lb 5.9 oz)  Body mass index is 28.5 kg/m².    Physical Exam  Constitutional:       General: He is not in acute distress.     Appearance: Normal appearance. He is not ill-appearing.   HENT:      Head: Normocephalic and atraumatic.      Right Ear: External ear normal.      Left Ear: External ear normal.      Nose: Nose normal.      Mouth/Throat:      Mouth: Mucous membranes are moist.   Eyes:      Pupils: Pupils are equal, round, and reactive to light.   Cardiovascular:      Rate and Rhythm: Normal rate.      Pulses: Normal pulses.      Heart sounds: Normal heart sounds.   Pulmonary:      Effort: Pulmonary effort is normal.      Breath sounds: Normal breath sounds.   Abdominal:      General: Bowel sounds are normal. There is no distension.      Palpations: Abdomen is soft.      Tenderness: There is no guarding or rebound.      Comments: Slight tenderness to palpation along epigastric area   Musculoskeletal:         General: Normal range of motion.   Skin:     General: Skin is warm.      Capillary Refill: Capillary refill takes less than 2 seconds.   Neurological:      General: No focal deficit present.      Mental Status: He is alert.         CRANIAL NERVES     CN III, IV, VI   Pupils are equal, round, and reactive to light.     Significant Labs: All pertinent labs within the past 24 hours have been reviewed.  CBC:   Recent Labs   Lab 12/29/22  1235 12/30/22  0403   WBC 9.31 6.73   HGB 16.2 15.5   HCT 49.6 47.1    167     CMP:   Recent Labs   Lab 12/29/22  1235 12/30/22  0403    139   K 3.9 3.5   CL  105 106   CO2 22* 23    121*   BUN 16 16   CREATININE 1.2 1.1   CALCIUM 9.6 9.1   PROT 8.0 7.2   ALBUMIN 3.4* 2.9*   BILITOT 2.2* 1.4*   ALKPHOS 182* 141*   * 73*   * 274*   ANIONGAP 11 10     Cardiac Markers: No results for input(s): CKMB, MYOGLOBIN, BNP, TROPISTAT in the last 48 hours.  Urine Culture: No results for input(s): LABURIN in the last 48 hours.  Urine Studies:   Recent Labs   Lab 12/29/22  1706   COLORU Yellow   APPEARANCEUA Cloudy*   PHUR 6.0   SPECGRAV 1.015   PROTEINUA 2+*   GLUCUA Negative   KETONESU Negative   BILIRUBINUA Negative   OCCULTUA 3+*   NITRITE Positive*   UROBILINOGEN 4.0-6.0*   LEUKOCYTESUR 3+*   RBCUA >100*   WBCUA >100*   BACTERIA None   HYALINECASTS 0       Significant Imaging: I have reviewed all pertinent imaging results/findings within the past 24 hours.

## 2022-12-30 NOTE — PROGRESS NOTES
[facility]  Progress Note    Patient Name: Amauri Lara  MRN: [id number]  Patient Class: OP- Observation   Admission Date: 12/29/2022  Length of Stay: 0 days  Attending Physician: Geno Diop MD  Primary Care Provider: Stefan Vera MD        Subjective:     Principal Problem:Common bile duct dilation        HPI:  89-year-old male with PMH HTN, chronic kidney disease stage 3 PE, T2DM who present to [place] ED for fall. Patient first present to urgent care was diagnosed with a urinary tract infection and was prescribed Cipro (not filled) and instructed to present to the ED due to a reported fall PTA. Patient did not have loss of consciousness or head trauma and is not currently on anticoagulants. CTH showed benign findings. Routine lab work obtained in the ED found the patient to elevated a LFTs without associated white count or appearance of infection/ cholangitis. Follow up Abdominal US showed common bile duct dilation at 6 mm with stones/sludge seen within the gallbladder. Patient with history of cochlear implants and unable to undergo MRCP. Patient was transferred to [facility] for need for potential EUS/ERCP and GI consultation.     At ED in [place], vital signs were stable as patient was afebrile, /68, HR 71, RR 16 with SPO2 97% on room air.  Patient is alert and orientated x4 with no abdominal pain physical exam only slight pressure the suprapubic region.  Labs pertinent for WBC 9.31, Hgb 16.2 Plt 165, Na 138, K 3.9, HCO3 22, BUN 16 with a Cr 1.2 alk-phos 182, Alb 3.4, T bili 2.2, , .  LA 1.7, . UA infectious appearing with 2+ protein 3+ occult blood positive nitrites 3+ leukocytes and > 100 WBC with many clumps. Patient given 1x rocephin for UTI at ED prior to transfer. [facility] was consulted for admission for continued medical management and need for potential EUS/ERCP.            Overview/Hospital Course:  No notes on file    Interval History:  MARY, patient comfortable in bed, currently denying dysuria, abdominal pain. Pending GI for possible ERCP.    Review of Systems   Constitutional:  Negative for chills and fever.   HENT:  Negative for congestion, rhinorrhea and sore throat.    Eyes:  Negative for visual disturbance.   Respiratory:  Negative for cough and shortness of breath.    Cardiovascular:  Negative for chest pain.   Gastrointestinal:  Negative for abdominal pain, diarrhea, nausea and vomiting.   Genitourinary:  Negative for decreased urine volume and dysuria.   Musculoskeletal:  Negative for arthralgias and myalgias.   Skin:  Negative for rash.   Neurological:  Negative for dizziness, weakness, numbness and headaches.   Psychiatric/Behavioral:  Negative for dysphoric mood. The patient is not nervous/anxious.    Objective:     Vital Signs (Most Recent):  Temp: 98.2 °F (36.8 °C) (12/30/22 0425)  Pulse: 79 (12/30/22 0425)  Resp: 18 (12/30/22 0425)  BP: 138/74 (12/30/22 0425)  SpO2: 96 % (12/30/22 0425) Vital Signs (24h Range):  Temp:  [97.7 °F (36.5 °C)-98.9 °F (37.2 °C)] 98.2 °F (36.8 °C)  Pulse:  [69-79] 79  Resp:  [14-20] 18  SpO2:  [95 %-98 %] 96 %  BP: (134-179)/(68-92) 138/74     Weight: 92.7 kg (204 lb 5.9 oz)  Body mass index is 28.5 kg/m².    Intake/Output Summary (Last 24 hours) at 12/30/2022 0827  Last data filed at 12/30/2022 0000  Gross per 24 hour   Intake 360 ml   Output 300 ml   Net 60 ml      Physical Exam  Constitutional:       General: He is not in acute distress.     Appearance: Normal appearance. He is well-developed. He is not diaphoretic.   HENT:      Head: Normocephalic and atraumatic.      Right Ear: Decreased hearing noted.      Left Ear: Decreased hearing noted.   Eyes:      Conjunctiva/sclera: Conjunctivae normal.      Pupils: Pupils are equal, round, and reactive to light.   Cardiovascular:      Rate and Rhythm: Normal rate and regular rhythm.      Pulses: Normal pulses.      Heart sounds: Normal heart sounds. No  murmur heard.  Pulmonary:      Effort: Pulmonary effort is normal. No respiratory distress.      Breath sounds: Normal breath sounds. No wheezing.   Abdominal:      General: Bowel sounds are normal. There is no distension.      Palpations: Abdomen is soft. There is no mass.      Tenderness: There is no abdominal tenderness.      Hernia: A hernia is present.   Musculoskeletal:         General: No swelling. Normal range of motion.      Cervical back: Normal range of motion and neck supple.   Skin:     General: Skin is warm and dry.      Capillary Refill: Capillary refill takes less than 2 seconds.   Neurological:      Mental Status: He is alert and oriented to person, place, and time. Mental status is at baseline.   Psychiatric:         Mood and Affect: Mood normal.         Behavior: Behavior normal.       Significant Labs: CBC:   Recent Labs   Lab 12/29/22  1235 12/30/22  0403   WBC 9.31 6.73   HGB 16.2 15.5   HCT 49.6 47.1    167     CMP:   Recent Labs   Lab 12/29/22  1235 12/30/22  0403    139   K 3.9 3.5    106   CO2 22* 23    121*   BUN 16 16   CREATININE 1.2 1.1   CALCIUM 9.6 9.1   PROT 8.0 7.2   ALBUMIN 3.4* 2.9*   BILITOT 2.2* 1.4*   ALKPHOS 182* 141*   * 73*   * 274*   ANIONGAP 11 10     Lipase: No results for input(s): LIPASE in the last 48 hours.  Magnesium:   Recent Labs   Lab 12/30/22  0403   MG 1.9     Urine Studies:   Recent Labs   Lab 12/29/22  1706   COLORU Yellow   APPEARANCEUA Cloudy*   PHUR 6.0   SPECGRAV 1.015   PROTEINUA 2+*   GLUCUA Negative   KETONESU Negative   BILIRUBINUA Negative   OCCULTUA 3+*   NITRITE Positive*   UROBILINOGEN 4.0-6.0*   LEUKOCYTESUR 3+*   RBCUA >100*   WBCUA >100*   BACTERIA None   HYALINECASTS 0       Significant Imaging: I have reviewed all pertinent imaging results/findings within the past 24 hours.      Assessment/Plan:      * Common bile duct dilation  Patient found to have common bile duct dilation at 6 mm. Intrahepatic ductal  dilatation also noted on transaminitis workup in ED. Sludge/stones also seen within gallbladder. Patient with no abdominal pain on arrival. Patient transferred to [facility] for EUS/ERCP    Plan  NPO  GI consulted appreciate recs  Monitor for symptoms change    UTI (urinary tract infection)  Patient found to have positive leukocytes and nitrite on UA. Likely reason for patient fall prior to arrival to ED. S/p 1x rocephin in ED. Patient with positive coagulase negative     Plan  Await Urine Cx  Continue Rocephin         Hypertension  Patient found to be hypertensive on arrival    - Continue home HCTZ, metoprolol.        VTE Risk Mitigation (From admission, onward)         Ordered     Reason for No Pharmacological VTE Prophylaxis  Once        Question:  Reasons:  Answer:  Risk of Bleeding    12/29/22 2310     IP VTE HIGH RISK PATIENT  Once         12/29/22 2310     Place sequential compression device  Until discontinued         12/29/22 2310                Discharge Planning   SAM:      Code Status: Full Code   Is the patient medically ready for discharge?:     Reason for patient still in hospital (select all that apply): Laboratory test, Treatment and Consult recommendations           Riaz Munoz MD  [facility] PGY-2

## 2022-12-30 NOTE — PLAN OF CARE
Reported off to Kenneth v/s  98.8  73  22  96/53  95%RA.  NAD noted.  Pt. Will be transported back to room 503  via stretcher.

## 2022-12-30 NOTE — NURSING
"Patient arrived from [facility] Emergency Department for further evaluation and ERCP in the AM, Patient is AAOX4, room air, vitals WNL, no c/o pain or discomfort, patient is definitely weak when walking and suggesting a SBAX2, patient received sandwich and water before being NPO at midnight, daughter came in to visit and helped with admission questions, Patient is EXTREMELY [place] and can hear "a little bettter" in the left than the right ear, patient has cochlear implant on left side, all safety precautions are in place, call bell and tray table are within reach of the patient and will continue to monitor.   "

## 2022-12-30 NOTE — SUBJECTIVE & OBJECTIVE
Interval History: NAEON, patient comfortable in bed, currently denying dysuria, abdominal pain. Pending GI for possible ERCP.    Review of Systems   Constitutional:  Negative for chills and fever.   HENT:  Negative for congestion, rhinorrhea and sore throat.    Eyes:  Negative for visual disturbance.   Respiratory:  Negative for cough and shortness of breath.    Cardiovascular:  Negative for chest pain.   Gastrointestinal:  Negative for abdominal pain, diarrhea, nausea and vomiting.   Genitourinary:  Negative for decreased urine volume and dysuria.   Musculoskeletal:  Negative for arthralgias and myalgias.   Skin:  Negative for rash.   Neurological:  Negative for dizziness, weakness, numbness and headaches.   Psychiatric/Behavioral:  Negative for dysphoric mood. The patient is not nervous/anxious.    Objective:     Vital Signs (Most Recent):  Temp: 98.2 °F (36.8 °C) (12/30/22 0425)  Pulse: 79 (12/30/22 0425)  Resp: 18 (12/30/22 0425)  BP: 138/74 (12/30/22 0425)  SpO2: 96 % (12/30/22 0425) Vital Signs (24h Range):  Temp:  [97.7 °F (36.5 °C)-98.9 °F (37.2 °C)] 98.2 °F (36.8 °C)  Pulse:  [69-79] 79  Resp:  [14-20] 18  SpO2:  [95 %-98 %] 96 %  BP: (134-179)/(68-92) 138/74     Weight: 92.7 kg (204 lb 5.9 oz)  Body mass index is 28.5 kg/m².    Intake/Output Summary (Last 24 hours) at 12/30/2022 0827  Last data filed at 12/30/2022 0000  Gross per 24 hour   Intake 360 ml   Output 300 ml   Net 60 ml      Physical Exam  Constitutional:       General: He is not in acute distress.     Appearance: Normal appearance. He is well-developed. He is not diaphoretic.   HENT:      Head: Normocephalic and atraumatic.      Right Ear: Decreased hearing noted.      Left Ear: Decreased hearing noted.   Eyes:      Conjunctiva/sclera: Conjunctivae normal.      Pupils: Pupils are equal, round, and reactive to light.   Cardiovascular:      Rate and Rhythm: Normal rate and regular rhythm.      Pulses: Normal pulses.      Heart sounds: Normal  heart sounds. No murmur heard.  Pulmonary:      Effort: Pulmonary effort is normal. No respiratory distress.      Breath sounds: Normal breath sounds. No wheezing.   Abdominal:      General: Bowel sounds are normal. There is no distension.      Palpations: Abdomen is soft. There is no mass.      Tenderness: There is no abdominal tenderness.      Hernia: A hernia is present.   Musculoskeletal:         General: No swelling. Normal range of motion.      Cervical back: Normal range of motion and neck supple.   Skin:     General: Skin is warm and dry.      Capillary Refill: Capillary refill takes less than 2 seconds.   Neurological:      Mental Status: He is alert and oriented to person, place, and time. Mental status is at baseline.   Psychiatric:         Mood and Affect: Mood normal.         Behavior: Behavior normal.       Significant Labs: CBC:   Recent Labs   Lab 12/29/22  1235 12/30/22  0403   WBC 9.31 6.73   HGB 16.2 15.5   HCT 49.6 47.1    167     CMP:   Recent Labs   Lab 12/29/22  1235 12/30/22  0403    139   K 3.9 3.5    106   CO2 22* 23    121*   BUN 16 16   CREATININE 1.2 1.1   CALCIUM 9.6 9.1   PROT 8.0 7.2   ALBUMIN 3.4* 2.9*   BILITOT 2.2* 1.4*   ALKPHOS 182* 141*   * 73*   * 274*   ANIONGAP 11 10     Lipase: No results for input(s): LIPASE in the last 48 hours.  Magnesium:   Recent Labs   Lab 12/30/22  0403   MG 1.9     Urine Studies:   Recent Labs   Lab 12/29/22  1706   COLORU Yellow   APPEARANCEUA Cloudy*   PHUR 6.0   SPECGRAV 1.015   PROTEINUA 2+*   GLUCUA Negative   KETONESU Negative   BILIRUBINUA Negative   OCCULTUA 3+*   NITRITE Positive*   UROBILINOGEN 4.0-6.0*   LEUKOCYTESUR 3+*   RBCUA >100*   WBCUA >100*   BACTERIA None   HYALINECASTS 0       Significant Imaging: I have reviewed all pertinent imaging results/findings within the past 24 hours.

## 2022-12-30 NOTE — PLAN OF CARE
SW met with pt at bedside to complete assessment. Pt is alert and able to verbally answer assessment questions. Pt presented confused at times but able to confirm daughter name and provide permission for SW to call to complete assessment. SW confirmed pt to live at home with his spouse and daughters. Pt to have built in shower chair and walk in tub. Pt to require assistance with cooking but able to bathe and clothe himself. Pt reports only DME in the home is a walker. Pt does not drive and family to offer assistance to get pt back home at time of discharge.  Family deny any active agencies, wound care, or dialysis. SW updated whiteboard with [facility] name and contact information. SW confirmed pt understanding of Observation unit and expected discharge plan. SW will continue to follow pt throughout care and assist with any discharge needs.         12/30/22 9790   Discharge Planning   Assessment Type Discharge Planning Brief Assessment   Resource/Environmental Concerns none   Support Systems Children;Family members   Equipment Currently Used at Home walker, rolling;shower chair   Current Living Arrangements home   Patient/Family Anticipates Transition to home with family   Patient/Family Anticipated Services at Transition none   DME Needed Upon Discharge  none   Discharge Plan A Home with family       Future Appointments   Date Time Provider Department Center   2/20/2023  8:30 AM LABORATORY, [facility] LAB Central   2/27/2023 10:40 AM Stefan Vera MD [facility] IM Central   6/1/2023  2:00 PM Mani Holman MD [facility] UROLOGY  Medical C   10/16/2023  9:45 AM Mayito Davis MD [facility] OPHTHAL  Medical C     [facility] Case Management  [phone]

## 2022-12-30 NOTE — ASSESSMENT & PLAN NOTE
Patient found to have common bile duct dilation at 6 mm. Intrahepatic ductal dilatation also noted on transaminitis workup in ED. Sludge/stones also seen within gallbladder. Patient with no abdominal pain on arrival. Patient transferred to [facility] for EUS/ERCP    Plan  NPO  GI consulted appreciate recs  Monitor for symptoms change

## 2022-12-30 NOTE — ANESTHESIA PREPROCEDURE EVALUATION
12/30/2022  Amauri Lara is a 89 y.o., male.      Pre-op Assessment    I have reviewed the Patient Summary Reports.     I have reviewed the Nursing Notes.    I have reviewed the Medications.     Review of Systems  Anesthesia Hx:  Denies Family Hx of Anesthesia complications.   Denies Personal Hx of Anesthesia complications.     Patient Active Problem List   Diagnosis    Obesity    Hearing loss of aging    History of pulmonary embolism    DM (diabetes mellitus), type 2 with complications    Hypertension    History of gout    Type 2 diabetes mellitus with polyneuropathy    Urinary retention    Aneurysm artery, popliteal    Atherosclerosis of aorta    Benign prostatic hyperplasia    Chronic venous insufficiency    Primary osteoarthritis involving multiple joints    At high risk for falls    CKD stage 3 due to type 2 diabetes mellitus    Common bile duct dilation    UTI (urinary tract infection)       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    Obesity, unspecified     Primary osteoarthritis involving multiple joints        ECHO: No results found for this or any previous visit.      Body mass index is 28.5 kg/m².    Tobacco Use: Low Risk     Smoking Tobacco Use: Never    Smokeless Tobacco Use: Never    Passive Exposure: Not on file       Social History     Substance and Sexual Activity   Drug Use No        Alcohol Use: Not on file         Airway:  No value filed.    Physical Exam    Airway:  Mouth Opening: Normal  TM Distance:  Normal          Anesthesia Plan  Type of Anesthesia, risks & benefits discussed:    Anesthesia Type: Gen Natural Airway  Intra-op Monitoring Plan: Standard ASA Monitors  Post Op Pain Control Plan: multimodal analgesia  Induction:  IV  Informed Consent: Informed consent signed with the Patient and all parties understand the risks and agree with anesthesia plan.  All questions answered.   ASA Score: 3  Day of Surgery Review of History & Physical: H&P Update referred to the surgeon/provider.    Ready For Surgery From Anesthesia Perspective.     .

## 2022-12-30 NOTE — ANESTHESIA POSTPROCEDURE EVALUATION
Anesthesia Post Evaluation    Patient: Amauri Lara    Procedure(s) Performed: Procedure(s) (LRB):  ULTRASOUND, UPPER GI TRACT, ENDOSCOPIC (N/A)    Final Anesthesia Type: general      Patient location during evaluation: PACU  Patient participation: Yes- Able to Participate  Level of consciousness: awake and alert  Post-procedure vital signs: reviewed and stable  Pain management: adequate  Airway patency: patent    PONV status at discharge: No PONV  Anesthetic complications: no      Cardiovascular status: stable  Respiratory status: spontaneous ventilation  Hydration status: euvolemic  Follow-up not needed.          Vitals Value Taken Time   /59 12/30/22 1158   Temp 37.1 °C (98.8 °F) 12/30/22 1128   Pulse 64 12/30/22 1158   Resp 25 12/30/22 1158   SpO2 96 % 12/30/22 1158         Event Time   Out of Recovery 12/30/2022 11:58:00         Pain/Sharita Score: Pain Rating Prior to Med Admin: 0 (12/30/2022 12:00 AM)  Pain Rating Post Med Admin: 0 (12/30/2022 12:00 AM)  Sharita Score: 10 (12/30/2022 11:58 AM)

## 2022-12-30 NOTE — TRANSFER OF CARE
"Anesthesia Transfer of Care Note    Patient: Amauri Lara    Procedure(s) Performed: Procedure(s) (LRB):  ULTRASOUND, UPPER GI TRACT, ENDOSCOPIC (N/A)    Patient location: GI    Anesthesia Type: general    Transport from OR: Transported from OR on room air with adequate spontaneous ventilation    Post pain: adequate analgesia    Post assessment: no apparent anesthetic complications    Post vital signs: stable    Level of consciousness: awake, alert and oriented    Nausea/Vomiting: no nausea/vomiting    Complications: none    Transfer of care protocol was followed      Last vitals:   Visit Vitals  BP (!) 147/80 (Patient Position: Lying)   Pulse 72   Temp 37.2 °C (99 °F)   Resp 18   Ht 5' 11" (1.803 m)   Wt 92.7 kg (204 lb 5.9 oz)   SpO2 97%   BMI 28.50 kg/m²     "

## 2022-12-30 NOTE — ASSESSMENT & PLAN NOTE
With mild lft elevation  Unclear if obstructive process vs. Others...    Will plan EUS  + / - ERCP today  Npo now  Trend lfts

## 2022-12-30 NOTE — ASSESSMENT & PLAN NOTE
Patient found to have positive leukocytes and nitrite on UA  Likely reason for patient fall prior to arrival to ED  Patient s/p 1x rocephin    Plan  Await Urine Cx  Continue Rocephin

## 2022-12-30 NOTE — PLAN OF CARE
Problem: Adult Inpatient Plan of Care  Goal: Plan of Care Review  Outcome: Ongoing, Progressing  Goal: Patient-Specific Goal (Individualized)  Outcome: Ongoing, Progressing  Goal: Absence of Hospital-Acquired Illness or Injury  Outcome: Ongoing, Progressing  Goal: Optimal Comfort and Wellbeing  Outcome: Ongoing, Progressing  Goal: Readiness for Transition of Care  Outcome: Ongoing, Progressing     Problem: Diabetes Comorbidity  Goal: Blood Glucose Level Within Targeted Range  Outcome: Ongoing, Progressing     Problem: Adjustment to Illness (Chronic Kidney Disease)  Goal: Optimal Coping with Chronic Illness  Outcome: Ongoing, Progressing     Problem: Electrolyte Imbalance (Chronic Kidney Disease)  Goal: Electrolyte Balance  Outcome: Ongoing, Progressing     Problem: Fluid Volume Excess (Chronic Kidney Disease)  Goal: Fluid Balance  Outcome: Ongoing, Progressing     Problem: Functional Decline (Chronic Kidney Disease)  Goal: Optimal Functional Ability  Outcome: Ongoing, Progressing     Problem: Hematologic Alteration (Chronic Kidney Disease)  Goal: Absence of Anemia Signs and Symptoms  Outcome: Ongoing, Progressing     Problem: Oral Intake Inadequate (Chronic Kidney Disease)  Goal: Optimal Oral Intake  Outcome: Ongoing, Progressing     Problem: Pain (Chronic Kidney Disease)  Goal: Acceptable Pain Control  Outcome: Ongoing, Progressing     Problem: Renal Function Impairment (Chronic Kidney Disease)  Goal: Minimize Renal Failure Effects  Outcome: Ongoing, Progressing     Problem: Hypertension Comorbidity  Goal: Blood Pressure in Desired Range  Outcome: Ongoing, Progressing     Problem: Fall Injury Risk  Goal: Absence of Fall and Fall-Related Injury  Outcome: Ongoing, Progressing

## 2022-12-30 NOTE — ASSESSMENT & PLAN NOTE
Patient found to have common bile duct dilation at 6 mm   Intrahepatic ductal dilatation also noted  Sludge/stones also seen within gallbladder  Patient with no abdominal pain on arrival  Patient need for transfer to [facility] for EUS/ERCP    Plan  NPO  GI consulted appreciate recs  Monitor for symptoms change

## 2022-12-30 NOTE — H&P
"    Short Stay Endoscopy History and Physical    PCP - Stefan Vera MD  Referring Physician - Miles Stiles MD  [id number] [place] [address] [place] [id number]    Procedure - eus/ercp  ASA - per anesthesia  Mallampati - per anesthesia  History of Anesthesia problems - no  Family history Anesthesia problems -  no   Plan of anesthesia - General    HPI:  This is a 89 y.o. male here for evaluation of: stone    Reflux - no  Dysphagia - no  Abdominal pain - no  Diarrhea - no    ROS:  Constitutional: No fevers, chills, No weight loss  CV: No chest pain  Pulm: No cough, No shortness of breath  Ophtho: No vision changes  GI: see HPI  Derm: No rash    Medical History:  has a past medical history of Aphakia of right eye, Chronic venous insufficiency, CKD stage 3 due to type 2 diabetes mellitus, DM (diabetes mellitus), type 2 with complications, Hearing loss of aging, History of gout, History of pulmonary embolism (2011), Hypertension associated with diabetes, Iridodialysis of right eye, Obesity, unspecified, and Primary osteoarthritis involving multiple joints.    Surgical History:  has a past surgical history that includes Vascular surgery; Transurethral resection of prostate (N/A, 5/31/2018); Cataract extraction; Cataract extraction w/  intraocular lens implant (Left, 12/13/2018); and yag laser capsulotomy (Left, 09/08/2020).    Family History: family history includes Diabetes in his brother..    Social History:  reports that he has never smoked. He has never used smokeless tobacco. He reports that he does not drink alcohol and does not use drugs.    Review of patient's allergies indicates:   Allergen Reactions    Morphine Other (See Comments)     Pt states, "It only makes my pain worse."       Medications:   Medications Prior to Admission   Medication Sig Dispense Refill Last Dose    allopurinoL (ZYLOPRIM) 300 MG tablet TAKE ONE TABLET BY MOUTH EVERY DAY 90 tablet 3 12/28/2022    docusate sodium (COLACE) 100 MG capsule " Take 1 capsule (100 mg total) by mouth 2 (two) times daily. 180 capsule 3 12/28/2022    finasteride (PROSCAR) 5 mg tablet Take 1 tablet (5 mg total) by mouth once daily. 30 tablet 11 12/28/2022    fluticasone propionate (FLONASE) 50 mcg/actuation nasal spray INHALE 2 SPRAYS IN EACH NOSTRIL ONCE DAILY 16 g 3 12/28/2022    HYDROcodone-acetaminophen (NORCO) 7.5-325 mg per tablet Take 1 tablet by mouth every 8 (eight) hours as needed for Pain. 90 tablet 0 Past Week    lisinopriL-hydrochlorothiazide (PRINZIDE,ZESTORETIC) 20-12.5 mg per tablet TAKE ONE TABLET BY MOUTH TWICE DAILY 60 tablet 11 12/29/2022    lovastatin (MEVACOR) 40 MG tablet TAKE ONE TABLET BY MOUTH EVERY EVENING 90 tablet 0 12/28/2022    metFORMIN (GLUCOPHAGE) 850 MG tablet TAKE ONE TABLET BY MOUTH DAILY 90 tablet 3 12/28/2022    prednisoLONE acetate (PRED FORTE) 1 % DrpS Place 1 drop into the left eye 4 (four) times daily. 5 mL 0 12/28/2022    tamsulosin (FLOMAX) 0.4 mg Cap Take 1 capsule (0.4 mg total) by mouth once daily. 90 capsule 3 12/28/2022    blood sugar diagnostic (TRUE METRIX GLUCOSE TEST STRIP) Strp 1 each by Misc.(Non-Drug; Combo Route) route 4 (four) times daily. 200 strip 5     blood-glucose meter (TRUE METRIX GLUCOSE METER) kit Use as instructed 1 each 0     ciprofloxacin HCl (CIPRO) 500 MG tablet Take 1 tablet (500 mg total) by mouth 2 (two) times daily. for 7 days 14 tablet 0     lancets Misc 1 each by Misc.(Non-Drug; Combo Route) route 4 (four) times daily. 200 each 5     metoprolol tartrate (LOPRESSOR) 50 MG tablet TAKE ONE TABLET BY MOUTH ONCE DAILY 90 tablet 3 Unknown    SSD 1 % cream           Physical Exam:    Vital Signs:   Vitals:    12/30/22 1102   BP: (!) 147/80   Pulse: 72   Resp: 18   Temp: 99 °F (37.2 °C)       General Appearance: Well appearing in no acute distress    Labs:  Lab Results   Component Value Date    WBC 6.73 12/30/2022    HGB 15.5 12/30/2022    HCT 47.1 12/30/2022     12/30/2022    CHOL 150 08/15/2022     TRIG 109 08/15/2022    HDL 49 08/15/2022     (H) 12/30/2022    AST 73 (H) 12/30/2022     12/30/2022    K 3.5 12/30/2022     12/30/2022    CREATININE 1.1 12/30/2022    BUN 16 12/30/2022    CO2 23 12/30/2022    TSH 1.404 12/30/2022    PSA 4.4 (H) 06/14/2021    INR 2.2 11/18/2021    HGBA1C 5.3 08/15/2022       I have explained the risks and benefits of this endoscopic procedure to the patient including but not limited to bleeding, inflammation, infection, perforation, and death.      Mani Howard MD

## 2022-12-30 NOTE — PROVATION PATIENT INSTRUCTIONS
Discharge Summary/Instructions after an Endoscopic Procedure  Patient Name: Amauri Lara  Patient MRN: [id number]  Patient YOB: 1929 Friday, December 30, 2022  Mani Howard MD  Dear patient,  As a result of recent federal legislation (The Federal Cures Act), you may   receive lab or pathology results from your procedure in your [facility]   account before your physician is able to contact you. Your physician or   their representative will relay the results to you with their   recommendations at their soonest availability.  Thank you,  Your health is very important to us during the Covid Crisis. Following your   procedure today, you will receive a daily text for 2 weeks asking about   signs or symptoms of Covid 19.  Please respond to this text when you   receive it so we can follow up and keep you as safe as possible.   RESTRICTIONS:  During your procedure today, you received medications for sedation.  These   medications may affect your judgment, balance and coordination.  Therefore,   for 24 hours, you have the following restrictions:   - DO NOT drive a car, operate machinery, make legal/financial decisions,   sign important papers or drink alcohol.    ACTIVITY:  Today: no heavy lifting, straining or running due to procedural   sedation/anesthesia.  The following day: return to full activity including work.  DIET:  Eat and drink normally unless instructed otherwise.     TREATMENT FOR COMMON SIDE EFFECTS:  - Mild abdominal pain, nausea, belching, bloating or excessive gas:  rest,   eat lightly and use a heating pad.  - Sore Throat: treat with throat lozenges and/or gargle with warm salt   water.  - Because air was used during the procedure, expelling large amounts of air   from your rectum or belching is normal.  - If a bowel prep was taken, you may not have a bowel movement for 1-3 days.    This is normal.  SYMPTOMS TO WATCH FOR AND REPORT TO YOUR PHYSICIAN:  1. Abdominal pain or bloating, other than  gas cramps.  2. Chest pain.  3. Back pain.  4. Signs of infection such as: chills or fever occurring within 24 hours   after the procedure.  5. Rectal bleeding, which would show as bright red, maroon, or black stools.   (A tablespoon of blood from the rectum is not serious, especially if   hemorrhoids are present.)  6. Vomiting.  7. Weakness or dizziness.  GO DIRECTLY TO THE NEAREST EMERGENCY ROOM IF YOU HAVE ANY OF THE FOLLOWING:      Difficulty breathing              Chills and/or fever over 101 F   Persistent vomiting and/or vomiting blood   Severe abdominal pain   Severe chest pain   Black, tarry stools   Bleeding- more than one tablespoon   Any other symptom or condition that you feel may need urgent attention  Your doctor recommends these additional instructions:  If any biopsies were taken, your doctors clinic will contact you in 1 to 2   weeks with any results.  - Resume previous diet.   - Continue present medications.   - [facility].  For questions, problems or results please call your physician - Mani Howard MD.  EMERGENCY PHONE NUMBER: [phone],  LAB RESULTS: [phone]  IF A COMPLICATION OR EMERGENCY SITUATION ARISES AND YOU ARE UNABLE TO REACH   YOUR PHYSICIAN - GO DIRECTLY TO THE EMERGENCY ROOM.  Mani Howard MD  12/30/2022 11:29:35 AM  This report has been verified and signed electronically.  Dear patient,  As a result of recent federal legislation (The Federal Cures Act), you may   receive lab or pathology results from your procedure in your [facility]   account before your physician is able to contact you. Your physician or   their representative will relay the results to you with their   recommendations at their soonest availability.  Thank you,  PROVATION

## 2022-12-30 NOTE — HPI
This is a 89-year-old man with history of diabetes and chronic kidney disease who presented to an outside emergency room with a fall, he states over the past few days he is had decreased appetite and some epigastric distress but no severe symptoms.  There has been some nausea but no associated vomiting.  He underwent a workup for a fall that also included lab work that showed elevated LFTs.  His ultrasound showed a mildly dilated bile duct, was 6 mm on ultrasound, with some intrahepatic dilatation, possibly concerning for obstructing stone or other lesion.  Given no GI services available, he was transferred to our facility for further care.  He denies any fevers.  Denies any prior biliary diagnoses.  No radiating symptoms.  No alleviating or exacerbating factors.

## 2022-12-30 NOTE — PLAN OF CARE
12/29/22 0340   Admission   Initial VN Admission Questions Complete   Communication Issues? None   Shift   Pain Management Interventions relaxation techniques promoted;quiet environment facilitated   Virtual Nurse - Patient Verbalized Approval Of VN Rounding;Camera Use   Type of Frequent Check   Type Patient Rounds   Safety/Activity   Patient Rounds clutter free environment maintained;visualized patient   Safety Promotion/Fall Prevention assistive device/personal item within reach;nonskid shoes/socks when out of bed;Fall Risk reviewed with patient/family;side rails raised x 2;instructed to call staff for mobility;medications reviewed   Safety Precautions emergency equipment at bedside   Activity Management Rolling - L1   Positioning   Body Position position changed independently    VN cued into room to complete admit assessment. VIP model introduced; VN working alongside bedside treatment team.  Plan of care reviewed with patient. Patient informed of fall risk, fall precautions, call light within reach, side rails x2 elevated. Patient notified to ask staff for assistance. Patient verbalized complete understanding. Time allowed for questions. Will continue to monitor and intervene as needed.

## 2022-12-30 NOTE — HPI
89-year-old male with PMH HTN, chronic kidney disease stage 3 PE, T2DM who present to [place] ED for fall. Patient first present to urgent care was diagnosed with a urinary tract infection and was prescribed Cipro (not filled) and instructed to present to the ED due to a reported fall PTA. Patient did not have loss of consciousness or head trauma and is not currently on anticoagulants. CTH showed benign findings. Routine lab work obtained in the ED found the patient to elevated a LFTs without associated white count or appearance of infection/ cholangitis. Follow up Abdominal US showed common bile duct dilation at 6 mm with stones/sludge seen within the gallbladder. Patient with history of cochlear implants and unable to undergo MRCP. [facility] and GI consultation.     At ED in [place], vital signs were stable as patient was afebrile, /68, HR 71, RR 16 with SPO2 97% on room air.  Patient is alert and orientated x4 with no abdominal pain physical exam only slight pressure the suprapubic region.  Labs pertinent for WBC 9.31, Hgb 16.2 Plt 165, Na 138, K 3.9, HCO3 22, BUN 16 with a Cr 1.2 alk-phos 182, Alb 3.4, T bili 2.2, , .  LA 1.7, . UA infectious appearing with 2+ protein 3+ occult blood positive nitrites 3+ leukocytes and > 100 WBC with many clumps. Patient given 1x rocephin for UTI at ED prior to transfer. [facility] was consulted for admission for continued medical management and need for potential EUS/ERCP.

## 2022-12-30 NOTE — SUBJECTIVE & OBJECTIVE
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    Obesity, unspecified     Primary osteoarthritis involving multiple joints        Past Surgical History:   Procedure Laterality Date    CATARACT EXTRACTION      aphakic od    CATARACT EXTRACTION W/  INTRAOCULAR LENS IMPLANT Left 12/13/2018    TRANSURETHRAL RESECTION OF PROSTATE N/A 5/31/2018    Procedure: PROSTATECTOMY-TRANSURETHRAL;  Surgeon: Michael Westbrook IV, MD;  Location: [facility];  Service: Urology;  Laterality: N/A;    VASCULAR SURGERY      yag laser capsulotomy Left 09/08/2020       Review of patient's allergies indicates:   Allergen Reactions    Morphine Other (See Comments)     Pt states, "It only makes my pain worse."     Family History       Problem Relation (Age of Onset)    Diabetes Brother          Tobacco Use    Smoking status: Never    Smokeless tobacco: Never   Substance and Sexual Activity    Alcohol use: No    Drug use: No    Sexual activity: Never     Review of Systems   Constitutional:  Positive for appetite change. Negative for chills and fever.   HENT:  Negative for facial swelling, mouth sores and trouble swallowing.    Eyes:  Negative for pain and redness.   Respiratory:  Negative for cough and shortness of breath.    Cardiovascular:  Negative for chest pain and leg swelling.   Gastrointestinal:  Positive for nausea. Negative for blood in stool.   Genitourinary:  Negative for dysuria and hematuria.   Musculoskeletal:  Negative for gait problem and neck stiffness.   Skin:  Negative for rash and wound.   Neurological:  Positive for weakness. Negative for seizures and headaches. "   Psychiatric/Behavioral:  Negative for confusion and hallucinations.    Objective:     Vital Signs (Most Recent):  Temp: 99 °F (37.2 °C) (12/30/22 1102)  Pulse: 72 (12/30/22 1102)  Resp: 18 (12/30/22 1102)  BP: (!) 147/80 (12/30/22 1102)  SpO2: 97 % (12/30/22 1102)   Vital Signs (24h Range):  Temp:  [97.7 °F (36.5 °C)-99 °F (37.2 °C)] 99 °F (37.2 °C)  Pulse:  [69-79] 72  Resp:  [16-20] 18  SpO2:  [96 %-98 %] 97 %  BP: (138-179)/(74-92) 147/80     Weight: 92.7 kg (204 lb 5.9 oz) (12/29/22 2232)  Body mass index is 28.5 kg/m².      Intake/Output Summary (Last 24 hours) at 12/30/2022 1120  Last data filed at 12/30/2022 0000  Gross per 24 hour   Intake 360 ml   Output 300 ml   Net 60 ml       Lines/Drains/Airways       None                   Physical Exam  Vitals reviewed.   Constitutional:       General: He is not in acute distress.     Appearance: He is well-developed.   HENT:      Head: Normocephalic and atraumatic.   Eyes:      General: No scleral icterus.     Conjunctiva/sclera: Conjunctivae normal.   Neck:      Thyroid: No thyromegaly.   Cardiovascular:      Rate and Rhythm: Normal rate and regular rhythm.   Pulmonary:      Effort: Pulmonary effort is normal. No respiratory distress.      Breath sounds: Normal breath sounds.   Abdominal:      General: There is no distension.      Palpations: Abdomen is soft.      Tenderness: There is no abdominal tenderness.   Musculoskeletal:         General: No tenderness. Normal range of motion.      Cervical back: Neck supple.   Lymphadenopathy:      Cervical: No cervical adenopathy.   Skin:     General: Skin is warm and dry.      Findings: No rash.   Neurological:      Mental Status: He is alert and oriented to person, place, and time. Mental status is at baseline.   Psychiatric:         Mood and Affect: Mood normal.         Behavior: Behavior normal.       Significant Labs:  Blood Culture: No results for input(s): LABBLOO in the last 48 hours.  CBC:   Recent Labs   Lab  12/29/22  1235 12/30/22  0403   WBC 9.31 6.73   HGB 16.2 15.5   HCT 49.6 47.1    167     BMP:   Recent Labs   Lab 12/30/22  0403   *      K 3.5      CO2 23   BUN 16   CREATININE 1.1   CALCIUM 9.1   MG 1.9     CMP:   Recent Labs   Lab 12/30/22  0403   *   CALCIUM 9.1   ALBUMIN 2.9*   PROT 7.2      K 3.5   CO2 23      BUN 16   CREATININE 1.1   ALKPHOS 141*   *   AST 73*   BILITOT 1.4*     Coagulation: No results for input(s): PT, INR, APTT in the last 48 hours.    Significant Imaging:  Imaging results within the past 24 hours have been reviewed.

## 2022-12-30 NOTE — CONSULTS
[place] [facility] Surg  Gastroenterology  Consult Note    Patient Name: Amauri Lara  MRN: [id number]  Admission Date: 12/29/2022  Hospital Length of Stay: 0 days  Code Status: Full Code   Attending Provider: Geno Diop MD   Consulting Provider: Garrett Etienne MD  Primary Care Physician: Stefan Vera MD  Principal Problem:Common bile duct dilation    Inpatient consult to Gastroenterology[facility]  Consult performed by: Garrett Etienne MD  Consult ordered by: Mani Card MD        Subjective:     HPI:  This is a 89-year-old man with history of diabetes and chronic kidney disease who presented to an outside emergency room with a fall, he states over the past few days he is had decreased appetite and some epigastric distress but no severe symptoms.  There has been some nausea but no associated vomiting.  He underwent a workup for a fall that also included lab work that showed elevated LFTs.  His ultrasound showed a mildly dilated bile duct, was 6 mm on ultrasound, with some intrahepatic dilatation, possibly concerning for obstructing stone or other lesion.  Given no GI services available, he was transferred to our facility for further care.  He denies any fevers.  Denies any prior biliary diagnoses.  No radiating symptoms.  No alleviating or exacerbating factors.      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    Obesity, unspecified     Primary osteoarthritis involving multiple joints        Past Surgical History:   Procedure Laterality Date    CATARACT EXTRACTION      aphakic od    CATARACT EXTRACTION W/  INTRAOCULAR LENS IMPLANT Left 12/13/2018     "TRANSURETHRAL RESECTION OF PROSTATE N/A 5/31/2018    Procedure: PROSTATECTOMY-TRANSURETHRAL;  Surgeon: Michael Westbrook IV, MD;  Location: [facility];  Service: Urology;  Laterality: N/A;    VASCULAR SURGERY      yag laser capsulotomy Left 09/08/2020       Review of patient's allergies indicates:   Allergen Reactions    Morphine Other (See Comments)     Pt states, "It only makes my pain worse."     Family History       Problem Relation (Age of Onset)    Diabetes Brother          Tobacco Use    Smoking status: Never    Smokeless tobacco: Never   Substance and Sexual Activity    Alcohol use: No    Drug use: No    Sexual activity: Never     Review of Systems   Constitutional:  Positive for appetite change. Negative for chills and fever.   HENT:  Negative for facial swelling, mouth sores and trouble swallowing.    Eyes:  Negative for pain and redness.   Respiratory:  Negative for cough and shortness of breath.    Cardiovascular:  Negative for chest pain and leg swelling.   Gastrointestinal:  Positive for nausea. Negative for blood in stool.   Genitourinary:  Negative for dysuria and hematuria.   Musculoskeletal:  Negative for gait problem and neck stiffness.   Skin:  Negative for rash and wound.   Neurological:  Positive for weakness. Negative for seizures and headaches.   Psychiatric/Behavioral:  Negative for confusion and hallucinations.    Objective:     Vital Signs (Most Recent):  Temp: 99 °F (37.2 °C) (12/30/22 1102)  Pulse: 72 (12/30/22 1102)  Resp: 18 (12/30/22 1102)  BP: (!) 147/80 (12/30/22 1102)  SpO2: 97 % (12/30/22 1102)   Vital Signs (24h Range):  Temp:  [97.7 °F (36.5 °C)-99 °F (37.2 °C)] 99 °F (37.2 °C)  Pulse:  [69-79] 72  Resp:  [16-20] 18  SpO2:  [96 %-98 %] 97 %  BP: (138-179)/(74-92) 147/80     Weight: 92.7 kg (204 lb 5.9 oz) (12/29/22 2232)  Body mass index is 28.5 kg/m².      Intake/Output Summary (Last 24 hours) at 12/30/2022 1120  Last data filed at 12/30/2022 0000  Gross per 24 hour "   Intake 360 ml   Output 300 ml   Net 60 ml       Lines/Drains/Airways       None                   Physical Exam  Vitals reviewed.   Constitutional:       General: He is not in acute distress.     Appearance: He is well-developed.   HENT:      Head: Normocephalic and atraumatic.   Eyes:      General: No scleral icterus.     Conjunctiva/sclera: Conjunctivae normal.   Neck:      Thyroid: No thyromegaly.   Cardiovascular:      Rate and Rhythm: Normal rate and regular rhythm.   Pulmonary:      Effort: Pulmonary effort is normal. No respiratory distress.      Breath sounds: Normal breath sounds.   Abdominal:      General: There is no distension.      Palpations: Abdomen is soft.      Tenderness: There is no abdominal tenderness.   Musculoskeletal:         General: No tenderness. Normal range of motion.      Cervical back: Neck supple.   Lymphadenopathy:      Cervical: No cervical adenopathy.   Skin:     General: Skin is warm and dry.      Findings: No rash.   Neurological:      Mental Status: He is alert and oriented to person, place, and time. Mental status is at baseline.   Psychiatric:         Mood and Affect: Mood normal.         Behavior: Behavior normal.       Significant Labs:  Blood Culture: No results for input(s): LABBLOO in the last 48 hours.  CBC:   Recent Labs   Lab 12/29/22  1235 12/30/22  0403   WBC 9.31 6.73   HGB 16.2 15.5   HCT 49.6 47.1    167     BMP:   Recent Labs   Lab 12/30/22  0403   *      K 3.5      CO2 23   BUN 16   CREATININE 1.1   CALCIUM 9.1   MG 1.9     CMP:   Recent Labs   Lab 12/30/22  0403   *   CALCIUM 9.1   ALBUMIN 2.9*   PROT 7.2      K 3.5   CO2 23      BUN 16   CREATININE 1.1   ALKPHOS 141*   *   AST 73*   BILITOT 1.4*     Coagulation: No results for input(s): PT, INR, APTT in the last 48 hours.    Significant Imaging:  Imaging results within the past 24 hours have been reviewed.    Assessment/Plan:     * Common bile duct  dilation  With mild lft elevation  Unclear if obstructive process vs. Others...    Will plan EUS  + / - ERCP today  Npo now  Trend lfts          Thank you for your consult. I will follow-up with patient. Please contact us if you have any additional questions.    Garrett Etienne MD  Gastroenterology  [facility] Surg

## 2022-12-30 NOTE — ASSESSMENT & PLAN NOTE
Patient found to have positive leukocytes and nitrite on UA. Likely reason for patient fall prior to arrival to ED. S/p 1x rocephin in ED. Patient with positive coagulase negative     Plan  Await Urine Cx  Continue Rocephin

## 2022-12-30 NOTE — CARE UPDATE
Patient with complicated urological hx including s/p TURP in 2018, BPH, recurrent UTIs, stones. Patient currently being treated for UTI presumed to be responsible for patient's presenting complaint of fall at home.    Patient noted on previous urine cultures from 10/2022 and 9/2021 to have grown coagulase-negative staph and ceftriaxone-resistant morganella species. In light of this, will broaden current abx treatment at this time from ceftriaxone to vanc/cefepime whilst urine cultures pend.    Riaz Munoz MD  [facility] PGY-2

## 2022-12-30 NOTE — H&P
[facility] Medicine  History & Physical    Patient Name: Amauri Lara  MRN: [id number]  Patient Class: OP- Observation  Admission Date: 12/29/2022  Attending Physician: Geno Diop MD   Primary Care Provider: Stefan Vera MD         Patient information was obtained from patient and ER records.     Subjective:     Principal Problem:Common bile duct dilation    Chief Complaint: No chief complaint on file.       HPI: 89-year-old male with PMH HTN, chronic kidney disease stage 3 PE, T2DM who present to [place] ED for fall. Patient first present to urgent care was diagnosed with a urinary tract infection and was prescribed Cipro (not filled) and instructed to present to the ED due to a reported fall PTA. Patient did not have loss of consciousness or head trauma and is not currently on anticoagulants. CTH showed benign findings. Routine lab work obtained in the ED found the patient to elevated a LFTs without associated white count or appearance of infection/ cholangitis. Follow up Abdominal US showed common bile duct dilation at 6 mm with stones/sludge seen within the gallbladder. Patient with history of cochlear implants and unable to undergo MRCP. Patient was transferred to [facility] for need for potential EUS/ERCP and GI consultation.     At ED in [place], vital signs were stable as patient was afebrile, /68, HR 71, RR 16 with SPO2 97% on room air.  Patient is alert and orientated x4 with no abdominal pain physical exam only slight pressure the suprapubic region.  Labs pertinent for WBC 9.31, Hgb 16.2 Plt 165, Na 138, K 3.9, HCO3 22, BUN 16 with a Cr 1.2 alk-phos 182, Alb 3.4, T bili 2.2, , .  LA 1.7, . UA infectious appearing with 2+ protein 3+ occult blood positive nitrites 3+ leukocytes and > 100 WBC with many clumps. Patient given 1x rocephin for UTI at ED prior to transfer. [facility] FM was consulted for admission for continued medical management and need for  "potential EUS/ERCP.            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    Obesity, unspecified     Primary osteoarthritis involving multiple joints        Past Surgical History:   Procedure Laterality Date    CATARACT EXTRACTION      aphakic od    CATARACT EXTRACTION W/  INTRAOCULAR LENS IMPLANT Left 12/13/2018    TRANSURETHRAL RESECTION OF PROSTATE N/A 5/31/2018    Procedure: PROSTATECTOMY-TRANSURETHRAL;  Surgeon: Michael Westbrook IV, MD;  Location: [facility];  Service: Urology;  Laterality: N/A;    VASCULAR SURGERY      yag laser capsulotomy Left 09/08/2020       Review of patient's allergies indicates:   Allergen Reactions    Morphine Other (See Comments)     Pt states, "It only makes my pain worse."       Current Facility-Administered Medications on File Prior to Encounter   Medication    [COMPLETED] artificial tears 0.5 % ophthalmic solution 1 drop    [COMPLETED] cefTRIAXone (ROCEPHIN) 1 g in dextrose 5 % in water (D5W) 5 % 50 mL IVPB (MB+)     Current Outpatient Medications on File Prior to Encounter   Medication Sig    allopurinoL (ZYLOPRIM) 300 MG tablet TAKE ONE TABLET BY MOUTH EVERY DAY    docusate sodium (COLACE) 100 MG capsule Take 1 capsule (100 mg total) by mouth 2 (two) times daily.    finasteride (PROSCAR) 5 mg tablet Take 1 tablet (5 mg total) by mouth once daily.    fluticasone propionate (FLONASE) 50 mcg/actuation nasal spray INHALE 2 SPRAYS IN EACH NOSTRIL ONCE DAILY    HYDROcodone-acetaminophen (NORCO) 7.5-325 mg per tablet Take 1 tablet by mouth every 8 (eight) hours as needed for Pain.    lisinopriL-hydrochlorothiazide " (PRINZIDE,ZESTORETIC) 20-12.5 mg per tablet TAKE ONE TABLET BY MOUTH TWICE DAILY    lovastatin (MEVACOR) 40 MG tablet TAKE ONE TABLET BY MOUTH EVERY EVENING    metFORMIN (GLUCOPHAGE) 850 MG tablet TAKE ONE TABLET BY MOUTH DAILY    prednisoLONE acetate (PRED FORTE) 1 % DrpS Place 1 drop into the left eye 4 (four) times daily.    tamsulosin (FLOMAX) 0.4 mg Cap Take 1 capsule (0.4 mg total) by mouth once daily.    blood sugar diagnostic (TRUE METRIX GLUCOSE TEST STRIP) Strp 1 each by Misc.(Non-Drug; Combo Route) route 4 (four) times daily.    blood-glucose meter (TRUE METRIX GLUCOSE METER) kit Use as instructed    ciprofloxacin HCl (CIPRO) 500 MG tablet Take 1 tablet (500 mg total) by mouth 2 (two) times daily. for 7 days    lancets Misc 1 each by Misc.(Non-Drug; Combo Route) route 4 (four) times daily.    metoprolol tartrate (LOPRESSOR) 50 MG tablet TAKE ONE TABLET BY MOUTH ONCE DAILY    SSD 1 % cream      Family History       Problem Relation (Age of Onset)    Diabetes Brother          Tobacco Use    Smoking status: Never    Smokeless tobacco: Never   Substance and Sexual Activity    Alcohol use: No    Drug use: No    Sexual activity: Never     Review of Systems   Constitutional:  Negative for activity change, appetite change, chills, diaphoresis, fatigue and fever.   HENT:  Negative for ear discharge, ear pain and sore throat.    Eyes:  Negative for photophobia and visual disturbance.   Respiratory:  Negative for chest tightness, shortness of breath and wheezing.    Cardiovascular:  Negative for chest pain and palpitations.   Gastrointestinal:  Negative for abdominal distention, abdominal pain, constipation, diarrhea, nausea and vomiting.   Endocrine: Negative for polydipsia and polyuria.   Genitourinary:  Positive for dysuria and frequency.   Musculoskeletal:  Negative for back pain and myalgias.   Skin:  Negative for color change and wound.   Neurological:  Negative for dizziness, seizures, syncope,  weakness, light-headedness and headaches.   Psychiatric/Behavioral: Negative.     Objective:     Vital Signs (Most Recent):  Temp: 98.2 °F (36.8 °C) (12/30/22 0425)  Pulse: 79 (12/30/22 0425)  Resp: 18 (12/30/22 0425)  BP: 138/74 (12/30/22 0425)  SpO2: 96 % (12/30/22 0425) Vital Signs (24h Range):  Temp:  [97.7 °F (36.5 °C)-98.9 °F (37.2 °C)] 98.2 °F (36.8 °C)  Pulse:  [69-79] 79  Resp:  [14-20] 18  SpO2:  [95 %-98 %] 96 %  BP: (134-179)/(68-92) 138/74     Weight: 92.7 kg (204 lb 5.9 oz)  Body mass index is 28.5 kg/m².    Physical Exam  Constitutional:       General: He is not in acute distress.     Appearance: Normal appearance. He is not ill-appearing.   HENT:      Head: Normocephalic and atraumatic.      Right Ear: External ear normal.      Left Ear: External ear normal.      Nose: Nose normal.      Mouth/Throat:      Mouth: Mucous membranes are moist.   Eyes:      Pupils: Pupils are equal, round, and reactive to light.   Cardiovascular:      Rate and Rhythm: Normal rate.      Pulses: Normal pulses.      Heart sounds: Normal heart sounds.   Pulmonary:      Effort: Pulmonary effort is normal.      Breath sounds: Normal breath sounds.   Abdominal:      General: Bowel sounds are normal. There is no distension.      Palpations: Abdomen is soft.      Tenderness: There is no guarding or rebound.      Comments: Slight tenderness to palpation along epigastric area   Musculoskeletal:         General: Normal range of motion.   Skin:     General: Skin is warm.      Capillary Refill: Capillary refill takes less than 2 seconds.   Neurological:      General: No focal deficit present.      Mental Status: He is alert.         CRANIAL NERVES     CN III, IV, VI   Pupils are equal, round, and reactive to light.     Significant Labs: All pertinent labs within the past 24 hours have been reviewed.  CBC:   Recent Labs   Lab 12/29/22  1235 12/30/22  0403   WBC 9.31 6.73   HGB 16.2 15.5   HCT 49.6 47.1    167     CMP:   Recent  Labs   Lab 12/29/22  1235 12/30/22  0403    139   K 3.9 3.5    106   CO2 22* 23    121*   BUN 16 16   CREATININE 1.2 1.1   CALCIUM 9.6 9.1   PROT 8.0 7.2   ALBUMIN 3.4* 2.9*   BILITOT 2.2* 1.4*   ALKPHOS 182* 141*   * 73*   * 274*   ANIONGAP 11 10     Cardiac Markers: No results for input(s): CKMB, MYOGLOBIN, BNP, TROPISTAT in the last 48 hours.  Urine Culture: No results for input(s): LABURIN in the last 48 hours.  Urine Studies:   Recent Labs   Lab 12/29/22  1706   COLORU Yellow   APPEARANCEUA Cloudy*   PHUR 6.0   SPECGRAV 1.015   PROTEINUA 2+*   GLUCUA Negative   KETONESU Negative   BILIRUBINUA Negative   OCCULTUA 3+*   NITRITE Positive*   UROBILINOGEN 4.0-6.0*   LEUKOCYTESUR 3+*   RBCUA >100*   WBCUA >100*   BACTERIA None   HYALINECASTS 0       Significant Imaging: I have reviewed all pertinent imaging results/findings within the past 24 hours.    Assessment/Plan:     * Common bile duct dilation  Patient found to have common bile duct dilation at 6 mm   Intrahepatic ductal dilatation also noted  Sludge/stones also seen within gallbladder  Patient with no abdominal pain on arrival  Patient need for transfer to [facility] for EUS/ERCP    Plan  NPO  GI consulted appreciate recs  Monitor for symptoms change    UTI (urinary tract infection)  Patient found to have positive leukocytes and nitrite on UA  Likely reason for patient fall prior to arrival to ED  Patient s/p 1x rocephin    Plan  Await Urine Cx  Continue Rocephin         Hypertension  Patient found to be hypertensive on arrival  Continue home medications        VTE Risk Mitigation (From admission, onward)         Ordered     Reason for No Pharmacological VTE Prophylaxis  Once        Question:  Reasons:  Answer:  Risk of Bleeding    12/29/22 2310     IP VTE HIGH RISK PATIENT  Once         12/29/22 2310     Place sequential compression device  Until discontinued         12/29/22 2310                   Mani Card MD  Department of  [facility]

## 2022-12-30 NOTE — PROVIDER TRANSFER
Outside Transfer Acceptance Note / Regional Referral Center    Referring facility: [facility]   Referring provider: MING, PROVIDER  ANISA BANDA  Accepting facility: [facility]  Accepting provider: GENO CERNA  Admitting provider: Geno Cerna  Reason for transfer: Higher Level of Care   Transfer diagnosis: Common Bile Dilation  Transfer specialty requested: Hospital Medicine  Gastroenterology  Transfer specialty notified: yes  Transfer level: NUMBER 1-5: 2  Bed type requested: Obs - Med/surg  Isolation status: No active isolations   Admission class or status: OP- Observation      Narrative     89-year-old male with PMH HTN, chronic kidney disease stage 3 PE, T2DM presented to [place] ED for fall.  Patient was seen by his primary care physician on 12/28 viral upper respiratory infection.  Patient then presented to urgent care the following morning for mild abdominal pain and was diagnosed with a urinary tract infection and was prescribed Cipro (not filled) and instructed to present to the ED due to a reported fall PTA. While in the ED patient reported that he had sustained a fall earlier that day but denies any current pain.  Patient did not have loss of consciousness or head trauma and is not currently on anticoagulants. CTH showed benign findings. Routine lab work obtained in the ED found the patient to elevated a LFTs without associated white count or appearance of infection/ cholangitis. Follow up Abdominal US showed common bile duct is prominent at 6 mm.  Suspect mild intrahepatic ductal dilatation. Referring facility requesting transfer for advanced endoscopy for a EUS/ERCP.    On presentation vital signs showed afebrile, /68, HR 71, RR 16 with SPO2 97% on room air.  Patient is alert and orientated x4 with no abdominal pain physical exam only slight pressure the suprapubic region.  Labs pertinent for WBC 9.31, Hgb 16.2 Plt  165, Na 138, K 3.9, HCO3 22, BUN 16 with a Cr 1.2 alk-phos 182, Alb 3.4, T bili 2.2, , .  LA 1.7, . UA infectious appearing with 2+ protein 3+ occult blood positive nitrites 3+ leukocytes and > 100 WBC with many clumps.    Imaging pertinent for CTH showing decreased attenuation in the white matter consistent with chronic microvascular changes no parenchymal mass hemorrhage or edema no fractures present with mild prominence the sulci and ventricles consistent with age related changes and postoperative changes the right mastoid with moderate right mastoid effusion. X ray of Pelvis AP showed no evidence of acute fracture dislocation.  Limited abdominal US liver mass 16.7 cm and homogeneous in echogenicity, CBD 6 mm, with sludge and stones seen within the gallbladder.  Visualized portions see WNL spleen is borderline enlarged at 12.2 cm patient incidentally also has moderate hydronephrosis bilaterally with some simple renal cysts.    He received a one time dose of Rocephin for his UTI prior to transfer.    Patient has cochlear implants and is unable to undergo MRCP. [facility] contacted and reports will be available at [facility] 12/30 for possible EUS/ ERCP as [facility] is currently on diversion. Transfer placed for higher level of care for an EUS/ ERCP due to concerns for biliary obstruction      Instructions      [facility]  Admit to Hospital Medicine with [facility] Consult  Upon patient arrival to floor, please contact Hospital Medicine on call.

## 2022-12-31 VITALS
HEART RATE: 64 BPM | TEMPERATURE: 98 F | HEIGHT: 71 IN | WEIGHT: 204.38 LBS | DIASTOLIC BLOOD PRESSURE: 69 MMHG | SYSTOLIC BLOOD PRESSURE: 136 MMHG | OXYGEN SATURATION: 95 % | BODY MASS INDEX: 28.61 KG/M2 | RESPIRATION RATE: 18 BRPM

## 2022-12-31 LAB
ALBUMIN SERPL BCP-MCNC: 2.8 G/DL (ref 3.5–5.2)
ALP SERPL-CCNC: 130 U/L (ref 55–135)
ALT SERPL W/O P-5'-P-CCNC: 179 U/L (ref 10–44)
ANION GAP SERPL CALC-SCNC: 9 MMOL/L (ref 8–16)
AST SERPL-CCNC: 43 U/L (ref 10–40)
BACTERIA UR CULT: ABNORMAL
BACTERIA UR CULT: ABNORMAL
BASOPHILS # BLD AUTO: 0.02 K/UL (ref 0–0.2)
BASOPHILS NFR BLD: 0.3 % (ref 0–1.9)
BILIRUB SERPL-MCNC: 1.3 MG/DL (ref 0.1–1)
BUN SERPL-MCNC: 16 MG/DL (ref 10–30)
CALCIUM SERPL-MCNC: 9 MG/DL (ref 8.7–10.5)
CHLORIDE SERPL-SCNC: 102 MMOL/L (ref 95–110)
CO2 SERPL-SCNC: 26 MMOL/L (ref 23–29)
CREAT SERPL-MCNC: 1.2 MG/DL (ref 0.5–1.4)
DIFFERENTIAL METHOD: ABNORMAL
EOSINOPHIL # BLD AUTO: 0.1 K/UL (ref 0–0.5)
EOSINOPHIL NFR BLD: 0.8 % (ref 0–8)
ERYTHROCYTE [DISTWIDTH] IN BLOOD BY AUTOMATED COUNT: 15.9 % (ref 11.5–14.5)
EST. GFR  (NO RACE VARIABLE): 56 ML/MIN/1.73 M^2
GLUCOSE SERPL-MCNC: 105 MG/DL (ref 70–110)
HCT VFR BLD AUTO: 46.8 % (ref 40–54)
HGB BLD-MCNC: 14.9 G/DL (ref 14–18)
IMM GRANULOCYTES # BLD AUTO: 0.02 K/UL (ref 0–0.04)
IMM GRANULOCYTES NFR BLD AUTO: 0.3 % (ref 0–0.5)
LYMPHOCYTES # BLD AUTO: 0.8 K/UL (ref 1–4.8)
LYMPHOCYTES NFR BLD: 11.2 % (ref 18–48)
MAGNESIUM SERPL-MCNC: 1.9 MG/DL (ref 1.6–2.6)
MCH RBC QN AUTO: 27.9 PG (ref 27–31)
MCHC RBC AUTO-ENTMCNC: 31.8 G/DL (ref 32–36)
MCV RBC AUTO: 88 FL (ref 82–98)
MONOCYTES # BLD AUTO: 1.4 K/UL (ref 0.3–1)
MONOCYTES NFR BLD: 19.3 % (ref 4–15)
NEUTROPHILS # BLD AUTO: 4.9 K/UL (ref 1.8–7.7)
NEUTROPHILS NFR BLD: 68.1 % (ref 38–73)
NRBC BLD-RTO: 0 /100 WBC
PHOSPHATE SERPL-MCNC: 2.7 MG/DL (ref 2.7–4.5)
PLATELET # BLD AUTO: 168 K/UL (ref 150–450)
PMV BLD AUTO: 12.3 FL (ref 9.2–12.9)
POCT GLUCOSE: 105 MG/DL (ref 70–110)
POTASSIUM SERPL-SCNC: 3.4 MMOL/L (ref 3.5–5.1)
PROT SERPL-MCNC: 7 G/DL (ref 6–8.4)
RBC # BLD AUTO: 5.35 M/UL (ref 4.6–6.2)
SODIUM SERPL-SCNC: 137 MMOL/L (ref 136–145)
WBC # BLD AUTO: 7.22 K/UL (ref 3.9–12.7)

## 2022-12-31 PROCEDURE — 25000003 PHARM REV CODE 250: Mod: HCNC

## 2022-12-31 PROCEDURE — 36415 COLL VENOUS BLD VENIPUNCTURE: CPT | Mod: HCNC | Performed by: STUDENT IN AN ORGANIZED HEALTH CARE EDUCATION/TRAINING PROGRAM

## 2022-12-31 PROCEDURE — 84100 ASSAY OF PHOSPHORUS: CPT | Mod: HCNC | Performed by: STUDENT IN AN ORGANIZED HEALTH CARE EDUCATION/TRAINING PROGRAM

## 2022-12-31 PROCEDURE — 80053 COMPREHEN METABOLIC PANEL: CPT | Mod: HCNC | Performed by: STUDENT IN AN ORGANIZED HEALTH CARE EDUCATION/TRAINING PROGRAM

## 2022-12-31 PROCEDURE — 63600175 PHARM REV CODE 636 W HCPCS: Mod: HCNC | Performed by: STUDENT IN AN ORGANIZED HEALTH CARE EDUCATION/TRAINING PROGRAM

## 2022-12-31 PROCEDURE — 96376 TX/PRO/DX INJ SAME DRUG ADON: CPT

## 2022-12-31 PROCEDURE — G0378 HOSPITAL OBSERVATION PER HR: HCPCS | Mod: HCNC

## 2022-12-31 PROCEDURE — 83735 ASSAY OF MAGNESIUM: CPT | Mod: HCNC | Performed by: STUDENT IN AN ORGANIZED HEALTH CARE EDUCATION/TRAINING PROGRAM

## 2022-12-31 PROCEDURE — 87086 URINE CULTURE/COLONY COUNT: CPT | Mod: HCNC

## 2022-12-31 PROCEDURE — 85025 COMPLETE CBC W/AUTO DIFF WBC: CPT | Mod: HCNC | Performed by: STUDENT IN AN ORGANIZED HEALTH CARE EDUCATION/TRAINING PROGRAM

## 2022-12-31 RX ORDER — NITROFURANTOIN 25; 75 MG/1; MG/1
100 CAPSULE ORAL 2 TIMES DAILY
Qty: 14 CAPSULE | Refills: 0 | Status: SHIPPED | OUTPATIENT
Start: 2022-12-31 | End: 2022-12-31 | Stop reason: HOSPADM

## 2022-12-31 RX ORDER — AMOXICILLIN 875 MG/1
875 TABLET, FILM COATED ORAL 2 TIMES DAILY
Qty: 14 TABLET | Refills: 0 | Status: SHIPPED | OUTPATIENT
Start: 2022-12-31 | End: 2023-01-07

## 2022-12-31 RX ORDER — AMOXICILLIN 875 MG/1
875 TABLET, FILM COATED ORAL 2 TIMES DAILY
Qty: 14 TABLET | Refills: 0 | OUTPATIENT
Start: 2022-12-31 | End: 2022-12-31 | Stop reason: SDUPTHER

## 2022-12-31 RX ORDER — NITROFURANTOIN 25; 75 MG/1; MG/1
100 CAPSULE ORAL 2 TIMES DAILY
Qty: 14 CAPSULE | Refills: 0 | Status: SHIPPED | OUTPATIENT
Start: 2022-12-31 | End: 2022-12-31 | Stop reason: SDUPTHER

## 2022-12-31 RX ORDER — POTASSIUM CHLORIDE 20 MEQ/1
40 TABLET, EXTENDED RELEASE ORAL EVERY 4 HOURS
Status: DISCONTINUED | OUTPATIENT
Start: 2022-12-31 | End: 2022-12-31 | Stop reason: HOSPADM

## 2022-12-31 RX ADMIN — METOPROLOL TARTRATE 50 MG: 50 TABLET, FILM COATED ORAL at 09:12

## 2022-12-31 RX ADMIN — CEFEPIME HYDROCHLORIDE 1 G: 1 INJECTION, SOLUTION INTRAVENOUS at 05:12

## 2022-12-31 RX ADMIN — ATORVASTATIN CALCIUM 10 MG: 10 TABLET, FILM COATED ORAL at 09:12

## 2022-12-31 RX ADMIN — HYDROCHLOROTHIAZIDE 12.5 MG: 12.5 TABLET ORAL at 09:12

## 2022-12-31 RX ADMIN — FINASTERIDE 5 MG: 5 TABLET, FILM COATED ORAL at 09:12

## 2022-12-31 RX ADMIN — POTASSIUM CHLORIDE 40 MEQ: 1500 TABLET, EXTENDED RELEASE ORAL at 09:12

## 2022-12-31 RX ADMIN — TAMSULOSIN HYDROCHLORIDE 0.4 MG: 0.4 CAPSULE ORAL at 09:12

## 2022-12-31 RX ADMIN — LISINOPRIL 20 MG: 20 TABLET ORAL at 09:12

## 2022-12-31 NOTE — HOSPITAL COURSE
Upon admission to [facility], patient was seen by GI. He was taken EUS the same day. Normal esophagus, stomach, duodenum, no specimens collected. No recommendation for further GI intervention or follow-up was noted. Urine cx that was previously collected began growing coag negative staphylococcus. Lab stated this was contaminated however he had grown coag negative staph in October 2022 and was given amoxil with resolution of symptoms. A new urine culture was collected in case the first sample was in fact contaminate. Patient and family requested that he be discharged so that he could spend time with his wife who is on hospice. We discussed the importance of follow up with his PCP and urologist. Patient and family were informed that his doctors and the FM would have get culture results and would fu with the family. He was given an rx for amoxil 875 mg PO BID x 7 days. Patient and family were given return precautions as well. All parties voiced understanding and acceptance of the plan.

## 2022-12-31 NOTE — PROGRESS NOTES
"Pharmacokinetic Initial Assessment: IV Vancomycin    Assessment/Plan:    Initiate intravenous vancomycin with loading dose of 2250 mg once followed by a maintenance dose of vancomycin 1500 mg IV every 24  hours  Desired empiric serum trough concentration is 15 to 20 mcg/mL  Draw vancomycin trough level 60 min prior to third dose on 1/1/2023 at approximately 1900  Pharmacy will continue to follow and monitor vancomycin.      Please contact pharmacy at extension [phone] with any questions regarding this assessment.     Thank you for the consult,   Geovanna Quinones       Patient brief summary:  Amauri Lara is a 89 y.o. male initiated on antimicrobial therapy with IV Vancomycin for treatment of suspected bacteremia    Drug Allergies:   Review of patient's allergies indicates:   Allergen Reactions    Morphine Other (See Comments)     Pt states, "It only makes my pain worse."       Actual Body Weight:   92.7 kg    Renal Function:   Estimated Creatinine Clearance: 48.8 mL/min (based on SCr of 1.1 mg/dL).,     Dialysis Method (if applicable):  N/A    CBC (last 72 hours):  Recent Labs   Lab Result Units 12/29/22  1235 12/30/22  0403   WBC K/uL 9.31 6.73   Hemoglobin g/dL 16.2 15.5   Hematocrit % 49.6 47.1   Platelets K/uL 165 167   Gran % % 68.2 67.2   Lymph % % 9.3* 12.5*   Mono % % 21.8* 19.6*   Eosinophil % % 0.1 0.1   Basophil % % 0.3 0.3   Differential Method  Automated Automated       Metabolic Panel (last 72 hours):  Recent Labs   Lab Result Units 12/29/22  1235 12/29/22  1706 12/30/22  0403   Sodium mmol/L 138  --  139   Potassium mmol/L 3.9  --  3.5   Chloride mmol/L 105  --  106   CO2 mmol/L 22*  --  23   Glucose mg/dL 103  --  121*   Glucose, UA   --  Negative  --    BUN mg/dL 16  --  16   Creatinine mg/dL 1.2  --  1.1   Albumin g/dL 3.4*  --  2.9*   Total Bilirubin mg/dL 2.2*  --  1.4*   Alkaline Phosphatase U/L 182*  --  141*   AST U/L 121*  --  73*   ALT U/L 393*  --  274*   Magnesium mg/dL  --   " --  1.9   Phosphorus mg/dL  --   --  2.3*       Drug levels (last 3 results):  No results for input(s): VANCOMYCINRA, VANCORANDOM, VANCOMYCINPE, VANCOPEAK, VANCOMYCINTR, VANCOTROUGH in the last 72 hours.    Microbiologic Results:  Microbiology Results (last 7 days)       ** No results found for the last 168 hours. **

## 2022-12-31 NOTE — PLAN OF CARE
12/31/22 1215   AVS Confirmation   Discharge instructions and AVS given to and reviewed with patient and/or significant other. Yes    AVS printed and handed to patient by bedside nurse. VN reviewed discharge instructions with patient and daughter using teachback method.  Changes from Nitrofurantin to Amoxicillin noted and discussed with patient and daughter. Updated on AVS and will have bedside nurse reprint new copy. Allowed time for questions, all questions answered.  Patient and daughter verbalized complete understanding of discharge instructions and voices no concerns.  Discharge instructions complete.   Bedside nurse notified.

## 2022-12-31 NOTE — DISCHARGE SUMMARY
[facility] Medicine  Discharge Summary      Patient Name: Amauri Lara  MRN: [id number]  DEL: [id number]  Patient Class: OP- Observation  Admission Date: 12/29/2022  Hospital Length of Stay: 0 days  Discharge Date and Time: No discharge date for patient encounter.  Attending Physician: Geno Diop MD   Discharging Provider: Prasanth Vaughan DO  Primary Care Provider: Stefan Vera MD    Primary Care Team: Networked reference to record PCT     HPI:   89-year-old male with PMH HTN, chronic kidney disease stage 3 PE, T2DM who present to [place] ED for fall. Patient first present to urgent care was diagnosed with a urinary tract infection and was prescribed Cipro (not filled) and instructed to present to the ED due to a reported fall PTA. Patient did not have loss of consciousness or head trauma and is not currently on anticoagulants. CTH showed benign findings. Routine lab work obtained in the ED found the patient to elevated a LFTs without associated white count or appearance of infection/ cholangitis. Follow up Abdominal US showed common bile duct dilation at 6 mm with stones/sludge seen within the gallbladder. Patient with history of cochlear implants and unable to undergo MRCP. Patient was transferred to [facility] for need for potential EUS/ERCP and GI consultation.     At ED in [place], vital signs were stable as patient was afebrile, /68, HR 71, RR 16 with SPO2 97% on room air.  Patient is alert and orientated x4 with no abdominal pain physical exam only slight pressure the suprapubic region.  Labs pertinent for WBC 9.31, Hgb 16.2 Plt 165, Na 138, K 3.9, HCO3 22, BUN 16 with a Cr 1.2 alk-phos 182, Alb 3.4, T bili 2.2, , .  LA 1.7, . UA infectious appearing with 2+ protein 3+ occult blood positive nitrites 3+ leukocytes and > 100 WBC with many clumps. Patient given 1x rocephin for UTI at ED prior to transfer. [facility] was consulted for admission for continued  medical management and need for potential EUS/ERCP.            Procedure(s) (LRB):  ULTRASOUND, UPPER GI TRACT, ENDOSCOPIC (N/A)      Hospital Course:   Upon admission to [facility], patient was seen by GI. He was taken EUS the same day. Normal esophagus, stomach, duodenum, no specimens collected. No recommendation for further GI intervention or follow-up was noted. Urine cx that was previously collected began growing coag negative staphylococcus. Lab stated this was contaminated however he had grown coag negative staph in October 2022 and was given amoxil with resolution of symptoms. A new urine culture was collected in case the first sample was in fact contaminate. Patient and family requested that he be discharged so that he could spend time with his wife who is on hospice. We discussed the importance of follow up with his PCP and urologist. Patient and family were informed that his doctors and the  would have get culture results and would fu with the family. He was given an rx for amoxil 875 mg PO BID x 7 days. Patient and family were given return precautions as well. All parties voiced understanding and acceptance of the plan.      Physical Exam  Vitals and nursing note reviewed.   Constitutional:       Appearance: Normal appearance.   HENT:      Head: Normocephalic and atraumatic.      Right Ear: External ear normal.      Left Ear: External ear normal.      Ears:      Comments: Hearing aid in place     Nose: Nose normal.   Eyes:      Extraocular Movements: Extraocular movements intact.      Pupils: Pupils are equal, round, and reactive to light.   Cardiovascular:      Rate and Rhythm: Normal rate and regular rhythm.      Pulses: Normal pulses.      Heart sounds: Normal heart sounds.   Pulmonary:      Effort: Pulmonary effort is normal.      Breath sounds: Normal breath sounds.   Abdominal:      Palpations: Abdomen is soft.      Tenderness: There is no abdominal tenderness.   Musculoskeletal:      Cervical back:  Normal range of motion and neck supple.   Skin:     General: Skin is warm and dry.   Neurological:      Mental Status: He is alert. Mental status is at baseline.   Psychiatric:         Mood and Affect: Mood normal.         Behavior: Behavior normal.       Goals of Care Treatment Preferences:  Code Status: Full Code      Consults:   Consults (From admission, onward)        Status Ordering Provider     Pharmacy to dose Vancomycin consult  Once        Provider:  (Not yet assigned)   See [facility] for full Linked Orders Report.    Acknowledged GABRIELLA ALEGRE     IP consult to case management  Once        Provider:  (Not yet assigned)    Acknowledged LAURA CERNA     Inpatient consult to [facility]  Once        Provider:  Garrett Etienne MD    Completed SHELIA VAZQUEZ          No new Assessment & Plan notes have been filed under this hospital service since the last note was generated.  Service: Hospital Medicine    Final Active Diagnoses:    Diagnosis Date Noted POA    PRINCIPAL PROBLEM:  UTI (urinary tract infection) [N39.0] 12/30/2022 Yes    Common bile duct dilation [K83.8] 12/30/2022 Yes    Hypertension [I10]  Yes      Problems Resolved During this Admission:       Discharged Condition: stable    Disposition: Home or Self Care    Follow Up:   Follow-up Information     Stefan Vera MD Follow up.    Specialty: Internal Medicine  Contact information:  [address]  [address]  [place] [address]  [phone]             Shelia Holman MD Follow up.    Specialty: Urology  Contact information:  [address] [place] [address]  [phone]             Shelia Holman MD .    Specialty: Urology  Contact information:  [address] [facility]  [place] [id number] [phone]                       Patient Instructions:   No discharge procedures on file.    Significant Diagnostic Studies: Labs: All labs within the past 24 hours have been  reviewed    Pending Diagnostic Studies:     None         Medications:  Reconciled Home Medications:      Medication List      START taking these medications    amoxicillin 875 MG tablet  Commonly known as: AMOXIL  Take 1 tablet (875 mg total) by mouth 2 (two) times daily. for 7 days        CONTINUE taking these medications    allopurinoL 300 MG tablet  Commonly known as: ZYLOPRIM  TAKE ONE TABLET BY MOUTH EVERY DAY     blood sugar diagnostic Strp  Commonly known as: TRUE METRIX GLUCOSE TEST STRIP  1 each by Misc.(Non-Drug; Combo Route) route 4 (four) times daily.     blood-glucose meter kit  Commonly known as: TRUE METRIX GLUCOSE METER  Use as instructed     docusate sodium 100 MG capsule  Commonly known as: COLACE  Take 1 capsule (100 mg total) by mouth 2 (two) times daily.     finasteride 5 mg tablet  Commonly known as: PROSCAR  Take 1 tablet (5 mg total) by mouth once daily.     fluticasone propionate 50 mcg/actuation nasal spray  Commonly known as: FLONASE  INHALE 2 SPRAYS IN EACH NOSTRIL ONCE DAILY     HYDROcodone-acetaminophen 7.5-325 mg per tablet  Commonly known as: NORCO  Take 1 tablet by mouth every 8 (eight) hours as needed for Pain.     lancets Misc  1 each by Misc.(Non-Drug; Combo Route) route 4 (four) times daily.     lisinopriL-hydrochlorothiazide 20-12.5 mg per tablet  Commonly known as: PRINZIDE,ZESTORETIC  TAKE ONE TABLET BY MOUTH TWICE DAILY     lovastatin 40 MG tablet  Commonly known as: MEVACOR  TAKE ONE TABLET BY MOUTH EVERY EVENING     metFORMIN 850 MG tablet  Commonly known as: GLUCOPHAGE  TAKE ONE TABLET BY MOUTH DAILY     metoprolol tartrate 50 MG tablet  Commonly known as: LOPRESSOR  TAKE ONE TABLET BY MOUTH ONCE DAILY     prednisoLONE acetate 1 % Drps  Commonly known as: PRED FORTE  Place 1 drop into the left eye 4 (four) times daily.     SSD 1 % cream  Generic drug: silver sulfADIAZINE 1%     tamsulosin 0.4 mg Cap  Commonly known as: FLOMAX  Take 1 capsule (0.4 mg total) by mouth once  daily.        STOP taking these medications    ciprofloxacin HCl 500 MG tablet  Commonly known as: CIPRO            Indwelling Lines/Drains at time of discharge:   Lines/Drains/Airways     None                 Time spent on the discharge of patient: 35 minutes         Prasanth Vaughan DO  Department of Hospital Medicine  [facility] Surg

## 2022-12-31 NOTE — PLAN OF CARE
Pt resting quietly in bed with eyes closed. No complaints overnight. Medications administered per MAR. IV abx infused. On RA. Blood glucose monitored. Bed alarm set, side rails raised, and call light in reach. Avasys in use.

## 2022-12-31 NOTE — PROGRESS NOTES
[facility] Medicine  Progress Note    Patient Name: Amauri Lara  MRN: [id number]  Patient Class: OP- Observation   Admission Date: 12/29/2022  Length of Stay: 0 days  Attending Physician: Geno Diop MD  Primary Care Provider: Stefan Vera MD        Subjective:     Principal Problem:UTI (urinary tract infection)        HPI:  89-year-old male with PMH HTN, chronic kidney disease stage 3 PE, T2DM who present to [place] ED for fall. Patient first present to urgent care was diagnosed with a urinary tract infection and was prescribed Cipro (not filled) and instructed to present to the ED due to a reported fall PTA. Patient did not have loss of consciousness or head trauma and is not currently on anticoagulants. CTH showed benign findings. Routine lab work obtained in the ED found the patient to elevated a LFTs without associated white count or appearance of infection/ cholangitis. Follow up Abdominal US showed common bile duct dilation at 6 mm with stones/sludge seen within the gallbladder. Patient with history of cochlear implants and unable to undergo MRCP. Patient was transferred to [facility] for need for potential EUS/ERCP and GI consultation.     At ED in [place], vital signs were stable as patient was afebrile, /68, HR 71, RR 16 with SPO2 97% on room air.  Patient is alert and orientated x4 with no abdominal pain physical exam only slight pressure the suprapubic region.  Labs pertinent for WBC 9.31, Hgb 16.2 Plt 165, Na 138, K 3.9, HCO3 22, BUN 16 with a Cr 1.2 alk-phos 182, Alb 3.4, T bili 2.2, , .  LA 1.7, . UA infectious appearing with 2+ protein 3+ occult blood positive nitrites 3+ leukocytes and > 100 WBC with many clumps. Patient given 1x rocephin for UTI at ED prior to transfer. [facility] was consulted for admission for continued medical management and need for potential EUS/ERCP.            Overview/Hospital Course:  No notes on file    No new  subjective & objective note has been filed under this hospital service since the last note was generated.      Assessment/Plan:      * UTI (urinary tract infection)  Patient found to have positive leukocytes and nitrite on UA. Likely reason for patient fall prior to arrival to ED. S/p 1x rocephin in ED. Patient with positive coagulase negative     Plan  Await Urine Cx  Continue vanc & cefepime        Common bile duct dilation  Patient found to have common bile duct dilation at 6 mm. Intrahepatic ductal dilatation also noted on transaminitis workup in ED. Sludge/stones also seen within gallbladder. Patient with no abdominal pain on arrival. Patient transferred to [facility] for EUS/ERCP    Plan  NPO  GI consulted appreciate recs  Monitor for symptoms change    Hypertension  Patient found to be hypertensive on arrival    - Continue home HCTZ, metoprolol.        VTE Risk Mitigation (From admission, onward)         Ordered     Reason for No Pharmacological VTE Prophylaxis  Once        Question:  Reasons:  Answer:  Risk of Bleeding    12/29/22 2310     IP VTE HIGH RISK PATIENT  Once         12/29/22 2310     Place sequential compression device  Until discontinued         12/29/22 2310                Discharge Planning   SAM:      Code Status: Full Code   Is the patient medically ready for discharge?:     Reason for patient still in hospital (select all that apply): Laboratory test  Discharge Plan A: Home with family                  Prasanth Vaughan DO  Department of Hospital Medicine   [facility] Surg

## 2023-01-01 LAB — BACTERIA UR CULT: NO GROWTH

## 2023-01-04 ENCOUNTER — OFFICE VISIT (OUTPATIENT)
Dept: UROLOGY | Facility: CLINIC | Age: 88
End: 2023-01-04
Payer: MEDICARE

## 2023-01-04 VITALS
HEIGHT: 71 IN | SYSTOLIC BLOOD PRESSURE: 150 MMHG | BODY MASS INDEX: 28.44 KG/M2 | HEART RATE: 73 BPM | RESPIRATION RATE: 18 BRPM | DIASTOLIC BLOOD PRESSURE: 83 MMHG | WEIGHT: 203.13 LBS

## 2023-01-04 DIAGNOSIS — N13.39 OTHER HYDRONEPHROSIS: ICD-10-CM

## 2023-01-04 DIAGNOSIS — N30.00 ACUTE CYSTITIS WITHOUT HEMATURIA: Primary | ICD-10-CM

## 2023-01-04 PROCEDURE — 81001 URINALYSIS AUTO W/SCOPE: CPT | Mod: HCNC | Performed by: NURSE PRACTITIONER

## 2023-01-04 PROCEDURE — 1126F AMNT PAIN NOTED NONE PRSNT: CPT | Mod: HCNC,CPTII,S$GLB, | Performed by: NURSE PRACTITIONER

## 2023-01-04 PROCEDURE — 3072F PR LOW RISK FOR RETINOPATHY: ICD-10-PCS | Mod: HCNC,CPTII,S$GLB, | Performed by: NURSE PRACTITIONER

## 2023-01-04 PROCEDURE — 1159F PR MEDICATION LIST DOCUMENTED IN MEDICAL RECORD: ICD-10-PCS | Mod: HCNC,CPTII,S$GLB, | Performed by: NURSE PRACTITIONER

## 2023-01-04 PROCEDURE — 99999 PR PBB SHADOW E&M-EST. PATIENT-LVL IV: ICD-10-PCS | Mod: PBBFAC,HCNC,, | Performed by: NURSE PRACTITIONER

## 2023-01-04 PROCEDURE — 1126F PR PAIN SEVERITY QUANTIFIED, NO PAIN PRESENT: ICD-10-PCS | Mod: HCNC,CPTII,S$GLB, | Performed by: NURSE PRACTITIONER

## 2023-01-04 PROCEDURE — 99999 PR PBB SHADOW E&M-EST. PATIENT-LVL IV: CPT | Mod: PBBFAC,HCNC,, | Performed by: NURSE PRACTITIONER

## 2023-01-04 PROCEDURE — 99213 PR OFFICE/OUTPT VISIT, EST, LEVL III, 20-29 MIN: ICD-10-PCS | Mod: HCNC,S$GLB,, | Performed by: NURSE PRACTITIONER

## 2023-01-04 PROCEDURE — 1101F PR PT FALLS ASSESS DOC 0-1 FALLS W/OUT INJ PAST YR: ICD-10-PCS | Mod: HCNC,CPTII,S$GLB, | Performed by: NURSE PRACTITIONER

## 2023-01-04 PROCEDURE — 1159F MED LIST DOCD IN RCRD: CPT | Mod: HCNC,CPTII,S$GLB, | Performed by: NURSE PRACTITIONER

## 2023-01-04 PROCEDURE — 3288F FALL RISK ASSESSMENT DOCD: CPT | Mod: HCNC,CPTII,S$GLB, | Performed by: NURSE PRACTITIONER

## 2023-01-04 PROCEDURE — 99213 OFFICE O/P EST LOW 20 MIN: CPT | Mod: HCNC,S$GLB,, | Performed by: NURSE PRACTITIONER

## 2023-01-04 PROCEDURE — 87086 URINE CULTURE/COLONY COUNT: CPT | Mod: HCNC | Performed by: NURSE PRACTITIONER

## 2023-01-04 PROCEDURE — 3072F LOW RISK FOR RETINOPATHY: CPT | Mod: HCNC,CPTII,S$GLB, | Performed by: NURSE PRACTITIONER

## 2023-01-04 PROCEDURE — 3288F PR FALLS RISK ASSESSMENT DOCUMENTED: ICD-10-PCS | Mod: HCNC,CPTII,S$GLB, | Performed by: NURSE PRACTITIONER

## 2023-01-04 PROCEDURE — 1101F PT FALLS ASSESS-DOCD LE1/YR: CPT | Mod: HCNC,CPTII,S$GLB, | Performed by: NURSE PRACTITIONER

## 2023-01-04 NOTE — PROGRESS NOTES
Chief Complaint:   Follow-up ED urinary tract  Hydronephrosis    HPI:   Patient is a 89-year-old male that is following up after a emergency room visit.  Daughter states that patient had a positive urine and is currently on Augmentin.  In reviewing EMR, urine culture indicated Staph.  Patient had an abdominal ultrasound indicating bilateral hydronephrosis.  Urine in clinic indicates leukocytes and blood, all other parameters are negative.  PVR was 44 mL.  No history of renal stones.  Daughter states that urine is very cloudy.    Allergies:  Morphine    Medications:  has a current medication list which includes the following prescription(s): allopurinol, amoxicillin, blood sugar diagnostic, blood-glucose meter, docusate sodium, finasteride, fluticasone propionate, hydrocodone-acetaminophen, lancets, lisinopril-hydrochlorothiazide, lovastatin, metformin, metoprolol tartrate, prednisolone acetate, ssd, and tamsulosin.    Review of Systems:  General: No fever, chills, fatigability, or weight loss.  Skin: No rashes, itching, or changes in color or texture of skin.  Chest: Denies DELGADILLO, cyanosis, wheezing, cough, and sputum production.  Abdomen: Appetite fine. No weight loss. Denies diarrhea, abdominal pain, hematemesis, or blood in stool.  Musculoskeletal: No joint stiffness or swelling. Denies back pain.  : As above.  All other review of systems negative.    PMH:   has a past medical history of Aphakia of right eye, Chronic venous insufficiency, CKD stage 3 due to type 2 diabetes mellitus, DM (diabetes mellitus), type 2 with complications, Hearing loss of aging, History of gout, History of pulmonary embolism (2011), Hypertension associated with diabetes, Iridodialysis of right eye, Obesity, unspecified, and Primary osteoarthritis involving multiple joints.    PSH:   has a past surgical history that includes Vascular surgery; Transurethral resection of prostate (N/A, 5/31/2018); Cataract extraction; Cataract extraction  w/  intraocular lens implant (Left, 12/13/2018); yag laser capsulotomy (Left, 09/08/2020); and Endoscopic ultrasound of upper gastrointestinal tract (N/A, 12/30/2022).    FamHx: family history includes Diabetes in his brother.    SocHx:  reports that he has never smoked. He has never used smokeless tobacco. He reports that he does not drink alcohol and does not use drugs.      Physical Exam:  Vitals:    01/04/23 1457   BP: (!) 150/83   Pulse: 73   Resp: 18     General: A&Ox3, no apparent distress, no deformities  Neck: No masses, normal thyroid  Lungs: normal inspiration, no use of accessory muscles  Heart: normal pulse, no arrhythmias  Abdomen: Soft, NT, ND, no masses, no hernias, no hepatosplenomegaly  Lymphatic: Neck and groin nodes negative    Labs/Studies:   12/29/2022  EXAMINATION:  US ABDOMEN LIMITED     CLINICAL HISTORY:  elevated bili and liver enzymes;     COMPARISON:  06/14/2021     FINDINGS:  Limited right upper quadrant ultrasound performed.  The liver measures 16.7 cm in length and is homogeneous in echogenicity.  Common bile duct is prominent at 6 mm.  Suspect mild intrahepatic ductal dilatation.  Sludge and stones are seen within the gallbladder.  Negative sonographic Forbes sign mild wall thickening.  The visualized portions of the pancreas and IVC are within normal limits.  Spleen is borderline enlarged measuring 12.2 cm.  Incidental note made of moderate hydronephrosis bilaterally and simple renal cysts.     Impression:     See findings above.  MRCP or ERCP can be obtained as clinically warranted.    Impression/Plan:   1. Urinary tract infection  Urine sent for urinalysis and culture    2. Incidental finding of hydronephrosis  Patient to have renal ultrasound and return to clinic in 1 week for re-evaluation.

## 2023-01-05 ENCOUNTER — TELEPHONE (OUTPATIENT)
Dept: URGENT CARE | Facility: CLINIC | Age: 88
End: 2023-01-05
Payer: MEDICARE

## 2023-01-05 ENCOUNTER — APPOINTMENT (OUTPATIENT)
Dept: RADIOLOGY | Facility: HOSPITAL | Age: 88
End: 2023-01-05
Attending: NURSE PRACTITIONER
Payer: MEDICARE

## 2023-01-05 DIAGNOSIS — N13.39 OTHER HYDRONEPHROSIS: ICD-10-CM

## 2023-01-05 LAB
BACTERIA #/AREA URNS AUTO: ABNORMAL /HPF
BILIRUB UR QL STRIP: NEGATIVE
CLARITY UR REFRACT.AUTO: ABNORMAL
COLOR UR AUTO: YELLOW
GLUCOSE UR QL STRIP: NEGATIVE
HGB UR QL STRIP: ABNORMAL
HYALINE CASTS UR QL AUTO: 0 /LPF
KETONES UR QL STRIP: NEGATIVE
LEUKOCYTE ESTERASE UR QL STRIP: ABNORMAL
MICROSCOPIC COMMENT: ABNORMAL
NITRITE UR QL STRIP: NEGATIVE
PH UR STRIP: 6 [PH] (ref 5–8)
PROT UR QL STRIP: ABNORMAL
RBC #/AREA URNS AUTO: >100 /HPF (ref 0–4)
SP GR UR STRIP: 1.02 (ref 1–1.03)
SQUAMOUS #/AREA URNS AUTO: 1 /HPF
UNSPECIFIED CRY UR QL COMP ASSIST: 14
URN SPEC COLLECT METH UR: ABNORMAL
WBC #/AREA URNS AUTO: >100 /HPF (ref 0–5)
WBC CLUMPS UR QL AUTO: ABNORMAL

## 2023-01-05 PROCEDURE — 76770 US EXAM ABDO BACK WALL COMP: CPT | Mod: 26,HCNC,, | Performed by: RADIOLOGY

## 2023-01-05 PROCEDURE — 76770 US EXAM ABDO BACK WALL COMP: CPT | Mod: TC,HCNC,PO

## 2023-01-05 PROCEDURE — 76770 US RETROPERITONEAL COMPLETE: ICD-10-PCS | Mod: 26,HCNC,, | Performed by: RADIOLOGY

## 2023-01-05 NOTE — TELEPHONE ENCOUNTER
Called patient spoke with daughter Kat, discussed urine results, patient's daughter reports patient still having problems with UTI during after admission to hospital last week.  Patient's daughter reports patient has seen Urology and is being followed by Urology for trouble with blood in urine and urinary tract infection.  Patient's daughter reports patient was sent home with amoxicillin from hospital.  Discussed with patient's daughter if patient condition should worsening to please go to emergency room.  Patient's daughter verbalized understanding and had no further questions.

## 2023-01-06 ENCOUNTER — TELEPHONE (OUTPATIENT)
Dept: INTERNAL MEDICINE | Facility: CLINIC | Age: 88
End: 2023-01-06
Payer: MEDICARE

## 2023-01-06 LAB — BACTERIA UR CULT: NO GROWTH

## 2023-01-10 ENCOUNTER — TELEPHONE (OUTPATIENT)
Dept: INTERNAL MEDICINE | Facility: CLINIC | Age: 88
End: 2023-01-10
Payer: MEDICARE

## 2023-01-10 NOTE — TELEPHONE ENCOUNTER
----- Message from Mary Jimenez sent at 1/10/2023 12:06 PM CST -----  Contact: [email or web address])-[phone]  Type:  Pharmacy Calling to Clarify an RX    Name of Caller:Aga  Pharmacy Name:[email or web address]  Prescription Name:HYDROcodone-acetaminophen (NORCO) 7.5-325 mg per tablet  What do they need to clarify?:Prior authorization  Best Call Back Number:[phone]  Additional Information: [facility] will fax over prior authorization to [phone]. Please fax it back to [phone]. Vesna/INEZ

## 2023-01-10 NOTE — TELEPHONE ENCOUNTER
----- Message from Yolanda Wasserman sent at 1/10/2023 12:06 PM CST -----  Contact: Kat/ daughter  Patients daughter is calling to speak with the nurse regarding medication. Reports needing prior authorization for HYDROcodone-acetaminophen (NORCO) 7.5-325 mg per tablet so the insurance could pay for medication. Please give patients daughter a call back at [phone].

## 2023-01-11 ENCOUNTER — OFFICE VISIT (OUTPATIENT)
Dept: UROLOGY | Facility: CLINIC | Age: 88
End: 2023-01-11
Payer: MEDICARE

## 2023-01-11 ENCOUNTER — TELEPHONE (OUTPATIENT)
Dept: INTERNAL MEDICINE | Facility: CLINIC | Age: 88
End: 2023-01-11
Payer: MEDICARE

## 2023-01-11 VITALS
SYSTOLIC BLOOD PRESSURE: 127 MMHG | WEIGHT: 203 LBS | HEART RATE: 88 BPM | DIASTOLIC BLOOD PRESSURE: 66 MMHG | BODY MASS INDEX: 28.31 KG/M2

## 2023-01-11 DIAGNOSIS — N30.00 ACUTE CYSTITIS WITHOUT HEMATURIA: Primary | ICD-10-CM

## 2023-01-11 PROCEDURE — 3288F FALL RISK ASSESSMENT DOCD: CPT | Mod: HCNC,CPTII,S$GLB, | Performed by: NURSE PRACTITIONER

## 2023-01-11 PROCEDURE — 99214 PR OFFICE/OUTPT VISIT, EST, LEVL IV, 30-39 MIN: ICD-10-PCS | Mod: HCNC,S$GLB,, | Performed by: NURSE PRACTITIONER

## 2023-01-11 PROCEDURE — 1126F PR PAIN SEVERITY QUANTIFIED, NO PAIN PRESENT: ICD-10-PCS | Mod: HCNC,CPTII,S$GLB, | Performed by: NURSE PRACTITIONER

## 2023-01-11 PROCEDURE — 3288F PR FALLS RISK ASSESSMENT DOCUMENTED: ICD-10-PCS | Mod: HCNC,CPTII,S$GLB, | Performed by: NURSE PRACTITIONER

## 2023-01-11 PROCEDURE — 1126F AMNT PAIN NOTED NONE PRSNT: CPT | Mod: HCNC,CPTII,S$GLB, | Performed by: NURSE PRACTITIONER

## 2023-01-11 PROCEDURE — 99999 PR PBB SHADOW E&M-EST. PATIENT-LVL IV: CPT | Mod: PBBFAC,HCNC,, | Performed by: NURSE PRACTITIONER

## 2023-01-11 PROCEDURE — 99999 PR PBB SHADOW E&M-EST. PATIENT-LVL IV: ICD-10-PCS | Mod: PBBFAC,HCNC,, | Performed by: NURSE PRACTITIONER

## 2023-01-11 PROCEDURE — 87086 URINE CULTURE/COLONY COUNT: CPT | Mod: HCNC | Performed by: NURSE PRACTITIONER

## 2023-01-11 PROCEDURE — 1159F MED LIST DOCD IN RCRD: CPT | Mod: HCNC,CPTII,S$GLB, | Performed by: NURSE PRACTITIONER

## 2023-01-11 PROCEDURE — 1101F PT FALLS ASSESS-DOCD LE1/YR: CPT | Mod: HCNC,CPTII,S$GLB, | Performed by: NURSE PRACTITIONER

## 2023-01-11 PROCEDURE — 1101F PR PT FALLS ASSESS DOC 0-1 FALLS W/OUT INJ PAST YR: ICD-10-PCS | Mod: HCNC,CPTII,S$GLB, | Performed by: NURSE PRACTITIONER

## 2023-01-11 PROCEDURE — 3072F PR LOW RISK FOR RETINOPATHY: ICD-10-PCS | Mod: HCNC,CPTII,S$GLB, | Performed by: NURSE PRACTITIONER

## 2023-01-11 PROCEDURE — 3072F LOW RISK FOR RETINOPATHY: CPT | Mod: HCNC,CPTII,S$GLB, | Performed by: NURSE PRACTITIONER

## 2023-01-11 PROCEDURE — 99214 OFFICE O/P EST MOD 30 MIN: CPT | Mod: HCNC,S$GLB,, | Performed by: NURSE PRACTITIONER

## 2023-01-11 PROCEDURE — 1159F PR MEDICATION LIST DOCUMENTED IN MEDICAL RECORD: ICD-10-PCS | Mod: HCNC,CPTII,S$GLB, | Performed by: NURSE PRACTITIONER

## 2023-01-11 NOTE — PROGRESS NOTES
Chief Complaint:   Micro hematuria  Hydronephrosis    HPI:   Patient is a 89-year-old male that is presenting with his daughter to review renal ultrasound.  Patient was seen in the emergency room and had an incidental finding of hydronephrosis.  Patient was sent for Renal ultrasound that indicated  a slight improvement to mild hydronephrosis.  Urine culture was negative, however, urinalysis indicated micro hematuria.  Patient denies pelvic or flank pain, no gross hematuria  01/04/2023  Patient is a 89-year-old male that is following up after a emergency room visit.  Daughter states that patient had a positive urine and is currently on Augmentin.  In reviewing EMR, urine culture indicated Staph.  Patient had an abdominal ultrasound indicating bilateral hydronephrosis.  Urine in clinic indicates leukocytes and blood, all other parameters are negative.  PVR was 44 mL.  No history of renal stones.  Daughter states that urine is very cloudy.      12/02/2022 - returns for follow-up, no returns for follow-up, completed his antibiotics and his symptoms have resolved and recurred, also believes that he is restarted the Flomax and feels like it is helping, still having some OAB but improved, no further gross hematuria     10/20/2022 - patient returns today for follow-up, notes that he passed three stones in his urine back in June, had another stone in August, he denies any flank pain during these episodes, did have a little bit of blood after the August episode, also notes increased nocturia q.1 hour, also notes recent increase in number of UTIs for which he got antibiotics from his PCP, does not think he has been taking his Flomax     06/24/2021 - presents today for follow-up, notes that he continues to take the Flomax, finasteride, but patient's daughter does not think he has been taking the oxybutynin, urgency and frequency still occurring, wakes up once an hour at night, less during the day, does not feel like he  empties all the way, denies any recent UTIs or gross hematuria    Allergies:  Morphine    Medications:  has a current medication list which includes the following prescription(s): allopurinol, blood sugar diagnostic, blood-glucose meter, docusate sodium, finasteride, fluticasone propionate, hydrocodone-acetaminophen, lancets, lisinopril-hydrochlorothiazide, lovastatin, metformin, metoprolol tartrate, prednisolone acetate, ssd, and tamsulosin.    Review of Systems:  General: No fever, chills, fatigability, or weight loss.  Skin: No rashes, itching, or changes in color or texture of skin.  Chest: Denies DELGADILLO, cyanosis, wheezing, cough, and sputum production.  Abdomen: Appetite fine. No weight loss. Denies diarrhea, abdominal pain, hematemesis, or blood in stool.  Musculoskeletal: No joint stiffness or swelling. Denies back pain.  : As above.  All other review of systems negative.    PMH:   has a past medical history of Aphakia of right eye, Chronic venous insufficiency, CKD stage 3 due to type 2 diabetes mellitus, DM (diabetes mellitus), type 2 with complications, Hearing loss of aging, History of gout, History of pulmonary embolism (2011), Hypertension associated with diabetes, Iridodialysis of right eye, Obesity, unspecified, and Primary osteoarthritis involving multiple joints.    PSH:   has a past surgical history that includes Vascular surgery; Transurethral resection of prostate (N/A, 5/31/2018); Cataract extraction; Cataract extraction w/  intraocular lens implant (Left, 12/13/2018); yag laser capsulotomy (Left, 09/08/2020); and Endoscopic ultrasound of upper gastrointestinal tract (N/A, 12/30/2022).    FamHx: family history includes Diabetes in his brother.    SocHx:  reports that he has never smoked. He has never used smokeless tobacco. He reports that he does not drink alcohol and does not use drugs.      Physical Exam:  Vitals:    01/11/23 1627   BP: 127/66   Pulse: 88     General: A&Ox3, no apparent  distress, no deformities  Neck: No masses, normal thyroid  Lungs: normal inspiration, no use of accessory muscles  Heart: normal pulse, no arrhythmias  Abdomen: Soft, NT, ND, no masses, no hernias, no hepatosplenomegaly  Lymphatic: Neck and groin nodes negative    Labs/Studies:   Urine in clinic    Impression/Plan:   Micro hematuria  Patient has been followed, in the past, by Dr. Holman.  Will be rescheduled with MD to discuss if cystoscopy is needed for micro hematuria.  MD to review renal ultrasound and discuss possibility of repeating ultrasound secondary to hydronephrosis.

## 2023-01-13 LAB — BACTERIA UR CULT: NO GROWTH

## 2023-01-19 ENCOUNTER — OFFICE VISIT (OUTPATIENT)
Dept: UROLOGY | Facility: CLINIC | Age: 88
End: 2023-01-19
Payer: MEDICARE

## 2023-01-19 ENCOUNTER — TELEPHONE (OUTPATIENT)
Dept: UROLOGY | Facility: CLINIC | Age: 88
End: 2023-01-19
Payer: MEDICARE

## 2023-01-19 VITALS
SYSTOLIC BLOOD PRESSURE: 147 MMHG | RESPIRATION RATE: 18 BRPM | WEIGHT: 203.06 LBS | HEIGHT: 71 IN | BODY MASS INDEX: 28.43 KG/M2 | HEART RATE: 80 BPM | DIASTOLIC BLOOD PRESSURE: 76 MMHG

## 2023-01-19 DIAGNOSIS — N30.00 ACUTE CYSTITIS WITHOUT HEMATURIA: Primary | ICD-10-CM

## 2023-01-19 PROCEDURE — 1160F PR REVIEW ALL MEDS BY PRESCRIBER/CLIN PHARMACIST DOCUMENTED: ICD-10-PCS | Mod: HCNC,CPTII,S$GLB, | Performed by: UROLOGY

## 2023-01-19 PROCEDURE — 1159F MED LIST DOCD IN RCRD: CPT | Mod: HCNC,CPTII,S$GLB, | Performed by: UROLOGY

## 2023-01-19 PROCEDURE — 99999 PR PBB SHADOW E&M-EST. PATIENT-LVL IV: ICD-10-PCS | Mod: PBBFAC,HCNC,, | Performed by: UROLOGY

## 2023-01-19 PROCEDURE — 1101F PT FALLS ASSESS-DOCD LE1/YR: CPT | Mod: HCNC,CPTII,S$GLB, | Performed by: UROLOGY

## 2023-01-19 PROCEDURE — 99214 PR OFFICE/OUTPT VISIT, EST, LEVL IV, 30-39 MIN: ICD-10-PCS | Mod: HCNC,S$GLB,, | Performed by: UROLOGY

## 2023-01-19 PROCEDURE — 1126F PR PAIN SEVERITY QUANTIFIED, NO PAIN PRESENT: ICD-10-PCS | Mod: HCNC,CPTII,S$GLB, | Performed by: UROLOGY

## 2023-01-19 PROCEDURE — 99999 PR PBB SHADOW E&M-EST. PATIENT-LVL IV: CPT | Mod: PBBFAC,HCNC,, | Performed by: UROLOGY

## 2023-01-19 PROCEDURE — 1101F PR PT FALLS ASSESS DOC 0-1 FALLS W/OUT INJ PAST YR: ICD-10-PCS | Mod: HCNC,CPTII,S$GLB, | Performed by: UROLOGY

## 2023-01-19 PROCEDURE — 3072F PR LOW RISK FOR RETINOPATHY: ICD-10-PCS | Mod: HCNC,CPTII,S$GLB, | Performed by: UROLOGY

## 2023-01-19 PROCEDURE — 1159F PR MEDICATION LIST DOCUMENTED IN MEDICAL RECORD: ICD-10-PCS | Mod: HCNC,CPTII,S$GLB, | Performed by: UROLOGY

## 2023-01-19 PROCEDURE — 87086 URINE CULTURE/COLONY COUNT: CPT | Mod: HCNC | Performed by: UROLOGY

## 2023-01-19 PROCEDURE — 99214 OFFICE O/P EST MOD 30 MIN: CPT | Mod: HCNC,S$GLB,, | Performed by: UROLOGY

## 2023-01-19 PROCEDURE — 1160F RVW MEDS BY RX/DR IN RCRD: CPT | Mod: HCNC,CPTII,S$GLB, | Performed by: UROLOGY

## 2023-01-19 PROCEDURE — 3288F FALL RISK ASSESSMENT DOCD: CPT | Mod: HCNC,CPTII,S$GLB, | Performed by: UROLOGY

## 2023-01-19 PROCEDURE — 1126F AMNT PAIN NOTED NONE PRSNT: CPT | Mod: HCNC,CPTII,S$GLB, | Performed by: UROLOGY

## 2023-01-19 PROCEDURE — 3288F PR FALLS RISK ASSESSMENT DOCUMENTED: ICD-10-PCS | Mod: HCNC,CPTII,S$GLB, | Performed by: UROLOGY

## 2023-01-19 PROCEDURE — 3072F LOW RISK FOR RETINOPATHY: CPT | Mod: HCNC,CPTII,S$GLB, | Performed by: UROLOGY

## 2023-01-19 RX ORDER — SULFAMETHOXAZOLE AND TRIMETHOPRIM 800; 160 MG/1; MG/1
1 TABLET ORAL 2 TIMES DAILY
Qty: 14 TABLET | Refills: 0 | Status: SHIPPED | OUTPATIENT
Start: 2023-01-19 | End: 2023-01-26

## 2023-01-19 NOTE — TELEPHONE ENCOUNTER
Tried calling pt family there was no answer    ----- Message from Kylie Alexis NP sent at 1/6/2023  2:14 PM CST -----  Please contact family and informed them that urine culture is negative for infection.

## 2023-01-19 NOTE — PROGRESS NOTES
Chief Complaint:   Micro hematuria  Hydronephrosis    HPI:   01/19/2023 - returns today for follow-up, urine continues to be dirty with positive leukocyte positive blood, overall feeling okay, no gross hematuria, renal function stable even with mild hydro    Patient is a 89-year-old male that is presenting with his daughter to review renal ultrasound.  Patient was seen in the emergency room and had an incidental finding of hydronephrosis.  Patient was sent for Renal ultrasound that indicated  a slight improvement to mild hydronephrosis.  Urine culture was negative, however, urinalysis indicated micro hematuria.  Patient denies pelvic or flank pain, no gross hematuria  01/04/2023  Patient is a 89-year-old male that is following up after a emergency room visit.  Daughter states that patient had a positive urine and is currently on Augmentin.  In reviewing EMR, urine culture indicated Staph.  Patient had an abdominal ultrasound indicating bilateral hydronephrosis.  Urine in clinic indicates leukocytes and blood, all other parameters are negative.  PVR was 44 mL.  No history of renal stones.  Daughter states that urine is very cloudy.      12/02/2022 - returns for follow-up, no returns for follow-up, completed his antibiotics and his symptoms have resolved and recurred, also believes that he is restarted the Flomax and feels like it is helping, still having some OAB but improved, no further gross hematuria     10/20/2022 - patient returns today for follow-up, notes that he passed three stones in his urine back in June, had another stone in August, he denies any flank pain during these episodes, did have a little bit of blood after the August episode, also notes increased nocturia q.1 hour, also notes recent increase in number of UTIs for which he got antibiotics from his PCP, does not think he has been taking his Flomax     06/24/2021 - presents today for follow-up, notes that he continues to take the Flomax,  finasteride, but patient's daughter does not think he has been taking the oxybutynin, urgency and frequency still occurring, wakes up once an hour at night, less during the day, does not feel like he empties all the way, denies any recent UTIs or gross hematuria    Allergies:  Morphine    Medications:  has a current medication list which includes the following prescription(s): allopurinol, blood sugar diagnostic, blood-glucose meter, docusate sodium, finasteride, fluticasone propionate, hydrocodone-acetaminophen, lancets, lisinopril-hydrochlorothiazide, lovastatin, metformin, metoprolol tartrate, prednisolone acetate, ssd, tamsulosin, and sulfamethoxazole-trimethoprim 800-160mg.    Review of Systems:  General: No fever, chills  Skin: No rashes  Chest:  Denies cough and sputum production  Heart: Denies chest pain  Resp: Denies dyspnea  Abdomen: Denies diarrhea, abdominal pain, hematemesis, or blood in stool.  Musculoskeletal: No joint stiffness or swelling. Denies back pain.  : see HPI  Neuro: no dizziness or weakness      PMH:   has a past medical history of Aphakia of right eye, Chronic venous insufficiency, CKD stage 3 due to type 2 diabetes mellitus, DM (diabetes mellitus), type 2 with complications, Hearing loss of aging, History of gout, History of pulmonary embolism (2011), Hypertension associated with diabetes, Iridodialysis of right eye, Obesity, unspecified, and Primary osteoarthritis involving multiple joints.    PSH:   has a past surgical history that includes Vascular surgery; Transurethral resection of prostate (N/A, 5/31/2018); Cataract extraction; Cataract extraction w/  intraocular lens implant (Left, 12/13/2018); yag laser capsulotomy (Left, 09/08/2020); and Endoscopic ultrasound of upper gastrointestinal tract (N/A, 12/30/2022).    FamHx: family history includes Diabetes in his brother.    SocHx:  reports that he has never smoked. He has never used smokeless tobacco. He reports that he does not  drink alcohol and does not use drugs.      Physical Exam:  Vitals:    01/19/23 1055   BP: (!) 147/76   Pulse: 80   Resp: 18     General: A&Ox3, no apparent distress, no deformities  Neck: No masses, normal thyroid  Lungs: normal inspiration, no use of accessory muscles  Heart: normal pulse, no arrhythmias  Abdomen: Soft, NT, ND, no masses, no hernias, no hepatosplenomegaly  Lymphatic: Neck and groin nodes negative    Labs/Studies:   Urine in clinic    Impression/Plan:   Micro hematuria - f/u for cysto    UTI - urine for culture, start Bactrim    Bladder diverticulum - not a very good surgical candidate due to age and comorbid conditions, will likely continue to observe    Mani Holman MD

## 2023-01-21 LAB — BACTERIA UR CULT: NO GROWTH

## 2023-01-26 ENCOUNTER — PROCEDURE VISIT (OUTPATIENT)
Dept: UROLOGY | Facility: CLINIC | Age: 88
End: 2023-01-26
Payer: MEDICARE

## 2023-01-26 VITALS
HEIGHT: 71 IN | SYSTOLIC BLOOD PRESSURE: 122 MMHG | WEIGHT: 203 LBS | DIASTOLIC BLOOD PRESSURE: 75 MMHG | TEMPERATURE: 98 F | BODY MASS INDEX: 28.42 KG/M2 | RESPIRATION RATE: 18 BRPM | HEART RATE: 69 BPM

## 2023-01-26 DIAGNOSIS — N40.1 BENIGN PROSTATIC HYPERPLASIA WITH URINARY RETENTION: ICD-10-CM

## 2023-01-26 DIAGNOSIS — R33.8 BENIGN PROSTATIC HYPERPLASIA WITH URINARY RETENTION: ICD-10-CM

## 2023-01-26 DIAGNOSIS — Z01.818 PREOP TESTING: Primary | ICD-10-CM

## 2023-01-26 PROCEDURE — 52000 CYSTOURETHROSCOPY: CPT | Mod: HCNC,S$GLB,, | Performed by: UROLOGY

## 2023-01-26 PROCEDURE — 52000 PR CYSTOURETHROSCOPY: ICD-10-PCS | Mod: HCNC,S$GLB,, | Performed by: UROLOGY

## 2023-01-26 RX ORDER — TAMSULOSIN HYDROCHLORIDE 0.4 MG/1
0.8 CAPSULE ORAL DAILY
Qty: 180 CAPSULE | Refills: 3 | Status: SHIPPED | OUTPATIENT
Start: 2023-01-26 | End: 2024-01-23

## 2023-01-26 NOTE — PROCEDURES
Procedures    Procedure:  Diagnostic Cystoscopy    Indications: microhematuria    UA: normal, see lab results for values    Procedure in Detail: After proper consents were obtained, the patient was prepped and draped in normal sterile fashion for diagnostic cystoscopy. 5 ml of lidocaine jelly was instilled in the urethra. The flexible cystoscope was then introduced into the urethra, and advanced into the bladder under direct vision. The urethral mucosa appeared normal, and no strictures were noted. The sphincter was normal, and the veru montanum was unremarkable. The prostatic mucosa and the lateral lobes of the prostate were unremarkable, with visual obstruction. The bladder neck was normal. Inspection of the interior of the bladder was then carried out. The trigone was unremarkable, with no mucosal lesions. The ureteral orifices were normal in position and configuration. Systematic inspection of the mucosa of the bladder it was then carried out, rotating the cystoscope so that all areas of the left and right lateral walls, the dome of the bladder, and the posterior wall were all visualized. The cystoscope was then advanced further into the bladder, and maximum deflection of the scope was performed so that the bladder neck could be inspected. No mucosal lesions were noted there.  Severe trabeculations were noted with some irritation of the bladder likely due to recent infections.  No overt lesions or areas concerning for tumor noted.  The cystoscope was then removed, and the procedure terminated. Patient tolerated the procedure well. No complications.     Findings:  Severe trabeculations, no overt tumors, regrowth of his prostate    Disposition:  - negative microhematuria work-up, no need for repeat work-up unless patient has significant increase in number of RBCs/hpf  - regrowth of prostate  - follow-up two months, may need to repeat a GreenLight if he continues to have issues with infections    Mani Arenas  MD [place]

## 2023-02-07 DIAGNOSIS — Z00.00 ENCOUNTER FOR MEDICARE ANNUAL WELLNESS EXAM: ICD-10-CM

## 2023-02-09 DIAGNOSIS — Z00.00 ENCOUNTER FOR MEDICARE ANNUAL WELLNESS EXAM: ICD-10-CM

## 2023-02-20 ENCOUNTER — LAB VISIT (OUTPATIENT)
Dept: LAB | Facility: HOSPITAL | Age: 88
End: 2023-02-20
Attending: INTERNAL MEDICINE
Payer: MEDICARE

## 2023-02-20 DIAGNOSIS — E11.8 DM (DIABETES MELLITUS), TYPE 2 WITH COMPLICATIONS: ICD-10-CM

## 2023-02-20 DIAGNOSIS — Z87.39 HISTORY OF GOUT: ICD-10-CM

## 2023-02-20 LAB
ALBUMIN SERPL BCP-MCNC: 3.5 G/DL (ref 3.5–5.2)
ALP SERPL-CCNC: 100 U/L (ref 55–135)
ALT SERPL W/O P-5'-P-CCNC: 15 U/L (ref 10–44)
ANION GAP SERPL CALC-SCNC: 13 MMOL/L (ref 8–16)
AST SERPL-CCNC: 15 U/L (ref 10–40)
BASOPHILS # BLD AUTO: 0.04 K/UL (ref 0–0.2)
BASOPHILS NFR BLD: 0.4 % (ref 0–1.9)
BILIRUB SERPL-MCNC: 0.9 MG/DL (ref 0.1–1)
BUN SERPL-MCNC: 20 MG/DL (ref 10–30)
CALCIUM SERPL-MCNC: 10.1 MG/DL (ref 8.7–10.5)
CHLORIDE SERPL-SCNC: 101 MMOL/L (ref 95–110)
CHOLEST SERPL-MCNC: 151 MG/DL (ref 120–199)
CHOLEST/HDLC SERPL: 2.9 {RATIO} (ref 2–5)
CO2 SERPL-SCNC: 25 MMOL/L (ref 23–29)
CREAT SERPL-MCNC: 1 MG/DL (ref 0.5–1.4)
DIFFERENTIAL METHOD: ABNORMAL
EOSINOPHIL # BLD AUTO: 0.1 K/UL (ref 0–0.5)
EOSINOPHIL NFR BLD: 1.3 % (ref 0–8)
ERYTHROCYTE [DISTWIDTH] IN BLOOD BY AUTOMATED COUNT: 17.2 % (ref 11.5–14.5)
EST. GFR  (NO RACE VARIABLE): >60 ML/MIN/1.73 M^2
ESTIMATED AVG GLUCOSE: 114 MG/DL (ref 68–131)
GLUCOSE SERPL-MCNC: 98 MG/DL (ref 70–110)
HBA1C MFR BLD: 5.6 % (ref 4–5.6)
HCT VFR BLD AUTO: 48.9 % (ref 40–54)
HDLC SERPL-MCNC: 52 MG/DL (ref 40–75)
HDLC SERPL: 34.4 % (ref 20–50)
HGB BLD-MCNC: 14.9 G/DL (ref 14–18)
IMM GRANULOCYTES # BLD AUTO: 0.02 K/UL (ref 0–0.04)
IMM GRANULOCYTES NFR BLD AUTO: 0.2 % (ref 0–0.5)
LDLC SERPL CALC-MCNC: 75.2 MG/DL (ref 63–159)
LYMPHOCYTES # BLD AUTO: 0.9 K/UL (ref 1–4.8)
LYMPHOCYTES NFR BLD: 9.9 % (ref 18–48)
MCH RBC QN AUTO: 27.8 PG (ref 27–31)
MCHC RBC AUTO-ENTMCNC: 30.5 G/DL (ref 32–36)
MCV RBC AUTO: 91 FL (ref 82–98)
MONOCYTES # BLD AUTO: 1 K/UL (ref 0.3–1)
MONOCYTES NFR BLD: 10.6 % (ref 4–15)
NEUTROPHILS # BLD AUTO: 7.2 K/UL (ref 1.8–7.7)
NEUTROPHILS NFR BLD: 77.6 % (ref 38–73)
NONHDLC SERPL-MCNC: 99 MG/DL
NRBC BLD-RTO: 0 /100 WBC
PLATELET # BLD AUTO: 180 K/UL (ref 150–450)
PMV BLD AUTO: 12.3 FL (ref 9.2–12.9)
POTASSIUM SERPL-SCNC: 4.2 MMOL/L (ref 3.5–5.1)
PROT SERPL-MCNC: 7.6 G/DL (ref 6–8.4)
RBC # BLD AUTO: 5.36 M/UL (ref 4.6–6.2)
SODIUM SERPL-SCNC: 139 MMOL/L (ref 136–145)
TRIGL SERPL-MCNC: 119 MG/DL (ref 30–150)
TSH SERPL DL<=0.005 MIU/L-ACNC: 3.39 UIU/ML (ref 0.4–4)
URATE SERPL-MCNC: 3.7 MG/DL (ref 3.4–7)
WBC # BLD AUTO: 9.32 K/UL (ref 3.9–12.7)

## 2023-02-20 PROCEDURE — 80061 LIPID PANEL: CPT | Mod: HCNC | Performed by: INTERNAL MEDICINE

## 2023-02-20 PROCEDURE — 83036 HEMOGLOBIN GLYCOSYLATED A1C: CPT | Mod: HCNC | Performed by: INTERNAL MEDICINE

## 2023-02-20 PROCEDURE — 80053 COMPREHEN METABOLIC PANEL: CPT | Mod: HCNC | Performed by: INTERNAL MEDICINE

## 2023-02-20 PROCEDURE — 85025 COMPLETE CBC W/AUTO DIFF WBC: CPT | Mod: HCNC | Performed by: INTERNAL MEDICINE

## 2023-02-20 PROCEDURE — 36415 COLL VENOUS BLD VENIPUNCTURE: CPT | Mod: HCNC | Performed by: INTERNAL MEDICINE

## 2023-02-20 PROCEDURE — 84550 ASSAY OF BLOOD/URIC ACID: CPT | Mod: HCNC | Performed by: INTERNAL MEDICINE

## 2023-02-20 PROCEDURE — 84443 ASSAY THYROID STIM HORMONE: CPT | Mod: HCNC | Performed by: INTERNAL MEDICINE

## 2023-02-22 ENCOUNTER — HOSPITAL ENCOUNTER (EMERGENCY)
Facility: HOSPITAL | Age: 88
Discharge: HOME | End: 2023-02-22
Attending: EMERGENCY MEDICINE
Payer: MEDICARE

## 2023-02-22 VITALS
OXYGEN SATURATION: 95 % | DIASTOLIC BLOOD PRESSURE: 59 MMHG | SYSTOLIC BLOOD PRESSURE: 112 MMHG | TEMPERATURE: 98 F | BODY MASS INDEX: 27.73 KG/M2 | HEART RATE: 60 BPM | WEIGHT: 198.88 LBS | RESPIRATION RATE: 20 BRPM

## 2023-02-22 DIAGNOSIS — I95.1 ORTHOSTATIC HYPOTENSION: ICD-10-CM

## 2023-02-22 DIAGNOSIS — R42 LIGHTHEADEDNESS: Primary | ICD-10-CM

## 2023-02-22 DIAGNOSIS — R42 DIZZINESS: ICD-10-CM

## 2023-02-22 DIAGNOSIS — T50.905A MEDICATION SIDE EFFECT, INITIAL ENCOUNTER: ICD-10-CM

## 2023-02-22 LAB
ALBUMIN SERPL BCP-MCNC: 3.3 G/DL (ref 3.5–5.2)
ALP SERPL-CCNC: 90 U/L (ref 55–135)
ALT SERPL W/O P-5'-P-CCNC: 12 U/L (ref 10–44)
ANION GAP SERPL CALC-SCNC: 13 MMOL/L (ref 8–16)
AST SERPL-CCNC: 14 U/L (ref 10–40)
BASOPHILS # BLD AUTO: 0.03 K/UL (ref 0–0.2)
BASOPHILS NFR BLD: 0.3 % (ref 0–1.9)
BILIRUB SERPL-MCNC: 1.1 MG/DL (ref 0.1–1)
BUN SERPL-MCNC: 18 MG/DL (ref 10–30)
CALCIUM SERPL-MCNC: 10.1 MG/DL (ref 8.7–10.5)
CHLORIDE SERPL-SCNC: 102 MMOL/L (ref 95–110)
CO2 SERPL-SCNC: 24 MMOL/L (ref 23–29)
CREAT SERPL-MCNC: 1.3 MG/DL (ref 0.5–1.4)
DIFFERENTIAL METHOD: ABNORMAL
EOSINOPHIL # BLD AUTO: 0 K/UL (ref 0–0.5)
EOSINOPHIL NFR BLD: 0.1 % (ref 0–8)
ERYTHROCYTE [DISTWIDTH] IN BLOOD BY AUTOMATED COUNT: 16.2 % (ref 11.5–14.5)
EST. GFR  (NO RACE VARIABLE): 51 ML/MIN/1.73 M^2
GLUCOSE SERPL-MCNC: 113 MG/DL (ref 70–110)
HCT VFR BLD AUTO: 46.3 % (ref 40–54)
HGB BLD-MCNC: 15.1 G/DL (ref 14–18)
IMM GRANULOCYTES # BLD AUTO: 0.04 K/UL (ref 0–0.04)
IMM GRANULOCYTES NFR BLD AUTO: 0.4 % (ref 0–0.5)
LYMPHOCYTES # BLD AUTO: 0.8 K/UL (ref 1–4.8)
LYMPHOCYTES NFR BLD: 7.8 % (ref 18–48)
MAGNESIUM SERPL-MCNC: 1.7 MG/DL (ref 1.6–2.6)
MCH RBC QN AUTO: 28.1 PG (ref 27–31)
MCHC RBC AUTO-ENTMCNC: 32.6 G/DL (ref 32–36)
MCV RBC AUTO: 86 FL (ref 82–98)
MONOCYTES # BLD AUTO: 1.5 K/UL (ref 0.3–1)
MONOCYTES NFR BLD: 15.6 % (ref 4–15)
NEUTROPHILS # BLD AUTO: 7.3 K/UL (ref 1.8–7.7)
NEUTROPHILS NFR BLD: 75.8 % (ref 38–73)
NRBC BLD-RTO: 0 /100 WBC
PLATELET # BLD AUTO: 168 K/UL (ref 150–450)
PMV BLD AUTO: 11.2 FL (ref 9.2–12.9)
POCT GLUCOSE: 116 MG/DL (ref 70–110)
POTASSIUM SERPL-SCNC: 4 MMOL/L (ref 3.5–5.1)
PROT SERPL-MCNC: 7.8 G/DL (ref 6–8.4)
RBC # BLD AUTO: 5.38 M/UL (ref 4.6–6.2)
SODIUM SERPL-SCNC: 139 MMOL/L (ref 136–145)
WBC # BLD AUTO: 9.67 K/UL (ref 3.9–12.7)

## 2023-02-22 PROCEDURE — 80053 COMPREHEN METABOLIC PANEL: CPT | Mod: HCNC | Performed by: EMERGENCY MEDICINE

## 2023-02-22 PROCEDURE — 96360 HYDRATION IV INFUSION INIT: CPT | Mod: HCNC

## 2023-02-22 PROCEDURE — 83735 ASSAY OF MAGNESIUM: CPT | Mod: HCNC | Performed by: EMERGENCY MEDICINE

## 2023-02-22 PROCEDURE — 93010 EKG 12-LEAD: ICD-10-PCS | Mod: HCNC,,, | Performed by: INTERNAL MEDICINE

## 2023-02-22 PROCEDURE — 99284 EMERGENCY DEPT VISIT MOD MDM: CPT | Mod: 25,HCNC

## 2023-02-22 PROCEDURE — 85025 COMPLETE CBC W/AUTO DIFF WBC: CPT | Mod: HCNC | Performed by: EMERGENCY MEDICINE

## 2023-02-22 PROCEDURE — 93010 ELECTROCARDIOGRAM REPORT: CPT | Mod: HCNC,,, | Performed by: INTERNAL MEDICINE

## 2023-02-22 PROCEDURE — 93005 ELECTROCARDIOGRAM TRACING: CPT | Mod: HCNC

## 2023-02-22 PROCEDURE — 25000003 PHARM REV CODE 250: Mod: HCNC | Performed by: EMERGENCY MEDICINE

## 2023-02-22 PROCEDURE — 82962 GLUCOSE BLOOD TEST: CPT | Mod: HCNC

## 2023-02-22 RX ADMIN — SODIUM CHLORIDE 500 ML: 9 INJECTION, SOLUTION INTRAVENOUS at 09:02

## 2023-02-22 NOTE — ED PROVIDER NOTES
"SCRIBE #1 NOTE: I, Luis Taveras, am scribing for, and in the presence of, Christiane Felix MD. I have scribed the entire note.       History     Chief Complaint   Patient presents with    Dizziness     Weak, clammy, dizzy x few days. BP low at home.      Review of patient's allergies indicates:   Allergen Reactions    Morphine Other (See Comments)     Pt states, "It only makes my pain worse."         History of Present Illness     HPI    2/22/2023, 9:36 AM  History obtained from the patient      History of Present Illness: Amauri Lara is a 89 y.o. male patient with a PMHx of HTN, DMII, CKD stage 3 who presents to the Emergency Department for evaluation of hypotension. Pt's BP was 86/38 at home. Pt was taken off his BP medication, but pt decided to start taking his BP meds again last week. Symptoms are constant and moderate in severity. No mitigating or exacerbating factors reported. Associated sxs include dizziness and diaphoresis. Patient denies any CP, SOB, n/v, cough, lightheadedness, and all other sxs at this time. No prior tx reported. No further complaints or concerns at this time.       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    Obesity, unspecified     Primary osteoarthritis involving multiple joints        Past Surgical History:  Past Surgical History:   Procedure Laterality Date    CATARACT EXTRACTION      aphakic od    CATARACT EXTRACTION W/  INTRAOCULAR LENS IMPLANT Left 12/13/2018    ENDOSCOPIC ULTRASOUND OF UPPER GASTROINTESTINAL TRACT N/A 12/30/2022    " Procedure: ULTRASOUND, UPPER GI TRACT, ENDOSCOPIC;  Surgeon: Mani Howard MD;  Location: [facility] ENDO;  Service: Endoscopy;  Laterality: N/A;    TRANSURETHRAL RESECTION OF PROSTATE N/A 5/31/2018    Procedure: PROSTATECTOMY-TRANSURETHRAL;  Surgeon: Michael Westbrook IV, MD;  Location: [facility];  Service: Urology;  Laterality: N/A;    VASCULAR SURGERY      yag laser capsulotomy Left 09/08/2020         Family History:  Family History   Problem Relation Age of Onset    Diabetes Brother        Social History:  Social History     Tobacco Use    Smoking status: Never    Smokeless tobacco: Never   Substance and Sexual Activity    Alcohol use: No    Drug use: No    Sexual activity: Never        Review of Systems     Review of Systems   Constitutional:  Positive for diaphoresis. Negative for chills and fever.   HENT:  Negative for sore throat.    Respiratory:  Negative for cough and shortness of breath.    Cardiovascular:  Negative for chest pain.   Gastrointestinal:  Negative for nausea and vomiting.   Genitourinary:  Negative for dysuria.   Musculoskeletal:  Negative for back pain.   Skin:  Negative for rash.   Neurological:  Positive for dizziness. Negative for weakness.   Hematological:  Does not bruise/bleed easily.   All other systems reviewed and are negative.   Physical Exam     Initial Vitals [02/22/23 0922]   BP Pulse Resp Temp SpO2   (!) 91/55 76 16 97.8 °F (36.6 °C) 96 %      MAP       --          Physical Exam  Nursing Notes and Vital Signs Reviewed.  Constitutional: Patient is in no acute distress. Elderly.  Head: Atraumatic. Normocephalic.  Eyes: PERRL. EOM intact. Conjunctivae are not pale. No scleral icterus.  ENT: Mucous membranes are moist. Oropharynx is clear and symmetric.    Neck: Supple. Full ROM. No lymphadenopathy.  Cardiovascular: Regular rate. Regular rhythm. No murmurs, rubs, or gallops. Distal pulses are 2+ and symmetric.  Pulmonary/Chest: No respiratory distress. Clear to auscultation bilaterally.  No wheezing or rales.  Abdominal: Soft and non-distended.  There is no tenderness.  No rebound, guarding, or rigidity. Good bowel sounds.  Genitourinary: No CVA tenderness  Musculoskeletal: Moves all extremities. No obvious deformities. No edema. No calf tenderness.  Skin: Warm and dry.  Neurological:  Alert, awake, and appropriate.  Normal speech.  No acute focal neurological deficits are appreciated.  Psychiatric: Normal affect. Good eye contact. Appropriate in content.     ED Course   Procedures  ED Vital Signs:  Vitals:    02/22/23 0922 02/22/23 0940 02/22/23 0942 02/22/23 0952   BP: (!) 91/55 114/63  (!) 103/57   Pulse: 76 63  63   Resp: 16   (!) 22   Temp: 97.8 °F (36.6 °C)      TempSrc: Oral      SpO2: 96% 97%  96%   Weight:   90.2 kg (198 lb 13.7 oz)     02/22/23 0954 02/22/23 0956 02/22/23 1000 02/22/23 1025   BP: (!) 73/44 (!) 84/49 (!) 108/56 (!) 102/56   Pulse: 70 72 62 60   Resp: (!) 24 (!) 26 (!) 21 (!) 21   Temp:       TempSrc:       SpO2: 97% 98% 98% 97%   Weight:           Abnormal Lab Results:  Labs Reviewed   CBC W/ AUTO DIFFERENTIAL - Abnormal; Notable for the following components:       Result Value    RDW 16.2 (*)     Lymph # 0.8 (*)     Mono # 1.5 (*)     Gran % 75.8 (*)     Lymph % 7.8 (*)     Mono % 15.6 (*)     All other components within normal limits   COMPREHENSIVE METABOLIC PANEL - Abnormal; Notable for the following components:    Glucose 113 (*)     Albumin 3.3 (*)     Total Bilirubin 1.1 (*)     eGFR 51 (*)     All other components within normal limits   POCT GLUCOSE - Abnormal; Notable for the following components:    POCT Glucose 116 (*)     All other components within normal limits   MAGNESIUM   POCT GLUCOSE MONITORING CONTINUOUS        All Lab Results:  Results for orders placed or performed during the hospital encounter of 02/22/23   CBC auto differential   Result Value Ref Range    WBC 9.67 3.90 - 12.70 K/uL    RBC 5.38 4.60 - 6.20 M/uL    Hemoglobin 15.1 14.0 - 18.0 g/dL     Hematocrit 46.3 40.0 - 54.0 %    MCV 86 82 - 98 fL    MCH 28.1 27.0 - 31.0 pg    MCHC 32.6 32.0 - 36.0 g/dL    RDW 16.2 (H) 11.5 - 14.5 %    Platelets 168 150 - 450 K/uL    MPV 11.2 9.2 - 12.9 fL    Immature Granulocytes 0.4 0.0 - 0.5 %    Gran # (ANC) 7.3 1.8 - 7.7 K/uL    Immature Grans (Abs) 0.04 0.00 - 0.04 K/uL    Lymph # 0.8 (L) 1.0 - 4.8 K/uL    Mono # 1.5 (H) 0.3 - 1.0 K/uL    Eos # 0.0 0.0 - 0.5 K/uL    Baso # 0.03 0.00 - 0.20 K/uL    nRBC 0 0 /100 WBC    Gran % 75.8 (H) 38.0 - 73.0 %    Lymph % 7.8 (L) 18.0 - 48.0 %    Mono % 15.6 (H) 4.0 - 15.0 %    Eosinophil % 0.1 0.0 - 8.0 %    Basophil % 0.3 0.0 - 1.9 %    Differential Method Automated    Comprehensive metabolic panel   Result Value Ref Range    Sodium 139 136 - 145 mmol/L    Potassium 4.0 3.5 - 5.1 mmol/L    Chloride 102 95 - 110 mmol/L    CO2 24 23 - 29 mmol/L    Glucose 113 (H) 70 - 110 mg/dL    BUN 18 10 - 30 mg/dL    Creatinine 1.3 0.5 - 1.4 mg/dL    Calcium 10.1 8.7 - 10.5 mg/dL    Total Protein 7.8 6.0 - 8.4 g/dL    Albumin 3.3 (L) 3.5 - 5.2 g/dL    Total Bilirubin 1.1 (H) 0.1 - 1.0 mg/dL    Alkaline Phosphatase 90 55 - 135 U/L    AST 14 10 - 40 U/L    ALT 12 10 - 44 U/L    Anion Gap 13 8 - 16 mmol/L    eGFR 51 (A) >60 mL/min/1.73 m^2   Magnesium   Result Value Ref Range    Magnesium 1.7 1.6 - 2.6 mg/dL   POCT glucose   Result Value Ref Range    POCT Glucose 116 (H) 70 - 110 mg/dL       Imaging Results:  Imaging Results    None          The EKG was ordered, reviewed, and independently interpreted by the ED provider.  Interpretation time: 9:23  Rate: 68 BPM  Rhythm: normal sinus rhythm  Interpretation: Possible anteroseptal infarct. No STEMI.           The Emergency Provider reviewed the vital signs and test results, which are outlined above.     ED Discussion     11:19 AM: Reassessed pt at this time. Advised the pt to hold BP meds and Flomax. Discussed with pt all pertinent ED information and results. Discussed pt dx and plan of tx. Gave pt all  f/u and return to the ED instructions. All questions and concerns were addressed at this time. Pt expresses understanding of information and instructions, and is comfortable with plan to discharge. Pt is stable for discharge.    I discussed with patient and/or family/caretaker that evaluation in the ED does not suggest any emergent or life threatening medical conditions requiring immediate intervention beyond what was provided in the ED, and I believe patient is safe for discharge.  Regardless, an unremarkable evaluation in the ED does not preclude the development or presence of a serious of life threatening condition. As such, patient was instructed to return immediately for any worsening or change in current symptoms.       Medical Decision Making:   Clinical Tests:   Lab Tests: Ordered and Reviewed  Medical Tests: Ordered and Reviewed  ED Management:  Patient presents with lightheadedness and low blood pressure, orthostatics noted, no other complaints, vitals reviewed, family at bedside states he started taking his BP meds again even though was told to stop his meds, lab work reviewed and otherwise normal, given fluid bolus in the ER and BP has been stable, do not suspect infection, feel that patient is stable for discharge home and family advised to stop BP meds, also on flomax which may need to be stopped.          ED Medication(s):  Medications   sodium chloride 0.9% bolus 500 mL 500 mL (500 mLs Intravenous New Bag 2/22/23 0956)       New Prescriptions    No medications on file        Follow-up Information       Stefan Vera MD. Schedule an appointment as soon as possible for a visit in 2 days.    Specialty: Internal Medicine  Why: Return to the Emeregncy Room, If symptoms worsen  Contact information:  [address]  [address]  [facility] [address]  [phone]                                 Scribe Attestation:   Scribe #1: I performed the above scribed service and the documentation accurately describes  the services I performed. I attest to the accuracy of the note.     Attending:   Physician Attestation Statement for Scribe #1: I, Christiane Felix MD, personally performed the services described in this documentation, as scribed by Luis Taveras, in my presence, and it is both accurate and complete.           Clinical Impression       ICD-10-CM ICD-9-CM   1. Lightheadedness  R42 780.4   2. Dizziness  R42 780.4   3. Orthostatic hypotension  I95.1 458.0   4. Medication side effect, initial encounter  T50.905A E947.9       Disposition:   Disposition: Discharged  Condition: Stable       Christiane Felix MD  02/22/23 153

## 2023-02-27 ENCOUNTER — OFFICE VISIT (OUTPATIENT)
Dept: INTERNAL MEDICINE | Facility: CLINIC | Age: 88
End: 2023-02-27
Payer: MEDICARE

## 2023-02-27 VITALS
TEMPERATURE: 98 F | OXYGEN SATURATION: 98 % | WEIGHT: 203.94 LBS | HEIGHT: 67 IN | HEART RATE: 68 BPM | SYSTOLIC BLOOD PRESSURE: 122 MMHG | DIASTOLIC BLOOD PRESSURE: 66 MMHG | RESPIRATION RATE: 20 BRPM | BODY MASS INDEX: 32.01 KG/M2

## 2023-02-27 DIAGNOSIS — E11.42 TYPE 2 DIABETES MELLITUS WITH POLYNEUROPATHY: ICD-10-CM

## 2023-02-27 DIAGNOSIS — N18.30 CKD STAGE 3 DUE TO TYPE 2 DIABETES MELLITUS: ICD-10-CM

## 2023-02-27 DIAGNOSIS — Z87.39 HISTORY OF GOUT: ICD-10-CM

## 2023-02-27 DIAGNOSIS — M15.9 PRIMARY OSTEOARTHRITIS INVOLVING MULTIPLE JOINTS: ICD-10-CM

## 2023-02-27 DIAGNOSIS — N40.1 BENIGN PROSTATIC HYPERPLASIA WITH URINARY RETENTION: ICD-10-CM

## 2023-02-27 DIAGNOSIS — Z86.711 HISTORY OF PULMONARY EMBOLISM: ICD-10-CM

## 2023-02-27 DIAGNOSIS — I70.0 ATHEROSCLEROSIS OF AORTA: ICD-10-CM

## 2023-02-27 DIAGNOSIS — I87.2 CHRONIC VENOUS INSUFFICIENCY: ICD-10-CM

## 2023-02-27 DIAGNOSIS — R33.8 BENIGN PROSTATIC HYPERPLASIA WITH URINARY RETENTION: ICD-10-CM

## 2023-02-27 DIAGNOSIS — Z00.00 ROUTINE GENERAL MEDICAL EXAMINATION AT A HEALTH CARE FACILITY: Primary | ICD-10-CM

## 2023-02-27 DIAGNOSIS — Z91.81 AT HIGH RISK FOR FALLS: ICD-10-CM

## 2023-02-27 DIAGNOSIS — E11.8 DM (DIABETES MELLITUS), TYPE 2 WITH COMPLICATIONS: ICD-10-CM

## 2023-02-27 DIAGNOSIS — H91.13 PRESBYCUSIS OF BOTH EARS: ICD-10-CM

## 2023-02-27 DIAGNOSIS — I10 HYPERTENSION, UNSPECIFIED TYPE: ICD-10-CM

## 2023-02-27 DIAGNOSIS — E11.22 CKD STAGE 3 DUE TO TYPE 2 DIABETES MELLITUS: ICD-10-CM

## 2023-02-27 PROCEDURE — 1101F PT FALLS ASSESS-DOCD LE1/YR: CPT | Mod: HCNC,CPTII,S$GLB, | Performed by: INTERNAL MEDICINE

## 2023-02-27 PROCEDURE — 99999 PR PBB SHADOW E&M-EST. PATIENT-LVL IV: CPT | Mod: PBBFAC,HCNC,, | Performed by: INTERNAL MEDICINE

## 2023-02-27 PROCEDURE — 99999 PR PBB SHADOW E&M-EST. PATIENT-LVL IV: ICD-10-PCS | Mod: PBBFAC,HCNC,, | Performed by: INTERNAL MEDICINE

## 2023-02-27 PROCEDURE — 3072F LOW RISK FOR RETINOPATHY: CPT | Mod: HCNC,CPTII,S$GLB, | Performed by: INTERNAL MEDICINE

## 2023-02-27 PROCEDURE — 1159F MED LIST DOCD IN RCRD: CPT | Mod: HCNC,CPTII,S$GLB, | Performed by: INTERNAL MEDICINE

## 2023-02-27 PROCEDURE — 3288F PR FALLS RISK ASSESSMENT DOCUMENTED: ICD-10-PCS | Mod: HCNC,CPTII,S$GLB, | Performed by: INTERNAL MEDICINE

## 2023-02-27 PROCEDURE — 1126F AMNT PAIN NOTED NONE PRSNT: CPT | Mod: HCNC,CPTII,S$GLB, | Performed by: INTERNAL MEDICINE

## 2023-02-27 PROCEDURE — 1159F PR MEDICATION LIST DOCUMENTED IN MEDICAL RECORD: ICD-10-PCS | Mod: HCNC,CPTII,S$GLB, | Performed by: INTERNAL MEDICINE

## 2023-02-27 PROCEDURE — 3072F PR LOW RISK FOR RETINOPATHY: ICD-10-PCS | Mod: HCNC,CPTII,S$GLB, | Performed by: INTERNAL MEDICINE

## 2023-02-27 PROCEDURE — 99397 PER PM REEVAL EST PAT 65+ YR: CPT | Mod: HCNC,S$GLB,, | Performed by: INTERNAL MEDICINE

## 2023-02-27 PROCEDURE — 99397 PR PREVENTIVE VISIT,EST,65 & OVER: ICD-10-PCS | Mod: HCNC,S$GLB,, | Performed by: INTERNAL MEDICINE

## 2023-02-27 PROCEDURE — 1101F PR PT FALLS ASSESS DOC 0-1 FALLS W/OUT INJ PAST YR: ICD-10-PCS | Mod: HCNC,CPTII,S$GLB, | Performed by: INTERNAL MEDICINE

## 2023-02-27 PROCEDURE — 3288F FALL RISK ASSESSMENT DOCD: CPT | Mod: HCNC,CPTII,S$GLB, | Performed by: INTERNAL MEDICINE

## 2023-02-27 PROCEDURE — 1126F PR PAIN SEVERITY QUANTIFIED, NO PAIN PRESENT: ICD-10-PCS | Mod: HCNC,CPTII,S$GLB, | Performed by: INTERNAL MEDICINE

## 2023-02-27 NOTE — PROGRESS NOTES
HPI:  Patient is a 89-year-old man who comes in today for follow-up of his diabetes, hypertension and for his annual physical.  He continues do very well.  He denies any chest pains or shortness of breath.  He denies any hypoglycemic problems.  He has been taking off all of his blood pressure medications due to orthostatic hypotension.  His daughter his here today and day check it daily and it is usually less than 130/90 off all meds.      Current MEDS: medcard review, verified and update  Allergies: Per the electronic medical record    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    Obesity, unspecified     Primary osteoarthritis involving multiple joints        Past Surgical History:   Procedure Laterality Date    CATARACT EXTRACTION      aphakic od    CATARACT EXTRACTION W/  INTRAOCULAR LENS IMPLANT Left 12/13/2018    ENDOSCOPIC ULTRASOUND OF UPPER GASTROINTESTINAL TRACT N/A 12/30/2022    Procedure: ULTRASOUND, UPPER GI TRACT, ENDOSCOPIC;  Surgeon: Mani Howard MD;  Location: [facility];  Service: Endoscopy;  Laterality: N/A;    TRANSURETHRAL RESECTION OF PROSTATE N/A 5/31/2018    Procedure: PROSTATECTOMY-TRANSURETHRAL;  Surgeon: Michael Westbrook IV, MD;  Location: [facility] OR;  Service: Urology;  Laterality: N/A;    VASCULAR SURGERY      yag laser capsulotomy Left 09/08/2020       SHx: per the electronic medical record    FHx: recorded in the electronic medical record    ROS:    denies any chest pains or shortness of breath. Denies any nausea, vomiting or diarrhea. Denies any fever, chills or sweats. Denies any change in weight, voice, stool, skin or hair. Denies any  "dysuria, dyspepsia or dysphagia. Denies any change in vision, hearing or headaches. Denies any swollen lymph nodes or loss of memory.    PE:  /66 (BP Location: Left arm, Patient Position: Sitting, BP Method: Large (Manual))   Pulse 68   Temp 98.1 °F (36.7 °C) (Tympanic)   Resp 20   Ht 5' 7" (1.702 m)   Wt 92.5 kg (203 lb 14.8 oz)   SpO2 98%   BMI 31.94 kg/m²   Gen: Well-developed, well-nourished, male, in no acute distress, oriented x3  HEENT: neck is supple, no adenopathy, carotids 2+ equal without bruits, thyroid exam normal size without nodules.  CHEST: clear to auscultation and percussion  CVS: regular rate and rhythm without significant murmur, gallop, or rubs  ABD: soft, benign, no rebound no guarding, no distention.  Bowel sounds are normal.     nontender.  No palpable masses.  No organomegaly and no audible bruits.  RECTAL:  Deferred.  EXT: no clubbing, cyanosis, or edema  LYMPH: no cervical, inguinal, or axillary adenopathy  FEET: no loss of sensation.  No ulcers or pressure sores.  Monofilament testing normal  NEURO: gait normal.  Cranial nerves II- XII intact. No nystagmus.  Speech normal.   Gross motor and sensory unremarkable.    Lab Results   Component Value Date    WBC 9.67 02/22/2023    HGB 15.1 02/22/2023    HCT 46.3 02/22/2023     02/22/2023    CHOL 151 02/20/2023    TRIG 119 02/20/2023    HDL 52 02/20/2023    ALT 12 02/22/2023    AST 14 02/22/2023     02/22/2023    K 4.0 02/22/2023     02/22/2023    CREATININE 1.3 02/22/2023    BUN 18 02/22/2023    CO2 24 02/22/2023    TSH 3.393 02/20/2023    PSA 4.4 (H) 06/14/2021    INR 2.2 11/18/2021    HGBA1C 5.6 02/20/2023       Impression:  Stable problems below  Patient Active Problem List   Diagnosis    Obesity    Hearing loss of aging    History of pulmonary embolism    DM (diabetes mellitus), type 2 with complications    Hypertension    History of gout    Type 2 diabetes mellitus with polyneuropathy    Urinary retention    " Aneurysm artery, popliteal    Atherosclerosis of aorta    Benign prostatic hyperplasia    Chronic venous insufficiency    Primary osteoarthritis involving multiple joints    At high risk for falls    CKD stage 3 due to type 2 diabetes mellitus       Plan:   Orders Placed This Encounter    Lipid Panel    Hemoglobin A1C    Comprehensive Metabolic Panel    TSH    CBC Auto Differential     Patient will see me again in 6 months with above lab work.  Medications remain the same  This note is generated with speech recognition software and is subject to transcription error and sound alike phrases that may be missed by proofreading.

## 2023-03-23 ENCOUNTER — OFFICE VISIT (OUTPATIENT)
Dept: UROLOGY | Facility: CLINIC | Age: 88
End: 2023-03-23
Payer: MEDICARE

## 2023-03-23 VITALS
DIASTOLIC BLOOD PRESSURE: 82 MMHG | HEIGHT: 67 IN | BODY MASS INDEX: 32.17 KG/M2 | SYSTOLIC BLOOD PRESSURE: 164 MMHG | WEIGHT: 204.94 LBS | HEART RATE: 77 BPM

## 2023-03-23 DIAGNOSIS — R39.12 BENIGN PROSTATIC HYPERPLASIA WITH WEAK URINARY STREAM: Primary | ICD-10-CM

## 2023-03-23 DIAGNOSIS — N40.1 BENIGN PROSTATIC HYPERPLASIA WITH WEAK URINARY STREAM: Primary | ICD-10-CM

## 2023-03-23 PROCEDURE — 1126F AMNT PAIN NOTED NONE PRSNT: CPT | Mod: HCNC,CPTII,S$GLB, | Performed by: UROLOGY

## 2023-03-23 PROCEDURE — 1159F PR MEDICATION LIST DOCUMENTED IN MEDICAL RECORD: ICD-10-PCS | Mod: HCNC,CPTII,S$GLB, | Performed by: UROLOGY

## 2023-03-23 PROCEDURE — 1126F PR PAIN SEVERITY QUANTIFIED, NO PAIN PRESENT: ICD-10-PCS | Mod: HCNC,CPTII,S$GLB, | Performed by: UROLOGY

## 2023-03-23 PROCEDURE — 3288F FALL RISK ASSESSMENT DOCD: CPT | Mod: HCNC,CPTII,S$GLB, | Performed by: UROLOGY

## 2023-03-23 PROCEDURE — 1159F MED LIST DOCD IN RCRD: CPT | Mod: HCNC,CPTII,S$GLB, | Performed by: UROLOGY

## 2023-03-23 PROCEDURE — 99214 OFFICE O/P EST MOD 30 MIN: CPT | Mod: HCNC,S$GLB,, | Performed by: UROLOGY

## 2023-03-23 PROCEDURE — 3072F PR LOW RISK FOR RETINOPATHY: ICD-10-PCS | Mod: HCNC,CPTII,S$GLB, | Performed by: UROLOGY

## 2023-03-23 PROCEDURE — 3072F LOW RISK FOR RETINOPATHY: CPT | Mod: HCNC,CPTII,S$GLB, | Performed by: UROLOGY

## 2023-03-23 PROCEDURE — 99999 PR PBB SHADOW E&M-EST. PATIENT-LVL IV: CPT | Mod: PBBFAC,HCNC,, | Performed by: UROLOGY

## 2023-03-23 PROCEDURE — 99999 PR PBB SHADOW E&M-EST. PATIENT-LVL IV: ICD-10-PCS | Mod: PBBFAC,HCNC,, | Performed by: UROLOGY

## 2023-03-23 PROCEDURE — 3288F PR FALLS RISK ASSESSMENT DOCUMENTED: ICD-10-PCS | Mod: HCNC,CPTII,S$GLB, | Performed by: UROLOGY

## 2023-03-23 PROCEDURE — 1160F PR REVIEW ALL MEDS BY PRESCRIBER/CLIN PHARMACIST DOCUMENTED: ICD-10-PCS | Mod: HCNC,CPTII,S$GLB, | Performed by: UROLOGY

## 2023-03-23 PROCEDURE — 1101F PR PT FALLS ASSESS DOC 0-1 FALLS W/OUT INJ PAST YR: ICD-10-PCS | Mod: HCNC,CPTII,S$GLB, | Performed by: UROLOGY

## 2023-03-23 PROCEDURE — 1101F PT FALLS ASSESS-DOCD LE1/YR: CPT | Mod: HCNC,CPTII,S$GLB, | Performed by: UROLOGY

## 2023-03-23 PROCEDURE — 99214 PR OFFICE/OUTPT VISIT, EST, LEVL IV, 30-39 MIN: ICD-10-PCS | Mod: HCNC,S$GLB,, | Performed by: UROLOGY

## 2023-03-23 PROCEDURE — 1160F RVW MEDS BY RX/DR IN RCRD: CPT | Mod: HCNC,CPTII,S$GLB, | Performed by: UROLOGY

## 2023-03-23 NOTE — PROGRESS NOTES
Chief Complaint:   Micro hematuria  Hydronephrosis    HPI:   03/23/2023 - patient returns today for follow-up, has been taking the Flomax as prescribed and notes that his urination is now back to normal, does mentioned that he has passed a few small stones in the interim but nothing overly bothersome, no dysuria or gross hematuria, no UTIs    01/26/2023 - cysto shows some adenoma regrowth, restart flomax    01/19/2023 - returns today for follow-up, urine continues to be dirty with positive leukocyte positive blood, overall feeling okay, no gross hematuria, renal function stable even with mild hydro    Patient is a 89-year-old male that is presenting with his daughter to review renal ultrasound.  Patient was seen in the emergency room and had an incidental finding of hydronephrosis.  Patient was sent for Renal ultrasound that indicated  a slight improvement to mild hydronephrosis.  Urine culture was negative, however, urinalysis indicated micro hematuria.  Patient denies pelvic or flank pain, no gross hematuria    01/04/2023 - NP Rushing: Patient is a 89-year-old male that is following up after a emergency room visit.  Daughter states that patient had a positive urine and is currently on Augmentin.  In reviewing EMR, urine culture indicated Staph.  Patient had an abdominal ultrasound indicating bilateral hydronephrosis.  Urine in clinic indicates leukocytes and blood, all other parameters are negative.  PVR was 44 mL.  No history of renal stones.  Daughter states that urine is very cloudy.    12/02/2022 - returns for follow-up, no returns for follow-up, completed his antibiotics and his symptoms have resolved and recurred, also believes that he is restarted the Flomax and feels like it is helping, still having some OAB but improved, no further gross hematuria     10/20/2022 - patient returns today for follow-up, notes that he passed three stones in his urine back in June, had another stone in August, he denies any  flank pain during these episodes, did have a little bit of blood after the August episode, also notes increased nocturia q.1 hour, also notes recent increase in number of UTIs for which he got antibiotics from his PCP, does not think he has been taking his Flomax     06/24/2021 - presents today for follow-up, notes that he continues to take the Flomax, finasteride, but patient's daughter does not think he has been taking the oxybutynin, urgency and frequency still occurring, wakes up once an hour at night, less during the day, does not feel like he empties all the way, denies any recent UTIs or gross hematuria    Allergies:  Morphine    Medications:  has a current medication list which includes the following prescription(s): allopurinol, blood sugar diagnostic, blood-glucose meter, docusate sodium, finasteride, fluticasone propionate, hydrocodone-acetaminophen, lancets, lisinopril-hydrochlorothiazide, lovastatin, metformin, prednisolone acetate, ssd, tamsulosin, and metoprolol tartrate.    Review of Systems:  General: No fever, chills  Skin: No rashes  Chest:  Denies cough and sputum production  Heart: Denies chest pain  Resp: Denies dyspnea  Abdomen: Denies diarrhea, abdominal pain, hematemesis, or blood in stool.  Musculoskeletal: No joint stiffness or swelling. Denies back pain.  : see HPI  Neuro: no dizziness or weakness      PMH:   has a past medical history of Aphakia of right eye, Chronic venous insufficiency, CKD stage 3 due to type 2 diabetes mellitus, DM (diabetes mellitus), type 2 with complications, Hearing loss of aging, History of gout, History of pulmonary embolism (2011), Hypertension associated with diabetes, Iridodialysis of right eye, Obesity, unspecified, and Primary osteoarthritis involving multiple joints.    PSH:   has a past surgical history that includes Vascular surgery; Transurethral resection of prostate (N/A, 5/31/2018); Cataract extraction; Cataract extraction w/  intraocular lens  implant (Left, 12/13/2018); yag laser capsulotomy (Left, 09/08/2020); and Endoscopic ultrasound of upper gastrointestinal tract (N/A, 12/30/2022).    FamHx: family history includes Diabetes in his brother.    SocHx:  reports that he has never smoked. He has never used smokeless tobacco. He reports that he does not drink alcohol and does not use drugs.      Physical Exam:  Vitals:    03/23/23 1008   BP: (!) 164/82   Pulse: 77   General: awake, alert, cooperative  Head: NC/AT  Ears: external ears normal  Eyes: sclera normal  Lungs: normal inspiration, NAD  Heart: well-perfused  Skin: The skin is warm and dry  Ext: No c/c/e.  Neuro: grossly intact, AOx3      Labs/Studies:   Lab Results   Component Value Date    WBC 9.67 02/22/2023    HGB 15.1 02/22/2023    HCT 46.3 02/22/2023     02/22/2023     02/22/2023    K 4.0 02/22/2023     02/22/2023    CREATININE 1.3 02/22/2023    BUN 18 02/22/2023    CO2 24 02/22/2023    TSH 3.393 02/20/2023    PSA 4.4 (H) 06/14/2021    INR 2.2 11/18/2021    HGBA1C 5.6 02/20/2023    CHOL 151 02/20/2023    TRIG 119 02/20/2023    HDL 52 02/20/2023    ALT 12 02/22/2023    AST 14 02/22/2023         Impression/Plan:   BPH - cysto showed prostatic adenoma regrowth, continue Flomax, f/u 6 months    Micro hematuria - cysto negative for concerning lesions    Shabbir - none since last UTI, continue to monitor    Bladder diverticulum - not a very good surgical candidate due to age and comorbid conditions, will likely continue to observe    Mani Holman MD

## 2023-04-10 RX ORDER — CALCIUM CITRATE/VITAMIN D3 200MG-6.25
TABLET ORAL
Qty: 400 STRIP | Refills: 3 | Status: SHIPPED | OUTPATIENT
Start: 2023-04-10

## 2023-04-10 NOTE — TELEPHONE ENCOUNTER
No new care gaps identified.  [facility] Embedded Care Gaps. Reference number: [id number]. 4/10/2023   12:12:32 AM CDT

## 2023-04-10 NOTE — TELEPHONE ENCOUNTER
Refill Decision Note   Aamuri Lara  is requesting a refill authorization.  Brief Assessment and Rationale for Refill:  Approve     Medication Therapy Plan:       Medication Reconciliation Completed: No   Comments:     No Care Gaps recommended.     Note composed:10:03 AM 04/10/2023

## 2023-05-08 RX ORDER — METFORMIN HYDROCHLORIDE 850 MG/1
TABLET ORAL
Qty: 90 TABLET | Refills: 1 | Status: SHIPPED | OUTPATIENT
Start: 2023-05-08 | End: 2023-09-26

## 2023-05-08 NOTE — TELEPHONE ENCOUNTER
Refill Decision Note   Amauri Lara  is requesting a refill authorization.  Brief Assessment and Rationale for Refill:  Approve     Medication Therapy Plan:  CKD stage 3 due to type 2 diabetes mellitus    Medication Reconciliation Completed: No   Comments:     No Care Gaps recommended.     Note composed:4:21 PM 05/08/2023

## 2023-05-08 NOTE — TELEPHONE ENCOUNTER
No care due was identified.  [facility] Embedded Care Due Messages. Reference number: [id number].   5/08/2023 8:49:10 AM CDT

## 2023-05-08 NOTE — TELEPHONE ENCOUNTER
Refill Routing Note   Medication(s) are not appropriate for processing by [facility] for the following reason(s):      Drug-disease interaction    [facility] action(s):  Defer None identified   Medication Therapy Plan: CKD stage 3 due to type 2 diabetes mellitus    Pharmacist review requested: Yes     Appointments  past 12m or future 3m with PCP    Date Provider   Last Visit   2/27/2023 Stefan Vera MD   Next Visit   Visit date not found Stefan Vera MD   ED visits in past 90 days: 1        Note composed:1:17 PM 05/08/2023

## 2023-06-15 ENCOUNTER — PES CALL (OUTPATIENT)
Dept: ADMINISTRATIVE | Facility: CLINIC | Age: 88
End: 2023-06-15
Payer: MEDICARE

## 2023-09-21 ENCOUNTER — OFFICE VISIT (OUTPATIENT)
Dept: UROLOGY | Facility: CLINIC | Age: 88
End: 2023-09-21
Payer: MEDICARE

## 2023-09-21 VITALS — DIASTOLIC BLOOD PRESSURE: 89 MMHG | SYSTOLIC BLOOD PRESSURE: 159 MMHG | HEART RATE: 70 BPM

## 2023-09-21 DIAGNOSIS — R39.12 BENIGN PROSTATIC HYPERPLASIA WITH WEAK URINARY STREAM: Primary | ICD-10-CM

## 2023-09-21 DIAGNOSIS — N40.1 BENIGN PROSTATIC HYPERPLASIA WITH WEAK URINARY STREAM: Primary | ICD-10-CM

## 2023-09-21 LAB
BILIRUB SERPL-MCNC: NEGATIVE MG/DL
BLOOD URINE, POC: NORMAL
CLARITY, POC UA: NORMAL
COLOR, POC UA: NORMAL
GLUCOSE UR QL STRIP: NEGATIVE
KETONES UR QL STRIP: NEGATIVE
LEUKOCYTE ESTERASE URINE, POC: NORMAL
NITRITE, POC UA: NEGATIVE
PH, POC UA: 7
PROTEIN, POC: NORMAL
SPECIFIC GRAVITY, POC UA: 1.02
UROBILINOGEN, POC UA: 1

## 2023-09-21 PROCEDURE — 3288F FALL RISK ASSESSMENT DOCD: CPT | Mod: HCNC,CPTII,S$GLB, | Performed by: UROLOGY

## 2023-09-21 PROCEDURE — 1159F PR MEDICATION LIST DOCUMENTED IN MEDICAL RECORD: ICD-10-PCS | Mod: HCNC,CPTII,S$GLB, | Performed by: UROLOGY

## 2023-09-21 PROCEDURE — 99999 PR PBB SHADOW E&M-EST. PATIENT-LVL III: ICD-10-PCS | Mod: PBBFAC,HCNC,, | Performed by: UROLOGY

## 2023-09-21 PROCEDURE — 1159F MED LIST DOCD IN RCRD: CPT | Mod: HCNC,CPTII,S$GLB, | Performed by: UROLOGY

## 2023-09-21 PROCEDURE — 1101F PR PT FALLS ASSESS DOC 0-1 FALLS W/OUT INJ PAST YR: ICD-10-PCS | Mod: HCNC,CPTII,S$GLB, | Performed by: UROLOGY

## 2023-09-21 PROCEDURE — 1126F PR PAIN SEVERITY QUANTIFIED, NO PAIN PRESENT: ICD-10-PCS | Mod: HCNC,CPTII,S$GLB, | Performed by: UROLOGY

## 2023-09-21 PROCEDURE — 99214 OFFICE O/P EST MOD 30 MIN: CPT | Mod: HCNC,S$GLB,, | Performed by: UROLOGY

## 2023-09-21 PROCEDURE — 99999 PR PBB SHADOW E&M-EST. PATIENT-LVL III: CPT | Mod: PBBFAC,HCNC,, | Performed by: UROLOGY

## 2023-09-21 PROCEDURE — 1101F PT FALLS ASSESS-DOCD LE1/YR: CPT | Mod: HCNC,CPTII,S$GLB, | Performed by: UROLOGY

## 2023-09-21 PROCEDURE — 1160F RVW MEDS BY RX/DR IN RCRD: CPT | Mod: HCNC,CPTII,S$GLB, | Performed by: UROLOGY

## 2023-09-21 PROCEDURE — 99214 PR OFFICE/OUTPT VISIT, EST, LEVL IV, 30-39 MIN: ICD-10-PCS | Mod: HCNC,S$GLB,, | Performed by: UROLOGY

## 2023-09-21 PROCEDURE — 3072F PR LOW RISK FOR RETINOPATHY: ICD-10-PCS | Mod: HCNC,CPTII,S$GLB, | Performed by: UROLOGY

## 2023-09-21 PROCEDURE — 1160F PR REVIEW ALL MEDS BY PRESCRIBER/CLIN PHARMACIST DOCUMENTED: ICD-10-PCS | Mod: HCNC,CPTII,S$GLB, | Performed by: UROLOGY

## 2023-09-21 PROCEDURE — 3072F LOW RISK FOR RETINOPATHY: CPT | Mod: HCNC,CPTII,S$GLB, | Performed by: UROLOGY

## 2023-09-21 PROCEDURE — 1126F AMNT PAIN NOTED NONE PRSNT: CPT | Mod: HCNC,CPTII,S$GLB, | Performed by: UROLOGY

## 2023-09-21 PROCEDURE — 81002 POCT URINE DIPSTICK WITHOUT MICROSCOPE: ICD-10-PCS | Mod: HCNC,S$GLB,, | Performed by: UROLOGY

## 2023-09-21 PROCEDURE — 3288F PR FALLS RISK ASSESSMENT DOCUMENTED: ICD-10-PCS | Mod: HCNC,CPTII,S$GLB, | Performed by: UROLOGY

## 2023-09-21 PROCEDURE — 81002 URINALYSIS NONAUTO W/O SCOPE: CPT | Mod: HCNC,S$GLB,, | Performed by: UROLOGY

## 2023-09-21 NOTE — PROGRESS NOTES
Chief Complaint:   Micro hematuria  Hydronephrosis    HPI:   09/21/2023 - returns today for follow-up, no new issues in the interim, Flomax working well for him, no gross hematuria, dysuria, UTIs    03/23/2023 - patient returns today for follow-up, has been taking the Flomax as prescribed and notes that his urination is now back to normal, does mention that he has passed a few small stones in the interim but nothing overly bothersome, no dysuria or gross hematuria, no UTIs    01/26/2023 - cysto shows some adenoma regrowth, restart flomax    01/19/2023 - returns today for follow-up, urine continues to be dirty with positive leukocyte positive blood, overall feeling okay, no gross hematuria, renal function stable even with mild hydro    Patient is a 89-year-old male that is presenting with his daughter to review renal ultrasound.  Patient was seen in the emergency room and had an incidental finding of hydronephrosis.  Patient was sent for Renal ultrasound that indicated  a slight improvement to mild hydronephrosis.  Urine culture was negative, however, urinalysis indicated micro hematuria.  Patient denies pelvic or flank pain, no gross hematuria    01/04/2023 - NP Rushing: Patient is a 89-year-old male that is following up after a emergency room visit.  Daughter states that patient had a positive urine and is currently on Augmentin.  In reviewing EMR, urine culture indicated Staph.  Patient had an abdominal ultrasound indicating bilateral hydronephrosis.  Urine in clinic indicates leukocytes and blood, all other parameters are negative.  PVR was 44 mL.  No history of renal stones.  Daughter states that urine is very cloudy.    12/02/2022 - returns for follow-up, no returns for follow-up, completed his antibiotics and his symptoms have resolved and recurred, also believes that he is restarted the Flomax and feels like it is helping, still having some OAB but improved, no further gross hematuria     10/20/2022 - patient  returns today for follow-up, notes that he passed three stones in his urine back in June, had another stone in August, he denies any flank pain during these episodes, did have a little bit of blood after the August episode, also notes increased nocturia q.1 hour, also notes recent increase in number of UTIs for which he got antibiotics from his PCP, does not think he has been taking his Flomax     06/24/2021 - presents today for follow-up, notes that he continues to take the Flomax, finasteride, but patient's daughter does not think he has been taking the oxybutynin, urgency and frequency still occurring, wakes up once an hour at night, less during the day, does not feel like he empties all the way, denies any recent UTIs or gross hematuria    Allergies:  Morphine    Medications:  has a current medication list which includes the following prescription(s): allopurinol, docusate sodium, finasteride, fluticasone propionate, hydrocodone-acetaminophen, lancets, lisinopril-hydrochlorothiazide, lovastatin, metformin, metoprolol tartrate, prednisolone acetate, ssd, tamsulosin, true metrix glucose test strip, and blood-glucose meter.    Review of Systems:  General: No fever, chills  Skin: No rashes  Chest:  Denies cough and sputum production  Heart: Denies chest pain  Resp: Denies dyspnea  Abdomen: Denies diarrhea, abdominal pain, hematemesis, or blood in stool.  Musculoskeletal: No joint stiffness or swelling. Denies back pain.  : see HPI  Neuro: no dizziness or weakness      PMH:   has a past medical history of Aphakia of right eye, Chronic venous insufficiency, CKD stage 3 due to type 2 diabetes mellitus, DM (diabetes mellitus), type 2 with complications, Hearing loss of aging, History of gout, History of pulmonary embolism (2011), Hypertension associated with diabetes, Iridodialysis of right eye, Obesity, unspecified, and Primary osteoarthritis involving multiple joints.    PSH:   has a past surgical history that  includes Vascular surgery; Transurethral resection of prostate (N/A, 5/31/2018); Cataract extraction; Cataract extraction w/  intraocular lens implant (Left, 12/13/2018); yag laser capsulotomy (Left, 09/08/2020); and Endoscopic ultrasound of upper gastrointestinal tract (N/A, 12/30/2022).    FamHx: family history includes Diabetes in his brother.    SocHx:  reports that he has never smoked. He has never used smokeless tobacco. He reports that he does not drink alcohol and does not use drugs.      Physical Exam:  Vitals:    09/21/23 0957   BP: (!) 159/89   Pulse: 70   General: awake, alert, cooperative  Head: NC/AT  Ears: external ears normal  Eyes: sclera normal  Lungs: normal inspiration, NAD  Heart: well-perfused  Skin: The skin is warm and dry  Ext: No c/c/e.  Neuro: grossly intact, AOx3      Labs/Studies:   Lab Results   Component Value Date    WBC 9.67 02/22/2023    HGB 15.1 02/22/2023    HCT 46.3 02/22/2023     02/22/2023     02/22/2023    K 4.0 02/22/2023     02/22/2023    CREATININE 1.3 02/22/2023    BUN 18 02/22/2023    CO2 24 02/22/2023    TSH 3.393 02/20/2023    PSA 4.4 (H) 06/14/2021    INR 1.6 12/22/2021    HGBA1C 5.6 02/20/2023    CHOL 151 02/20/2023    TRIG 119 02/20/2023    HDL 52 02/20/2023    ALT 12 02/22/2023    AST 14 02/22/2023         Impression/Plan:   BPH - cysto showed prostatic adenoma regrowth, continue Flomax, f/u 1 yr    Micro hematuria - cysto negative for concerning lesions    Shabbir - none since last UTI, continue to monitor    Bladder diverticulum - not a very good surgical candidate due to age and comorbid conditions, will likely continue to observe    Mani Holman MD

## 2023-09-26 RX ORDER — METFORMIN HYDROCHLORIDE 850 MG/1
850 TABLET ORAL DAILY
COMMUNITY
End: 2023-10-26

## 2023-09-26 NOTE — TELEPHONE ENCOUNTER
Metformin 850 mg order discontinued due to cosign declined by Stefan Vera MD. Entered patient-reported order on med list for placeholder.

## 2023-10-26 DIAGNOSIS — N40.1 BENIGN PROSTATIC HYPERPLASIA WITH URINARY RETENTION: ICD-10-CM

## 2023-10-26 DIAGNOSIS — R33.8 BENIGN PROSTATIC HYPERPLASIA WITH URINARY RETENTION: ICD-10-CM

## 2023-10-27 RX ORDER — FINASTERIDE 5 MG/1
5 TABLET, FILM COATED ORAL DAILY
Qty: 90 TABLET | Refills: 3 | Status: SHIPPED | OUTPATIENT
Start: 2023-10-27

## 2023-11-07 RX ORDER — DOCUSATE SODIUM 100 MG
100 CAPSULE ORAL 2 TIMES DAILY
Qty: 180 CAPSULE | Refills: 3 | Status: SHIPPED | OUTPATIENT
Start: 2023-11-07

## 2023-11-07 NOTE — TELEPHONE ENCOUNTER
Care Due:                  Date            Visit Type   Department     Provider  --------------------------------------------------------------------------------                                EP -                              PRIMARY      [facility] INTERNAL  Last Visit: 02-      CARE ([facility])   JENIFER Vera                              [facility]                              PRIMARY      [facility] INTERNAL  Next Visit: 11-      CARE ([facility])   JENIFER Vera                                                            Last  Test          Frequency    Reason                     Performed    Due Date  --------------------------------------------------------------------------------    HBA1C.......  6 months...  metFORMIN................  02- 08-    Health [facility] Embedded Care Due Messages. Reference number: [id number].   11/07/2023 6:52:56 AM CST

## 2023-11-20 ENCOUNTER — LAB VISIT (OUTPATIENT)
Dept: LAB | Facility: HOSPITAL | Age: 88
End: 2023-11-20
Attending: INTERNAL MEDICINE
Payer: MEDICARE

## 2023-11-20 DIAGNOSIS — E11.8 DM (DIABETES MELLITUS), TYPE 2 WITH COMPLICATIONS: ICD-10-CM

## 2023-11-20 LAB
ALBUMIN SERPL BCP-MCNC: 3.6 G/DL (ref 3.5–5.2)
ALP SERPL-CCNC: 78 U/L (ref 55–135)
ALT SERPL W/O P-5'-P-CCNC: 20 U/L (ref 10–44)
ANION GAP SERPL CALC-SCNC: 9 MMOL/L (ref 8–16)
AST SERPL-CCNC: 23 U/L (ref 10–40)
BASOPHILS # BLD AUTO: 0.05 K/UL (ref 0–0.2)
BASOPHILS NFR BLD: 0.7 % (ref 0–1.9)
BILIRUB SERPL-MCNC: 0.9 MG/DL (ref 0.1–1)
BUN SERPL-MCNC: 16 MG/DL (ref 10–30)
CALCIUM SERPL-MCNC: 10.1 MG/DL (ref 8.7–10.5)
CHLORIDE SERPL-SCNC: 105 MMOL/L (ref 95–110)
CHOLEST SERPL-MCNC: 141 MG/DL (ref 120–199)
CHOLEST/HDLC SERPL: 3.1 {RATIO} (ref 2–5)
CO2 SERPL-SCNC: 26 MMOL/L (ref 23–29)
CREAT SERPL-MCNC: 1.1 MG/DL (ref 0.5–1.4)
DIFFERENTIAL METHOD: ABNORMAL
EOSINOPHIL # BLD AUTO: 0.2 K/UL (ref 0–0.5)
EOSINOPHIL NFR BLD: 2.3 % (ref 0–8)
ERYTHROCYTE [DISTWIDTH] IN BLOOD BY AUTOMATED COUNT: 16 % (ref 11.5–14.5)
EST. GFR  (NO RACE VARIABLE): >60 ML/MIN/1.73 M^2
ESTIMATED AVG GLUCOSE: 105 MG/DL (ref 68–131)
GLUCOSE SERPL-MCNC: 82 MG/DL (ref 70–110)
HBA1C MFR BLD: 5.3 % (ref 4–5.6)
HCT VFR BLD AUTO: 49.7 % (ref 40–54)
HDLC SERPL-MCNC: 46 MG/DL (ref 40–75)
HDLC SERPL: 32.6 % (ref 20–50)
HGB BLD-MCNC: 15.6 G/DL (ref 14–18)
IMM GRANULOCYTES # BLD AUTO: 0.01 K/UL (ref 0–0.04)
IMM GRANULOCYTES NFR BLD AUTO: 0.1 % (ref 0–0.5)
LDLC SERPL CALC-MCNC: 69.2 MG/DL (ref 63–159)
LYMPHOCYTES # BLD AUTO: 1.3 K/UL (ref 1–4.8)
LYMPHOCYTES NFR BLD: 18.1 % (ref 18–48)
MCH RBC QN AUTO: 28.5 PG (ref 27–31)
MCHC RBC AUTO-ENTMCNC: 31.4 G/DL (ref 32–36)
MCV RBC AUTO: 91 FL (ref 82–98)
MONOCYTES # BLD AUTO: 0.8 K/UL (ref 0.3–1)
MONOCYTES NFR BLD: 11.8 % (ref 4–15)
NEUTROPHILS # BLD AUTO: 4.6 K/UL (ref 1.8–7.7)
NEUTROPHILS NFR BLD: 67 % (ref 38–73)
NONHDLC SERPL-MCNC: 95 MG/DL
NRBC BLD-RTO: 0 /100 WBC
PLATELET # BLD AUTO: 196 K/UL (ref 150–450)
PMV BLD AUTO: 12.8 FL (ref 9.2–12.9)
POTASSIUM SERPL-SCNC: 4.2 MMOL/L (ref 3.5–5.1)
PROT SERPL-MCNC: 7.6 G/DL (ref 6–8.4)
RBC # BLD AUTO: 5.48 M/UL (ref 4.6–6.2)
SODIUM SERPL-SCNC: 140 MMOL/L (ref 136–145)
TRIGL SERPL-MCNC: 129 MG/DL (ref 30–150)
TSH SERPL DL<=0.005 MIU/L-ACNC: 2.19 UIU/ML (ref 0.4–4)
WBC # BLD AUTO: 6.92 K/UL (ref 3.9–12.7)

## 2023-11-20 PROCEDURE — 36415 COLL VENOUS BLD VENIPUNCTURE: CPT | Mod: HCNC | Performed by: INTERNAL MEDICINE

## 2023-11-20 PROCEDURE — 80053 COMPREHEN METABOLIC PANEL: CPT | Mod: HCNC | Performed by: INTERNAL MEDICINE

## 2023-11-20 PROCEDURE — 84443 ASSAY THYROID STIM HORMONE: CPT | Mod: HCNC | Performed by: INTERNAL MEDICINE

## 2023-11-20 PROCEDURE — 83036 HEMOGLOBIN GLYCOSYLATED A1C: CPT | Mod: HCNC | Performed by: INTERNAL MEDICINE

## 2023-11-20 PROCEDURE — 85025 COMPLETE CBC W/AUTO DIFF WBC: CPT | Mod: HCNC | Performed by: INTERNAL MEDICINE

## 2023-11-20 PROCEDURE — 80061 LIPID PANEL: CPT | Mod: HCNC | Performed by: INTERNAL MEDICINE

## 2023-11-27 ENCOUNTER — OFFICE VISIT (OUTPATIENT)
Dept: INTERNAL MEDICINE | Facility: CLINIC | Age: 88
End: 2023-11-27
Payer: MEDICARE

## 2023-11-27 VITALS
SYSTOLIC BLOOD PRESSURE: 138 MMHG | HEART RATE: 78 BPM | WEIGHT: 207.88 LBS | DIASTOLIC BLOOD PRESSURE: 88 MMHG | HEIGHT: 71 IN | BODY MASS INDEX: 29.1 KG/M2 | OXYGEN SATURATION: 98 %

## 2023-11-27 DIAGNOSIS — I10 HYPERTENSION, UNSPECIFIED TYPE: ICD-10-CM

## 2023-11-27 DIAGNOSIS — I70.0 ATHEROSCLEROSIS OF AORTA: ICD-10-CM

## 2023-11-27 DIAGNOSIS — Z91.81 AT HIGH RISK FOR FALLS: ICD-10-CM

## 2023-11-27 DIAGNOSIS — E11.22 CKD STAGE 3 DUE TO TYPE 2 DIABETES MELLITUS: ICD-10-CM

## 2023-11-27 DIAGNOSIS — E11.8 DM (DIABETES MELLITUS), TYPE 2 WITH COMPLICATIONS: ICD-10-CM

## 2023-11-27 DIAGNOSIS — Z86.711 HISTORY OF PULMONARY EMBOLISM: ICD-10-CM

## 2023-11-27 DIAGNOSIS — N18.30 CKD STAGE 3 DUE TO TYPE 2 DIABETES MELLITUS: ICD-10-CM

## 2023-11-27 DIAGNOSIS — Z87.39 HISTORY OF GOUT: ICD-10-CM

## 2023-11-27 DIAGNOSIS — E11.42 TYPE 2 DIABETES MELLITUS WITH POLYNEUROPATHY: Primary | ICD-10-CM

## 2023-11-27 DIAGNOSIS — I87.2 CHRONIC VENOUS INSUFFICIENCY: ICD-10-CM

## 2023-11-27 PROCEDURE — 99999 PR PBB SHADOW E&M-EST. PATIENT-LVL III: ICD-10-PCS | Mod: PBBFAC,HCNC,, | Performed by: INTERNAL MEDICINE

## 2023-11-27 PROCEDURE — 1126F AMNT PAIN NOTED NONE PRSNT: CPT | Mod: HCNC,CPTII,S$GLB, | Performed by: INTERNAL MEDICINE

## 2023-11-27 PROCEDURE — 3072F PR LOW RISK FOR RETINOPATHY: ICD-10-PCS | Mod: HCNC,CPTII,S$GLB, | Performed by: INTERNAL MEDICINE

## 2023-11-27 PROCEDURE — 1101F PR PT FALLS ASSESS DOC 0-1 FALLS W/OUT INJ PAST YR: ICD-10-PCS | Mod: HCNC,CPTII,S$GLB, | Performed by: INTERNAL MEDICINE

## 2023-11-27 PROCEDURE — 99214 PR OFFICE/OUTPT VISIT, EST, LEVL IV, 30-39 MIN: ICD-10-PCS | Mod: HCNC,S$GLB,, | Performed by: INTERNAL MEDICINE

## 2023-11-27 PROCEDURE — 1126F PR PAIN SEVERITY QUANTIFIED, NO PAIN PRESENT: ICD-10-PCS | Mod: HCNC,CPTII,S$GLB, | Performed by: INTERNAL MEDICINE

## 2023-11-27 PROCEDURE — 3288F FALL RISK ASSESSMENT DOCD: CPT | Mod: HCNC,CPTII,S$GLB, | Performed by: INTERNAL MEDICINE

## 2023-11-27 PROCEDURE — 99214 OFFICE O/P EST MOD 30 MIN: CPT | Mod: HCNC,S$GLB,, | Performed by: INTERNAL MEDICINE

## 2023-11-27 PROCEDURE — 1101F PT FALLS ASSESS-DOCD LE1/YR: CPT | Mod: HCNC,CPTII,S$GLB, | Performed by: INTERNAL MEDICINE

## 2023-11-27 PROCEDURE — 3288F PR FALLS RISK ASSESSMENT DOCUMENTED: ICD-10-PCS | Mod: HCNC,CPTII,S$GLB, | Performed by: INTERNAL MEDICINE

## 2023-11-27 PROCEDURE — 3072F LOW RISK FOR RETINOPATHY: CPT | Mod: HCNC,CPTII,S$GLB, | Performed by: INTERNAL MEDICINE

## 2023-11-27 PROCEDURE — 1159F MED LIST DOCD IN RCRD: CPT | Mod: HCNC,CPTII,S$GLB, | Performed by: INTERNAL MEDICINE

## 2023-11-27 PROCEDURE — 1159F PR MEDICATION LIST DOCUMENTED IN MEDICAL RECORD: ICD-10-PCS | Mod: HCNC,CPTII,S$GLB, | Performed by: INTERNAL MEDICINE

## 2023-11-27 PROCEDURE — 99999 PR PBB SHADOW E&M-EST. PATIENT-LVL III: CPT | Mod: PBBFAC,HCNC,, | Performed by: INTERNAL MEDICINE

## 2023-11-27 RX ORDER — HYDROCODONE BITARTRATE AND ACETAMINOPHEN 7.5; 325 MG/1; MG/1
1 TABLET ORAL EVERY 8 HOURS PRN
Qty: 90 TABLET | Refills: 0 | Status: SHIPPED | OUTPATIENT
Start: 2023-11-27

## 2023-11-27 NOTE — PROGRESS NOTES
"HPI:  Patient is a 89-year-old man who comes today for follow-up of diabetes, hypertension, lipids and chronic kidney disease.  He has been doing extremely well.  He denies any flares of gout.  His blood pressures been well controlled.  He is had no hypoglycemic events.  He denies any other new problems or complaints    Current meds have been verified and updated per the EMR  Exam:/88 (BP Location: Left arm)   Pulse 78   Ht 5' 11" (1.803 m)   Wt 94.3 kg (207 lb 14.3 oz)   SpO2 98%   BMI 29.00 kg/m²   Carotids 2+ equal without bruits  Chest clear  Cardiovascular regular rate and rhythm without murmur gallop or rub    Lab Results   Component Value Date    WBC 6.92 11/20/2023    HGB 15.6 11/20/2023    HCT 49.7 11/20/2023     11/20/2023    CHOL 141 11/20/2023    TRIG 129 11/20/2023    HDL 46 11/20/2023    ALT 20 11/20/2023    AST 23 11/20/2023     11/20/2023    K 4.2 11/20/2023     11/20/2023    CREATININE 1.1 11/20/2023    BUN 16 11/20/2023    CO2 26 11/20/2023    TSH 2.186 11/20/2023    PSA 4.4 (H) 06/14/2021    INR 1.6 12/22/2021    HGBA1C 5.3 11/20/2023    BNP 70 01/27/2017    URICACID 3.7 02/20/2023    SEDRATE 22 (H) 04/18/2011       Impression:  Diabetes, hypertension, lipids all very well controlled on current medical therapy  Chronic kidney disease, stable GFR  History of gout, asymptomatic  Other medical problems as outlined below, stable  Patient Active Problem List   Diagnosis    Obesity    Hearing loss of aging    History of pulmonary embolism    DM (diabetes mellitus), type 2 with complications    Hypertension    History of gout    Type 2 diabetes mellitus with polyneuropathy    Urinary retention    Aneurysm artery, popliteal    Atherosclerosis of aorta    Benign prostatic hyperplasia    Chronic venous insufficiency    Primary osteoarthritis involving multiple joints    At high risk for falls    CKD stage 3 due to type 2 diabetes mellitus       Plan:  Orders Placed This " Encounter    Hemoglobin A1C    Comprehensive Metabolic Panel    Lipid Panel    TSH    CBC Auto Differential    Uric Acid    HYDROcodone-acetaminophen (NORCO) 7.5-325 mg per tablet     Medications remain the same.  He will be seen again in 6 months with above lab work.    This note is generated with speech recognition software and is subject to transcription error and sound alike phrases that may be missed by proofreading.

## 2023-12-04 ENCOUNTER — OFFICE VISIT (OUTPATIENT)
Dept: OPHTHALMOLOGY | Facility: CLINIC | Age: 88
End: 2023-12-04
Payer: MEDICARE

## 2023-12-04 DIAGNOSIS — H17.9 CORNEAL OPACITY: ICD-10-CM

## 2023-12-04 DIAGNOSIS — Z98.42 CATARACT EXTRACTION STATUS, LEFT: ICD-10-CM

## 2023-12-04 DIAGNOSIS — H27.01 APHAKIA OF RIGHT EYE: ICD-10-CM

## 2023-12-04 DIAGNOSIS — E11.8 DM (DIABETES MELLITUS), TYPE 2 WITH COMPLICATIONS: Primary | ICD-10-CM

## 2023-12-04 PROCEDURE — 1160F PR REVIEW ALL MEDS BY PRESCRIBER/CLIN PHARMACIST DOCUMENTED: ICD-10-PCS | Mod: HCNC,CPTII,S$GLB, | Performed by: OPHTHALMOLOGY

## 2023-12-04 PROCEDURE — 99999 PR PBB SHADOW E&M-EST. PATIENT-LVL III: CPT | Mod: PBBFAC,HCNC,, | Performed by: OPHTHALMOLOGY

## 2023-12-04 PROCEDURE — 92014 COMPRE OPH EXAM EST PT 1/>: CPT | Mod: HCNC,S$GLB,, | Performed by: OPHTHALMOLOGY

## 2023-12-04 PROCEDURE — 1159F PR MEDICATION LIST DOCUMENTED IN MEDICAL RECORD: ICD-10-PCS | Mod: HCNC,CPTII,S$GLB, | Performed by: OPHTHALMOLOGY

## 2023-12-04 PROCEDURE — 99999 PR PBB SHADOW E&M-EST. PATIENT-LVL III: ICD-10-PCS | Mod: PBBFAC,HCNC,, | Performed by: OPHTHALMOLOGY

## 2023-12-04 PROCEDURE — 1160F RVW MEDS BY RX/DR IN RCRD: CPT | Mod: HCNC,CPTII,S$GLB, | Performed by: OPHTHALMOLOGY

## 2023-12-04 PROCEDURE — 92014 PR EYE EXAM, EST PATIENT,COMPREHESV: ICD-10-PCS | Mod: HCNC,S$GLB,, | Performed by: OPHTHALMOLOGY

## 2023-12-04 PROCEDURE — 1159F MED LIST DOCD IN RCRD: CPT | Mod: HCNC,CPTII,S$GLB, | Performed by: OPHTHALMOLOGY

## 2023-12-04 NOTE — PROGRESS NOTES
HPI     Diabetic Eye Exam            Comments: Pt reports for annual DM Exam: Pt states he feels like the lens   in the left eye is dirty. States he still get white discharge and pain   sometimes. States he is using eyedrops and forget what kind.    Lab Results       Component                Value               Date                       HGBA1C                   5.3                 11/20/2023                      Comments    1. +DM  2. PCIOL OS +22.5 SN60WF / CDE: 20.00 /12-14-18  Yag OS 9-8-20  3. Aphakia OD (WORK INJURY EARLY 60S)  4. Iriodialysis OD  5. Dry Eyes O             Last edited by Aleta Lujan on 12/4/2023  9:33 AM.            Assessment /Plan     For exam results, see Encounter Report.      ICD-10-CM ICD-9-CM    1. DM (diabetes mellitus), type 2 with complications  E11.8 250.90 Diabetes with no diabetic retinopathy on dilated exam.   Reviewed diabetic eye precautions including excellent blood sugar control, and importance of regular follow up.           2. Cataract extraction status, left  Z98.42 V45.61 Doing  well       3. Aphakia of right eye  H27.01 379.31 Follow       4. 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

## 2024-01-16 DIAGNOSIS — R33.8 BENIGN PROSTATIC HYPERPLASIA WITH URINARY RETENTION: ICD-10-CM

## 2024-01-16 DIAGNOSIS — N40.1 BENIGN PROSTATIC HYPERPLASIA WITH URINARY RETENTION: ICD-10-CM

## 2024-01-16 RX ORDER — FLUTICASONE PROPIONATE 50 MCG
SPRAY, SUSPENSION (ML) NASAL
Qty: 16 G | Refills: 3 | Status: SHIPPED | OUTPATIENT
Start: 2024-01-16

## 2024-01-16 RX ORDER — ALLOPURINOL 300 MG/1
TABLET ORAL
Qty: 90 TABLET | Refills: 3 | Status: SHIPPED | OUTPATIENT
Start: 2024-01-16

## 2024-01-16 NOTE — TELEPHONE ENCOUNTER
Requested Prescriptions     Pending Prescriptions Disp Refills    allopurinoL (ZYLOPRIM) 300 MG tablet [Pharmacy Med Name: allopurinol 300 mg tablet] 90 tablet 3     Sig: TAKE ONE TABLET BY MOUTH EVERY DAY     LV 11/27/2023   NV 06/03/2024  LF 12/29/2022

## 2024-01-17 RX ORDER — METFORMIN HYDROCHLORIDE 850 MG/1
850 TABLET ORAL
Qty: 90 TABLET | Refills: 3 | Status: SHIPPED | OUTPATIENT
Start: 2024-01-17

## 2024-01-17 NOTE — TELEPHONE ENCOUNTER
Requested Prescriptions     Pending Prescriptions Disp Refills    metFORMIN (GLUCOPHAGE) 850 MG tablet [Pharmacy Med Name: metformin 850 mg tablet] 90 tablet 0     Sig: TAKE ONE TABLET BY MOUTH EVERY DAY     LV 11/27/2023  NV 06/03/2024   LF 10/26/2023

## 2024-01-23 RX ORDER — TAMSULOSIN HYDROCHLORIDE 0.4 MG/1
2 CAPSULE ORAL
Qty: 180 CAPSULE | Refills: 3 | Status: SHIPPED | OUTPATIENT
Start: 2024-01-23

## 2024-04-15 RX ORDER — LOVASTATIN 40 MG/1
TABLET ORAL
Qty: 90 TABLET | Refills: 3 | Status: SHIPPED | OUTPATIENT
Start: 2024-04-15

## 2024-05-28 ENCOUNTER — LAB VISIT (OUTPATIENT)
Dept: LAB | Facility: HOSPITAL | Age: 89
End: 2024-05-28
Attending: INTERNAL MEDICINE
Payer: MEDICARE

## 2024-05-28 DIAGNOSIS — E11.42 TYPE 2 DIABETES MELLITUS WITH POLYNEUROPATHY: ICD-10-CM

## 2024-05-28 DIAGNOSIS — Z87.39 HISTORY OF GOUT: ICD-10-CM

## 2024-05-28 LAB
ALBUMIN SERPL BCP-MCNC: 3.7 G/DL (ref 3.5–5.2)
ALP SERPL-CCNC: 82 U/L (ref 55–135)
ALT SERPL W/O P-5'-P-CCNC: 21 U/L (ref 10–44)
ANION GAP SERPL CALC-SCNC: 9 MMOL/L (ref 8–16)
AST SERPL-CCNC: 21 U/L (ref 10–40)
BASOPHILS # BLD AUTO: 0.03 K/UL (ref 0–0.2)
BASOPHILS NFR BLD: 0.5 % (ref 0–1.9)
BILIRUB SERPL-MCNC: 0.6 MG/DL (ref 0.1–1)
BUN SERPL-MCNC: 21 MG/DL (ref 10–30)
CALCIUM SERPL-MCNC: 10.4 MG/DL (ref 8.7–10.5)
CHLORIDE SERPL-SCNC: 106 MMOL/L (ref 95–110)
CHOLEST SERPL-MCNC: 164 MG/DL (ref 120–199)
CHOLEST/HDLC SERPL: 3.2 {RATIO} (ref 2–5)
CO2 SERPL-SCNC: 27 MMOL/L (ref 23–29)
CREAT SERPL-MCNC: 1.3 MG/DL (ref 0.5–1.4)
DIFFERENTIAL METHOD BLD: ABNORMAL
EOSINOPHIL # BLD AUTO: 0.2 K/UL (ref 0–0.5)
EOSINOPHIL NFR BLD: 2.9 % (ref 0–8)
ERYTHROCYTE [DISTWIDTH] IN BLOOD BY AUTOMATED COUNT: 16.1 % (ref 11.5–14.5)
EST. GFR  (NO RACE VARIABLE): 50.9 ML/MIN/1.73 M^2
ESTIMATED AVG GLUCOSE: 111 MG/DL (ref 68–131)
GLUCOSE SERPL-MCNC: 87 MG/DL (ref 70–110)
HBA1C MFR BLD: 5.5 % (ref 4–5.6)
HCT VFR BLD AUTO: 52.6 % (ref 40–54)
HDLC SERPL-MCNC: 51 MG/DL (ref 40–75)
HDLC SERPL: 31.1 % (ref 20–50)
HGB BLD-MCNC: 16 G/DL (ref 14–18)
IMM GRANULOCYTES # BLD AUTO: 0.03 K/UL (ref 0–0.04)
IMM GRANULOCYTES NFR BLD AUTO: 0.5 % (ref 0–0.5)
LDLC SERPL CALC-MCNC: 86.8 MG/DL (ref 63–159)
LYMPHOCYTES # BLD AUTO: 1.1 K/UL (ref 1–4.8)
LYMPHOCYTES NFR BLD: 16.9 % (ref 18–48)
MCH RBC QN AUTO: 28.2 PG (ref 27–31)
MCHC RBC AUTO-ENTMCNC: 30.4 G/DL (ref 32–36)
MCV RBC AUTO: 93 FL (ref 82–98)
MONOCYTES # BLD AUTO: 0.8 K/UL (ref 0.3–1)
MONOCYTES NFR BLD: 11.3 % (ref 4–15)
NEUTROPHILS # BLD AUTO: 4.5 K/UL (ref 1.8–7.7)
NEUTROPHILS NFR BLD: 67.9 % (ref 38–73)
NONHDLC SERPL-MCNC: 113 MG/DL
NRBC BLD-RTO: 0 /100 WBC
PLATELET # BLD AUTO: 213 K/UL (ref 150–450)
PMV BLD AUTO: 12.7 FL (ref 9.2–12.9)
POTASSIUM SERPL-SCNC: 4.4 MMOL/L (ref 3.5–5.1)
PROT SERPL-MCNC: 7.8 G/DL (ref 6–8.4)
RBC # BLD AUTO: 5.68 M/UL (ref 4.6–6.2)
SODIUM SERPL-SCNC: 142 MMOL/L (ref 136–145)
TRIGL SERPL-MCNC: 131 MG/DL (ref 30–150)
TSH SERPL DL<=0.005 MIU/L-ACNC: 2.71 UIU/ML (ref 0.4–4)
URATE SERPL-MCNC: 3.5 MG/DL (ref 3.4–7)
WBC # BLD AUTO: 6.62 K/UL (ref 3.9–12.7)

## 2024-05-28 PROCEDURE — 85025 COMPLETE CBC W/AUTO DIFF WBC: CPT | Mod: HCNC | Performed by: INTERNAL MEDICINE

## 2024-05-28 PROCEDURE — 84550 ASSAY OF BLOOD/URIC ACID: CPT | Mod: HCNC | Performed by: INTERNAL MEDICINE

## 2024-05-28 PROCEDURE — 80053 COMPREHEN METABOLIC PANEL: CPT | Mod: HCNC | Performed by: INTERNAL MEDICINE

## 2024-05-28 PROCEDURE — 80061 LIPID PANEL: CPT | Mod: HCNC | Performed by: INTERNAL MEDICINE

## 2024-05-28 PROCEDURE — 84443 ASSAY THYROID STIM HORMONE: CPT | Mod: HCNC | Performed by: INTERNAL MEDICINE

## 2024-05-28 PROCEDURE — 83036 HEMOGLOBIN GLYCOSYLATED A1C: CPT | Mod: HCNC | Performed by: INTERNAL MEDICINE

## 2024-05-28 PROCEDURE — 36415 COLL VENOUS BLD VENIPUNCTURE: CPT | Mod: HCNC | Performed by: INTERNAL MEDICINE

## 2024-06-03 ENCOUNTER — OFFICE VISIT (OUTPATIENT)
Dept: INTERNAL MEDICINE | Facility: CLINIC | Age: 89
End: 2024-06-03
Payer: MEDICARE

## 2024-06-03 VITALS
SYSTOLIC BLOOD PRESSURE: 150 MMHG | DIASTOLIC BLOOD PRESSURE: 80 MMHG | OXYGEN SATURATION: 98 % | HEART RATE: 84 BPM | WEIGHT: 215.63 LBS | HEIGHT: 71 IN | BODY MASS INDEX: 30.19 KG/M2

## 2024-06-03 DIAGNOSIS — I72.4 ANEURYSM ARTERY, POPLITEAL: ICD-10-CM

## 2024-06-03 DIAGNOSIS — E11.22 CKD STAGE 3 DUE TO TYPE 2 DIABETES MELLITUS: ICD-10-CM

## 2024-06-03 DIAGNOSIS — I87.2 CHRONIC VENOUS INSUFFICIENCY: ICD-10-CM

## 2024-06-03 DIAGNOSIS — Z91.81 AT HIGH RISK FOR FALLS: ICD-10-CM

## 2024-06-03 DIAGNOSIS — E11.42 TYPE 2 DIABETES MELLITUS WITH POLYNEUROPATHY: ICD-10-CM

## 2024-06-03 DIAGNOSIS — Z00.00 ROUTINE GENERAL MEDICAL EXAMINATION AT A HEALTH CARE FACILITY: Primary | ICD-10-CM

## 2024-06-03 DIAGNOSIS — E66.9 CLASS 1 OBESITY WITH BODY MASS INDEX (BMI) OF 33.0 TO 33.9 IN ADULT, UNSPECIFIED OBESITY TYPE, UNSPECIFIED WHETHER SERIOUS COMORBIDITY PRESENT: ICD-10-CM

## 2024-06-03 DIAGNOSIS — M15.9 PRIMARY OSTEOARTHRITIS INVOLVING MULTIPLE JOINTS: ICD-10-CM

## 2024-06-03 DIAGNOSIS — I70.0 ATHEROSCLEROSIS OF AORTA: ICD-10-CM

## 2024-06-03 DIAGNOSIS — N18.30 CKD STAGE 3 DUE TO TYPE 2 DIABETES MELLITUS: ICD-10-CM

## 2024-06-03 DIAGNOSIS — Z87.39 HISTORY OF GOUT: ICD-10-CM

## 2024-06-03 DIAGNOSIS — I10 HYPERTENSION, UNSPECIFIED TYPE: ICD-10-CM

## 2024-06-03 DIAGNOSIS — Z86.711 HISTORY OF PULMONARY EMBOLISM: ICD-10-CM

## 2024-06-03 DIAGNOSIS — H91.13 PRESBYCUSIS OF BOTH EARS: ICD-10-CM

## 2024-06-03 DIAGNOSIS — E11.8 DM (DIABETES MELLITUS), TYPE 2 WITH COMPLICATIONS: ICD-10-CM

## 2024-06-03 PROCEDURE — 1126F AMNT PAIN NOTED NONE PRSNT: CPT | Mod: HCNC,CPTII,S$GLB, | Performed by: INTERNAL MEDICINE

## 2024-06-03 PROCEDURE — 99999 PR PBB SHADOW E&M-EST. PATIENT-LVL IV: CPT | Mod: PBBFAC,HCNC,, | Performed by: INTERNAL MEDICINE

## 2024-06-03 PROCEDURE — 1160F RVW MEDS BY RX/DR IN RCRD: CPT | Mod: HCNC,CPTII,S$GLB, | Performed by: INTERNAL MEDICINE

## 2024-06-03 PROCEDURE — 1159F MED LIST DOCD IN RCRD: CPT | Mod: HCNC,CPTII,S$GLB, | Performed by: INTERNAL MEDICINE

## 2024-06-03 PROCEDURE — 3288F FALL RISK ASSESSMENT DOCD: CPT | Mod: HCNC,CPTII,S$GLB, | Performed by: INTERNAL MEDICINE

## 2024-06-03 PROCEDURE — 1101F PT FALLS ASSESS-DOCD LE1/YR: CPT | Mod: HCNC,CPTII,S$GLB, | Performed by: INTERNAL MEDICINE

## 2024-06-03 PROCEDURE — 99397 PER PM REEVAL EST PAT 65+ YR: CPT | Mod: HCNC,S$GLB,, | Performed by: INTERNAL MEDICINE

## 2024-06-03 NOTE — PROGRESS NOTES
HPI:  Patient is a 89-year-old man who comes in today for follow-up of diabetes, hypertension, lipids, chronic kidney disease, history of pulmonary embolism, and for his annual physical.  He continues do very well.  He denies any chest pains or shortness a breath.  He has had no problems with hypoglycemia.  His blood pressure tends to be 150-1 60/80.  I explained to him that is more than acceptable range.  He has had no flares of gout.  He still does not wear his support hose that he is supposed to be wearing.      Current MEDS: medcard review, verified and update  Allergies: Per the electronic medical record    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    Obesity, unspecified     Primary osteoarthritis involving multiple joints        Past Surgical History:   Procedure Laterality Date    CATARACT EXTRACTION      aphakic od    CATARACT EXTRACTION W/  INTRAOCULAR LENS IMPLANT Left 12/13/2018    ENDOSCOPIC ULTRASOUND OF UPPER GASTROINTESTINAL TRACT N/A 12/30/2022    Procedure: ULTRASOUND, UPPER GI TRACT, ENDOSCOPIC;  Surgeon: Mani Howard MD;  Location: [facility] ENDO;  Service: Endoscopy;  Laterality: N/A;    TRANSURETHRAL RESECTION OF PROSTATE N/A 5/31/2018    Procedure: PROSTATECTOMY-TRANSURETHRAL;  Surgeon: Michael Westbrook IV, MD;  Location: [facility] OR;  Service: Urology;  Laterality: N/A;    VASCULAR SURGERY      yag laser capsulotomy Left 09/08/2020       SHx: per the electronic medical record    FHx: recorded in the electronic medical record    ROS:    denies any chest pains or shortness of breath. Denies any nausea, vomiting or diarrhea. Denies any fever, chills or  "sweats. Denies any change in weight, voice, stool, skin or hair. Denies any dysuria, dyspepsia or dysphagia. Denies any change in vision, hearing or headaches. Denies any swollen lymph nodes or loss of memory.    PE:  BP (!) 150/80 (BP Location: Right arm)   Pulse 84   Ht 5' 11" (1.803 m)   Wt 97.8 kg (215 lb 9.8 oz)   SpO2 98%   BMI 30.07 kg/m²   Gen: Well-developed, well-nourished, male, in no acute distress, oriented x3  HEENT: neck is supple, no adenopathy, carotids 2+ equal without bruits, thyroid exam normal size without nodules.  CHEST: clear to auscultation and percussion  CVS: regular rate and rhythm with 2 over 6 systolic murmur, no gallop or rubs  ABD: soft, benign, no rebound no guarding, no distention.  Bowel sounds are normal.     nontender.  No palpable masses.  No organomegaly and no audible bruits.  RECTAL: no masses.  Prostate   Grams without nodules.  EXT:  He has changes of chronic venous insufficiency with edema in both lower extremities.    LYMPH: no cervical, inguinal, or axillary adenopathy  FEET:  Markedly decreased tactile sensation in both feet.  No ulcers present.  NEURO: gait normal.  Cranial nerves II- XII intact. No nystagmus.  Speech normal.   Gross motor and sensory unremarkable.    Lab Results   Component Value Date    WBC 6.62 05/28/2024    HGB 16.0 05/28/2024    HCT 52.6 05/28/2024     05/28/2024    CHOL 164 05/28/2024    TRIG 131 05/28/2024    HDL 51 05/28/2024    ALT 21 05/28/2024    AST 21 05/28/2024     05/28/2024    K 4.4 05/28/2024     05/28/2024    CREATININE 1.3 05/28/2024    BUN 21 05/28/2024    CO2 27 05/28/2024    TSH 2.706 05/28/2024    PSA 4.4 (H) 06/14/2021    INR 1.6 12/22/2021    HGBA1C 5.5 05/28/2024    BNP 70 01/27/2017    URICACID 3.5 05/28/2024    SEDRATE 22 (H) 04/18/2011       Impression:  Diabetic peripheral neuropathy, currently no foot ulcers  Chronic venous insufficiency/stasis, patient had again has been told he needs to wear " knee-high support hose bilaterally.  Diabetes, hypertension and lipids all very well controlled on current therapy  History of gout, asymptomatic  Chronic kidney disease, stable GFR  Patient Active Problem List   Diagnosis    Obesity    Hearing loss of aging    History of pulmonary embolism    DM (diabetes mellitus), type 2 with complications    Hypertension    History of gout    Type 2 diabetes mellitus with polyneuropathy    Urinary retention    Aneurysm artery, popliteal    Atherosclerosis of aorta    Benign prostatic hyperplasia    Chronic venous insufficiency    Primary osteoarthritis involving multiple joints    At high risk for falls    CKD stage 3 due to type 2 diabetes mellitus       Plan:   Orders Placed This Encounter    Hemoglobin A1C    Comprehensive Metabolic Panel    Lipid Panel    Microalbumin/Creatinine Ratio, Urine    CBC Auto Differential    TSH     His medications remain the same.  He will be seen again in 6 months with the above lab work.  This note is generated with speech recognition software and is subject to transcription error and sound alike phrases that may be missed by proofreading.

## 2024-06-05 ENCOUNTER — TELEPHONE (OUTPATIENT)
Dept: INTERNAL MEDICINE | Facility: CLINIC | Age: 89
End: 2024-06-05
Payer: MEDICARE

## 2024-06-05 RX ORDER — MUPIROCIN 20 MG/G
OINTMENT TOPICAL 3 TIMES DAILY
Qty: 30 G | Refills: 1 | Status: SHIPPED | OUTPATIENT
Start: 2024-06-05

## 2024-06-05 NOTE — TELEPHONE ENCOUNTER
Spoke with daughter and informed of abx ointment sent in to [facility].  Patient need to elevate feet during the day until bedtime above his chest.  If the swelling does not improve or the blisters get worse, patient need to schedule an appt with Dr Vera. Verbally understands.

## 2024-06-05 NOTE — TELEPHONE ENCOUNTER
"Daughter stated pt was seen on Monday and had leg swelling but no blisters. Daughter stated this morning, she noticed he has swelling so bad that blisters have formed and some are "busted" and draining. Daughter wants to know if any kind of ointment or cream can be called in? Or if there is something that she can do to help. Please review and advise. Daughter denies any redness or fever. But blisters are tender to touch.       "

## 2024-06-05 NOTE — TELEPHONE ENCOUNTER
----- Message from Mary Austin sent at 6/5/2024  9:02 AM CDT -----  Contact: Kat/Daughter  Patient was there on Monday  patient has swelling in his legs and now have blisters that are bursting. Please call back  [phone]

## 2024-07-03 ENCOUNTER — HOSPITAL ENCOUNTER (EMERGENCY)
Facility: HOSPITAL | Age: 89
Discharge: HOME | End: 2024-07-03
Attending: EMERGENCY MEDICINE
Payer: MEDICARE

## 2024-07-03 VITALS
OXYGEN SATURATION: 97 % | HEART RATE: 87 BPM | TEMPERATURE: 100 F | RESPIRATION RATE: 19 BRPM | DIASTOLIC BLOOD PRESSURE: 77 MMHG | SYSTOLIC BLOOD PRESSURE: 144 MMHG
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
## 2024-07-03 DIAGNOSIS — N30.01 ACUTE CYSTITIS WITH HEMATURIA: Primary | ICD-10-CM

## 2024-07-03 DIAGNOSIS — R50.9 FEVER, UNSPECIFIED FEVER CAUSE: ICD-10-CM

## 2024-07-03 LAB
ALBUMIN SERPL BCP-MCNC: 3.4 G/DL (ref 3.5–5.2)
ALP SERPL-CCNC: 78 U/L (ref 55–135)
ALT SERPL W/O P-5'-P-CCNC: 23 U/L (ref 10–44)
ANION GAP SERPL CALC-SCNC: 9 MMOL/L (ref 8–16)
AST SERPL-CCNC: 31 U/L (ref 10–40)
BACTERIA #/AREA URNS HPF: ABNORMAL /HPF
BASOPHILS # BLD AUTO: 0.03 K/UL (ref 0–0.2)
BASOPHILS NFR BLD: 0.2 % (ref 0–1.9)
BILIRUB SERPL-MCNC: 0.9 MG/DL (ref 0.1–1)
BILIRUB UR QL STRIP: NEGATIVE
BUN SERPL-MCNC: 20 MG/DL (ref 10–30)
CALCIUM SERPL-MCNC: 9.8 MG/DL (ref 8.7–10.5)
CHLORIDE SERPL-SCNC: 106 MMOL/L (ref 95–110)
CLARITY UR: ABNORMAL
CO2 SERPL-SCNC: 23 MMOL/L (ref 23–29)
COLOR UR: YELLOW
CREAT SERPL-MCNC: 1.2 MG/DL (ref 0.5–1.4)
DIFFERENTIAL METHOD BLD: ABNORMAL
EOSINOPHIL # BLD AUTO: 0 K/UL (ref 0–0.5)
EOSINOPHIL NFR BLD: 0.1 % (ref 0–8)
ERYTHROCYTE [DISTWIDTH] IN BLOOD BY AUTOMATED COUNT: 15.9 % (ref 11.5–14.5)
EST. GFR  (NO RACE VARIABLE): 56 ML/MIN/1.73 M^2
GLUCOSE SERPL-MCNC: 109 MG/DL (ref 70–110)
GLUCOSE UR QL STRIP: NEGATIVE
HCT VFR BLD AUTO: 51.7 % (ref 40–54)
HGB BLD-MCNC: 16.6 G/DL (ref 14–18)
HGB UR QL STRIP: ABNORMAL
HYALINE CASTS #/AREA URNS LPF: 0 /LPF
IMM GRANULOCYTES # BLD AUTO: 0.09 K/UL (ref 0–0.04)
IMM GRANULOCYTES NFR BLD AUTO: 0.6 % (ref 0–0.5)
INFLUENZA A, MOLECULAR: NEGATIVE
INFLUENZA B, MOLECULAR: NEGATIVE
KETONES UR QL STRIP: NEGATIVE
LACTATE SERPL-SCNC: 1.6 MMOL/L (ref 0.5–2.2)
LEUKOCYTE ESTERASE UR QL STRIP: ABNORMAL
LYMPHOCYTES # BLD AUTO: 0.4 K/UL (ref 1–4.8)
LYMPHOCYTES NFR BLD: 2.5 % (ref 18–48)
MCH RBC QN AUTO: 28.5 PG (ref 27–31)
MCHC RBC AUTO-ENTMCNC: 32.1 G/DL (ref 32–36)
MCV RBC AUTO: 89 FL (ref 82–98)
MICROSCOPIC COMMENT: ABNORMAL
MONOCYTES # BLD AUTO: 1.2 K/UL (ref 0.3–1)
MONOCYTES NFR BLD: 7.1 % (ref 4–15)
NEUTROPHILS # BLD AUTO: 14.4 K/UL (ref 1.8–7.7)
NEUTROPHILS NFR BLD: 89.5 % (ref 38–73)
NITRITE UR QL STRIP: NEGATIVE
NRBC BLD-RTO: 0 /100 WBC
OHS QRS DURATION: 78 MS
OHS QRS DURATION: 78 MS
OHS QTC CALCULATION: 446 MS
OHS QTC CALCULATION: 452 MS
PH UR STRIP: 7 [PH] (ref 5–8)
PLATELET # BLD AUTO: 203 K/UL (ref 150–450)
PMV BLD AUTO: 11.5 FL (ref 9.2–12.9)
POTASSIUM SERPL-SCNC: 4.8 MMOL/L (ref 3.5–5.1)
PROCALCITONIN SERPL IA-MCNC: 0.25 NG/ML
PROT SERPL-MCNC: 8.5 G/DL (ref 6–8.4)
PROT UR QL STRIP: ABNORMAL
RBC # BLD AUTO: 5.83 M/UL (ref 4.6–6.2)
RBC #/AREA URNS HPF: >100 /HPF (ref 0–4)
SARS-COV-2 RDRP RESP QL NAA+PROBE: NEGATIVE
SODIUM SERPL-SCNC: 138 MMOL/L (ref 136–145)
SP GR UR STRIP: 1.01 (ref 1–1.03)
SPECIMEN SOURCE: NORMAL
TROPONIN I SERPL DL<=0.01 NG/ML-MCNC: 0.02 NG/ML (ref 0–0.03)
URN SPEC COLLECT METH UR: ABNORMAL
UROBILINOGEN UR STRIP-ACNC: NEGATIVE EU/DL
WBC # BLD AUTO: 16.14 K/UL (ref 3.9–12.7)
WBC #/AREA URNS HPF: >100 /HPF (ref 0–5)
WBC CLUMPS URNS QL MICRO: ABNORMAL

## 2024-07-03 PROCEDURE — 63600175 PHARM REV CODE 636 W HCPCS: Mod: HCNC | Performed by: EMERGENCY MEDICINE

## 2024-07-03 PROCEDURE — 99285 EMERGENCY DEPT VISIT HI MDM: CPT | Mod: 25,HCNC

## 2024-07-03 PROCEDURE — 87086 URINE CULTURE/COLONY COUNT: CPT | Mod: HCNC

## 2024-07-03 PROCEDURE — 25000003 PHARM REV CODE 250: Mod: HCNC

## 2024-07-03 PROCEDURE — 84484 ASSAY OF TROPONIN QUANT: CPT | Mod: HCNC | Performed by: EMERGENCY MEDICINE

## 2024-07-03 PROCEDURE — 83605 ASSAY OF LACTIC ACID: CPT | Mod: HCNC

## 2024-07-03 PROCEDURE — 85025 COMPLETE CBC W/AUTO DIFF WBC: CPT | Mod: HCNC

## 2024-07-03 PROCEDURE — 93005 ELECTROCARDIOGRAM TRACING: CPT | Mod: HCNC

## 2024-07-03 PROCEDURE — 80053 COMPREHEN METABOLIC PANEL: CPT | Mod: HCNC | Performed by: EMERGENCY MEDICINE

## 2024-07-03 PROCEDURE — 84145 PROCALCITONIN (PCT): CPT | Mod: HCNC | Performed by: EMERGENCY MEDICINE

## 2024-07-03 PROCEDURE — U0002 COVID-19 LAB TEST NON-CDC: HCPCS | Mod: HCNC

## 2024-07-03 PROCEDURE — 93010 ELECTROCARDIOGRAM REPORT: CPT | Mod: HCNC,,, | Performed by: INTERNAL MEDICINE

## 2024-07-03 PROCEDURE — 96365 THER/PROPH/DIAG IV INF INIT: CPT | Mod: HCNC

## 2024-07-03 PROCEDURE — 81000 URINALYSIS NONAUTO W/SCOPE: CPT | Mod: HCNC

## 2024-07-03 PROCEDURE — 87502 INFLUENZA DNA AMP PROBE: CPT | Mod: HCNC

## 2024-07-03 PROCEDURE — 25000003 PHARM REV CODE 250: Mod: HCNC | Performed by: EMERGENCY MEDICINE

## 2024-07-03 RX ORDER — CEFUROXIME AXETIL 500 MG/1
500 TABLET ORAL 2 TIMES DAILY
Qty: 20 TABLET | Refills: 0 | Status: SHIPPED | OUTPATIENT
Start: 2024-07-03 | End: 2024-07-13

## 2024-07-03 RX ORDER — ACETAMINOPHEN 325 MG/1
650 TABLET ORAL
Status: COMPLETED | OUTPATIENT
Start: 2024-07-03 | End: 2024-07-03

## 2024-07-03 RX ADMIN — CEFTRIAXONE SODIUM 1 G: 1 INJECTION, POWDER, FOR SOLUTION INTRAMUSCULAR; INTRAVENOUS at 04:07

## 2024-07-03 RX ADMIN — ACETAMINOPHEN 650 MG: 325 TABLET ORAL at 02:07

## 2024-07-03 NOTE — FIRST PROVIDER EVALUATION
Medical screening examination initiated.  I have conducted a focused provider triage encounter, findings are as follows:    Brief history of present illness:  Fall about 30 minutes prior to arrival.  Patient reports hitting his head, denies LOC.  Complains of neck pain, head pain, bilateral shoulder pain and chest pain.    Vitals:    07/03/24 1222   BP: (!) 166/84   BP Location: Left arm   Patient Position: Sitting   Pulse: 110   Resp: 16   SpO2: 97%       Pertinent physical exam:  Headache, neck pain, chest pain, fever and triage.    Brief workup plan:  Workup    Preliminary workup initiated; this workup will be continued and followed by the physician or advanced practice provider that is assigned to the patient when roomed.

## 2024-07-03 NOTE — ED PROVIDER NOTES
"SCRIBE #1 NOTE: I, Sher Cortes, am scribing for, and in the presence of, Darwin Nuno MD. I have scribed the entire note.       History     Chief Complaint   Patient presents with    Chest Pain     Pt fell while getting out the car hitting both shoulder and his head. No loc . No blood thinners. Cc of both shoulder pain. Neck, HA , wrist and right sided chest pain     Review of patient's allergies indicates:   Allergen Reactions    Morphine Other (See Comments)     Pt states, "It only makes my pain worse."         History of Present Illness     HPI    7/3/2024, 5:37 PM  History obtained from the patient and family      History of Present Illness: Amauri Lara is a 89 y.o. male patient with a PMHx of CHF, chronic venous insufficiency, PE, HTN, DM who presents to the Emergency Department for evaluation of right sided CP which onset gradually today. Family states patient fell while getting out of the car hitting both of his shoulders and head. Patient denies any LOC and any blood thinner use. Symptoms are constant and moderate in severity. No mitigating or exacerbating factors reported. Associated sxs include neck pain, HA, bilateral shoulder pain, and right chest pain. Patient denies any fever, SOB, N/V, abdominal pain, weakness, numbness, dizziness, lightheadedness, palpitations, and all other sxs at this time. No further complaints or concerns at this time.       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    Obesity, unspecified     Primary osteoarthritis involving multiple " joints        Past Surgical History:  Past Surgical History:   Procedure Laterality Date    CATARACT EXTRACTION      aphakic od    CATARACT EXTRACTION W/  INTRAOCULAR LENS IMPLANT Left 12/13/2018    ENDOSCOPIC ULTRASOUND OF UPPER GASTROINTESTINAL TRACT N/A 12/30/2022    Procedure: ULTRASOUND, UPPER GI TRACT, ENDOSCOPIC;  Surgeon: Mani Howard MD;  Location: [facility];  Service: Endoscopy;  Laterality: N/A;    TRANSURETHRAL RESECTION OF PROSTATE N/A 5/31/2018    Procedure: PROSTATECTOMY-TRANSURETHRAL;  Surgeon: Michael Westbrook IV, MD;  Location: [facility] OR;  Service: Urology;  Laterality: N/A;    VASCULAR SURGERY      yag laser capsulotomy Left 09/08/2020         Family History:  Family History   Problem Relation Name Age of Onset    Diabetes Brother         Social History:  Social History     Tobacco Use    Smoking status: Never    Smokeless tobacco: Never   Substance and Sexual Activity    Alcohol use: No    Drug use: No    Sexual activity: Never        Review of Systems     Review of Systems   Constitutional:  Negative for fever.   HENT:  Negative for sore throat.    Respiratory:  Negative for shortness of breath.    Cardiovascular:  Positive for chest pain (right). Negative for palpitations.   Gastrointestinal:  Negative for nausea.   Genitourinary:  Negative for dysuria.   Musculoskeletal:  Positive for arthralgias (bilateral shoulder) and neck pain. Negative for back pain.   Skin:  Negative for rash.   Neurological:  Positive for headaches. Negative for dizziness, syncope, weakness, light-headedness and numbness.   Hematological:  Does not bruise/bleed easily.   All other systems reviewed and are negative.       Physical Exam     Initial Vitals [07/03/24 1222]   BP Pulse Resp Temp SpO2   (!) 166/84 110 16 (!) 102.2 °F (39 °C) 97 %      MAP       --          Physical Exam   Nursing Notes and Vital Signs Reviewed.  Constitutional: Patient is in no acute distress. Well-developed and well-nourished.  Head:  Atraumatic. Normocephalic.  Eyes: PERRL. EOM intact. Conjunctivae are not pale. No scleral icterus.  ENT: Mucous membranes are moist. Oropharynx is clear and symmetric.    Neck: Supple. Full ROM. No lymphadenopathy.  Cardiovascular: Regular rate. Regular rhythm. No murmurs, rubs, or gallops. Distal pulses are 2+ and symmetric.  Pulmonary/Chest: No respiratory distress. Clear to auscultation bilaterally. No wheezing or rales.  Abdominal: Soft and non-distended.  There is no tenderness.  No rebound, guarding, or rigidity. Good bowel sounds.  Genitourinary: No CVA tenderness  Musculoskeletal: Moves all extremities. No obvious deformities. No edema. No calf tenderness. No midline spinal tenderness.   Skin: Warm and dry.  Neurological:  Alert, awake, and appropriate.  Normal speech.  No acute focal neurological deficits are appreciated. CN II-XII intact.   Psychiatric: Normal affect. Good eye contact. Appropriate in content.     ED Course   Procedures  ED Vital Signs:  Vitals:    07/03/24 1222 07/03/24 1634 07/03/24 1701   BP: (!) 166/84  (!) 144/77   Pulse: 110  87   Resp: 16  19   Temp: (!) 102.2 °F (39 °C) 99.6 °F (37.6 °C)    TempSrc: Oral Oral    SpO2: 97%  97%       Abnormal Lab Results:  Labs Reviewed   CBC W/ AUTO DIFFERENTIAL - Abnormal; Notable for the following components:       Result Value    WBC 16.14 (*)     RDW 15.9 (*)     Immature Granulocytes 0.6 (*)     Gran # (ANC) 14.4 (*)     Immature Grans (Abs) 0.09 (*)     Lymph # 0.4 (*)     Mono # 1.2 (*)     Gran % 89.5 (*)     Lymph % 2.5 (*)     All other components within normal limits   COMPREHENSIVE METABOLIC PANEL - Abnormal; Notable for the following components:    Total Protein 8.5 (*)     Albumin 3.4 (*)     eGFR 56 (*)     All other components within normal limits   PROCALCITONIN - Abnormal; Notable for the following components:    Procalcitonin 0.25 (*)     All other components within normal limits   URINALYSIS, REFLEX TO URINE CULTURE - Abnormal;  Notable for the following components:    Appearance, UA Cloudy (*)     Protein, UA 1+ (*)     Occult Blood UA 2+ (*)     Leukocytes, UA 3+ (*)     All other components within normal limits    Narrative:     Specimen Source->Urine   URINALYSIS MICROSCOPIC - Abnormal; Notable for the following components:    RBC, UA >100 (*)     WBC, UA >100 (*)     WBC Clumps, UA Many (*)     All other components within normal limits    Narrative:     Specimen Source->Urine   INFLUENZA A & B BY MOLECULAR   CULTURE, URINE   SARS-COV-2 RNA AMPLIFICATION, QUAL   LACTIC ACID, PLASMA   TROPONIN I        All Lab Results:  Results for orders placed or performed during the hospital encounter of 07/03/24   Influenza A & B by Molecular    Specimen: Nasopharyngeal Swab   Result Value Ref Range    Influenza A, Molecular Negative Negative    Influenza B, Molecular Negative Negative    Flu A & B Source Nasal swab    COVID-19 Rapid Screening   Result Value Ref Range    SARS-CoV-2 RNA, Amplification, Qual Negative Negative   CBC auto differential   Result Value Ref Range    WBC 16.14 (H) 3.90 - 12.70 K/uL    RBC 5.83 4.60 - 6.20 M/uL    Hemoglobin 16.6 14.0 - 18.0 g/dL    Hematocrit 51.7 40.0 - 54.0 %    MCV 89 82 - 98 fL    MCH 28.5 27.0 - 31.0 pg    MCHC 32.1 32.0 - 36.0 g/dL    RDW 15.9 (H) 11.5 - 14.5 %    Platelets 203 150 - 450 K/uL    MPV 11.5 9.2 - 12.9 fL    Immature Granulocytes 0.6 (H) 0.0 - 0.5 %    Gran # (ANC) 14.4 (H) 1.8 - 7.7 K/uL    Immature Grans (Abs) 0.09 (H) 0.00 - 0.04 K/uL    Lymph # 0.4 (L) 1.0 - 4.8 K/uL    Mono # 1.2 (H) 0.3 - 1.0 K/uL    Eos # 0.0 0.0 - 0.5 K/uL    Baso # 0.03 0.00 - 0.20 K/uL    nRBC 0 0 /100 WBC    Gran % 89.5 (H) 38.0 - 73.0 %    Lymph % 2.5 (L) 18.0 - 48.0 %    Mono % 7.1 4.0 - 15.0 %    Eosinophil % 0.1 0.0 - 8.0 %    Basophil % 0.2 0.0 - 1.9 %    Differential Method Automated    Lactic acid, plasma   Result Value Ref Range    Lactate (Lactic Acid) 1.6 0.5 - 2.2 mmol/L   Comprehensive metabolic panel    Result Value Ref Range    Sodium 138 136 - 145 mmol/L    Potassium 4.8 3.5 - 5.1 mmol/L    Chloride 106 95 - 110 mmol/L    CO2 23 23 - 29 mmol/L    Glucose 109 70 - 110 mg/dL    BUN 20 10 - 30 mg/dL    Creatinine 1.2 0.5 - 1.4 mg/dL    Calcium 9.8 8.7 - 10.5 mg/dL    Total Protein 8.5 (H) 6.0 - 8.4 g/dL    Albumin 3.4 (L) 3.5 - 5.2 g/dL    Total Bilirubin 0.9 0.1 - 1.0 mg/dL    Alkaline Phosphatase 78 55 - 135 U/L    AST 31 10 - 40 U/L    ALT 23 10 - 44 U/L    eGFR 56 (A) >60 mL/min/1.73 m^2    Anion Gap 9 8 - 16 mmol/L   Procalcitonin   Result Value Ref Range    Procalcitonin 0.25 (H) <0.25 ng/mL   Troponin I   Result Value Ref Range    Troponin I 0.016 0.000 - 0.026 ng/mL   Urinalysis, Reflex to Urine Culture Urine, Clean Catch    Specimen: Urine   Result Value Ref Range    Specimen UA Urine, Clean Catch     Color, UA Yellow Yellow, Straw, Jennifer    Appearance, UA Cloudy (A) Clear    pH, UA 7.0 5.0 - 8.0    Specific Gravity, UA 1.010 1.005 - 1.030    Protein, UA 1+ (A) Negative    Glucose, UA Negative Negative    Ketones, UA Negative Negative    Bilirubin (UA) Negative Negative    Occult Blood UA 2+ (A) Negative    Nitrite, UA Negative Negative    Urobilinogen, UA Negative <2.0 EU/dL    Leukocytes, UA 3+ (A) Negative   Urinalysis Microscopic   Result Value Ref Range    RBC, UA >100 (H) 0 - 4 /hpf    WBC, UA >100 (H) 0 - 5 /hpf    WBC Clumps, UA Many (A) None-Rare    Bacteria Occasional None-Occ /hpf    Hyaline Casts, UA 0 0-1/lpf /lpf    Microscopic Comment SEE COMMENT    EKG 12-lead   Result Value Ref Range    QRS Duration 78 ms    [facility] QTC Calculation 452 ms   EKG 12-lead   Result Value Ref Range    QRS Duration 78 ms    [facility] QTC Calculation 446 ms         Imaging Results:  Imaging Results              CT Head Without Contrast (Final result)  Result time 07/03/24 13:25:28      Final result by Deon Leonard MD (07/03/24 13:25:28)                   Impression:      Age-related atrophy.  No acute  findings.      Electronically signed by: Deon Leonard MD  Date:    07/03/2024  Time:    13:25               Narrative:    EXAMINATION:  CT HEAD WITHOUT CONTRAST    CLINICAL HISTORY:  Head trauma with head injury after head trauma, minor (Age >= 65y);    TECHNIQUE:  Standard noncontrast CT of the brain.    All CT scans at this facility are performed  using dose modulation techniques as appropriate to performed exam including the following:  automated exposure control; adjustment of mA and/or kV according to the patients size (this includes techniques or standardized protocols for targeted exams where dose is matched to indication/reason for exam: i.e. extremities or head);  iterative reconstruction technique.    COMPARISON:  None    FINDINGS:  Brain: The ventricles are mild-to-moderately enlarged consistent with volume loss.  No acute edema, hemorrhage or mass effect is present.    Skull: There are postop changes consistent with right mastoidectomy with right cochlear implant placement.  No acute bony abnormality.                                       X-Ray Shoulder Trauma Right (Final result)  Result time 07/03/24 13:04:46      Final result by Partha Mckeon MD (07/03/24 13:04:46)                   Impression:      Degenerative changes.  No acute finding.      Electronically signed by: Partha Mckeon  Date:    07/03/2024  Time:    13:04               Narrative:    EXAMINATION:  XR SHOULDER TRAUMA 3 VIEW RIGHT    CLINICAL HISTORY:  Unspecified fall, initial encounter    TECHNIQUE:  Three or four views of the right shoulder were performed.    COMPARISON:  None    FINDINGS:  Moderate AC joint degenerative changes.  No acute fracture or dislocation.  Mild subacromial space loss.                                       X-Ray Shoulder Trauma Left (Final result)  Result time 07/03/24 13:00:05      Final result by Partha Mckeon MD (07/03/24 13:00:05)                   Impression:      Mild degenerative joint disease  and probable mild chronic rotator cuff injury.  No acute finding.      Electronically signed by: Partha Mckeon  Date:    07/03/2024  Time:    13:00               Narrative:    EXAMINATION:  XR SHOULDER TRAUMA 3 VIEW LEFT    CLINICAL HISTORY:  Unspecified fall, initial encounter    TECHNIQUE:  Three views of the left shoulder were performed.    COMPARISON  June 17, 2011.    FINDINGS:  Glenohumeral joint is not well profiled.  Mild atherosclerotic disease involving the glenohumeral joint.  Subacromial space is narrowed.  Moderate AC joint degenerative changes.  No acute fracture or dislocation                                       X-Ray Wrist Complete Right (Final result)  Result time 07/03/24 12:59:09      Final result by Partha Mckeon MD (07/03/24 12:59:09)                   Impression:      No acute finding.      Electronically signed by: Partha Mckeon  Date:    07/03/2024  Time:    12:59               Narrative:    EXAMINATION:  XR WRIST COMPLETE 3 VIEWS RIGHT    CLINICAL HISTORY:  Unspecified fall, initial encounter    TECHNIQUE:  PA, lateral, and oblique views of the right wrist were performed.    COMPARISON:  None    FINDINGS:  No acute fracture.  No dislocation.  No significant soft tissue swelling.  Vascular calcifications.                                       X-Ray Cervical Spine AP And Lateral (Final result)  Result time 07/03/24 12:59:18      Final result by Deon Leonard MD (07/03/24 12:59:18)                   Impression:      No acute abnormality.      Electronically signed by: Deon Leonard MD  Date:    07/03/2024  Time:    12:59               Narrative:    EXAMINATION:  XR CERVICAL SPINE AP LATERAL    CLINICAL HISTORY:  Neck injury with neck pain.  Unspecified fall, initial encounter    COMPARISON:  None    FINDINGS:  Grossly normal alignment.  Prevertebral soft tissues appear normal.  Cervical degenerative disc disease and facet arthrosis noted.                                        X-Ray Chest AP Portable (Final result)  Result time 07/03/24 12:58:05      Final result by Deon Leonard MD (07/03/24 12:58:05)                   Impression:      No acute findings.      Electronically signed by: Deon Leonard MD  Date:    07/03/2024  Time:    12:58               Narrative:    EXAMINATION:  XR CHEST AP PORTABLE    CLINICAL HISTORY:  Chest trauma with chest pain after a fall.,    COMPARISON:  12/05/2019.    FINDINGS:  Normal heart size.  Mild aortic atherosclerosis.    Chronic interstitial lung disease with decreased lung volumes.    Bilateral shoulder arthrosis.                                       The EKG was ordered, reviewed, and independently interpreted by the ED provider.  Interpretation time: 12:26  Rate: 106 BPM  Rhythm: sinus tachycardia with occasional Premature ventricular complexes  Interpretation: No acute ST changes. No STEMI.    The Emergency Provider reviewed the vital signs and test results, which are outlined above.     ED Discussion     5:38 PM: Reassessed pt at this time. Discussed with pt all pertinent ED information and results. Discussed pt dx and plan of tx. Gave pt all f/u and return to the ED instructions. All questions and concerns were addressed at this time. Pt expresses understanding of information and instructions, and is comfortable with plan to discharge. Pt is stable for discharge.    I discussed with patient and/or family/caretaker that evaluation in the ED does not suggest any emergent or life threatening medical conditions requiring immediate intervention beyond what was provided in the ED, and I believe patient is safe for discharge.  Regardless, an unremarkable evaluation in the ED does not preclude the development or presence of a serious of life threatening condition. As such, patient was instructed to return immediately for any worsening or change in current symptoms.         Medical Decision Making  DDx: fall, fever, UTI    Amount and/or  Complexity of Data Reviewed  Independent Historian: caregiver     Details: Family at bedside for additional history  Labs: ordered. Decision-making details documented in ED Course.  Radiology: ordered. Decision-making details documented in ED Course.  ECG/medicine tests: ordered and independent interpretation performed. Decision-making details documented in ED Course.  Discussion of management or test interpretation with external provider(s): Feeling better, and would like to go home.     Risk  Prescription drug management.                 ED Medication(s):  Medications   acetaminophen tablet 650 mg (650 mg Oral Given 7/3/24 1424)   cefTRIAXone (Rocephin) 1 g in D5W 100 mL IVPB (MB+) (0 g Intravenous Stopped 7/3/24 1715)       New Prescriptions    CEFUROXIME (CEFTIN) 500 MG TABLET    Take 1 tablet (500 mg total) by mouth 2 (two) times daily. for 10 days        Follow-up Information       Stefan Vera MD.    Specialty: Internal Medicine  Contact information:  [address]  [address]  [facility] [id number] [phone]                                 Scribe Attestation:   Scribe #1: I performed the above scribed service and the documentation accurately describes the services I performed. I attest to the accuracy of the note.     Attending:   Physician Attestation Statement for Scribe #1: I, Darwin Nuno MD, personally performed the services described in this documentation, as scribed by Sher Cortes, in my presence, and it is both accurate and complete.           Clinical Impression       ICD-10-CM ICD-9-CM   1. Acute cystitis with hematuria  N30.01 595.0   2. Fall  W19.XXXA E888.9   3. Fever, unspecified fever cause  R50.9 780.60       Disposition:   Disposition: Discharged  Condition: Stable         Darwin Nuno MD  07/03/24 5860

## 2024-07-05 ENCOUNTER — PATIENT OUTREACH (OUTPATIENT)
Dept: EMERGENCY MEDICINE | Facility: HOSPITAL | Age: 89
End: 2024-07-05
Payer: MEDICARE

## 2024-07-05 LAB
BACTERIA UR CULT: NORMAL
BACTERIA UR CULT: NORMAL

## 2024-07-15 ENCOUNTER — LAB VISIT (OUTPATIENT)
Dept: LAB | Facility: HOSPITAL | Age: 89
End: 2024-07-15
Attending: INTERNAL MEDICINE
Payer: MEDICARE

## 2024-07-15 ENCOUNTER — OFFICE VISIT (OUTPATIENT)
Dept: INTERNAL MEDICINE | Facility: CLINIC | Age: 89
End: 2024-07-15
Payer: MEDICARE

## 2024-07-15 VITALS
OXYGEN SATURATION: 98 % | TEMPERATURE: 98 F | HEART RATE: 80 BPM | WEIGHT: 209.69 LBS | HEIGHT: 71 IN | SYSTOLIC BLOOD PRESSURE: 138 MMHG | DIASTOLIC BLOOD PRESSURE: 60 MMHG | BODY MASS INDEX: 29.35 KG/M2

## 2024-07-15 DIAGNOSIS — R30.0 DYSURIA: Primary | ICD-10-CM

## 2024-07-15 DIAGNOSIS — R30.0 DYSURIA: ICD-10-CM

## 2024-07-15 LAB
BACTERIA #/AREA URNS AUTO: ABNORMAL /HPF
BILIRUB SERPL-MCNC: ABNORMAL MG/DL
BILIRUB UR QL STRIP: NEGATIVE
BLOOD URINE, POC: ABNORMAL
CLARITY UR REFRACT.AUTO: ABNORMAL
CLARITY, POC UA: ABNORMAL
COLOR UR AUTO: ABNORMAL
COLOR, POC UA: ABNORMAL
GLUCOSE UR QL STRIP: 1000
GLUCOSE UR QL STRIP: NEGATIVE
HGB UR QL STRIP: ABNORMAL
HYALINE CASTS UR QL AUTO: 0 /LPF
KETONES UR QL STRIP: 15
KETONES UR QL STRIP: NEGATIVE
LEUKOCYTE ESTERASE UR QL STRIP: ABNORMAL
LEUKOCYTE ESTERASE URINE, POC: ABNORMAL
MICROSCOPIC COMMENT: ABNORMAL
NITRITE UR QL STRIP: NEGATIVE
NITRITE, POC UA: ABNORMAL
PH UR STRIP: 6 [PH] (ref 5–8)
PH, POC UA: 5
PROT UR QL STRIP: ABNORMAL
PROTEIN, POC: 2000
RBC #/AREA URNS AUTO: >100 /HPF (ref 0–4)
SP GR UR STRIP: 1.01 (ref 1–1.03)
SPECIFIC GRAVITY, POC UA: 1.01
URN SPEC COLLECT METH UR: ABNORMAL
UROBILINOGEN, POC UA: 8
WBC #/AREA URNS AUTO: >100 /HPF (ref 0–5)

## 2024-07-15 PROCEDURE — 81001 URINALYSIS AUTO W/SCOPE: CPT | Mod: HCNC | Performed by: INTERNAL MEDICINE

## 2024-07-15 PROCEDURE — 3288F FALL RISK ASSESSMENT DOCD: CPT | Mod: HCNC,CPTII,S$GLB, | Performed by: INTERNAL MEDICINE

## 2024-07-15 PROCEDURE — 1159F MED LIST DOCD IN RCRD: CPT | Mod: HCNC,CPTII,S$GLB, | Performed by: INTERNAL MEDICINE

## 2024-07-15 PROCEDURE — 99999 PR PBB SHADOW E&M-EST. PATIENT-LVL IV: CPT | Mod: PBBFAC,HCNC,, | Performed by: INTERNAL MEDICINE

## 2024-07-15 PROCEDURE — 1160F RVW MEDS BY RX/DR IN RCRD: CPT | Mod: HCNC,CPTII,S$GLB, | Performed by: INTERNAL MEDICINE

## 2024-07-15 PROCEDURE — 81002 URINALYSIS NONAUTO W/O SCOPE: CPT | Mod: HCNC,S$GLB,, | Performed by: INTERNAL MEDICINE

## 2024-07-15 PROCEDURE — 1126F AMNT PAIN NOTED NONE PRSNT: CPT | Mod: HCNC,CPTII,S$GLB, | Performed by: INTERNAL MEDICINE

## 2024-07-15 PROCEDURE — 99213 OFFICE O/P EST LOW 20 MIN: CPT | Mod: HCNC,S$GLB,, | Performed by: INTERNAL MEDICINE

## 2024-07-15 PROCEDURE — 87086 URINE CULTURE/COLONY COUNT: CPT | Mod: HCNC | Performed by: INTERNAL MEDICINE

## 2024-07-15 PROCEDURE — 1100F PTFALLS ASSESS-DOCD GE2>/YR: CPT | Mod: HCNC,CPTII,S$GLB, | Performed by: INTERNAL MEDICINE

## 2024-07-15 RX ORDER — AMOXICILLIN AND CLAVULANATE POTASSIUM 875; 125 MG/1; MG/1
1 TABLET, FILM COATED ORAL 2 TIMES DAILY
Qty: 20 TABLET | Refills: 0 | Status: SHIPPED | OUTPATIENT
Start: 2024-07-15

## 2024-07-15 NOTE — PROGRESS NOTES
"HPI:  Patient is a 89-year-old man who comes in today accompanied by his granddaughter for follow-up of an ER visit where he was diagnosed with a bladder infection.  They took all of the antibiotics.  They finished him about a week ago.  The daughter states that she thinks he has a another bladder infection.  He has been going more frequently.  There has been no fever or back pain    Current meds have been verified and updated per the EMR  Exam:/60 (BP Location: Right arm)   Pulse 80   Temp 98 °F (36.7 °C) (Tympanic)   Ht 5' 11" (1.803 m)   Wt 95.1 kg (209 lb 10.5 oz)   SpO2 98%   BMI 29.24 kg/m²   Exam deferred    Point of care urine testing is positive for nitrates, leukocyte esterase and markedly positive for blood    Lab Results   Component Value Date    WBC 16.14 (H) 07/03/2024    HGB 16.6 07/03/2024    HCT 51.7 07/03/2024     07/03/2024    CHOL 164 05/28/2024    TRIG 131 05/28/2024    HDL 51 05/28/2024    ALT 23 07/03/2024    AST 31 07/03/2024     07/03/2024    K 4.8 07/03/2024     07/03/2024    CREATININE 1.2 07/03/2024    BUN 20 07/03/2024    CO2 23 07/03/2024    TSH 2.706 05/28/2024    PSA 4.4 (H) 06/14/2021    INR 1.6 12/22/2021    HGBA1C 5.5 05/28/2024    BNP 70 01/27/2017    URICACID 3.5 05/28/2024    SEDRATE 22 (H) 04/18/2011       Impression:  Hemorrhagic cystitis  Patient Active Problem List   Diagnosis    Obesity    Hearing loss of aging    History of pulmonary embolism    DM (diabetes mellitus), type 2 with complications    Hypertension    History of gout    Type 2 diabetes mellitus with polyneuropathy    Urinary retention    Aneurysm artery, popliteal    Atherosclerosis of aorta    Benign prostatic hyperplasia    Chronic venous insufficiency    Primary osteoarthritis involving multiple joints    At high risk for falls    CKD stage 3 due to type 2 diabetes mellitus       Plan:  Orders Placed This Encounter    Urine culture    Urinalysis    POCT URINE DIPSTICK WITHOUT " MICROSCOPE    amoxicillin-clavulanate 875-125mg (AUGMENTIN) 875-125 mg per tablet     Patient was started on Augmentin.  If he does not resolve this time we would have him see an urologist    This note is generated with speech recognition software and is subject to transcription error and sound alike phrases that may be missed by proofreading.

## 2024-07-17 ENCOUNTER — PATIENT MESSAGE (OUTPATIENT)
Dept: INTERNAL MEDICINE | Facility: CLINIC | Age: 89
End: 2024-07-17
Payer: MEDICARE

## 2024-07-17 DIAGNOSIS — R31.0 GROSS HEMATURIA: ICD-10-CM

## 2024-07-17 DIAGNOSIS — R82.81 STERILE PYURIA: Primary | ICD-10-CM

## 2024-07-17 LAB — BACTERIA UR CULT: NO GROWTH

## 2024-07-17 RX ORDER — HYDROCODONE BITARTRATE AND ACETAMINOPHEN 7.5; 325 MG/1; MG/1
1 TABLET ORAL EVERY 8 HOURS PRN
Qty: 90 TABLET | Refills: 0 | Status: SHIPPED | OUTPATIENT
Start: 2024-07-17

## 2024-07-17 NOTE — TELEPHONE ENCOUNTER
Call family and tell them that I would like for him to see an urologist for evaluation of his excessive blood in his urine. Tell him to finish all the antibiotics.  I have put in referral, please schedule apt with MD.

## 2024-07-23 ENCOUNTER — OFFICE VISIT (OUTPATIENT)
Dept: UROLOGY | Facility: CLINIC | Age: 89
End: 2024-07-23
Payer: MEDICARE

## 2024-07-23 VITALS
HEART RATE: 85 BPM | SYSTOLIC BLOOD PRESSURE: 143 MMHG | BODY MASS INDEX: 29.26 KG/M2 | DIASTOLIC BLOOD PRESSURE: 74 MMHG | WEIGHT: 209 LBS | HEIGHT: 71 IN

## 2024-07-23 DIAGNOSIS — R31.0 GROSS HEMATURIA: ICD-10-CM

## 2024-07-23 DIAGNOSIS — R82.81 STERILE PYURIA: Primary | ICD-10-CM

## 2024-07-23 LAB
BACTERIA #/AREA URNS AUTO: ABNORMAL /HPF
BILIRUB UR QL STRIP: NEGATIVE
BILIRUB UR QL STRIP: NEGATIVE
CLARITY UR REFRACT.AUTO: CLEAR
COLOR UR AUTO: YELLOW
GLUCOSE UR QL STRIP: NEGATIVE
GLUCOSE UR QL STRIP: NEGATIVE
HGB UR QL STRIP: ABNORMAL
KETONES UR QL STRIP: NEGATIVE
KETONES UR QL STRIP: NEGATIVE
LEUKOCYTE ESTERASE UR QL STRIP: ABNORMAL
LEUKOCYTE ESTERASE UR QL STRIP: POSITIVE
MICROSCOPIC COMMENT: ABNORMAL
NITRITE UR QL STRIP: NEGATIVE
PH UR STRIP: 7 [PH] (ref 5–8)
PH, POC UA: 7
POC BLOOD, URINE: POSITIVE
POC NITRATES, URINE: NEGATIVE
PROT UR QL STRIP: NEGATIVE
PROT UR QL STRIP: NEGATIVE
RBC #/AREA URNS AUTO: >100 /HPF (ref 0–4)
SP GR UR STRIP: 1.01 (ref 1–1.03)
SP GR UR STRIP: 1.02 (ref 1–1.03)
SQUAMOUS #/AREA URNS AUTO: 2 /HPF
URN SPEC COLLECT METH UR: ABNORMAL
UROBILINOGEN UR STRIP-ACNC: 1 (ref 0.3–2.2)
WBC #/AREA URNS AUTO: 44 /HPF (ref 0–5)

## 2024-07-23 PROCEDURE — 99999 PR PBB SHADOW E&M-EST. PATIENT-LVL IV: CPT | Mod: PBBFAC,HCNC,, | Performed by: NURSE PRACTITIONER

## 2024-07-23 PROCEDURE — 81001 URINALYSIS AUTO W/SCOPE: CPT | Mod: HCNC | Performed by: NURSE PRACTITIONER

## 2024-07-23 PROCEDURE — 1126F AMNT PAIN NOTED NONE PRSNT: CPT | Mod: HCNC,CPTII,S$GLB, | Performed by: NURSE PRACTITIONER

## 2024-07-23 PROCEDURE — 87086 URINE CULTURE/COLONY COUNT: CPT | Mod: HCNC | Performed by: NURSE PRACTITIONER

## 2024-07-23 PROCEDURE — 99214 OFFICE O/P EST MOD 30 MIN: CPT | Mod: HCNC,S$GLB,, | Performed by: NURSE PRACTITIONER

## 2024-07-23 PROCEDURE — 3288F FALL RISK ASSESSMENT DOCD: CPT | Mod: HCNC,CPTII,S$GLB, | Performed by: NURSE PRACTITIONER

## 2024-07-23 PROCEDURE — 81003 URINALYSIS AUTO W/O SCOPE: CPT | Mod: QW,HCNC,S$GLB, | Performed by: NURSE PRACTITIONER

## 2024-07-23 PROCEDURE — 1101F PT FALLS ASSESS-DOCD LE1/YR: CPT | Mod: HCNC,CPTII,S$GLB, | Performed by: NURSE PRACTITIONER

## 2024-07-23 PROCEDURE — 1159F MED LIST DOCD IN RCRD: CPT | Mod: HCNC,CPTII,S$GLB, | Performed by: NURSE PRACTITIONER

## 2024-07-23 NOTE — PROGRESS NOTES
Chief Complaint:   Gross hematuria    HPI:   Patient is a 89-year-old male that is presenting with his daughter.  Daughter states that patient was dizzy and had a fall, brought him to the emergency room, where they were told that he had a urinary tract infection.  In reviewing EMR, urine culture was negative.  Daughter states that patient has had several episodes of gross hematuria.  Was also seen by primary care physician is currently on Augmentin.  PVR was 32 mL in urine in clinic indicates small leukocytes and large blood.  Patient is currently asymptomatic.  [facility]  09/21/2023 - returns today for follow-up, no new issues in the interim, Flomax working well for him, no gross hematuria, dysuria, UTIs     03/23/2023 - patient returns today for follow-up, has been taking the Flomax as prescribed and notes that his urination is now back to normal, does mention that he has passed a few small stones in the interim but nothing overly bothersome, no dysuria or gross hematuria, no UTIs     01/26/2023 - cysto shows some adenoma regrowth, restart flomax     01/19/2023 - returns today for follow-up, urine continues to be dirty with positive leukocyte positive blood, overall feeling okay, no gross hematuria, renal function stable even with mild hydro     Patient is a 89-year-old male that is presenting with his daughter to review renal ultrasound.  Patient was seen in the emergency room and had an incidental finding of hydronephrosis.  Patient was sent for Renal ultrasound that indicated  a slight improvement to mild hydronephrosis.  Urine culture was negative, however, urinalysis indicated micro hematuria.  Patient denies pelvic or flank pain, no gross hematuria     01/04/2023 - NP Rushing: Patient is a 89-year-old male that is following up after a emergency room visit.  Daughter states that patient had a positive urine and is currently on Augmentin.  In reviewing EMR, urine culture indicated Staph.  Patient had an  abdominal ultrasound indicating bilateral hydronephrosis.  Urine in clinic indicates leukocytes and blood, all other parameters are negative.  PVR was 44 mL.  No history of renal stones.  Daughter states that urine is very cloudy.     12/02/2022 - returns for follow-up, no returns for follow-up, completed his antibiotics and his symptoms have resolved and recurred, also believes that he is restarted the Flomax and feels like it is helping, still having some OAB but improved, no further gross hematuria     10/20/2022 - patient returns today for follow-up, notes that he passed three stones in his urine back in June, had another stone in August, he denies any flank pain during these episodes, did have a little bit of blood after the August episode, also notes increased nocturia q.1 hour, also notes recent increase in number of UTIs for which he got antibiotics from his PCP, does not think he has been taking his Flomax     06/24/2021 - presents today for follow-up, notes that he continues to take the Flomax, finasteride, but patient's daughter does not think he has been taking the oxybutynin, urgency and frequency still occurring, wakes up once an hour at night, less during the day, does not feel like he empties all the way, denies any recent UTIs or gross hematuria    Allergies:  Morphine    Medications:  has a current medication list which includes the following prescription(s): allopurinol, amoxicillin-clavulanate 875-125mg, blood-glucose meter, finasteride, fluticasone propionate, hydrocodone-acetaminophen, lancets, lisinopril-hydrochlorothiazide, lovastatin, metformin, metoprolol tartrate, mupirocin, prednisolone acetate, ssd, stool softener, tamsulosin, and true metrix glucose test strip.    Review of Systems:  General: No fever, chills, fatigability, or weight loss.  Skin: No rashes, itching, or changes in color or texture of skin.  Chest: Denies [place], cyanosis, wheezing, cough, and sputum production.  Abdomen:  Appetite fine. No weight loss. Denies diarrhea, abdominal pain, hematemesis, or blood in stool.  Musculoskeletal: No joint stiffness or swelling. Denies back pain.  : As above.  All other review of systems negative.    PMH:   has a past medical history of Aphakia of right eye, Chronic venous insufficiency, CKD stage 3 due to type 2 diabetes mellitus, DM (diabetes mellitus), type 2 with complications, Hearing loss of aging, History of gout, History of pulmonary embolism (2011), Hypertension associated with diabetes, Iridodialysis of right eye, Obesity, unspecified, and Primary osteoarthritis involving multiple joints.    PSH:   has a past surgical history that includes Vascular surgery; Transurethral resection of prostate (N/A, 5/31/2018); Cataract extraction; Cataract extraction w/  intraocular lens implant (Left, 12/13/2018); yag laser capsulotomy (Left, 09/08/2020); and Endoscopic ultrasound of upper gastrointestinal tract (N/A, 12/30/2022).    FamHx: family history includes Diabetes in his brother.    SocHx:  reports that he has never smoked. He has never used smokeless tobacco. He reports that he does not drink alcohol and does not use drugs.      Physical Exam:  General:  Elderly male, A&Ox3, no apparent distress, no deformities  Neck: No masses, normal thyroid  Lungs: normal inspiration, no use of accessory muscles  Heart: normal pulse, no arrhythmias  Abdomen: Soft, NT, ND, no masses, no hernias, no hepatosplenomegaly  Lymphatic: Neck and groin nodes negative  Skin: The skin is warm and dry. No jaundice.    Labs/Studies:   See HPI    Impression/Plan:   Gross hematuria  Will send urine for urinalysis and culture.  Had a lengthy discussion with daughter related to gross hematuria workup.  Daughter is uncertain if she wants to proceed with CT urogram and cystoscopy if urinalysis does indicate hematuria without an infection.  Will be contacted with results and needed follow-up.  Daughter states that they do  have a follow-up appointment with Dr. Holman in September.  Patient to remain on tamsulosin.

## 2024-07-24 ENCOUNTER — TELEPHONE (OUTPATIENT)
Dept: UROLOGY | Facility: CLINIC | Age: 89
End: 2024-07-24
Payer: MEDICARE

## 2024-07-24 NOTE — TELEPHONE ENCOUNTER
.Outgoing call placed to patient's daughter, she verified name and date of birth, she was notified that the patient's urinalysis indicates bacteria and red blood cells and that she will be contacted with final urine culture results and plan of care once.      ----- Message from Hope Coats sent at 7/24/2024  4:44 PM CDT -----  Contact: Kat/daughter  Type:  Patient Returning Call    Who Called:Kat   Who Left Message for Patient:Nurse  Does the patient know what this is regarding?: Urine results   Would the patient rather a call back or a response via [facility]? Call back   Best Call Back Number:[phone]  Additional Information:

## 2024-07-24 NOTE — TELEPHONE ENCOUNTER
.Outgoing call attempted to notify patient's daughter regarding below but no answer, left voice message with contact information letting daughter know she can contact us at her earliest convenience for details.    ----- Message from Kylie Alexis NP sent at 7/24/2024  8:26 AM CDT -----  Please call daughter and inform her that urinalysis indicates bacteria and red blood cells. Will contact her with final urine culture results and plan of care

## 2024-07-25 DIAGNOSIS — R31.29 MICROHEMATURIA: Primary | ICD-10-CM

## 2024-07-25 LAB — BACTERIA UR CULT: NORMAL

## 2024-07-30 ENCOUNTER — LAB VISIT (OUTPATIENT)
Dept: LAB | Facility: HOSPITAL | Age: 89
End: 2024-07-30
Attending: NURSE PRACTITIONER
Payer: MEDICARE

## 2024-07-30 DIAGNOSIS — R31.29 MICROHEMATURIA: ICD-10-CM

## 2024-07-30 LAB
CREAT SERPL-MCNC: 1.1 MG/DL (ref 0.5–1.4)
EST. GFR  (NO RACE VARIABLE): >60 ML/MIN/1.73 M^2

## 2024-07-30 PROCEDURE — 36415 COLL VENOUS BLD VENIPUNCTURE: CPT | Mod: PO | Performed by: NURSE PRACTITIONER

## 2024-07-30 PROCEDURE — 82565 ASSAY OF CREATININE: CPT | Performed by: NURSE PRACTITIONER

## 2024-08-09 ENCOUNTER — HOSPITAL ENCOUNTER (INPATIENT)
Facility: HOSPITAL | Age: 89
LOS: 3 days | Discharge: HOME HEALTH | DRG: 176 | End: 2024-08-12
Attending: EMERGENCY MEDICINE | Admitting: HOSPITALIST
Payer: MEDICARE

## 2024-08-09 ENCOUNTER — PATIENT MESSAGE (OUTPATIENT)
Dept: UROLOGY | Facility: CLINIC | Age: 89
End: 2024-08-09
Payer: MEDICARE

## 2024-08-09 ENCOUNTER — HOSPITAL ENCOUNTER (OUTPATIENT)
Dept: RADIOLOGY | Facility: HOSPITAL | Age: 89
Discharge: HOME | End: 2024-08-09
Attending: NURSE PRACTITIONER
Payer: MEDICARE

## 2024-08-09 DIAGNOSIS — R07.9 CHEST PAIN: ICD-10-CM

## 2024-08-09 DIAGNOSIS — R31.29 MICROHEMATURIA: ICD-10-CM

## 2024-08-09 DIAGNOSIS — I26.99 PULMONARY EMBOLISM: ICD-10-CM

## 2024-08-09 DIAGNOSIS — I26.92 ACUTE SADDLE PULMONARY EMBOLISM WITHOUT ACUTE COR PULMONALE: Primary | ICD-10-CM

## 2024-08-09 PROBLEM — E78.5 HYPERLIPIDEMIA: Status: ACTIVE | Noted: 2021-12-22

## 2024-08-09 PROBLEM — E11.9 TYPE 2 DIABETES MELLITUS, WITHOUT LONG-TERM CURRENT USE OF INSULIN: Status: ACTIVE | Noted: 2024-08-09

## 2024-08-09 LAB
ALBUMIN SERPL BCP-MCNC: 3.4 G/DL (ref 3.5–5.2)
ALP SERPL-CCNC: 88 U/L (ref 55–135)
ALT SERPL W/O P-5'-P-CCNC: 19 U/L (ref 10–44)
ANION GAP SERPL CALC-SCNC: 10 MMOL/L (ref 8–16)
APTT PPP: 30.8 SEC (ref 21–32)
AST SERPL-CCNC: 20 U/L (ref 10–40)
BASOPHILS # BLD AUTO: 0.03 K/UL (ref 0–0.2)
BASOPHILS NFR BLD: 0.4 % (ref 0–1.9)
BILIRUB SERPL-MCNC: 0.6 MG/DL (ref 0.1–1)
BNP SERPL-MCNC: 93 PG/ML (ref 0–99)
BUN SERPL-MCNC: 17 MG/DL (ref 10–30)
CALCIUM SERPL-MCNC: 9.8 MG/DL (ref 8.7–10.5)
CHLORIDE SERPL-SCNC: 105 MMOL/L (ref 95–110)
CO2 SERPL-SCNC: 23 MMOL/L (ref 23–29)
CREAT SERPL-MCNC: 1.1 MG/DL (ref 0.5–1.4)
DIFFERENTIAL METHOD BLD: ABNORMAL
EOSINOPHIL # BLD AUTO: 0.1 K/UL (ref 0–0.5)
EOSINOPHIL NFR BLD: 1.6 % (ref 0–8)
ERYTHROCYTE [DISTWIDTH] IN BLOOD BY AUTOMATED COUNT: 15.4 % (ref 11.5–14.5)
EST. GFR  (NO RACE VARIABLE): >60 ML/MIN/1.73 M^2
GLUCOSE SERPL-MCNC: 132 MG/DL (ref 70–110)
HCT VFR BLD AUTO: 48.2 % (ref 40–54)
HGB BLD-MCNC: 15.5 G/DL (ref 14–18)
IMM GRANULOCYTES # BLD AUTO: 0.06 K/UL (ref 0–0.04)
IMM GRANULOCYTES NFR BLD AUTO: 0.8 % (ref 0–0.5)
INR PPP: 1 (ref 0.8–1.2)
LYMPHOCYTES # BLD AUTO: 1.1 K/UL (ref 1–4.8)
LYMPHOCYTES NFR BLD: 14.3 % (ref 18–48)
MCH RBC QN AUTO: 28.5 PG (ref 27–31)
MCHC RBC AUTO-ENTMCNC: 32.2 G/DL (ref 32–36)
MCV RBC AUTO: 89 FL (ref 82–98)
MONOCYTES # BLD AUTO: 0.9 K/UL (ref 0.3–1)
MONOCYTES NFR BLD: 11.8 % (ref 4–15)
NEUTROPHILS # BLD AUTO: 5.3 K/UL (ref 1.8–7.7)
NEUTROPHILS NFR BLD: 71.1 % (ref 38–73)
NRBC BLD-RTO: 0 /100 WBC
PLATELET # BLD AUTO: 197 K/UL (ref 150–450)
PMV BLD AUTO: 11.3 FL (ref 9.2–12.9)
POTASSIUM SERPL-SCNC: 4.4 MMOL/L (ref 3.5–5.1)
PROT SERPL-MCNC: 8.1 G/DL (ref 6–8.4)
PROTHROMBIN TIME: 11.8 SEC (ref 9–12.5)
RBC # BLD AUTO: 5.44 M/UL (ref 4.6–6.2)
SODIUM SERPL-SCNC: 138 MMOL/L (ref 136–145)
TROPONIN I SERPL DL<=0.01 NG/ML-MCNC: 0.04 NG/ML (ref 0–0.03)
TROPONIN I SERPL DL<=0.01 NG/ML-MCNC: 0.04 NG/ML (ref 0–0.03)
WBC # BLD AUTO: 7.47 K/UL (ref 3.9–12.7)

## 2024-08-09 PROCEDURE — 84484 ASSAY OF TROPONIN QUANT: CPT | Mod: HCNC | Performed by: NURSE PRACTITIONER

## 2024-08-09 PROCEDURE — 63600175 PHARM REV CODE 636 W HCPCS: Performed by: EMERGENCY MEDICINE

## 2024-08-09 PROCEDURE — 74178 CT ABD&PLV WO CNTR FLWD CNTR: CPT | Mod: 26,,, | Performed by: RADIOLOGY

## 2024-08-09 PROCEDURE — 74178 CT ABD&PLV WO CNTR FLWD CNTR: CPT | Mod: TC

## 2024-08-09 PROCEDURE — 85025 COMPLETE CBC W/AUTO DIFF WBC: CPT | Mod: HCNC | Performed by: EMERGENCY MEDICINE

## 2024-08-09 PROCEDURE — 96365 THER/PROPH/DIAG IV INF INIT: CPT | Mod: HCNC

## 2024-08-09 PROCEDURE — 99291 CRITICAL CARE FIRST HOUR: CPT | Mod: HCNC

## 2024-08-09 PROCEDURE — 80053 COMPREHEN METABOLIC PANEL: CPT | Mod: HCNC | Performed by: EMERGENCY MEDICINE

## 2024-08-09 PROCEDURE — 83880 ASSAY OF NATRIURETIC PEPTIDE: CPT | Mod: HCNC | Performed by: EMERGENCY MEDICINE

## 2024-08-09 PROCEDURE — 36415 COLL VENOUS BLD VENIPUNCTURE: CPT | Mod: HCNC | Performed by: NURSE PRACTITIONER

## 2024-08-09 PROCEDURE — 25500020 PHARM REV CODE 255: Performed by: NURSE PRACTITIONER

## 2024-08-09 PROCEDURE — 11000001 HC ACUTE MED/SURG PRIVATE ROOM

## 2024-08-09 PROCEDURE — 85610 PROTHROMBIN TIME: CPT | Mod: HCNC | Performed by: EMERGENCY MEDICINE

## 2024-08-09 PROCEDURE — 93005 ELECTROCARDIOGRAM TRACING: CPT | Mod: HCNC

## 2024-08-09 PROCEDURE — 85730 THROMBOPLASTIN TIME PARTIAL: CPT | Mod: HCNC | Performed by: EMERGENCY MEDICINE

## 2024-08-09 PROCEDURE — 93010 ELECTROCARDIOGRAM REPORT: CPT | Mod: HCNC,,, | Performed by: INTERNAL MEDICINE

## 2024-08-09 PROCEDURE — 84484 ASSAY OF TROPONIN QUANT: CPT | Mod: 91,HCNC | Performed by: EMERGENCY MEDICINE

## 2024-08-09 RX ORDER — HEPARIN SODIUM,PORCINE/D5W 25000/250
0-40 INTRAVENOUS SOLUTION INTRAVENOUS CONTINUOUS
Status: DISCONTINUED | OUTPATIENT
Start: 2024-08-09 | End: 2024-08-11

## 2024-08-09 RX ORDER — IBUPROFEN 200 MG
24 TABLET ORAL
Status: DISCONTINUED | OUTPATIENT
Start: 2024-08-09 | End: 2024-08-12 | Stop reason: HOSPADM

## 2024-08-09 RX ORDER — PROMETHAZINE HYDROCHLORIDE 25 MG/1
25 TABLET ORAL EVERY 6 HOURS PRN
Status: DISCONTINUED | OUTPATIENT
Start: 2024-08-09 | End: 2024-08-12 | Stop reason: HOSPADM

## 2024-08-09 RX ORDER — ONDANSETRON HYDROCHLORIDE 2 MG/ML
4 INJECTION, SOLUTION INTRAVENOUS EVERY 8 HOURS PRN
Status: DISCONTINUED | OUTPATIENT
Start: 2024-08-09 | End: 2024-08-12 | Stop reason: HOSPADM

## 2024-08-09 RX ORDER — INSULIN ASPART 100 [IU]/ML
0-5 INJECTION, SOLUTION INTRAVENOUS; SUBCUTANEOUS
Status: DISCONTINUED | OUTPATIENT
Start: 2024-08-09 | End: 2024-08-12 | Stop reason: HOSPADM

## 2024-08-09 RX ORDER — IPRATROPIUM BROMIDE AND ALBUTEROL SULFATE 2.5; .5 MG/3ML; MG/3ML
3 SOLUTION RESPIRATORY (INHALATION) EVERY 4 HOURS PRN
Status: DISCONTINUED | OUTPATIENT
Start: 2024-08-09 | End: 2024-08-12 | Stop reason: HOSPADM

## 2024-08-09 RX ORDER — ACETAMINOPHEN 325 MG/1
650 TABLET ORAL EVERY 6 HOURS PRN
Status: DISCONTINUED | OUTPATIENT
Start: 2024-08-09 | End: 2024-08-12 | Stop reason: HOSPADM

## 2024-08-09 RX ORDER — GLUCAGON 1 MG
1 KIT INJECTION
Status: DISCONTINUED | OUTPATIENT
Start: 2024-08-09 | End: 2024-08-12 | Stop reason: HOSPADM

## 2024-08-09 RX ORDER — ALUMINUM HYDROXIDE, MAGNESIUM HYDROXIDE, AND SIMETHICONE 1200; 120; 1200 MG/30ML; MG/30ML; MG/30ML
30 SUSPENSION ORAL 4 TIMES DAILY PRN
Status: DISCONTINUED | OUTPATIENT
Start: 2024-08-09 | End: 2024-08-12 | Stop reason: HOSPADM

## 2024-08-09 RX ORDER — IBUPROFEN 200 MG
16 TABLET ORAL
Status: DISCONTINUED | OUTPATIENT
Start: 2024-08-09 | End: 2024-08-12 | Stop reason: HOSPADM

## 2024-08-09 RX ORDER — SODIUM CHLORIDE 0.9 % (FLUSH) 0.9 %
3 SYRINGE (ML) INJECTION EVERY 12 HOURS PRN
Status: DISCONTINUED | OUTPATIENT
Start: 2024-08-09 | End: 2024-08-12 | Stop reason: HOSPADM

## 2024-08-09 RX ORDER — TALC
6 POWDER (GRAM) TOPICAL NIGHTLY PRN
Status: DISCONTINUED | OUTPATIENT
Start: 2024-08-09 | End: 2024-08-12 | Stop reason: HOSPADM

## 2024-08-09 RX ORDER — AMOXICILLIN 500 MG/1
500 CAPSULE ORAL EVERY 8 HOURS
Status: ON HOLD | COMMUNITY
Start: 2024-08-05 | End: 2024-08-12 | Stop reason: HOSPADM

## 2024-08-09 RX ORDER — ACETAMINOPHEN 650 MG/1
650 SUPPOSITORY RECTAL EVERY 4 HOURS PRN
Status: DISCONTINUED | OUTPATIENT
Start: 2024-08-09 | End: 2024-08-12 | Stop reason: HOSPADM

## 2024-08-09 RX ORDER — SIMETHICONE 80 MG
1 TABLET,CHEWABLE ORAL 4 TIMES DAILY PRN
Status: DISCONTINUED | OUTPATIENT
Start: 2024-08-09 | End: 2024-08-12 | Stop reason: HOSPADM

## 2024-08-09 RX ORDER — POLYETHYLENE GLYCOL 3350 17 G/17G
17 POWDER, FOR SOLUTION ORAL DAILY PRN
Status: DISCONTINUED | OUTPATIENT
Start: 2024-08-09 | End: 2024-08-12 | Stop reason: HOSPADM

## 2024-08-09 RX ORDER — NALOXONE HCL 0.4 MG/ML
0.02 VIAL (ML) INJECTION
Status: DISCONTINUED | OUTPATIENT
Start: 2024-08-09 | End: 2024-08-12 | Stop reason: HOSPADM

## 2024-08-09 RX ADMIN — IOHEXOL 100 ML: 350 INJECTION, SOLUTION INTRAVENOUS at 03:08

## 2024-08-09 RX ADMIN — HEPARIN SODIUM 18 UNITS/KG/HR: 10000 INJECTION, SOLUTION INTRAVENOUS at 06:08

## 2024-08-09 NOTE — ED PROVIDER NOTES
"SCRIBE #1 NOTE: I, Me-Virgilio Trevino, am scribing for, and in the presence of, Tito Matamoros DO. I have scribed the entire note.       History     Chief Complaint   Patient presents with    Pulmonary Embolism     Pt sent by [facility] for  saddle PE on CT today     Review of patient's allergies indicates:   Allergen Reactions    Morphine Other (See Comments)     Pt states, "It only makes my pain worse."         History of Present Illness     HPI    8/9/2024, 5:31 PM  History obtained from the patient and family member at bedside      History of Present Illness: Amauri Lara is a 89 y.o. male patient with a PMHx of CKD stage 3 due to DM2, gout, and HTN associated with DM2 who presents to the Emergency Department for evaluation after being sent by urology -- yKlie Alexis NP -- for saddle pulmonary embolism on CT urogram performed today for hematuria. Pt has no other complaints at this time. Patient denies any fever, CP, chest tightness, SOB, nausea, vomiting, and all other sxs at this time. No prior Tx reported.     Independent patient history obtained from pt's family member at bedside. States that pt has been "hurting all weekend". Family member reports symptoms of generalized weakness and states that pt "fell a couple times". No further complaints or concerns at this time.       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    Obesity, unspecified     Primary osteoarthritis involving multiple joints        Past Surgical History:  Past Surgical History:   Procedure " Laterality Date    CATARACT EXTRACTION      aphakic od    CATARACT EXTRACTION W/  INTRAOCULAR LENS IMPLANT Left 12/13/2018    ENDOSCOPIC ULTRASOUND OF UPPER GASTROINTESTINAL TRACT N/A 12/30/2022    Procedure: ULTRASOUND, UPPER GI TRACT, ENDOSCOPIC;  Surgeon: Mani Howard MD;  Location: [facility];  Service: Endoscopy;  Laterality: N/A;    TRANSURETHRAL RESECTION OF PROSTATE N/A 5/31/2018    Procedure: PROSTATECTOMY-TRANSURETHRAL;  Surgeon: Michael Westbrook IV, MD;  Location: [facility] OR;  Service: Urology;  Laterality: N/A;    VASCULAR SURGERY      yag laser capsulotomy Left 09/08/2020         Family History:  Family History   Problem Relation Name Age of Onset    Diabetes Brother         Social History:  Social History     Tobacco Use    Smoking status: Never    Smokeless tobacco: Never   Substance and Sexual Activity    Alcohol use: No    Drug use: No    Sexual activity: Never        Review of Systems     Review of Systems   Constitutional:  Negative for fever.   HENT:  Negative for sore throat.    Respiratory:  Negative for chest tightness and shortness of breath.    Cardiovascular:  Negative for chest pain.   Gastrointestinal:  Negative for nausea and vomiting.   Genitourinary:  Negative for dysuria.   Musculoskeletal:  Negative for back pain.   Skin:  Negative for rash.   Neurological:  Positive for weakness (generalized).   Hematological:  Does not bruise/bleed easily.   All other systems reviewed and are negative.     Physical Exam     Initial Vitals [08/09/24 1713]   BP Pulse Resp Temp SpO2   (!) 163/78 81 16 98 °F (36.7 °C) 97 %      MAP       --          Physical Exam  Vitals reviewed.   Cardiovascular:      Rate and Rhythm: Normal rate and regular rhythm.      Heart sounds: No murmur heard.  Pulmonary:      Effort: Pulmonary effort is normal.      Breath sounds: No wheezing.   Abdominal:      Palpations: Abdomen is soft.      Tenderness: There is no abdominal tenderness.   Musculoskeletal:         General:  "Normal range of motion.   Skin:     General: Skin is warm and dry.      Findings: No rash.   Neurological:      General: No focal deficit present.      Mental Status: He is alert and oriented to person, place, and time.          ED Course   Critical Care    Date/Time: 8/9/2024 7:00 PM    Performed by: Tito Matamoros DO  Authorized by: Tito Matamoros DO  Direct patient critical care time: 15 minutes  Additional history critical care time: 5 minutes  Ordering / reviewing critical care time: 5 minutes  Documentation critical care time: 5 minutes  Consulting other physicians critical care time: 5 minutes  Total critical care time (exclusive of procedural time) : 35 minutes  Critical care time was exclusive of separately billable procedures and treating other patients.  Critical care was necessary to treat or prevent imminent or life-threatening deterioration of the following conditions: respiratory failure and circulatory failure.  Critical care was time spent personally by me on the following activities: discussions with consultants, interpretation of cardiac output measurements, evaluation of patient's response to treatment, examination of patient, ordering and performing treatments and interventions, ordering and review of laboratory studies, ordering and review of radiographic studies, pulse oximetry, re-evaluation of patient's condition and review of old charts.        ED Vital Signs:  Vitals:    08/10/24 0736 08/10/24 0823 08/10/24 1100 08/10/24 1205   BP: (!) 150/72   (!) 155/76   Pulse: 67 70 66 66   Resp: 18   18   Temp: 97.7 °F (36.5 °C)   97.7 °F (36.5 °C)   TempSrc: Oral   Oral   SpO2: 97%   96%   Weight: 97.1 kg (214 lb)      Height: 5' 11" (1.803 m)       08/10/24 1249 08/10/24 1500 08/10/24 1518 08/10/24 1623   BP:    135/71   Pulse: 80 77 84 81   Resp:    18   Temp:    98.2 °F (36.8 °C)   TempSrc:    Oral   SpO2:    97%   Weight:       Height:        08/10/24 2000 08/10/24 2004 08/10/24 " 2350 08/11/24 0400   BP:  125/71 127/74    Pulse: 78 83 78 70   Resp:  18 18    Temp:  98.3 °F (36.8 °C) 98 °F (36.7 °C)    TempSrc:  Oral Oral    SpO2:  97% 96%    Weight:       Height:        08/11/24 0405 08/11/24 0800 08/11/24 0814   BP: (!) 144/75  128/68   Pulse: 73 68 71   Resp: 18  18   Temp: 97.6 °F (36.4 °C)  98 °F (36.7 °C)   TempSrc: Oral  Oral   SpO2: 95%  95%   Weight:      Height:          Abnormal Lab Results:  Labs Reviewed   CBC W/ AUTO DIFFERENTIAL - Abnormal       Result Value    WBC 7.47      RBC 5.44      Hemoglobin 15.5      Hematocrit 48.2      MCV 89      MCH 28.5      MCHC 32.2      RDW 15.4 (*)     Platelets 197      MPV 11.3      Immature Granulocytes 0.8 (*)     Gran # (ANC) 5.3      Immature Grans (Abs) 0.06 (*)     Lymph # 1.1      Mono # 0.9      Eos # 0.1      Baso # 0.03      nRBC 0      Gran % 71.1      Lymph % 14.3 (*)     Mono % 11.8      Eosinophil % 1.6      Basophil % 0.4      Differential Method Automated     COMPREHENSIVE METABOLIC PANEL - Abnormal    Sodium 138      Potassium 4.4      Chloride 105      CO2 23      Glucose 132 (*)     BUN 17      Creatinine 1.1      Calcium 9.8      Total Protein 8.1      Albumin 3.4 (*)     Total Bilirubin 0.6      Alkaline Phosphatase 88      AST 20      ALT 19      eGFR >60      Anion Gap 10     TROPONIN I - Abnormal    Troponin I 0.037 (*)    B-TYPE NATRIURETIC PEPTIDE    BNP 93     APTT    aPTT 30.8      Narrative:     Draw baseline aPTT prior to starting the heparin bolus or  infusion  (if patient is on warfarin prior to heparin therapy)   PROTIME-INR    Prothrombin Time 11.8      INR 1.0      Narrative:     Draw baseline aPTT prior to starting the heparin bolus or  infusion  (if patient is on warfarin prior to heparin therapy)        All Lab Results:  Results for orders placed or performed during the hospital encounter of 08/09/24   CBC auto differential   Result Value Ref Range    WBC 7.47 3.90 - 12.70 K/uL    RBC 5.44 4.60 - 6.20  M/uL    Hemoglobin 15.5 14.0 - 18.0 g/dL    Hematocrit 48.2 40.0 - 54.0 %    MCV 89 82 - 98 fL    MCH 28.5 27.0 - 31.0 pg    MCHC 32.2 32.0 - 36.0 g/dL    RDW 15.4 (H) 11.5 - 14.5 %    Platelets 197 150 - 450 K/uL    MPV 11.3 9.2 - 12.9 fL    Immature Granulocytes 0.8 (H) 0.0 - 0.5 %    Gran # (ANC) 5.3 1.8 - 7.7 K/uL    Immature Grans (Abs) 0.06 (H) 0.00 - 0.04 K/uL    Lymph # 1.1 1.0 - 4.8 K/uL    Mono # 0.9 0.3 - 1.0 K/uL    Eos # 0.1 0.0 - 0.5 K/uL    Baso # 0.03 0.00 - 0.20 K/uL    nRBC 0 0 /100 WBC    Gran % 71.1 38.0 - 73.0 %    Lymph % 14.3 (L) 18.0 - 48.0 %    Mono % 11.8 4.0 - 15.0 %    Eosinophil % 1.6 0.0 - 8.0 %    Basophil % 0.4 0.0 - 1.9 %    Differential Method Automated    Comprehensive metabolic panel   Result Value Ref Range    Sodium 138 136 - 145 mmol/L    Potassium 4.4 3.5 - 5.1 mmol/L    Chloride 105 95 - 110 mmol/L    CO2 23 23 - 29 mmol/L    Glucose 132 (H) 70 - 110 mg/dL    BUN 17 10 - 30 mg/dL    Creatinine 1.1 0.5 - 1.4 mg/dL    Calcium 9.8 8.7 - 10.5 mg/dL    Total Protein 8.1 6.0 - 8.4 g/dL    Albumin 3.4 (L) 3.5 - 5.2 g/dL    Total Bilirubin 0.6 0.1 - 1.0 mg/dL    Alkaline Phosphatase 88 55 - 135 U/L    AST 20 10 - 40 U/L    ALT 19 10 - 44 U/L    eGFR >60 >60 mL/min/1.73 m^2    Anion Gap 10 8 - 16 mmol/L   Troponin I   Result Value Ref Range    Troponin I 0.037 (H) 0.000 - 0.026 ng/mL   Brain natriuretic peptide   Result Value Ref Range    BNP 93 0 - 99 pg/mL   APTT   Result Value Ref Range    aPTT 30.8 21.0 - 32.0 sec   Protime-INR   Result Value Ref Range    Prothrombin Time 11.8 9.0 - 12.5 sec    INR 1.0 0.8 - 1.2   Troponin I   Result Value Ref Range    Troponin I 0.038 (H) 0.000 - 0.026 ng/mL   APTT   Result Value Ref Range    aPTT 85.4 (H) 21.0 - 32.0 sec   CBC auto differential   Result Value Ref Range    WBC 6.62 3.90 - 12.70 K/uL    RBC 5.04 4.60 - 6.20 M/uL    Hemoglobin 14.5 14.0 - 18.0 g/dL    Hematocrit 44.3 40.0 - 54.0 %    MCV 88 82 - 98 fL    MCH 28.8 27.0 - 31.0 pg     MCHC 32.7 32.0 - 36.0 g/dL    RDW 15.3 (H) 11.5 - 14.5 %    Platelets 190 150 - 450 K/uL    MPV 10.9 9.2 - 12.9 fL    Immature Granulocytes 0.8 (H) 0.0 - 0.5 %    Gran # (ANC) 4.4 1.8 - 7.7 K/uL    Immature Grans (Abs) 0.05 (H) 0.00 - 0.04 K/uL    Lymph # 1.0 1.0 - 4.8 K/uL    Mono # 1.1 (H) 0.3 - 1.0 K/uL    Eos # 0.1 0.0 - 0.5 K/uL    Baso # 0.04 0.00 - 0.20 K/uL    nRBC 0 0 /100 WBC    Gran % 65.8 38.0 - 73.0 %    Lymph % 14.7 (L) 18.0 - 48.0 %    Mono % 16.0 (H) 4.0 - 15.0 %    Eosinophil % 2.1 0.0 - 8.0 %    Basophil % 0.6 0.0 - 1.9 %    Differential Method Automated    Troponin I   Result Value Ref Range    Troponin I 0.030 (H) 0.000 - 0.026 ng/mL   Troponin I   Result Value Ref Range    Troponin I 0.024 0.000 - 0.026 ng/mL   APTT   Result Value Ref Range    aPTT 54.7 (H) 21.0 - 32.0 sec   APTT   Result Value Ref Range    aPTT 47.7 (H) 21.0 - 32.0 sec   CBC auto differential   Result Value Ref Range    WBC 8.87 3.90 - 12.70 K/uL    RBC 5.59 4.60 - 6.20 M/uL    Hemoglobin 15.8 14.0 - 18.0 g/dL    Hematocrit 50.3 40.0 - 54.0 %    MCV 90 82 - 98 fL    MCH 28.3 27.0 - 31.0 pg    MCHC 31.4 (L) 32.0 - 36.0 g/dL    RDW 15.4 (H) 11.5 - 14.5 %    Platelets 201 150 - 450 K/uL    MPV 12.0 9.2 - 12.9 fL    Immature Granulocytes 0.3 0.0 - 0.5 %    Gran # (ANC) 6.7 1.8 - 7.7 K/uL    Immature Grans (Abs) 0.03 0.00 - 0.04 K/uL    Lymph # 0.8 (L) 1.0 - 4.8 K/uL    Mono # 1.2 (H) 0.3 - 1.0 K/uL    Eos # 0.1 0.0 - 0.5 K/uL    Baso # 0.05 0.00 - 0.20 K/uL    nRBC 0 0 /100 WBC    Gran % 75.7 (H) 38.0 - 73.0 %    Lymph % 8.7 (L) 18.0 - 48.0 %    Mono % 13.6 4.0 - 15.0 %    Eosinophil % 1.1 0.0 - 8.0 %    Basophil % 0.6 0.0 - 1.9 %    Differential Method Automated    APTT   Result Value Ref Range    aPTT 48.1 (H) 21.0 - 32.0 sec   EKG 12-lead   Result Value Ref Range    QRS Duration 86 ms    [facility] QTC Calculation 472 ms   Echo   Result Value Ref Range    BSA 2.21 m2    LVOT stroke volume 88.30 cm3    LVIDd 4.11 3.5 - 6.0 cm    LV  Systolic Volume 26.98 mL    LV Systolic Volume Index 12.4 mL/m2    LVIDs 2.70 2.1 - 4.0 cm    LV Diastolic Volume 74.77 mL    LV Diastolic Volume Index 34.46 mL/m2    Left Ventricular End Systolic Volume by Teichholz Method 26.98 mL    Left Ventricular End Diastolic Volume by Teichholz Method 74.77 mL    IVS 1.26 (A) 0.6 - 1.1 cm    LVOT diameter 2.08 cm    LVOT area 3.4 cm2    FS 34 28 - 44 %    Left Ventricle Relative Wall Thickness 0.75 cm    Posterior Wall 1.54 (A) 0.6 - 1.1 cm    LV mass 217.35 g    LV Mass Index 100 g/m2    MV Peak E Efra 0.55 m/s    TDI LATERAL 0.08 m/s    TDI SEPTAL 0.07 m/s    E/E' ratio 7.33 m/s    MV Peak A Efra 0.86 m/s    TR Max Efra 2.78 m/s    E/A ratio 0.64     IVRT 94.20 msec    E wave deceleration time 309.24 msec    LV SEPTAL E/E' RATIO 7.86 m/s    LV LATERAL E/E' RATIO 6.88 m/s    LVOT peak efra 1.01 m/s    Left Ventricular Outflow Tract Mean Velocity 0.69 cm/s    Left Ventricular Outflow Tract Mean Gradient 2.17 mmHg    RVOT peak VTI 5.9 cm    TAPSE 1.80 cm    LA size 3.41 cm    Left Atrium Minor Axis 3.19 cm    Left Atrium Major Axis 6.58 cm    RA Major Axis 5.24 cm    AV regurgitation pressure 1/2 time [id number] ms    AR Max Efra 4.55 m/s    AV mean gradient 15 mmHg    AV peak gradient 24 mmHg    Ao peak efra 2.45 m/s    Ao VTI 52.50 cm    LVOT peak VTI 26.00 cm    AV valve area 1.68 cm²    AV Velocity Ratio 0.41     AV index (prosthetic) 0.50     MARIBEL by Velocity Ratio 1.40 cm²    MV stenosis pressure 1/2 time 89.68 ms    MV valve area p 1/2 method 2.45 cm2    Triscuspid Valve Regurgitation Peak Gradient 31 mmHg    PV mean gradient 0 mmHg    RVOT peak efra 0.49 m/s    Ao root annulus 3.70 cm    STJ 4.03 cm    Ascending aorta 3.77 cm    IVC diameter 2.16 cm    Mean e' 0.08 m/s    ZLVIDS -3.81     ZLVIDD -5.58     LA Volume Index 20.7 mL/m2    LA volume 44.84 cm3    LA WIDTH 3.6 cm    RA Width 3.0 cm    EF 60 %    TV resting pulmonary artery pressure 34 mmHg    RV TB RVSP 6  mmHg    Est. RA pres 3 mmHg   POCT glucose   Result Value Ref Range    POCT Glucose 95 70 - 110 mg/dL   POCT glucose   Result Value Ref Range    POCT Glucose 112 (H) 70 - 110 mg/dL   POCT glucose   Result Value Ref Range    POCT Glucose 122 (H) 70 - 110 mg/dL   POCT glucose   Result Value Ref Range    POCT Glucose 103 70 - 110 mg/dL   POCT glucose   Result Value Ref Range    POCT Glucose 126 (H) 70 - 110 mg/dL         Imaging Results:  Imaging Results              X-Ray Chest AP Portable (Final result)  Result time 08/09/24 18:32:30      Final result by Yanely Benton MD (08/09/24 18:32:30)                   Impression:      Pulmonary vascular congestion or interstitial edema suspected      Electronically signed by: Yanely Benton  Date:    08/09/2024  Time:    18:32               Narrative:    EXAMINATION:  XR CHEST AP PORTABLE    CLINICAL HISTORY:  pulmonary embolism;    TECHNIQUE:  Single frontal portable view of the chest was performed.    COMPARISON:  07/03/2024    FINDINGS:  Pulmonary vascular/interstitial markings are increased.  No consolidation or pleural effusion                                       The EKG was ordered, reviewed, and independently interpreted by the ED provider.  Interpretation time: 17:37  Rate: 80 BPM  Rhythm: normal sinus rhythm  Interpretation: Septal infarct, age undetermined. No STEMI.           The Emergency Provider reviewed the vital signs and test results, which are outlined above.     ED Discussion     6:48 PM: Discussed pt's case with Konrad Law NP (Critical Care) who states vss, on ra, and no heart strain. Recommends formal echo and for patient to go to the floor.    6:49 PM: Discussed case with Maritza Mckeon NP (Hospital Medicine). Dr. Palmer agrees with current care and management of pt and accepts admission.   Admitting Service: Hospital Medicine  Admitting Physician: Dr. Palmer  Admit to: Inpatient Med Tele    6:51 PM: Re-evaluated pt. I have  discussed test results, shared treatment plan, and the need for admission with patient and family at bedside. Pt and family express understanding at this time and agree with all information. All questions answered. Pt and family have no further questions or concerns at this time. Pt is ready for admit.        ED Course as of 08/11/24 1208   Fri Aug 09, 2024   1816 Troponin I(!)  Elevated [CD]   1816 Brain natriuretic peptide  Within normal limits [CD]   1816 Comprehensive metabolic panel(!)  Nonspecific findings [CD]      ED Course User Index  [CD] Tito Matamoros, DO     Medical Decision Making  Amount and/or Complexity of Data Reviewed  Independent Historian:      Details: Family member at bedside    Labs: ordered. Decision-making details documented in ED Course.     Details: CBC reviewed shows nonspecific findings  Radiology: ordered and independent interpretation performed. Decision-making details documented in ED Course.  ECG/medicine tests: ordered and independent interpretation performed. Decision-making details documented in ED Course.    Risk  Prescription drug management.  Decision regarding hospitalization.  Risk Details: Differential diagnosis includes but is not limited to:  Pulmonary embolism, right heart strain, ACS, heart failure                ED Medication(s):  Medications   heparin 25,000 units in dextrose 5% 250 mL (100 units/mL) infusion HIGH INTENSITY nomogram - [facility] (15 Units/kg/hr × 97.3 kg Intravenous Handoff 8/11/24 0711)   heparin 25,000 units in dextrose 5% (100 units/ml) IV bolus from bag HIGH INTENSITY nomogram - [facility] (has no administration in time range)   heparin 25,000 units in dextrose 5% (100 units/ml) IV bolus from bag HIGH INTENSITY nomogram - [facility] (has no administration in time range)   sodium chloride 0.9% flush 3 mL (has no administration in time range)   albuterol-ipratropium 2.5 mg-0.5 mg/3 mL nebulizer solution 3 mL (has no administration in time range)   melatonin  tablet 6 mg (has no administration in time range)   ondansetron injection 4 mg (has no administration in time range)   promethazine tablet 25 mg (has no administration in time range)   polyethylene glycol packet 17 g (has no administration in time range)   acetaminophen tablet 650 mg (has no administration in time range)   simethicone chewable tablet 80 mg (has no administration in time range)   aluminum-magnesium hydroxide-simethicone 200-200-20 mg/5 mL suspension 30 mL (has no administration in time range)   acetaminophen suppository 650 mg (has no administration in time range)   naloxone 0.4 mg/mL injection 0.02 mg (has no administration in time range)   glucose chewable tablet 16 g (has no administration in time range)   glucose chewable tablet 24 g (has no administration in time range)   glucagon (human recombinant) injection 1 mg (has no administration in time range)   insulin aspart U-100 pen 0-5 Units (has no administration in time range)   dextrose 10% bolus 125 mL 125 mL (has no administration in time range)   dextrose 10% bolus 250 mL 250 mL (has no administration in time range)   allopurinoL tablet 300 mg (300 mg Oral Given 8/11/24 0817)   finasteride tablet 5 mg (5 mg Oral Given 8/11/24 0817)   atorvastatin tablet 10 mg (10 mg Oral Given 8/10/24 2149)   tamsulosin 24 hr capsule 0.8 mg (0.8 mg Oral Given 8/11/24 0817)   furosemide tablet 20 mg (20 mg Oral Given 8/11/24 1105)   heparin 25,000 units in dextrose 5% (100 units/ml) IV bolus from bag HIGH INTENSITY nomogram - [facility] (7,784 Units Intravenous Bolus from Bag 8/9/24 1832)   furosemide injection 40 mg (40 mg Intravenous Given 8/10/24 1155)       Current Discharge Medication List                  Scribe Attestation:   Scribe #1: I performed the above scribed service and the documentation accurately describes the services I performed. I attest to the accuracy of the note.     Attending:   Physician Attestation Statement for Scribe #1: Shiloh REYNAGA  Tito RAMIREZ DO, personally performed the services described in this documentation, as scribed by Mary Trevino, in my presence, and it is both accurate and complete.           Clinical Impression       ICD-10-CM ICD-9-CM   1. Pulmonary embolism  I26.99 415.19   2. Chest pain  R07.9 786.50       Disposition:   Disposition: Admitted  Condition: Serious         Tito Matamoros DO  08/11/24 1210

## 2024-08-10 LAB
AORTIC ROOT ANNULUS: 3.7 CM
APTT PPP: 47.7 SEC (ref 21–32)
APTT PPP: 54.7 SEC (ref 21–32)
APTT PPP: 85.4 SEC (ref 21–32)
ASCENDING AORTA: 3.77 CM
AV INDEX (PROSTH): 0.5
AV MEAN GRADIENT: 15 MMHG
AV PEAK GRADIENT: 24 MMHG
AV REGURGITATION PRESSURE HALF TIME: 723.3 MS
AV VALVE AREA BY VELOCITY RATIO: 1.4 CM²
AV VALVE AREA: 1.68 CM²
AV VELOCITY RATIO: 0.41
BASOPHILS # BLD AUTO: 0.04 K/UL (ref 0–0.2)
BASOPHILS NFR BLD: 0.6 % (ref 0–1.9)
BSA FOR ECHO PROCEDURE: 2.21 M2
CV ECHO LV RWT: 0.75 CM
DIFFERENTIAL METHOD BLD: ABNORMAL
DOP CALC AO PEAK VEL: 2.45 M/S
DOP CALC AO VTI: 52.5 CM
DOP CALC LVOT AREA: 3.4 CM2
DOP CALC LVOT DIAMETER: 2.08 CM
DOP CALC LVOT PEAK VEL: 1.01 M/S
DOP CALC LVOT STROKE VOLUME: 88.3 CM3
DOP CALC RVOT PEAK VEL: 0.49 M/S
DOP CALC RVOT VTI: 5.9 CM
DOP CALCLVOT PEAK VEL VTI: 26 CM
E WAVE DECELERATION TIME: 309.24 MSEC
E/A RATIO: 0.64
E/E' RATIO: 7.33 M/S
ECHO LV POSTERIOR WALL: 1.54 CM (ref 0.6–1.1)
EJECTION FRACTION: 60 %
EOSINOPHIL # BLD AUTO: 0.1 K/UL (ref 0–0.5)
EOSINOPHIL NFR BLD: 2.1 % (ref 0–8)
ERYTHROCYTE [DISTWIDTH] IN BLOOD BY AUTOMATED COUNT: 15.3 % (ref 11.5–14.5)
FRACTIONAL SHORTENING: 34 % (ref 28–44)
HCT VFR BLD AUTO: 44.3 % (ref 40–54)
HGB BLD-MCNC: 14.5 G/DL (ref 14–18)
IMM GRANULOCYTES # BLD AUTO: 0.05 K/UL (ref 0–0.04)
IMM GRANULOCYTES NFR BLD AUTO: 0.8 % (ref 0–0.5)
INTERVENTRICULAR SEPTUM: 1.26 CM (ref 0.6–1.1)
IVC DIAMETER: 2.16 CM
IVRT: 94.2 MSEC
LA MAJOR: 6.58 CM
LA MINOR: 3.19 CM
LA WIDTH: 3.6 CM
LEFT ATRIUM SIZE: 3.41 CM
LEFT ATRIUM VOLUME INDEX: 20.7 ML/M2
LEFT ATRIUM VOLUME: 44.84 CM3
LEFT INTERNAL DIMENSION IN SYSTOLE: 2.7 CM (ref 2.1–4)
LEFT VENTRICLE DIASTOLIC VOLUME INDEX: 34.46 ML/M2
LEFT VENTRICLE DIASTOLIC VOLUME: 74.77 ML
LEFT VENTRICLE MASS INDEX: 100 G/M2
LEFT VENTRICLE SYSTOLIC VOLUME INDEX: 12.4 ML/M2
LEFT VENTRICLE SYSTOLIC VOLUME: 26.98 ML
LEFT VENTRICULAR INTERNAL DIMENSION IN DIASTOLE: 4.11 CM (ref 3.5–6)
LEFT VENTRICULAR MASS: 217.35 G
LV LATERAL E/E' RATIO: 6.88 M/S
LV SEPTAL E/E' RATIO: 7.86 M/S
LVED V (TEICH): 74.77 ML
LVES V (TEICH): 26.98 ML
LVOT MG: 2.17 MMHG
LVOT MV: 0.69 CM/S
LYMPHOCYTES # BLD AUTO: 1 K/UL (ref 1–4.8)
LYMPHOCYTES NFR BLD: 14.7 % (ref 18–48)
MCH RBC QN AUTO: 28.8 PG (ref 27–31)
MCHC RBC AUTO-ENTMCNC: 32.7 G/DL (ref 32–36)
MCV RBC AUTO: 88 FL (ref 82–98)
MONOCYTES # BLD AUTO: 1.1 K/UL (ref 0.3–1)
MONOCYTES NFR BLD: 16 % (ref 4–15)
MV PEAK A VEL: 0.86 M/S
MV PEAK E VEL: 0.55 M/S
MV STENOSIS PRESSURE HALF TIME: 89.68 MS
MV VALVE AREA P 1/2 METHOD: 2.45 CM2
NEUTROPHILS # BLD AUTO: 4.4 K/UL (ref 1.8–7.7)
NEUTROPHILS NFR BLD: 65.8 % (ref 38–73)
NRBC BLD-RTO: 0 /100 WBC
PISA AR MAX VEL: 4.55 M/S
PISA TR MAX VEL: 2.78 M/S
PLATELET # BLD AUTO: 190 K/UL (ref 150–450)
PMV BLD AUTO: 10.9 FL (ref 9.2–12.9)
POCT GLUCOSE: 112 MG/DL (ref 70–110)
POCT GLUCOSE: 122 MG/DL (ref 70–110)
POCT GLUCOSE: 95 MG/DL (ref 70–110)
PV MEAN GRADIENT: 0 MMHG
RA MAJOR: 5.24 CM
RA PRESSURE ESTIMATED: 3 MMHG
RA WIDTH: 3 CM
RBC # BLD AUTO: 5.04 M/UL (ref 4.6–6.2)
RV TB RVSP: 6 MMHG
STJ: 4.03 CM
TDI LATERAL: 0.08 M/S
TDI SEPTAL: 0.07 M/S
TDI: 0.08 M/S
TR MAX PG: 31 MMHG
TRICUSPID ANNULAR PLANE SYSTOLIC EXCURSION: 1.8 CM
TROPONIN I SERPL DL<=0.01 NG/ML-MCNC: 0.02 NG/ML (ref 0–0.03)
TROPONIN I SERPL DL<=0.01 NG/ML-MCNC: 0.03 NG/ML (ref 0–0.03)
TV REST PULMONARY ARTERY PRESSURE: 34 MMHG
WBC # BLD AUTO: 6.62 K/UL (ref 3.9–12.7)
Z-SCORE OF LEFT VENTRICULAR DIMENSION IN END DIASTOLE: -5.58
Z-SCORE OF LEFT VENTRICULAR DIMENSION IN END SYSTOLE: -3.81

## 2024-08-10 PROCEDURE — 84484 ASSAY OF TROPONIN QUANT: CPT | Mod: 91,HCNC | Performed by: NURSE PRACTITIONER

## 2024-08-10 PROCEDURE — 36415 COLL VENOUS BLD VENIPUNCTURE: CPT | Mod: HCNC,XB | Performed by: STUDENT IN AN ORGANIZED HEALTH CARE EDUCATION/TRAINING PROGRAM

## 2024-08-10 PROCEDURE — 94799 UNLISTED PULMONARY SVC/PX: CPT | Mod: HCNC,XB

## 2024-08-10 PROCEDURE — 99222 1ST HOSP IP/OBS MODERATE 55: CPT | Mod: HCNC,25,, | Performed by: INTERNAL MEDICINE

## 2024-08-10 PROCEDURE — 99900035 HC TECH TIME PER 15 MIN (STAT): Mod: HCNC

## 2024-08-10 PROCEDURE — 36415 COLL VENOUS BLD VENIPUNCTURE: CPT | Mod: HCNC,XB | Performed by: HOSPITALIST

## 2024-08-10 PROCEDURE — 85730 THROMBOPLASTIN TIME PARTIAL: CPT | Mod: HCNC | Performed by: HOSPITALIST

## 2024-08-10 PROCEDURE — 25000003 PHARM REV CODE 250: Performed by: NURSE PRACTITIONER

## 2024-08-10 PROCEDURE — 36415 COLL VENOUS BLD VENIPUNCTURE: CPT | Mod: HCNC | Performed by: EMERGENCY MEDICINE

## 2024-08-10 PROCEDURE — 63600175 PHARM REV CODE 636 W HCPCS: Mod: HCNC | Performed by: INTERNAL MEDICINE

## 2024-08-10 PROCEDURE — 85730 THROMBOPLASTIN TIME PARTIAL: CPT | Mod: 91,HCNC | Performed by: STUDENT IN AN ORGANIZED HEALTH CARE EDUCATION/TRAINING PROGRAM

## 2024-08-10 PROCEDURE — 11000001 HC ACUTE MED/SURG PRIVATE ROOM: Mod: HCNC

## 2024-08-10 PROCEDURE — 85025 COMPLETE CBC W/AUTO DIFF WBC: CPT | Mod: HCNC | Performed by: EMERGENCY MEDICINE

## 2024-08-10 PROCEDURE — 21400001 HC TELEMETRY ROOM: Mod: HCNC

## 2024-08-10 PROCEDURE — 63600175 PHARM REV CODE 636 W HCPCS: Performed by: EMERGENCY MEDICINE

## 2024-08-10 PROCEDURE — 36415 COLL VENOUS BLD VENIPUNCTURE: CPT | Mod: HCNC,XB | Performed by: NURSE PRACTITIONER

## 2024-08-10 PROCEDURE — 85730 THROMBOPLASTIN TIME PARTIAL: CPT | Mod: 91,HCNC | Performed by: HOSPITALIST

## 2024-08-10 RX ORDER — HYDROCHLOROTHIAZIDE 12.5 MG/1
12.5 TABLET ORAL DAILY
Status: DISCONTINUED | OUTPATIENT
Start: 2024-08-10 | End: 2024-08-11

## 2024-08-10 RX ORDER — LISINOPRIL 20 MG/1
20 TABLET ORAL DAILY
Status: DISCONTINUED | OUTPATIENT
Start: 2024-08-10 | End: 2024-08-11

## 2024-08-10 RX ORDER — FUROSEMIDE 10 MG/ML
40 INJECTION INTRAMUSCULAR; INTRAVENOUS ONCE
Status: COMPLETED | OUTPATIENT
Start: 2024-08-10 | End: 2024-08-10

## 2024-08-10 RX ORDER — FINASTERIDE 5 MG/1
5 TABLET, FILM COATED ORAL DAILY
Status: DISCONTINUED | OUTPATIENT
Start: 2024-08-10 | End: 2024-08-12 | Stop reason: HOSPADM

## 2024-08-10 RX ORDER — ATORVASTATIN CALCIUM 10 MG/1
10 TABLET, FILM COATED ORAL NIGHTLY
Status: DISCONTINUED | OUTPATIENT
Start: 2024-08-10 | End: 2024-08-12 | Stop reason: HOSPADM

## 2024-08-10 RX ORDER — ALLOPURINOL 300 MG/1
300 TABLET ORAL DAILY
Status: DISCONTINUED | OUTPATIENT
Start: 2024-08-10 | End: 2024-08-12 | Stop reason: HOSPADM

## 2024-08-10 RX ORDER — TAMSULOSIN HYDROCHLORIDE 0.4 MG/1
0.8 CAPSULE ORAL DAILY
Status: DISCONTINUED | OUTPATIENT
Start: 2024-08-10 | End: 2024-08-12 | Stop reason: HOSPADM

## 2024-08-10 RX ORDER — METOPROLOL TARTRATE 50 MG/1
50 TABLET ORAL DAILY
Status: DISCONTINUED | OUTPATIENT
Start: 2024-08-10 | End: 2024-08-11

## 2024-08-10 RX ADMIN — HEPARIN SODIUM 15 UNITS/KG/HR: 10000 INJECTION, SOLUTION INTRAVENOUS at 03:08

## 2024-08-10 RX ADMIN — TAMSULOSIN HYDROCHLORIDE 0.8 MG: 0.4 CAPSULE ORAL at 09:08

## 2024-08-10 RX ADMIN — ATORVASTATIN CALCIUM 10 MG: 10 TABLET, FILM COATED ORAL at 09:08

## 2024-08-10 RX ADMIN — FUROSEMIDE 40 MG: 10 INJECTION, SOLUTION INTRAMUSCULAR; INTRAVENOUS at 11:08

## 2024-08-10 RX ADMIN — HEPARIN SODIUM 15 UNITS/KG/HR: 10000 INJECTION, SOLUTION INTRAVENOUS at 07:08

## 2024-08-10 RX ADMIN — FINASTERIDE 5 MG: 5 TABLET, FILM COATED ORAL at 09:08

## 2024-08-10 RX ADMIN — HYDROCHLOROTHIAZIDE 12.5 MG: 12.5 TABLET ORAL at 09:08

## 2024-08-10 RX ADMIN — ALLOPURINOL 300 MG: 300 TABLET ORAL at 09:08

## 2024-08-10 NOTE — ASSESSMENT & PLAN NOTE
Saddle PE w/o right heart strain noted. Currently on Heparin drip.   Plan:  -continue Heparin drip  -monitor labs per nomogram   -echo  -Cards consult  -Defer IR consult to day team

## 2024-08-10 NOTE — ASSESSMENT & PLAN NOTE
Voices no new or worsening urinary complaints. Compliant with home medication.  Plan:  -continue proscar and flomax     ambulatory

## 2024-08-10 NOTE — NURSING
Pt BP at 0227 was 165/81 asymptomatic. Notified provider, no new orders for prn antihypertensive since pt's SBP is less than 180.

## 2024-08-10 NOTE — PROGRESS NOTES
"[facility] Medicine  Progress Note    Patient Name: Amauri Lara  MRN: [id number]  Patient Class: IP- Inpatient   Admission Date: 8/9/2024  Length of Stay: 1 days  Attending Physician: Jose Raul Powers DO  Primary Care Provider: Stefan Vera MD        Subjective:     Principal Problem:Acute saddle pulmonary embolism without acute cor pulmonale        HPI:  Amauri Lara is a 89 y.o. male with a PMH  has a past medical history of Aphakia of right eye, Chronic venous insufficiency, CKD stage 3 due to type 2 diabetes mellitus, DM (diabetes mellitus), type 2 with complications, Hearing loss of aging, History of gout, History of pulmonary embolism (2011), Hypertension associated with diabetes, Iridodialysis of right eye, Obesity, unspecified, and Primary osteoarthritis involving multiple joints.presented to the Emergency Department for evaluation after being sent by urology -- Kylie Alexis NP -- for saddle pulmonary embolism on CT urogram performed today for hematuria. Patient has no other complaints at this time. Patient denies any fever, CP, chest tightness, SOB, nausea, vomiting, and all other sxs at this time. No prior Tx reported.      Independent patient history obtained from patient's family member at bedside. States that pt has been "hurting all weekend". Family member reports symptoms of generalized weakness and states that patient slide out of his recliner, but denies hitting his head of having LOC. No further complaints or concerns at this time.      ER workup revealed CBC to be unremarkable.  CMP unremarkable.  BNP within normal limits.  Initial troponin 0.037.  Chest x-ray revealed pulmonary vascular congestion or interstitial edema suspected.  EKG revealed normal sinus rhythm with a ventricular rate of 80 beats per minute with a QT/QTC of 404/465.  Patient initiated on heparin drip for saddle PE.  Hospital Medicine consulted to admit patient for further evaluation and treatment. "  Patient and family at bedside in agreement with treatment plan.  Patient admitted under inpatient status.    PCP: Stefan Vera      Overview/Hospital Course:  Admitted to Hospital Medicine for evaluation of pulmonary embolism.  Cardiology consulted for further evaluation.  TTE without concerns for right heart strain.     Interval History: No acute events overnight, afebrile, hemodynamically stable.  Denies chest pain, palpitations, lightheadedness, dizziness, shortness of breath, cough, nausea, or vomiting.  Per cardiology recs, diuresing with furosemide 40 mg IV x1 for pulmonary edema concerns on imaging.    Objective:     Vital Signs (Most Recent):  Temp: 97.7 °F (36.5 °C) (08/10/24 1205)  Pulse: 84 (08/10/24 1518)  Resp: 18 (08/10/24 1205)  BP: (!) 155/76 (08/10/24 1205)  SpO2: 96 % (08/10/24 1205) Vital Signs (24h Range):  Temp:  [97.5 °F (36.4 °C)-98 °F (36.7 °C)] 97.7 °F (36.5 °C)  Pulse:  [66-84] 84  Resp:  [16-22] 18  SpO2:  [95 %-98 %] 96 %  BP: (140-173)/(72-95) 155/76     Weight: 97.1 kg (214 lb)  Body mass index is 29.85 kg/m².    Intake/Output Summary (Last 24 hours) at 8/10/2024 1525  Last data filed at 8/10/2024 1313  Gross per 24 hour   Intake 480 ml   Output 1800 ml   Net -1320 ml         Physical Exam  Vitals and nursing note reviewed.   Constitutional:       General: He is not in acute distress.     Appearance: He is not ill-appearing, toxic-appearing or diaphoretic.   HENT:      Head: Normocephalic and atraumatic.      Mouth/Throat:      Mouth: Mucous membranes are moist.   Eyes:      General: No scleral icterus.  Cardiovascular:      Rate and Rhythm: Normal rate and regular rhythm.      Heart sounds: Normal heart sounds. No murmur heard.  Pulmonary:      Effort: Pulmonary effort is normal. No respiratory distress.      Breath sounds: No wheezing, rhonchi or rales.   Abdominal:      General: Bowel sounds are normal. There is no distension.      Palpations: Abdomen is soft.      Tenderness:  There is no abdominal tenderness. There is no guarding or rebound.   Musculoskeletal:      Right lower leg: Edema present.      Left lower leg: Edema present.   Skin:     General: Skin is warm and dry.      Coloration: Skin is not jaundiced.   Neurological:      Mental Status: He is alert.   Psychiatric:         Mood and Affect: Mood normal.         Behavior: Behavior normal.             Significant Labs: All pertinent labs within the past 24 hours have been reviewed.  LABS:  Recent Labs   Lab 08/09/24  1742      K 4.4      CO2 23   BUN 17   CREATININE 1.1   *   ANIONGAP 10       Recent Labs   Lab 08/09/24  1742   AST 20   ALT 19   ALKPHOS 88   BILITOT 0.6   ALBUMIN 3.4*     POCT Glucose:   Recent Labs   Lab 08/10/24  0618 08/10/24  1200   POCTGLUCOSE 95 112*    Recent Labs   Lab 08/09/24  1742 08/10/24  0150   WBC 7.47 6.62   HGB 15.5 14.5   HCT 48.2 44.3    190   GRAN 71.1  5.3 65.8  4.4          Significant Imaging: I have reviewed all pertinent imaging results/findings within the past 24 hours.  X-Ray Chest AP Portable   Final Result      Pulmonary vascular congestion or interstitial edema suspected         Electronically signed by: Yanely Benton   Date:    08/09/2024   Time:    18:32          Inpatient Medications:  Continuous Infusions:   heparin (porcine) in D5W  0-40 Units/kg/hr Intravenous Continuous 14.6 mL/hr at 08/10/24 0325 15 Units/kg/hr at 08/10/24 0325     Scheduled Meds:   allopurinoL  300 mg Oral Daily    atorvastatin  10 mg Oral QHS    finasteride  5 mg Oral Daily    hydroCHLOROthiazide  12.5 mg Oral Daily    lisinopriL  20 mg Oral Daily    metoprolol tartrate  50 mg Oral Daily    tamsulosin  0.8 mg Oral Daily     PRN Meds:  Current Facility-Administered Medications:     acetaminophen, 650 mg, Rectal, Q4H PRN    acetaminophen, 650 mg, Oral, Q6H PRN    albuterol-ipratropium, 3 mL, Nebulization, Q4H PRN    aluminum-magnesium hydroxide-simethicone, 30 mL, Oral, QID  PRN    dextrose 10%, 12.5 g, Intravenous, PRN    dextrose 10%, 25 g, Intravenous, PRN    glucagon (human recombinant), 1 mg, Intramuscular, PRN    glucose, 16 g, Oral, PRN    glucose, 24 g, Oral, PRN    heparin (PORCINE), 60 Units/kg, Intravenous, PRN    heparin (PORCINE), 30 Units/kg, Intravenous, PRN    insulin aspart U-100, 0-5 Units, Subcutaneous, QID (AC + HS) PRN    melatonin, 6 mg, Oral, Nightly PRN    naloxone, 0.02 mg, Intravenous, PRN    ondansetron, 4 mg, Intravenous, Q8H PRN    polyethylene glycol, 17 g, Oral, Daily PRN    promethazine, 25 mg, Oral, Q6H PRN    simethicone, 1 tablet, Oral, QID PRN    sodium chloride 0.9%, 3 mL, Intravenous, Q12H PRN      Assessment/Plan:      * Acute saddle pulmonary embolism without acute cor pulmonale  Saddle PE w/o right heart strain noted. Currently on Heparin drip.     Plan:  -Cardiology consulted, follow-up recs  -continue Heparin drip  -monitor labs per nomogram    Hyperlipidemia  Patient is chronically on statin.will continue for now. Last Lipid Panel:   Lab Results   Component Value Date    CHOL 164 05/28/2024    HDL 51 05/28/2024    LDLCALC 86.8 05/28/2024    TRIG 131 05/28/2024    CHOLHDL 31.1 05/28/2024     Plan:  -Continue home medication  -low fat/low calorie diet        Type 2 diabetes mellitus, without long-term current use of insulin  Stable  on admission      Home Diabetes Medications               metFORMIN (GLUCOPHAGE) 850 MG tablet TAKE ONE TABLET BY MOUTH EVERY DAY            Most recent A1c reviewed-   Lab Results   Component Value Date    HGBA1C 5.5 05/28/2024        Most recent glucose reviewed-  Recent Labs     08/10/24  0618 08/10/24  1200   POCTGLUCOSE 95 112*         Plan:  Antihyperglycemics (From admission, onward)      Start     Stop Route Frequency Ordered    08/09/24 2210  insulin aspart U-100 pen 0-5 Units         -- SubQ Before meals & nightly PRN 08/09/24 2111        - POCT glucose ACHS  - Will monitor glucose results and adjust  insulin regimen accordingly  - Hold oral hypoglycemic agents while patient is in the hospital.    CKD stage 3 due to type 2 diabetes mellitus  Creatine stable for now. BMP reviewed- noted Estimated Creatinine Clearance: 48.8 mL/min (based on SCr of 1.1 mg/dL). according to latest data. Based on current GFR, CKD stage is stage 3 - GFR 30-59.  Monitor UOP and serial BMP and adjust therapy as needed. Renally dose meds. Avoid nephrotoxic medications and procedures.    Benign prostatic hyperplasia  Voices no new or worsening urinary complaints. Compliant with home medication.  Plan:  -continue proscar and flomax      History of gout  Compliant with home medications. Voices no complaints of active flare-up.  Plan:  -continue allopurinol      Hypertension  Chronic, controlled. Latest blood pressure and vitals reviewed-     Temp:  [97.5 °F (36.4 °C)-98 °F (36.7 °C)]   Pulse:  [66-84]   Resp:  [16-22]   BP: (140-173)/(72-95)   SpO2:  [95 %-98 %] .   Home meds for hypertension were reviewed and noted below.   Hypertension Medications               lisinopriL-hydrochlorothiazide (PRINZIDE,ZESTORETIC) 20-12.5 mg per tablet TAKE ONE TABLET BY MOUTH TWICE DAILY    metoprolol tartrate (LOPRESSOR) 50 MG tablet TAKE ONE TABLET BY MOUTH ONCE DAILY            While in the hospital, will manage blood pressure as follows; Continue home antihypertensive regimen    Will utilize p.r.n. blood pressure medication only if patient's blood pressure greater than 160/100 and he develops symptoms such as worsening chest pain or shortness of breath.      VTE Risk Mitigation (From admission, onward)           Ordered     Reason for No Pharmacological VTE Prophylaxis  Once        Question:  Reasons:  Answer:  IV Heparin w/in 24 hrs. Pre or Post-Op    08/09/24 2111     IP VTE HIGH RISK PATIENT  Once         08/09/24 2111     Place sequential compression device  Until discontinued         08/09/24 2111     heparin 25,000 units in dextrose 5% (100  units/ml) IV bolus from bag HIGH INTENSITY nomogram - [facility]  As needed (PRN)        Question:  Heparin Infusion Adjustment (DO NOT MODIFY ANSWER)  Answer:  [email or web address]\epic\Images\Pharmacy\HeparinInfusions\heparin HIGH INTENSITY nomogram for [facility] [id number].pdf    08/09/24 1734     heparin 25,000 units in dextrose 5% (100 units/ml) IV bolus from bag HIGH INTENSITY nomogram - [facility]  As needed (PRN)        Question:  Heparin Infusion Adjustment (DO NOT MODIFY ANSWER)  Answer:  [email or web address]\epic\Images\Pharmacy\HeparinInfusions\heparin HIGH INTENSITY nomogram for [facility] [id number].pdf    08/09/24 1734     heparin 25,000 units in dextrose 5% 250 mL (100 units/mL) infusion HIGH INTENSITY nomogram - [facility]  Continuous        Question:  Begin at (units/kg/hr)  Answer:  18    08/09/24 1734                    Discharge Planning   SAM:      Code Status: Full Code   Is the patient medically ready for discharge?:     Reason for patient still in hospital (select all that apply): Patient trending condition, Laboratory test, Treatment, and Consult recommendations  Discharge Plan A: Home Health, Home with family          Jose Raul Powers DO  Department of Hospital Medicine   [facility]    Voice recognition software was used in the creation of this note/communication and any sound-alike/typographical errors which may have occurred despite initial review prior to signing should be taken in context when interpreting.  If such errors prevent a clear understanding of the note/communication, please contact the provider/office for clarification.

## 2024-08-10 NOTE — PLAN OF CARE
[facility] - Med Surg  Initial Discharge Assessment       Primary Care Provider: Stefan Vera MD    Admission Diagnosis: Pulmonary embolism [I26.99]    Admission Date: 8/9/2024  Expected Discharge Date:     Transition of Care Barriers: None    Payor: HU[employer] MANAGED MEDICARE / Plan: [employer] MEDICARE HMO / Product Type: Capitation /     Extended Emergency Contact Information  Primary Emergency Contact: Nel Bella   [facility]  Home Phone: [phone]  Relation: Daughter  Secondary Emergency Contact: Bautista lara  Mobile Phone: [phone]  Relation: Daughter    Discharge Plan A: Home Health, Home with family  Discharge Plan B: Home with family, Home Health      [facility] - [place], [place] - [address]   [address] [facility] [address]  Phone: [phone] Fax: [phone]    [facility] - [facility] [address]  [address] [facility] [address]  Phone: [phone] Fax: [phone]    [id number] - [place], [place] - [address]  [address]  [address]  [place] [address]  Phone: [phone] Fax: [phone]      Initial Assessment (most recent)       Adult Discharge Assessment - 08/10/24 1047          Discharge Assessment    Assessment Type Discharge Planning Assessment     Confirmed/corrected address, phone number and insurance Yes     Confirmed Demographics Correct on Facesheet     Source of Information family     If unable to respond/provide information was family/caregiver contacted? Yes     Contact Name/Number Bautista Lara (daughter) [phone]     People in Home child(jerome), adult     Facility Arrived From: Home     Do you expect to return to your current living situation? Yes     Do you have help at home or someone to help you manage your care at home? Yes     Who are your caregiver(s) and their phone number(s)? Bautista Lara (daughter) [phone] and Kat  Marco (daughter) [phone]     Prior to hospitilization cognitive status: Alert/Oriented     Current cognitive status: Alert/Oriented     Walking or Climbing Stairs Difficulty yes     Walking or Climbing Stairs ambulation difficulty, requires equipment     Dressing/Bathing Difficulty no     Equipment Currently Used at Home cane, straight     Readmission within 30 days? No     Patient currently being followed by outpatient case management? No     Do you currently have service(s) that help you manage your care at home? No     Do you take prescription medications? Yes     Do you have prescription coverage? Yes     Do you have any problems affording any of your prescribed medications? No     Is the patient taking medications as prescribed? yes     Who is going to help you get home at discharge? Bautista Lara (daughter) [phone]     How do you get to doctors appointments? family or friend will provide     Are you on dialysis? No     Do you take coumadin? No     Discharge Plan A Home Health;Home with family     Discharge Plan B Home with family;Home Health     DME Needed Upon Discharge  walker, rolling     Discharge Plan discussed with: Adult children     Transition of Care Barriers None                        Pt's daughter requested RW and Home Health referral.

## 2024-08-10 NOTE — ASSESSMENT & PLAN NOTE
"Patient's FSGs are controlled on current medication regimen.  Last A1c reviewed-   Lab Results   Component Value Date    HGBA1C 5.5 05/28/2024     Most recent fingerstick glucose reviewed- No results for input(s): "POCTGLUCOSE" in the last 24 hours.  Current correctional scale  Low  Maintain anti-hyperglycemic dose as follows-   Antihyperglycemics (From admission, onward)      Start     Stop Route Frequency Ordered    08/09/24 2210  insulin aspart U-100 pen 0-5 Units         -- SubQ Before meals & nightly PRN 08/09/24 2111          Plan:  -SSI  -A1c  -Accu-checks  -Hold oral hypoglycemics while patient is in the hospital  -Continue home long-acting insulin at 20% decrease, titrate up as needed  -Hypoglycemic protocol        "

## 2024-08-10 NOTE — ASSESSMENT & PLAN NOTE
Compliant with home medications. Voices no complaints of active flare-up.  Plan:  -continue allopurinol

## 2024-08-10 NOTE — CONSULTS
[facility] Surg  Cardiology  Consult Note    Patient Name: Amauri Lara  MRN: [id number]  Admission Date: 8/9/2024  Hospital Length of Stay: 1 days  Code Status: Full Code   Attending Provider: Jose Raul Powers DO   Consulting Provider: Samina Sanchez MD   Primary Care Physician: Stefan Vera MD  Principal Problem:Acute saddle pulmonary embolism without acute cor pulmonale    Patient information was obtained from patient and ER records.     Inpatient consult to Cardiology  Consult performed by: Clifton Sanchez MD  Consult ordered by: Jose Raul Powers DO        Subjective:     Chief Complaint:       HPI:   Mr Lara is a 89 year old male with a past medical history of DM, histoyr of PE (2011) HTN, on chronic coumadin,(apparently off coumadin). Urology workup (CT) showed saddle pulmonary embolism. Patient with some SOB and denies any chest pain. Chest X-ray showed mild CHF. EKG with no changes, NSR, and probable septal scar. Patient on IV heparin.   Recommendation to continue IV heparin, check echo, pt will eventually back on eliquis ,will give one dose of IV lasix CHF.     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    Obesity, unspecified     Primary osteoarthritis involving multiple joints        Past Surgical History:   Procedure Laterality Date    CATARACT EXTRACTION      aphakic od    CATARACT EXTRACTION W/  INTRAOCULAR LENS IMPLANT Left 12/13/2018    ENDOSCOPIC ULTRASOUND OF UPPER GASTROINTESTINAL TRACT N/A 12/30/2022    Procedure: ULTRASOUND, UPPER GI TRACT, ENDOSCOPIC;  Surgeon: Mani Howard MD;  Location: [facility];  Service: Endoscopy;   "Laterality: N/A;    TRANSURETHRAL RESECTION OF PROSTATE N/A 5/31/2018    Procedure: PROSTATECTOMY-TRANSURETHRAL;  Surgeon: Michael Westbrook IV, MD;  Location: [facility];  Service: Urology;  Laterality: N/A;    VASCULAR SURGERY      yag laser capsulotomy Left 09/08/2020       Review of patient's allergies indicates:   Allergen Reactions    Morphine Other (See Comments)     Pt states, "It only makes my pain worse."       Current Facility-Administered Medications on File Prior to Encounter   Medication    [COMPLETED] iohexoL (OMNIPAQUE 350) injection 100 mL     Current Outpatient Medications on File Prior to Encounter   Medication Sig    allopurinoL (ZYLOPRIM) 300 MG tablet TAKE ONE TABLET BY MOUTH EVERY DAY    amoxicillin (AMOXIL) 500 MG capsule Take 500 mg by mouth every 8 (eight) hours.    finasteride (PROSCAR) 5 mg tablet Take 1 tablet (5 mg total) by mouth once daily.    HYDROcodone-acetaminophen (NORCO) 7.5-325 mg per tablet Take 1 tablet by mouth every 8 (eight) hours as needed for Pain.    lisinopriL-hydrochlorothiazide (PRINZIDE,ZESTORETIC) 20-12.5 mg per tablet TAKE ONE TABLET BY MOUTH TWICE DAILY    lovastatin (MEVACOR) 40 MG tablet TAKE ONE TABLET BY MOUTH EVERY EVENING    metFORMIN (GLUCOPHAGE) 850 MG tablet TAKE ONE TABLET BY MOUTH EVERY DAY    metoprolol tartrate (LOPRESSOR) 50 MG tablet TAKE ONE TABLET BY MOUTH ONCE DAILY    mupirocin (BACTROBAN) 2 % ointment Apply topically 3 (three) times daily.    tamsulosin (FLOMAX) 0.4 mg Cap TAKE TWO CAPSULES BY MOUTH EVERY DAY    blood-glucose meter (TRUE METRIX GLUCOSE METER) kit Use as instructed    fluticasone propionate (FLONASE) 50 mcg/actuation nasal spray INHALE 2 SPRAYS IN EACH NOSTRIL ONCE DAILY    lancets Misc 1 each by Misc.(Non-Drug; Combo Route) route 4 (four) times daily.    prednisoLONE acetate (PRED FORTE) 1 % DrpS Place 1 drop into the left eye 4 (four) times daily.    SSD 1 % cream     STOOL SOFTENER 100 mg capsule TAKE ONE CAPSULE BY MOUTH TWICE " DAILY    TRUE METRIX GLUCOSE TEST STRIP Strp TEST BLOOD SUGAR FOUR TIMES DAILY     Family History       Problem Relation (Age of Onset)    Diabetes Brother          Tobacco Use    Smoking status: Never    Smokeless tobacco: Never   Substance and Sexual Activity    Alcohol use: No    Drug use: No    Sexual activity: Never   Review of Systems   Constitutional:  Negative for chills, diaphoresis, fatigue and fever.   Respiratory:  Positive for shortness of breath. Negative for cough.    Cardiovascular:  Positive for chest pain and leg swelling. Negative for palpitations.   Gastrointestinal:  Negative for abdominal pain, diarrhea, nausea and vomiting.   Genitourinary:  Positive for hematuria. Negative for dysuria and frequency.   All other systems reviewed and are negative.     Objective:      Vital Signs (Most Recent):  Temp: 97.8 °F (36.6 °C) (08/10/24 0458)  Pulse: 68 (08/10/24 0458)  Resp: 17 (08/10/24 0458)  BP: (!) 140/73 (08/10/24 0458)  SpO2: 95 % (08/10/24 0458) Vital Signs (24h Range):  Temp:  [97.5 °F (36.4 °C)-98 °F (36.7 °C)] 97.8 °F (36.6 °C)  Pulse:  [67-81] 68  Resp:  [16-22] 17  SpO2:  [95 %-98 %] 95 %  BP: (140-173)/(73-95) 140/73      Weight: 97.3 kg (214 lb 8.1 oz)  Body mass index is 29.92 kg/m².     Physical Exam  Vitals and nursing note reviewed.   Constitutional:       General: He is awake. He is not in acute distress.     Appearance: Normal appearance. He is well-developed and well-groomed. He is not ill-appearing, toxic-appearing or diaphoretic.   HENT:      Head: Normocephalic and atraumatic.   Eyes:      Extraocular Movements: Extraocular movements intact.      Conjunctiva/sclera: Conjunctivae normal.   Cardiovascular:      Rate and Rhythm: Normal rate and regular rhythm.      Pulses: Normal pulses.      Heart sounds: Normal heart sounds. No murmur heard.  Pulmonary:      Effort: Pulmonary effort is normal.      Breath sounds: Decreased breath sounds present. No wheezing, rhonchi or rales.    Abdominal:      General: Bowel sounds are normal.      Palpations: Abdomen is soft.      Tenderness: There is no abdominal tenderness.   Musculoskeletal:      Cervical back: Normal range of motion and neck supple.      Right lower leg: Edema present.      Left lower leg: Edema present.      Comments: 5/5 strength throughout   Skin:     General: Skin is warm and dry.      Capillary Refill: Capillary refill takes less than 2 seconds.   Neurological:      General: No focal deficit present.      Mental Status: He is alert and oriented to person, place, and time. Mental status is at baseline.      GCS: GCS eye subscore is 4. GCS verbal subscore is 5. GCS motor subscore is 6.      Cranial Nerves: Cranial nerves 2-12 are intact.      Sensory: Sensation is intact.      Motor: Motor function is intact.   Psychiatric:         Mood and Affect: Mood normal.         Behavior: Behavior normal. Behavior is cooperative.   Significant Labs: BMP:   Recent Labs   Lab 08/09/24  1742   *      K 4.4      CO2 23   BUN 17   CREATININE 1.1   CALCIUM 9.8   , CMP   Recent Labs   Lab 08/09/24  1742      K 4.4      CO2 23   *   BUN 17   CREATININE 1.1   CALCIUM 9.8   PROT 8.1   ALBUMIN 3.4*   BILITOT 0.6   ALKPHOS 88   AST 20   ALT 19   ANIONGAP 10   , CBC   Recent Labs   Lab 08/09/24  1742 08/10/24  0150   WBC 7.47 6.62   HGB 15.5 14.5   HCT 48.2 44.3    190   , and Troponin   Recent Labs   Lab 08/09/24  2212 08/10/24  0150 08/10/24  0603   TROPONINI 0.038* 0.030* 0.024       Significant Imaging: Echocardiogram: 2D echo with color flow doppler:   Results for orders placed or performed during the hospital encounter of 01/27/17   2D echo with color flow doppler   Result Value Ref Range    EF + QEF 60 55 - 65    Diastolic Dysfunction No     Est. PA Systolic Pressure 7.29     Narrative    Date of Procedure: 01/28/2017        TEST DESCRIPTION   Technical Quality: This study was performed in conjunction  with a 3ml intravenous injection of Optison contrast agent because of poor endocardial visualization.     Aorta: The aortic root is normal in size, measuring 3.5 cm at sinotubular junction and 3.5 cm at Sinuses of Valsalva. The proximal ascending aorta is normal in size, measuring 3.3 cm across.     Left Atrium: The left atrial volume index is mildly enlarged, measuring 38.41 cc/m2.     Left Ventricle: The left ventricle is normal in size, with an end-diastolic diameter of 4.4 cm, and an end-systolic diameter of 3.1 cm. LV wall thickness is normal, with the septum measuring 1.4 cm and the posterior wall measuring 1.0 cm across. Relative   wall thickness was increased at 0.45, and the LV mass index was 99.5 g/m2 consistent with concentric remodeling. Global left ventricular systolic function appears normal. Visually estimated ejection fraction is 60-65%. The LV Doppler derived stroke   volume equals 81.0 ccs.   The E/e'(lat) is 5, consistent with normal diastolic function.     Right Atrium: The right atrium is normal in size, measuring 5.4 cm in length and 4.1 cm in width in the apical view.     Right Ventricle: The right ventricle is normal in size. Global right ventricular systolic function appears normal. Tricuspid annular plane systolic excursion (TAPSE) is 2.2 cm. The estimated PA systolic pressure is greater than 7 mmHg.     Aortic Valve:  The aortic valve is mildly sclerotic. The mean gradient obtained across the aortic valve is 6.3 mmHg.     Mitral Valve:  The pressure half time is 54 msec. The calculated mitral valve area is 4.07 cm2.     Intracavitary: There is no evidence of pericardial effusion, intracavity mass, thrombi, or vegetation.         CONCLUSIONS     1 - Mild left atrial enlargement.     2 - Concentric remodeling.     3 - Normal left ventricular systolic function (EF 60-65%).     4 - Normal left ventricular diastolic function.     5 - Normal right ventricular systolic function .     6 - The  estimated PA systolic pressure is greater than 7 mmHg.             This document has been electronically    SIGNED BY: Clifton Sanchez MD On: 01/28/2017 11:53    and Transthoracic echo (TTE) complete (Cupid Only):   Results for orders placed or performed during the hospital encounter of 08/09/24   Echo   Result Value Ref Range    BSA 2.21 m2    LVOT stroke volume 88.30 cm3    LVIDd 4.11 3.5 - 6.0 cm    LV Systolic Volume 26.98 mL    LV Systolic Volume Index 12.4 mL/m2    LVIDs 2.70 2.1 - 4.0 cm    LV Diastolic Volume 74.77 mL    LV Diastolic Volume Index 34.46 mL/m2    Left Ventricular End Systolic Volume by Teichholz Method 26.98 mL    Left Ventricular End Diastolic Volume by Teichholz Method 74.77 mL    IVS 1.26 (A) 0.6 - 1.1 cm    LVOT diameter 2.08 cm    LVOT area 3.4 cm2    FS 34 28 - 44 %    Left Ventricle Relative Wall Thickness 0.75 cm    Posterior Wall 1.54 (A) 0.6 - 1.1 cm    LV mass 217.35 g    LV Mass Index 100 g/m2    MV Peak E Efra 0.55 m/s    TDI LATERAL 0.08 m/s    TDI SEPTAL 0.07 m/s    E/E' ratio 7.33 m/s    MV Peak A Efra 0.86 m/s    TR Max Efra 2.78 m/s    E/A ratio 0.64     IVRT 94.20 msec    E wave deceleration time 309.24 msec    LV SEPTAL E/E' RATIO 7.86 m/s    LV LATERAL E/E' RATIO 6.88 m/s    LVOT peak efra 1.01 m/s    Left Ventricular Outflow Tract Mean Velocity 0.69 cm/s    Left Ventricular Outflow Tract Mean Gradient 2.17 mmHg    RVOT peak VTI 5.9 cm    TAPSE 1.80 cm    LA size 3.41 cm    Left Atrium Minor Axis 3.19 cm    Left Atrium Major Axis 6.58 cm    RA Major Axis 5.24 cm    AV regurgitation pressure 1/2 time 723.297822933619539 ms    AR Max Efra 4.55 m/s    AV mean gradient 15 mmHg    AV peak gradient 24 mmHg    Ao peak efra 2.45 m/s    Ao VTI 52.50 cm    LVOT peak VTI 26.00 cm    AV valve area 1.68 cm²    AV Velocity Ratio 0.41     AV index (prosthetic) 0.50     MARIBEL by Velocity Ratio 1.40 cm²    MV stenosis pressure 1/2 time 89.68 ms    MV valve area p 1/2 method 2.45 cm2    Triscuspid  Valve Regurgitation Peak Gradient 31 mmHg    PV mean gradient 0 mmHg    RVOT peak holli 0.49 m/s    Ao root annulus 3.70 cm    STJ 4.03 cm    Ascending aorta 3.77 cm    IVC diameter 2.16 cm    Mean e' 0.08 m/s    ZLVIDS -3.81     ZLVIDD -5.58     LA Volume Index 20.7 mL/m2    LA volume 44.84 cm3    LA WIDTH 3.6 cm    RA Width 3.0 cm    EF 60 %    TV resting pulmonary artery pressure 34 mmHg    RV TB RVSP 6 mmHg    Est. RA pres 3 mmHg    Narrative      Left Ventricle: The left ventricle is normal in size. Normal wall   thickness. There is concentric remodeling. There is normal systolic   function. Ejection fraction by visual approximation is 60%. There is   normal diastolic function.    Right Ventricle: Normal right ventricular cavity size. Wall thickness   is normal. Systolic function is normal.    Aortic Valve: There is moderate aortic valve sclerosis. There is mild   stenosis. Aortic valve area by VTI is 1.68 cm². Aortic valve peak velocity   is 2.45 m/s. Mean gradient is 15 mmHg. The dimensionless index is 0.50.    Pulmonary Artery: The estimated pulmonary artery systolic pressure is   34 mmHg.    IVC/SVC: Normal venous pressure at 3 mmHg.      and EKG:   Assessment and Plan:   Mr Lara is a 89 year old male with a past medical history of DM, histoyr of PE (2011) HTN, on chronic coumadin,(apparently off coumadin). Urology workup (CT) showed saddle pulmonary embolism. Patient with some SOB and denies any chest pain. Chest X-ray showed mild CHF. EKG with no changes, NSR, and probable septal scar. Patient on IV heparin.   Recommendation to continue IV heparin, check echo, Pt will eventually be back on eliquis ,will give one dose of IV lasix.     Acute saddle pulmonary embolism without acute cor pulmonale  Saddle PE w/o right heart strain noted. Currently on Heparin drip.   Plan:  -continue Heparin drip  -monitor labs per nomogram   -echo  -Cards consult  -Defer IR consult to day team     Hypertension  Chronic,  "controlled. Latest blood pressure and vitals reviewed-      Temp:  [97.5 °F (36.4 °C)-98 °F (36.7 °C)]   Pulse:  [71-81]   Resp:  [16-22]   BP: (141-173)/(76-95)   SpO2:  [95 %-98 %] .   Home meds for hypertension were reviewed and noted below.   Hypertension Medications                    lisinopriL-hydrochlorothiazide (PRINZIDE,ZESTORETIC) 20-12.5 mg per tablet TAKE ONE TABLET BY MOUTH TWICE DAILY     metoprolol tartrate (LOPRESSOR) 50 MG tablet TAKE ONE TABLET BY MOUTH ONCE DAILY                While in the hospital, will manage blood pressure as follows; Continue home antihypertensive regimen     Will utilize p.r.n. blood pressure medication only if patient's blood pressure greater than 160/100 and he develops symptoms such as worsening chest pain or shortness of breath.     Type 2 diabetes mellitus, without long-term current use of insulin  Patient's FSGs are controlled on current medication regimen.  Last A1c reviewed-         Lab Results   Component Value Date     HGBA1C 5.5 05/28/2024      Most recent fingerstick glucose reviewed- No results for input(s): "POCTGLUCOSE" in the last 24 hours.  Current correctional scale  Low  Maintain anti-hyperglycemic dose as follows-   Antihyperglycemics (From admission, onward)        Start     Stop Route Frequency Ordered     08/09/24 2210   insulin aspart U-100 pen 0-5 Units         -- SubQ Before meals & nightly PRN 08/09/24 2111             Plan:  -SSI  -A1c  -Accu-checks  -Hold oral hypoglycemics while patient is in the hospital  -Continue home long-acting insulin at 20% decrease, titrate up as needed  -Hypoglycemic protocol             CKD stage 3 due to type 2 diabetes mellitus  Creatine stable for now. BMP reviewed- noted Estimated Creatinine Clearance: 48.8 mL/min (based on SCr of 1.1 mg/dL). according to latest data. Based on current GFR, CKD stage is stage 3 - GFR 30-59.  Monitor UOP and serial BMP and adjust therapy as needed. Renally dose meds. Avoid " nephrotoxic medications and procedures.     History of gout  Compliant with home medications. Voices no complaints of active flare-up.  Plan:  -continue allopurinol        Benign prostatic hyperplasia  Voices no new or worsening urinary complaints. Compliant with home medication.  Plan:  -continue proscar and flomax        Hyperlipidemia  Patient is chronically on statin.will continue for now. Last Lipid Panel:         Lab Results   Component Value Date     CHOL 164 05/28/2024     HDL 51 05/28/2024     LDLCALC 86.8 05/28/2024     TRIG 131 05/28/2024     CHOLHDL 31.1 05/28/2024      Plan:  -Continue home medication  -low fat/low calorie diet     VTE Risk Mitigation (From admission, onward)           Ordered     Reason for No Pharmacological VTE Prophylaxis  Once        Question:  Reasons:  Answer:  IV Heparin w/in 24 hrs. Pre or Post-Op    08/09/24 2111     IP VTE HIGH RISK PATIENT  Once         08/09/24 2111     Place sequential compression device  Until discontinued         08/09/24 2111     heparin 25,000 units in dextrose 5% (100 units/ml) IV bolus from bag HIGH INTENSITY nomogram - [facility]  As needed (PRN)        Question:  Heparin Infusion Adjustment (DO NOT MODIFY ANSWER)  Answer:  \[email or web address]\Images\[email or web address]\HeparinInfusions\heparin HIGH INTENSITY nomogram for [facility] [email or web address]    08/09/24 1734     heparin 25,000 units in dextrose 5% (100 units/ml) IV bolus from bag HIGH INTENSITY nomogram - [facility]  As needed (PRN)        Question:  Heparin Infusion Adjustment (DO NOT MODIFY ANSWER)  Answer:  \[email or web address]\Images\Pharmacy\HeparinInfusions\heparin HIGH INTENSITY nomogram for [facility] [id number]    08/09/24 1734     heparin 25,000 units in dextrose 5% 250 mL (100 units/mL) infusion HIGH INTENSITY nomogram - [facility]  Continuous        Question:  Begin at (units/kg/hr)  Answer:  18    08/09/24 1734                    Cardiology   [facility] - Med Surg

## 2024-08-10 NOTE — SUBJECTIVE & OBJECTIVE
Interval History: No acute events overnight, afebrile, hemodynamically stable.  Denies chest pain, palpitations, lightheadedness, dizziness, shortness of breath, cough, nausea, vomiting.    Objective:     Vital Signs (Most Recent):  Temp: 97.7 °F (36.5 °C) (08/10/24 1205)  Pulse: 84 (08/10/24 1518)  Resp: 18 (08/10/24 1205)  BP: (!) 155/76 (08/10/24 1205)  SpO2: 96 % (08/10/24 1205) Vital Signs (24h Range):  Temp:  [97.5 °F (36.4 °C)-98 °F (36.7 °C)] 97.7 °F (36.5 °C)  Pulse:  [66-84] 84  Resp:  [16-22] 18  SpO2:  [95 %-98 %] 96 %  BP: (140-173)/(72-95) 155/76     Weight: 97.1 kg (214 lb)  Body mass index is 29.85 kg/m².    Intake/Output Summary (Last 24 hours) at 8/10/2024 1525  Last data filed at 8/10/2024 1313  Gross per 24 hour   Intake 480 ml   Output 1800 ml   Net -1320 ml         Physical Exam  Vitals and nursing note reviewed.   Constitutional:       General: He is not in acute distress.     Appearance: He is not ill-appearing, toxic-appearing or diaphoretic.   HENT:      Head: Normocephalic and atraumatic.      Mouth/Throat:      Mouth: Mucous membranes are moist.   Eyes:      General: No scleral icterus.  Cardiovascular:      Rate and Rhythm: Normal rate and regular rhythm.      Heart sounds: Normal heart sounds. No murmur heard.  Pulmonary:      Effort: Pulmonary effort is normal. No respiratory distress.      Breath sounds: No wheezing, rhonchi or rales.   Abdominal:      General: Bowel sounds are normal. There is no distension.      Palpations: Abdomen is soft.      Tenderness: There is no abdominal tenderness. There is no guarding or rebound.   Musculoskeletal:      Right lower leg: Edema present.      Left lower leg: Edema present.   Skin:     General: Skin is warm and dry.      Coloration: Skin is not jaundiced.   Neurological:      Mental Status: He is alert.   Psychiatric:         Mood and Affect: Mood normal.         Behavior: Behavior normal.             Significant Labs: All pertinent labs  within the past 24 hours have been reviewed.  LABS:  Recent Labs   Lab 08/09/24  1742      K 4.4      CO2 23   BUN 17   CREATININE 1.1   *   ANIONGAP 10       Recent Labs   Lab 08/09/24  1742   AST 20   ALT 19   ALKPHOS 88   BILITOT 0.6   ALBUMIN 3.4*     POCT Glucose:   Recent Labs   Lab 08/10/24  0618 08/10/24  1200   POCTGLUCOSE 95 112*    Recent Labs   Lab 08/09/24  1742 08/10/24  0150   WBC 7.47 6.62   HGB 15.5 14.5   HCT 48.2 44.3    190   GRAN 71.1  5.3 65.8  4.4          Micro  Blood Cultures  Lab Results   Component Value Date    LABBLOO No growth after 5 days. 12/05/2019    LABBLOO No growth after 5 days. 12/05/2019    LABBLOO No growth after 5 days. 01/27/2017    LABBLOO No growth after 5 days. 01/27/2017    LABBLOO NO GROWTH AFTER 5 DAYS. FINAL REPORT 03/28/2012     Urine Cultures  Lab Results   Component Value Date    LABURIN No significant growth 07/23/2024    LABURIN No growth 07/15/2024    LABURIN  07/03/2024     Multiple organisms isolated. None in predominance.  Repeat if    LABURIN clinically necessary. 07/03/2024    LABURIN No growth 01/19/2023         Significant Imaging: I have reviewed all pertinent imaging results/findings within the past 24 hours.  X-Ray Chest AP Portable   Final Result      Pulmonary vascular congestion or interstitial edema suspected         Electronically signed by: Yanely Benton   Date:    08/09/2024   Time:    18:32          Inpatient Medications:  Continuous Infusions:   heparin (porcine) in D5W  0-40 Units/kg/hr Intravenous Continuous 14.6 mL/hr at 08/10/24 0325 15 Units/kg/hr at 08/10/24 0325     Scheduled Meds:   allopurinoL  300 mg Oral Daily    atorvastatin  10 mg Oral QHS    finasteride  5 mg Oral Daily    hydroCHLOROthiazide  12.5 mg Oral Daily    lisinopriL  20 mg Oral Daily    metoprolol tartrate  50 mg Oral Daily    tamsulosin  0.8 mg Oral Daily     PRN Meds:  Current Facility-Administered Medications:     acetaminophen, 650  mg, Rectal, Q4H PRN    acetaminophen, 650 mg, Oral, Q6H PRN    albuterol-ipratropium, 3 mL, Nebulization, Q4H PRN    aluminum-magnesium hydroxide-simethicone, 30 mL, Oral, QID PRN    dextrose 10%, 12.5 g, Intravenous, PRN    dextrose 10%, 25 g, Intravenous, PRN    glucagon (human recombinant), 1 mg, Intramuscular, PRN    glucose, 16 g, Oral, PRN    glucose, 24 g, Oral, PRN    heparin (PORCINE), 60 Units/kg, Intravenous, PRN    heparin (PORCINE), 30 Units/kg, Intravenous, PRN    insulin aspart U-100, 0-5 Units, Subcutaneous, QID (AC + HS) PRN    melatonin, 6 mg, Oral, Nightly PRN    naloxone, 0.02 mg, Intravenous, PRN    ondansetron, 4 mg, Intravenous, Q8H PRN    polyethylene glycol, 17 g, Oral, Daily PRN    promethazine, 25 mg, Oral, Q6H PRN    simethicone, 1 tablet, Oral, QID PRN    sodium chloride 0.9%, 3 mL, Intravenous, Q12H PRN

## 2024-08-10 NOTE — PHARMACY MED REC
"Admission Medication History     The home medication history was taken by Nina Cooper.    You may go to "Admission" then "Reconcile Home Medications" tabs to review and/or act upon these items.     The home medication list has been updated by the Pharmacy department.   Please read ALL comments highlighted in yellow.   Please address this information as you see fit.    Feel free to contact us if you have any questions or require assistance.      The medications listed below were removed from the home medication list. Please reorder if appropriate:  Patient reports no longer taking the following medication(s):  Augmentin 875-125 mg        Medications listed below were obtained from: Patient/family and Analytic software- [employer]      LAST MED REC COMPLETED:     Nina Cooper  [id number]    Current Outpatient Medications on File Prior to Encounter   Medication Sig Dispense Refill Last Dose    allopurinoL (ZYLOPRIM) 300 MG tablet TAKE ONE TABLET BY MOUTH EVERY DAY 90 tablet 3 8/9/2024    amoxicillin (AMOXIL) 500 MG capsule Take 500 mg by mouth every 8 (eight) hours.   8/9/2024    finasteride (PROSCAR) 5 mg tablet Take 1 tablet (5 mg total) by mouth once daily. 90 tablet 3 8/9/2024    HYDROcodone-acetaminophen (NORCO) 7.5-325 mg per tablet Take 1 tablet by mouth every 8 (eight) hours as needed for Pain. 90 tablet 0 8/9/2024    lisinopriL-hydrochlorothiazide (PRINZIDE,ZESTORETIC) 20-12.5 mg per tablet TAKE ONE TABLET BY MOUTH TWICE DAILY 60 tablet 11 8/9/2024    lovastatin (MEVACOR) 40 MG tablet TAKE ONE TABLET BY MOUTH EVERY EVENING 90 tablet 3 8/9/2024    metFORMIN (GLUCOPHAGE) 850 MG tablet TAKE ONE TABLET BY MOUTH EVERY DAY 90 tablet 3 8/9/2024    metoprolol tartrate (LOPRESSOR) 50 MG tablet TAKE ONE TABLET BY MOUTH ONCE DAILY 90 tablet 3 8/9/2024    mupirocin (BACTROBAN) 2 % ointment Apply topically 3 (three) times daily. 30 g 1 Past Week    tamsulosin (FLOMAX) 0.4 mg Cap TAKE TWO CAPSULES BY MOUTH EVERY DAY " 180 capsule 3 8/9/2024    blood-glucose meter (TRUE METRIX GLUCOSE METER) kit Use as instructed 1 each 0     fluticasone propionate (FLONASE) 50 mcg/actuation nasal spray INHALE 2 SPRAYS IN EACH NOSTRIL ONCE DAILY 16 g 3 Unknown    lancets Misc 1 each by Misc.(Non-Drug; Combo Route) route 4 (four) times daily. 200 each 5     prednisoLONE acetate (PRED FORTE) 1 % DrpS Place 1 drop into the left eye 4 (four) times daily. 5 mL 0 Unknown    SSD 1 % cream    Unknown    STOOL SOFTENER 100 mg capsule TAKE ONE CAPSULE BY MOUTH TWICE DAILY 180 capsule 3 Unknown    TRUE METRIX GLUCOSE TEST STRIP Strp TEST BLOOD SUGAR FOUR TIMES DAILY 400 strip 3                            .

## 2024-08-10 NOTE — ASSESSMENT & PLAN NOTE
Chronic, controlled. Latest blood pressure and vitals reviewed-     Temp:  [97.5 °F (36.4 °C)-98 °F (36.7 °C)]   Pulse:  [71-81]   Resp:  [16-22]   BP: (141-173)/(76-95)   SpO2:  [95 %-98 %] .   Home meds for hypertension were reviewed and noted below.   Hypertension Medications               lisinopriL-hydrochlorothiazide (PRINZIDE,ZESTORETIC) 20-12.5 mg per tablet TAKE ONE TABLET BY MOUTH TWICE DAILY    metoprolol tartrate (LOPRESSOR) 50 MG tablet TAKE ONE TABLET BY MOUTH ONCE DAILY            While in the hospital, will manage blood pressure as follows; Continue home antihypertensive regimen    Will utilize p.r.n. blood pressure medication only if patient's blood pressure greater than 160/100 and he develops symptoms such as worsening chest pain or shortness of breath.

## 2024-08-10 NOTE — ASSESSMENT & PLAN NOTE
Patient is chronically on statin.will continue for now. Last Lipid Panel:   Lab Results   Component Value Date    CHOL 164 05/28/2024    HDL 51 05/28/2024    LDLCALC 86.8 05/28/2024    TRIG 131 05/28/2024    CHOLHDL 31.1 05/28/2024     Plan:  -Continue home medication  -low fat/low calorie diet

## 2024-08-10 NOTE — ASSESSMENT & PLAN NOTE
Stable  on admission      Home Diabetes Medications               metFORMIN (GLUCOPHAGE) 850 MG tablet TAKE ONE TABLET BY MOUTH EVERY DAY            Most recent A1c reviewed-   Lab Results   Component Value Date    HGBA1C 5.5 05/28/2024        Most recent glucose reviewed-  Recent Labs     08/10/24  0618 08/10/24  1200   POCTGLUCOSE 95 112*         Plan:  Antihyperglycemics (From admission, onward)      Start     Stop Route Frequency Ordered    08/09/24 2210  insulin aspart U-100 pen 0-5 Units         -- SubQ Before meals & nightly PRN 08/09/24 2111        - POCT glucose ACHS  - Will monitor glucose results and adjust insulin regimen accordingly  - Hold oral hypoglycemic agents while patient is in the hospital.

## 2024-08-10 NOTE — HOSPITAL COURSE
Admitted to Hospital Medicine for evaluation of saddle pulmonary embolism.  Cardiology consulted for further evaluation.  TTE without concerns for right heart strain. Started on Furosemide for CHF concerns. Evaluation of bilateral lower extremity pain and swelling via lower extremity ultrasound noted concerns for bilateral acute occlusive thrombus of the popliteal veins  so Vascular Surgery consulted for evaluation. Given hemodynamic stability, patient co-morbidities and operative risk, recommended conservative measures instead of acute surgical intervention at this time. PT/OT and possible acute rehab vs home with rehab given his weakness. If he develops hematuria or he is determined to be a fall risk with contraindication to anti-coagulation then I would recommend an IV filter which we would be happy to place at any time. I am going to arrange follow-up in three months at our office at the [facility]. Patient continued to remain hemodynamically stable. Discussed with patient daughter regarding risk-benefits of any potential intervention. Daughter in agreement and verbalized understanding.     -You were admitted to our hospital for treatment of acute saddle pulmonary embolism and bilateral lower extremity DVTs. Over the course of your hospitalization, you were treated with blood thinners.    -Please read the attached information regarding bleeding complications and go to your nearest ED if any of the alarm/concerning signs develop.    -Followup with your primary care physician within 1 week for updated labwork, medication adjustments and any routine post-discharge care.

## 2024-08-10 NOTE — ASSESSMENT & PLAN NOTE
Saddle PE w/o right heart strain noted. Currently on Heparin drip.     Plan:  -Cardiology consulted, follow-up recs  -continue Heparin drip  -monitor labs per nomogram

## 2024-08-10 NOTE — HPI
"Amauri Lara is a 89 y.o. male with a PMH  has a past medical history of Aphakia of right eye, Chronic venous insufficiency, CKD stage 3 due to type 2 diabetes mellitus, DM (diabetes mellitus), type 2 with complications, Hearing loss of aging, History of gout, History of pulmonary embolism (2011), Hypertension associated with diabetes, Iridodialysis of right eye, Obesity, unspecified, and Primary osteoarthritis involving multiple joints.presented to the Emergency Department for evaluation after being sent by urology -- Kylie Alexis NP -- for saddle pulmonary embolism on CT urogram performed today for hematuria. Patient has no other complaints at this time. Patient denies any fever, CP, chest tightness, SOB, nausea, vomiting, and all other sxs at this time. No prior Tx reported.      Independent patient history obtained from patient's family member at bedside. States that pt has been "hurting all weekend". Family member reports symptoms of generalized weakness and states that patient slide out of his recliner, but denies hitting his head of having LOC. No further complaints or concerns at this time.      ER workup revealed CBC to be unremarkable.  CMP unremarkable.  BNP within normal limits.  Initial troponin 0.037.  Chest x-ray revealed pulmonary vascular congestion or interstitial edema suspected.  EKG revealed normal sinus rhythm with a ventricular rate of 80 beats per minute with a QT/QTC of 404/465.  Patient initiated on heparin drip for saddle PE.  Hospital Medicine consulted to admit patient for further evaluation and treatment.  Patient and family at bedside in agreement with treatment plan.  Patient admitted under inpatient status.    PCP: Stefan Vera    "

## 2024-08-10 NOTE — SUBJECTIVE & OBJECTIVE
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    Obesity, unspecified     Primary osteoarthritis involving multiple joints        Past Surgical History:   Procedure Laterality Date    CATARACT EXTRACTION      aphakic od    CATARACT EXTRACTION W/  INTRAOCULAR LENS IMPLANT Left 12/13/2018    ENDOSCOPIC ULTRASOUND OF UPPER GASTROINTESTINAL TRACT N/A 12/30/2022    Procedure: ULTRASOUND, UPPER GI TRACT, ENDOSCOPIC;  Surgeon: Mani Howard MD;  Location: [facility] ENDO;  Service: Endoscopy;  Laterality: N/A;    TRANSURETHRAL RESECTION OF PROSTATE N/A 5/31/2018    Procedure: PROSTATECTOMY-TRANSURETHRAL;  Surgeon: Michael Westbrook IV, MD;  Location: [facility] OR;  Service: Urology;  Laterality: N/A;    VASCULAR SURGERY      yag laser capsulotomy Left 09/08/2020       Review of patient's allergies indicates:   Allergen Reactions    Morphine Other (See Comments)     Pt states, "It only makes my pain worse."       Current Facility-Administered Medications on File Prior to Encounter   Medication    [COMPLETED] iohexoL (OMNIPAQUE 350) injection 100 mL     Current Outpatient Medications on File Prior to Encounter   Medication Sig    allopurinoL (ZYLOPRIM) 300 MG tablet TAKE ONE TABLET BY MOUTH EVERY DAY    amoxicillin (AMOXIL) 500 MG capsule Take 500 mg by mouth every 8 (eight) hours.    finasteride (PROSCAR) 5 mg tablet Take 1 tablet (5 mg total) by mouth once daily.    HYDROcodone-acetaminophen (NORCO) 7.5-325 mg per tablet Take 1 tablet by mouth every 8 (eight) hours as needed for Pain.    lisinopriL-hydrochlorothiazide (PRINZIDE,ZESTORETIC) 20-12.5 mg per tablet TAKE ONE TABLET BY MOUTH " TWICE DAILY    lovastatin (MEVACOR) 40 MG tablet TAKE ONE TABLET BY MOUTH EVERY EVENING    metFORMIN (GLUCOPHAGE) 850 MG tablet TAKE ONE TABLET BY MOUTH EVERY DAY    metoprolol tartrate (LOPRESSOR) 50 MG tablet TAKE ONE TABLET BY MOUTH ONCE DAILY    mupirocin (BACTROBAN) 2 % ointment Apply topically 3 (three) times daily.    tamsulosin (FLOMAX) 0.4 mg Cap TAKE TWO CAPSULES BY MOUTH EVERY DAY    blood-glucose meter (TRUE METRIX GLUCOSE METER) kit Use as instructed    fluticasone propionate (FLONASE) 50 mcg/actuation nasal spray INHALE 2 SPRAYS IN EACH NOSTRIL ONCE DAILY    lancets Misc 1 each by Misc.(Non-Drug; Combo Route) route 4 (four) times daily.    prednisoLONE acetate (PRED FORTE) 1 % DrpS Place 1 drop into the left eye 4 (four) times daily.    SSD 1 % cream     STOOL SOFTENER 100 mg capsule TAKE ONE CAPSULE BY MOUTH TWICE DAILY    TRUE METRIX GLUCOSE TEST STRIP Strp TEST BLOOD SUGAR FOUR TIMES DAILY     Family History       Problem Relation (Age of Onset)    Diabetes Brother          Tobacco Use    Smoking status: Never    Smokeless tobacco: Never   Substance and Sexual Activity    Alcohol use: No    Drug use: No    Sexual activity: Never     Review of Systems   Constitutional:  Negative for chills, diaphoresis, fatigue and fever.   Respiratory:  Positive for shortness of breath. Negative for cough.    Cardiovascular:  Positive for chest pain and leg swelling. Negative for palpitations.   Gastrointestinal:  Negative for abdominal pain, diarrhea, nausea and vomiting.   Genitourinary:  Positive for hematuria. Negative for dysuria and frequency.   All other systems reviewed and are negative.    Objective:     Vital Signs (Most Recent):  Temp: 97.8 °F (36.6 °C) (08/10/24 0458)  Pulse: 68 (08/10/24 0458)  Resp: 17 (08/10/24 0458)  BP: (!) 140/73 (08/10/24 0458)  SpO2: 95 % (08/10/24 0458) Vital Signs (24h Range):  Temp:  [97.5 °F (36.4 °C)-98 °F (36.7 °C)] 97.8 °F (36.6 °C)  Pulse:  [67-81] 68  Resp:  [16-22]  17  SpO2:  [95 %-98 %] 95 %  BP: (140-173)/(73-95) 140/73     Weight: 97.3 kg (214 lb 8.1 oz)  Body mass index is 29.92 kg/m².     Physical Exam  Vitals and nursing note reviewed.   Constitutional:       General: He is awake. He is not in acute distress.     Appearance: Normal appearance. He is well-developed and well-groomed. He is not ill-appearing, toxic-appearing or diaphoretic.   HENT:      Head: Normocephalic and atraumatic.   Eyes:      Extraocular Movements: Extraocular movements intact.      Conjunctiva/sclera: Conjunctivae normal.   Cardiovascular:      Rate and Rhythm: Normal rate and regular rhythm.      Pulses: Normal pulses.      Heart sounds: Normal heart sounds. No murmur heard.  Pulmonary:      Effort: Pulmonary effort is normal.      Breath sounds: Decreased breath sounds present. No wheezing, rhonchi or rales.   Abdominal:      General: Bowel sounds are normal.      Palpations: Abdomen is soft.      Tenderness: There is no abdominal tenderness.   Musculoskeletal:      Cervical back: Normal range of motion and neck supple.      Right lower leg: Edema present.      Left lower leg: Edema present.      Comments: 5/5 strength throughout   Skin:     General: Skin is warm and dry.      Capillary Refill: Capillary refill takes less than 2 seconds.   Neurological:      General: No focal deficit present.      Mental Status: He is alert and oriented to person, place, and time. Mental status is at baseline.      GCS: GCS eye subscore is 4. GCS verbal subscore is 5. GCS motor subscore is 6.      Cranial Nerves: Cranial nerves 2-12 are intact.      Sensory: Sensation is intact.      Motor: Motor function is intact.   Psychiatric:         Mood and Affect: Mood normal.         Behavior: Behavior normal. Behavior is cooperative.           LABS:  Recent Results (from the past 24 hour(s))   CBC auto differential    Collection Time: 08/09/24  5:42 PM   Result Value Ref Range    WBC 7.47 3.90 - 12.70 K/uL    RBC 5.44  4.60 - 6.20 M/uL    Hemoglobin 15.5 14.0 - 18.0 g/dL    Hematocrit 48.2 40.0 - 54.0 %    MCV 89 82 - 98 fL    MCH 28.5 27.0 - 31.0 pg    MCHC 32.2 32.0 - 36.0 g/dL    RDW 15.4 (H) 11.5 - 14.5 %    Platelets 197 150 - 450 K/uL    MPV 11.3 9.2 - 12.9 fL    Immature Granulocytes 0.8 (H) 0.0 - 0.5 %    Gran # (ANC) 5.3 1.8 - 7.7 K/uL    Immature Grans (Abs) 0.06 (H) 0.00 - 0.04 K/uL    Lymph # 1.1 1.0 - 4.8 K/uL    Mono # 0.9 0.3 - 1.0 K/uL    Eos # 0.1 0.0 - 0.5 K/uL    Baso # 0.03 0.00 - 0.20 K/uL    nRBC 0 0 /100 WBC    Gran % 71.1 38.0 - 73.0 %    Lymph % 14.3 (L) 18.0 - 48.0 %    Mono % 11.8 4.0 - 15.0 %    Eosinophil % 1.6 0.0 - 8.0 %    Basophil % 0.4 0.0 - 1.9 %    Differential Method Automated    Comprehensive metabolic panel    Collection Time: 08/09/24  5:42 PM   Result Value Ref Range    Sodium 138 136 - 145 mmol/L    Potassium 4.4 3.5 - 5.1 mmol/L    Chloride 105 95 - 110 mmol/L    CO2 23 23 - 29 mmol/L    Glucose 132 (H) 70 - 110 mg/dL    BUN 17 10 - 30 mg/dL    Creatinine 1.1 0.5 - 1.4 mg/dL    Calcium 9.8 8.7 - 10.5 mg/dL    Total Protein 8.1 6.0 - 8.4 g/dL    Albumin 3.4 (L) 3.5 - 5.2 g/dL    Total Bilirubin 0.6 0.1 - 1.0 mg/dL    Alkaline Phosphatase 88 55 - 135 U/L    AST 20 10 - 40 U/L    ALT 19 10 - 44 U/L    eGFR >60 >60 mL/min/1.73 m^2    Anion Gap 10 8 - 16 mmol/L   Troponin I    Collection Time: 08/09/24  5:42 PM   Result Value Ref Range    Troponin I 0.037 (H) 0.000 - 0.026 ng/mL   Brain natriuretic peptide    Collection Time: 08/09/24  5:42 PM   Result Value Ref Range    BNP 93 0 - 99 pg/mL   APTT    Collection Time: 08/09/24  5:42 PM   Result Value Ref Range    aPTT 30.8 21.0 - 32.0 sec   Protime-INR    Collection Time: 08/09/24  5:42 PM   Result Value Ref Range    Prothrombin Time 11.8 9.0 - 12.5 sec    INR 1.0 0.8 - 1.2   Troponin I    Collection Time: 08/09/24 10:12 PM   Result Value Ref Range    Troponin I 0.038 (H) 0.000 - 0.026 ng/mL   APTT    Collection Time: 08/10/24 12:43 AM   Result  Value Ref Range    aPTT 85.4 (H) 21.0 - 32.0 sec   CBC auto differential    Collection Time: 08/10/24  1:50 AM   Result Value Ref Range    WBC 6.62 3.90 - 12.70 K/uL    RBC 5.04 4.60 - 6.20 M/uL    Hemoglobin 14.5 14.0 - 18.0 g/dL    Hematocrit 44.3 40.0 - 54.0 %    MCV 88 82 - 98 fL    MCH 28.8 27.0 - 31.0 pg    MCHC 32.7 32.0 - 36.0 g/dL    RDW 15.3 (H) 11.5 - 14.5 %    Platelets 190 150 - 450 K/uL    MPV 10.9 9.2 - 12.9 fL    Immature Granulocytes 0.8 (H) 0.0 - 0.5 %    Gran # (ANC) 4.4 1.8 - 7.7 K/uL    Immature Grans (Abs) 0.05 (H) 0.00 - 0.04 K/uL    Lymph # 1.0 1.0 - 4.8 K/uL    Mono # 1.1 (H) 0.3 - 1.0 K/uL    Eos # 0.1 0.0 - 0.5 K/uL    Baso # 0.04 0.00 - 0.20 K/uL    nRBC 0 0 /100 WBC    Gran % 65.8 38.0 - 73.0 %    Lymph % 14.7 (L) 18.0 - 48.0 %    Mono % 16.0 (H) 4.0 - 15.0 %    Eosinophil % 2.1 0.0 - 8.0 %    Basophil % 0.6 0.0 - 1.9 %    Differential Method Automated    Troponin I    Collection Time: 08/10/24  1:50 AM   Result Value Ref Range    Troponin I 0.030 (H) 0.000 - 0.026 ng/mL       RADIOLOGY  X-Ray Chest AP Portable    Result Date: 8/9/2024  EXAMINATION: XR CHEST AP PORTABLE CLINICAL HISTORY: pulmonary embolism; TECHNIQUE: Single frontal portable view of the chest was performed. COMPARISON: 07/03/2024 FINDINGS: Pulmonary vascular/interstitial markings are increased.  No consolidation or pleural effusion     Pulmonary vascular congestion or interstitial edema suspected Electronically signed by: Yanely Benton Date:    08/09/2024 Time:    18:32    CT Urogram Abd Pelvis W WO    Result Date: 8/9/2024  EXAMINATION: CT UROGRAM ABD PELVIS W WO CLINICAL HISTORY: Other microscopic hematuriaHematuria, gross/macroscopic;Microhematuria; TECHNIQUE: Axial CT imaging was performed through the abdomen and pelvis without and with 100cc  of intravenous contrast. Multiplanar reformats were performed and interpreted. COMPARISON: CT abdomen and pelvis on 10/05/2019.  Renal ultrasound on 01/05/2023 FINDINGS:  Lung bases are clear.  Saddle pulmonary embolus.  No evidence of right heart strain. Chronic, moderate bilateral hydroureteronephrosis with urinary bladder distention and trabeculation.  Benign left interpolar and right lower pole renal cysts measuring up to 3.4 cm on the left.  Nonobstructive left lower pole renal calculi measuring up to 1.7 cm.  No ureteral calculi.  Prostatomegaly. Liver, adrenal glands, spleen, pancreas, and small bowel are within normal limits.  Colonic diverticulosis.  The appendix is normal.  Cholelithiasis This. Aortic atherosclerosis without evidence of aneurysm. The abdominal aorta is normal in caliber. The urinary bladder is unremarkable. No significant osseous abnormality is identified.     Saddle pulmonary embolus.  The findings were reported to nurse practitioner Kylie Alexis. Chronic hydroureteronephrosis and neurogenic bladder.  Nonobstructive left lower pole nephrolithiasis. All CT scans at this facility are performed  using dose modulation techniques as appropriate to performed exam including the following:  automated exposure control; adjustment of mA and/or kV according to the patients size (this includes techniques or standardized protocols for targeted exams where dose is matched to indication/reason for exam: i.e. extremities or head);  iterative reconstruction technique. Electronically signed by: Real Chester MD Date:    08/09/2024 Time:    15:58      EKG    MICROBIOLOGY    MDM     Amount and/or Complexity of Data Reviewed  Clinical lab tests: reviewed  Tests in the radiology section of CPT®: reviewed  Tests in the medicine section of CPT®: reviewed  Discussion of test results with the performing providers: yes  Decide to obtain previous medical records or to obtain history from someone other than the patient: yes  Obtain history from someone other than the patient: yes  Review and summarize past medical records: yes  Discuss the patient with other providers: yes  Independent  visualization of images, tracings, or specimens: yes

## 2024-08-10 NOTE — ASSESSMENT & PLAN NOTE
Chronic, controlled. Latest blood pressure and vitals reviewed-     Temp:  [97.5 °F (36.4 °C)-98 °F (36.7 °C)]   Pulse:  [66-84]   Resp:  [16-22]   BP: (140-173)/(72-95)   SpO2:  [95 %-98 %] .   Home meds for hypertension were reviewed and noted below.   Hypertension Medications               lisinopriL-hydrochlorothiazide (PRINZIDE,ZESTORETIC) 20-12.5 mg per tablet TAKE ONE TABLET BY MOUTH TWICE DAILY    metoprolol tartrate (LOPRESSOR) 50 MG tablet TAKE ONE TABLET BY MOUTH ONCE DAILY            While in the hospital, will manage blood pressure as follows; Continue home antihypertensive regimen    Will utilize p.r.n. blood pressure medication only if patient's blood pressure greater than 160/100 and he develops symptoms such as worsening chest pain or shortness of breath.

## 2024-08-10 NOTE — H&P
"[facility] Medicine  History & Physical    Patient Name: Amauri Lara  MRN: [id number]  Patient Class: IP- Inpatient  Admission Date: 8/9/2024  Attending Physician: Damien Palmer MD   Primary Care Provider: Stefan Vera MD         Patient information was obtained from patient, relative(s), past medical records, and ER records.     Subjective:     Principal Problem:Acute saddle pulmonary embolism without acute cor pulmonale    Chief Complaint:   Chief Complaint   Patient presents with    Pulmonary Embolism     Pt sent by [facility] Alexis for  saddle PE on CT today        HPI: Amauri Lara is a 89 y.o. male with a PMH  has a past medical history of Aphakia of right eye, Chronic venous insufficiency, CKD stage 3 due to type 2 diabetes mellitus, DM (diabetes mellitus), type 2 with complications, Hearing loss of aging, History of gout, History of pulmonary embolism (2011), Hypertension associated with diabetes, Iridodialysis of right eye, Obesity, unspecified, and Primary osteoarthritis involving multiple joints.presented to the Emergency Department for evaluation after being sent by urology -- Kylie Alexis NP -- for saddle pulmonary embolism on CT urogram performed today for hematuria. Patient has no other complaints at this time. Patient denies any fever, CP, chest tightness, SOB, nausea, vomiting, and all other sxs at this time. No prior Tx reported.      Independent patient history obtained from patient's family member at bedside. States that pt has been "hurting all weekend". Family member reports symptoms of generalized weakness and states that patient slide out of his recliner, but denies hitting his head of having LOC. No further complaints or concerns at this time.      ER workup revealed CBC to be unremarkable.  CMP unremarkable.  BNP within normal limits.  Initial troponin 0.037.  Chest x-ray revealed pulmonary vascular congestion or interstitial edema suspected.  EKG revealed " "normal sinus rhythm with a ventricular rate of 80 beats per minute with a QT/QTC of 404/465.  Patient initiated on heparin drip for saddle PE.  Hospital Medicine consulted to admit patient for further evaluation and treatment.  Patient and family at bedside in agreement with treatment plan.  Patient admitted under inpatient status.    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    Obesity, unspecified     Primary osteoarthritis involving multiple joints        Past Surgical History:   Procedure Laterality Date    CATARACT EXTRACTION      aphakic od    CATARACT EXTRACTION W/  INTRAOCULAR LENS IMPLANT Left 12/13/2018    ENDOSCOPIC ULTRASOUND OF UPPER GASTROINTESTINAL TRACT N/A 12/30/2022    Procedure: ULTRASOUND, UPPER GI TRACT, ENDOSCOPIC;  Surgeon: Mani Howard MD;  Location: [facility] ENDO;  Service: Endoscopy;  Laterality: N/A;    TRANSURETHRAL RESECTION OF PROSTATE N/A 5/31/2018    Procedure: PROSTATECTOMY-TRANSURETHRAL;  Surgeon: Michael Westbrook IV, MD;  Location: [facility] OR;  Service: Urology;  Laterality: N/A;    VASCULAR SURGERY      yag laser capsulotomy Left 09/08/2020       Review of patient's allergies indicates:   Allergen Reactions    Morphine Other (See Comments)     Pt states, "It only makes my pain worse."       Current Facility-Administered Medications on File Prior to Encounter   Medication    [COMPLETED] iohexoL (OMNIPAQUE 350) injection 100 mL     Current Outpatient Medications on File Prior to Encounter   Medication Sig    allopurinoL (ZYLOPRIM) 300 MG tablet TAKE ONE TABLET BY MOUTH EVERY DAY    amoxicillin (AMOXIL) 500 MG " capsule Take 500 mg by mouth every 8 (eight) hours.    finasteride (PROSCAR) 5 mg tablet Take 1 tablet (5 mg total) by mouth once daily.    HYDROcodone-acetaminophen (NORCO) 7.5-325 mg per tablet Take 1 tablet by mouth every 8 (eight) hours as needed for Pain.    lisinopriL-hydrochlorothiazide (PRINZIDE,ZESTORETIC) 20-12.5 mg per tablet TAKE ONE TABLET BY MOUTH TWICE DAILY    lovastatin (MEVACOR) 40 MG tablet TAKE ONE TABLET BY MOUTH EVERY EVENING    metFORMIN (GLUCOPHAGE) 850 MG tablet TAKE ONE TABLET BY MOUTH EVERY DAY    metoprolol tartrate (LOPRESSOR) 50 MG tablet TAKE ONE TABLET BY MOUTH ONCE DAILY    mupirocin (BACTROBAN) 2 % ointment Apply topically 3 (three) times daily.    tamsulosin (FLOMAX) 0.4 mg Cap TAKE TWO CAPSULES BY MOUTH EVERY DAY    blood-glucose meter (TRUE METRIX GLUCOSE METER) kit Use as instructed    fluticasone propionate (FLONASE) 50 mcg/actuation nasal spray INHALE 2 SPRAYS IN EACH NOSTRIL ONCE DAILY    lancets Misc 1 each by Misc.(Non-Drug; Combo Route) route 4 (four) times daily.    prednisoLONE acetate (PRED FORTE) 1 % DrpS Place 1 drop into the left eye 4 (four) times daily.    SSD 1 % cream     STOOL SOFTENER 100 mg capsule TAKE ONE CAPSULE BY MOUTH TWICE DAILY    TRUE METRIX GLUCOSE TEST STRIP Strp TEST BLOOD SUGAR FOUR TIMES DAILY     Family History       Problem Relation (Age of Onset)    Diabetes Brother          Tobacco Use    Smoking status: Never    Smokeless tobacco: Never   Substance and Sexual Activity    Alcohol use: No    Drug use: No    Sexual activity: Never     Review of Systems   Constitutional:  Negative for chills, diaphoresis, fatigue and fever.   Respiratory:  Positive for shortness of breath. Negative for cough.    Cardiovascular:  Positive for chest pain and leg swelling. Negative for palpitations.   Gastrointestinal:  Negative for abdominal pain, diarrhea, nausea and vomiting.   Genitourinary:  Positive for hematuria. Negative for dysuria and frequency.   All  other systems reviewed and are negative.    Objective:     Vital Signs (Most Recent):  Temp: 97.8 °F (36.6 °C) (08/10/24 0458)  Pulse: 68 (08/10/24 0458)  Resp: 17 (08/10/24 0458)  BP: (!) 140/73 (08/10/24 0458)  SpO2: 95 % (08/10/24 0458) Vital Signs (24h Range):  Temp:  [97.5 °F (36.4 °C)-98 °F (36.7 °C)] 97.8 °F (36.6 °C)  Pulse:  [67-81] 68  Resp:  [16-22] 17  SpO2:  [95 %-98 %] 95 %  BP: (140-173)/(73-95) 140/73     Weight: 97.3 kg (214 lb 8.1 oz)  Body mass index is 29.92 kg/m².     Physical Exam  Vitals and nursing note reviewed.   Constitutional:       General: He is awake. He is not in acute distress.     Appearance: Normal appearance. He is well-developed and well-groomed. He is not ill-appearing, toxic-appearing or diaphoretic.   HENT:      Head: Normocephalic and atraumatic.   Eyes:      Extraocular Movements: Extraocular movements intact.      Conjunctiva/sclera: Conjunctivae normal.   Cardiovascular:      Rate and Rhythm: Normal rate and regular rhythm.      Pulses: Normal pulses.      Heart sounds: Normal heart sounds. No murmur heard.  Pulmonary:      Effort: Pulmonary effort is normal.      Breath sounds: Decreased breath sounds present. No wheezing, rhonchi or rales.   Abdominal:      General: Bowel sounds are normal.      Palpations: Abdomen is soft.      Tenderness: There is no abdominal tenderness.   Musculoskeletal:      Cervical back: Normal range of motion and neck supple.      Right lower leg: Edema present.      Left lower leg: Edema present.      Comments: 5/5 strength throughout   Skin:     General: Skin is warm and dry.      Capillary Refill: Capillary refill takes less than 2 seconds.   Neurological:      General: No focal deficit present.      Mental Status: He is alert and oriented to person, place, and time. Mental status is at baseline.      GCS: GCS eye subscore is 4. GCS verbal subscore is 5. GCS motor subscore is 6.      Cranial Nerves: Cranial nerves 2-12 are intact.       Sensory: Sensation is intact.      Motor: Motor function is intact.   Psychiatric:         Mood and Affect: Mood normal.         Behavior: Behavior normal. Behavior is cooperative.           LABS:  Recent Results (from the past 24 hour(s))   CBC auto differential    Collection Time: 08/09/24  5:42 PM   Result Value Ref Range    WBC 7.47 3.90 - 12.70 K/uL    RBC 5.44 4.60 - 6.20 M/uL    Hemoglobin 15.5 14.0 - 18.0 g/dL    Hematocrit 48.2 40.0 - 54.0 %    MCV 89 82 - 98 fL    MCH 28.5 27.0 - 31.0 pg    MCHC 32.2 32.0 - 36.0 g/dL    RDW 15.4 (H) 11.5 - 14.5 %    Platelets 197 150 - 450 K/uL    MPV 11.3 9.2 - 12.9 fL    Immature Granulocytes 0.8 (H) 0.0 - 0.5 %    Gran # (ANC) 5.3 1.8 - 7.7 K/uL    Immature Grans (Abs) 0.06 (H) 0.00 - 0.04 K/uL    Lymph # 1.1 1.0 - 4.8 K/uL    Mono # 0.9 0.3 - 1.0 K/uL    Eos # 0.1 0.0 - 0.5 K/uL    Baso # 0.03 0.00 - 0.20 K/uL    nRBC 0 0 /100 WBC    Gran % 71.1 38.0 - 73.0 %    Lymph % 14.3 (L) 18.0 - 48.0 %    Mono % 11.8 4.0 - 15.0 %    Eosinophil % 1.6 0.0 - 8.0 %    Basophil % 0.4 0.0 - 1.9 %    Differential Method Automated    Comprehensive metabolic panel    Collection Time: 08/09/24  5:42 PM   Result Value Ref Range    Sodium 138 136 - 145 mmol/L    Potassium 4.4 3.5 - 5.1 mmol/L    Chloride 105 95 - 110 mmol/L    CO2 23 23 - 29 mmol/L    Glucose 132 (H) 70 - 110 mg/dL    BUN 17 10 - 30 mg/dL    Creatinine 1.1 0.5 - 1.4 mg/dL    Calcium 9.8 8.7 - 10.5 mg/dL    Total Protein 8.1 6.0 - 8.4 g/dL    Albumin 3.4 (L) 3.5 - 5.2 g/dL    Total Bilirubin 0.6 0.1 - 1.0 mg/dL    Alkaline Phosphatase 88 55 - 135 U/L    AST 20 10 - 40 U/L    ALT 19 10 - 44 U/L    eGFR >60 >60 mL/min/1.73 m^2    Anion Gap 10 8 - 16 mmol/L   Troponin I    Collection Time: 08/09/24  5:42 PM   Result Value Ref Range    Troponin I 0.037 (H) 0.000 - 0.026 ng/mL   Brain natriuretic peptide    Collection Time: 08/09/24  5:42 PM   Result Value Ref Range    BNP 93 0 - 99 pg/mL   APTT    Collection Time: 08/09/24  5:42  PM   Result Value Ref Range    aPTT 30.8 21.0 - 32.0 sec   Protime-INR    Collection Time: 08/09/24  5:42 PM   Result Value Ref Range    Prothrombin Time 11.8 9.0 - 12.5 sec    INR 1.0 0.8 - 1.2   Troponin I    Collection Time: 08/09/24 10:12 PM   Result Value Ref Range    Troponin I 0.038 (H) 0.000 - 0.026 ng/mL   APTT    Collection Time: 08/10/24 12:43 AM   Result Value Ref Range    aPTT 85.4 (H) 21.0 - 32.0 sec   CBC auto differential    Collection Time: 08/10/24  1:50 AM   Result Value Ref Range    WBC 6.62 3.90 - 12.70 K/uL    RBC 5.04 4.60 - 6.20 M/uL    Hemoglobin 14.5 14.0 - 18.0 g/dL    Hematocrit 44.3 40.0 - 54.0 %    MCV 88 82 - 98 fL    MCH 28.8 27.0 - 31.0 pg    MCHC 32.7 32.0 - 36.0 g/dL    RDW 15.3 (H) 11.5 - 14.5 %    Platelets 190 150 - 450 K/uL    MPV 10.9 9.2 - 12.9 fL    Immature Granulocytes 0.8 (H) 0.0 - 0.5 %    Gran # (ANC) 4.4 1.8 - 7.7 K/uL    Immature Grans (Abs) 0.05 (H) 0.00 - 0.04 K/uL    Lymph # 1.0 1.0 - 4.8 K/uL    Mono # 1.1 (H) 0.3 - 1.0 K/uL    Eos # 0.1 0.0 - 0.5 K/uL    Baso # 0.04 0.00 - 0.20 K/uL    nRBC 0 0 /100 WBC    Gran % 65.8 38.0 - 73.0 %    Lymph % 14.7 (L) 18.0 - 48.0 %    Mono % 16.0 (H) 4.0 - 15.0 %    Eosinophil % 2.1 0.0 - 8.0 %    Basophil % 0.6 0.0 - 1.9 %    Differential Method Automated    Troponin I    Collection Time: 08/10/24  1:50 AM   Result Value Ref Range    Troponin I 0.030 (H) 0.000 - 0.026 ng/mL       RADIOLOGY  X-Ray Chest AP Portable    Result Date: 8/9/2024  EXAMINATION: XR CHEST AP PORTABLE CLINICAL HISTORY: pulmonary embolism; TECHNIQUE: Single frontal portable view of the chest was performed. COMPARISON: 07/03/2024 FINDINGS: Pulmonary vascular/interstitial markings are increased.  No consolidation or pleural effusion     Pulmonary vascular congestion or interstitial edema suspected Electronically signed by: Yanely Benton Date:    08/09/2024 Time:    18:32    CT Urogram Abd Pelvis W WO    Result Date: 8/9/2024  EXAMINATION: CT UROGRAM ABD  PELVIS W WO CLINICAL HISTORY: Other microscopic hematuriaHematuria, gross/macroscopic;Microhematuria; TECHNIQUE: Axial CT imaging was performed through the abdomen and pelvis without and with 100cc  of intravenous contrast. Multiplanar reformats were performed and interpreted. COMPARISON: CT abdomen and pelvis on 10/05/2019.  Renal ultrasound on 01/05/2023 FINDINGS: Lung bases are clear.  Saddle pulmonary embolus.  No evidence of right heart strain. Chronic, moderate bilateral hydroureteronephrosis with urinary bladder distention and trabeculation.  Benign left interpolar and right lower pole renal cysts measuring up to 3.4 cm on the left.  Nonobstructive left lower pole renal calculi measuring up to 1.7 cm.  No ureteral calculi.  Prostatomegaly. Liver, adrenal glands, spleen, pancreas, and small bowel are within normal limits.  Colonic diverticulosis.  The appendix is normal.  Cholelithiasis This. Aortic atherosclerosis without evidence of aneurysm. The abdominal aorta is normal in caliber. The urinary bladder is unremarkable. No significant osseous abnormality is identified.     Saddle pulmonary embolus.  The findings were reported to nurse practitioner Kylie Alexis. Chronic hydroureteronephrosis and neurogenic bladder.  Nonobstructive left lower pole nephrolithiasis. All CT scans at this facility are performed  using dose modulation techniques as appropriate to performed exam including the following:  automated exposure control; adjustment of mA and/or kV according to the patients size (this includes techniques or standardized protocols for targeted exams where dose is matched to indication/reason for exam: i.e. extremities or head);  iterative reconstruction technique. Electronically signed by: Real Chester MD Date:    08/09/2024 Time:    15:58      EKG    MICROBIOLOGY    MDM     Amount and/or Complexity of Data Reviewed  Clinical lab tests: reviewed  Tests in the radiology section of CPT®: reviewed  Tests in the  "medicine section of CPT®: reviewed  Discussion of test results with the performing providers: yes  Decide to obtain previous medical records or to obtain history from someone other than the patient: yes  Obtain history from someone other than the patient: yes  Review and summarize past medical records: yes  Discuss the patient with other providers: yes  Independent visualization of images, tracings, or specimens: yes        Assessment/Plan:     * Acute saddle pulmonary embolism without acute cor pulmonale  Saddle PE w/o right heart strain noted. Currently on Heparin drip.   Plan:  -continue Heparin drip  -monitor labs per nomogram   -echo  -Cards consult  -Defer IR consult to day team    Hypertension  Chronic, controlled. Latest blood pressure and vitals reviewed-     Temp:  [97.5 °F (36.4 °C)-98 °F (36.7 °C)]   Pulse:  [71-81]   Resp:  [16-22]   BP: (141-173)/(76-95)   SpO2:  [95 %-98 %] .   Home meds for hypertension were reviewed and noted below.   Hypertension Medications               lisinopriL-hydrochlorothiazide (PRINZIDE,ZESTORETIC) 20-12.5 mg per tablet TAKE ONE TABLET BY MOUTH TWICE DAILY    metoprolol tartrate (LOPRESSOR) 50 MG tablet TAKE ONE TABLET BY MOUTH ONCE DAILY            While in the hospital, will manage blood pressure as follows; Continue home antihypertensive regimen    Will utilize p.r.n. blood pressure medication only if patient's blood pressure greater than 160/100 and he develops symptoms such as worsening chest pain or shortness of breath.    Type 2 diabetes mellitus, without long-term current use of insulin  Patient's FSGs are controlled on current medication regimen.  Last A1c reviewed-   Lab Results   Component Value Date    HGBA1C 5.5 05/28/2024     Most recent fingerstick glucose reviewed- No results for input(s): "POCTGLUCOSE" in the last 24 hours.  Current correctional scale  Low  Maintain anti-hyperglycemic dose as follows-   Antihyperglycemics (From admission, onward)      " Start     Stop Route Frequency Ordered    08/09/24 2210  insulin aspart U-100 pen 0-5 Units         -- SubQ Before meals & nightly PRN 08/09/24 2111          Plan:  -SSI  -A1c  -Accu-checks  -Hold oral hypoglycemics while patient is in the hospital  -Continue home long-acting insulin at 20% decrease, titrate up as needed  -Hypoglycemic protocol          CKD stage 3 due to type 2 diabetes mellitus  Creatine stable for now. BMP reviewed- noted Estimated Creatinine Clearance: 48.8 mL/min (based on SCr of 1.1 mg/dL). according to latest data. Based on current GFR, CKD stage is stage 3 - GFR 30-59.  Monitor UOP and serial BMP and adjust therapy as needed. Renally dose meds. Avoid nephrotoxic medications and procedures.    History of gout  Compliant with home medications. Voices no complaints of active flare-up.  Plan:  -continue allopurinol      Benign prostatic hyperplasia  Voices no new or worsening urinary complaints. Compliant with home medication.  Plan:  -continue proscar and flomax      Hyperlipidemia  Patient is chronically on statin.will continue for now. Last Lipid Panel:   Lab Results   Component Value Date    CHOL 164 05/28/2024    HDL 51 05/28/2024    LDLCALC 86.8 05/28/2024    TRIG 131 05/28/2024    CHOLHDL 31.1 05/28/2024     Plan:  -Continue home medication  -low fat/low calorie diet          VTE Risk Mitigation (From admission, onward)           Ordered     Reason for No Pharmacological VTE Prophylaxis  Once        Question:  Reasons:  Answer:  IV Heparin w/in 24 hrs. Pre or Post-Op    08/09/24 2111     IP VTE HIGH RISK PATIENT  Once         08/09/24 2111     Place sequential compression device  Until discontinued         08/09/24 2111     heparin 25,000 units in dextrose 5% (100 units/ml) IV bolus from bag HIGH INTENSITY nomogram - [facility]  As needed (PRN)        Question:  Heparin Infusion Adjustment (DO NOT MODIFY ANSWER)  Answer:  [email or web address]\epic\Images\Pharmacy\HeparinInfusions\heparin HIGH INTENSITY  nomogram for [facility] [id number]    08/09/24 1734     heparin 25,000 units in dextrose 5% (100 units/ml) IV bolus from bag HIGH INTENSITY nomogram - [facility]  As needed (PRN)        Question:  Heparin Infusion Adjustment (DO NOT MODIFY ANSWER)  Answer:  [email or web address]\epic\Images\Pharmacy\HeparinInfusions\heparin HIGH INTENSITY nomogram for [facility] [id number]    08/09/24 1734     heparin 25,000 units in dextrose 5% 250 mL (100 units/mL) infusion HIGH INTENSITY nomogram - [facility]  Continuous        Question:  Begin at (units/kg/hr)  Answer:  18    08/09/24 1734                     //Core Measures   -DVT proph: SCDs, continue heparin drip  -Code status Full    -Surrogate:daughter      Components of this note were documented using a voice recognition system and are subject to errors not corrected at the time the document was proof read. Please contact the author for any clarifications.       Jorge Palmer NP  Department of Hospital Medicine  [facility] - Med Surg

## 2024-08-10 NOTE — ASSESSMENT & PLAN NOTE
Creatine stable for now. BMP reviewed- noted Estimated Creatinine Clearance: 48.8 mL/min (based on SCr of 1.1 mg/dL). according to latest data. Based on current GFR, CKD stage is stage 3 - GFR 30-59.  Monitor UOP and serial BMP and adjust therapy as needed. Renally dose meds. Avoid nephrotoxic medications and procedures.

## 2024-08-11 PROBLEM — I82.433 ACUTE BILATERAL DEEP VEIN THROMBOSIS (DVT) OF POPLITEAL VEINS: Status: ACTIVE | Noted: 2024-08-11

## 2024-08-11 LAB
APTT PPP: 48.1 SEC (ref 21–32)
BASOPHILS # BLD AUTO: 0.05 K/UL (ref 0–0.2)
BASOPHILS NFR BLD: 0.6 % (ref 0–1.9)
DIFFERENTIAL METHOD BLD: ABNORMAL
EOSINOPHIL # BLD AUTO: 0.1 K/UL (ref 0–0.5)
EOSINOPHIL NFR BLD: 1.1 % (ref 0–8)
ERYTHROCYTE [DISTWIDTH] IN BLOOD BY AUTOMATED COUNT: 15.4 % (ref 11.5–14.5)
HCT VFR BLD AUTO: 50.3 % (ref 40–54)
HGB BLD-MCNC: 15.8 G/DL (ref 14–18)
IMM GRANULOCYTES # BLD AUTO: 0.03 K/UL (ref 0–0.04)
IMM GRANULOCYTES NFR BLD AUTO: 0.3 % (ref 0–0.5)
LYMPHOCYTES # BLD AUTO: 0.8 K/UL (ref 1–4.8)
LYMPHOCYTES NFR BLD: 8.7 % (ref 18–48)
MCH RBC QN AUTO: 28.3 PG (ref 27–31)
MCHC RBC AUTO-ENTMCNC: 31.4 G/DL (ref 32–36)
MCV RBC AUTO: 90 FL (ref 82–98)
MONOCYTES # BLD AUTO: 1.2 K/UL (ref 0.3–1)
MONOCYTES NFR BLD: 13.6 % (ref 4–15)
NEUTROPHILS # BLD AUTO: 6.7 K/UL (ref 1.8–7.7)
NEUTROPHILS NFR BLD: 75.7 % (ref 38–73)
NRBC BLD-RTO: 0 /100 WBC
OHS QRS DURATION: 86 MS
OHS QTC CALCULATION: 472 MS
PLATELET # BLD AUTO: 201 K/UL (ref 150–450)
PMV BLD AUTO: 12 FL (ref 9.2–12.9)
POCT GLUCOSE: 103 MG/DL (ref 70–110)
POCT GLUCOSE: 118 MG/DL (ref 70–110)
POCT GLUCOSE: 126 MG/DL (ref 70–110)
POCT GLUCOSE: 133 MG/DL (ref 70–110)
RBC # BLD AUTO: 5.59 M/UL (ref 4.6–6.2)
WBC # BLD AUTO: 8.87 K/UL (ref 3.9–12.7)

## 2024-08-11 PROCEDURE — 36415 COLL VENOUS BLD VENIPUNCTURE: CPT | Mod: HCNC | Performed by: STUDENT IN AN ORGANIZED HEALTH CARE EDUCATION/TRAINING PROGRAM

## 2024-08-11 PROCEDURE — 63600175 PHARM REV CODE 636 W HCPCS: Mod: HCNC | Performed by: EMERGENCY MEDICINE

## 2024-08-11 PROCEDURE — 85730 THROMBOPLASTIN TIME PARTIAL: CPT | Mod: HCNC | Performed by: STUDENT IN AN ORGANIZED HEALTH CARE EDUCATION/TRAINING PROGRAM

## 2024-08-11 PROCEDURE — 11000001 HC ACUTE MED/SURG PRIVATE ROOM: Mod: HCNC

## 2024-08-11 PROCEDURE — 99232 SBSQ HOSP IP/OBS MODERATE 35: CPT | Mod: HCNC,,, | Performed by: INTERNAL MEDICINE

## 2024-08-11 PROCEDURE — 25000003 PHARM REV CODE 250: Mod: HCNC | Performed by: NURSE PRACTITIONER

## 2024-08-11 PROCEDURE — 99222 1ST HOSP IP/OBS MODERATE 55: CPT | Mod: HCNC,,, | Performed by: SURGERY

## 2024-08-11 PROCEDURE — 25000003 PHARM REV CODE 250: Mod: HCNC | Performed by: INTERNAL MEDICINE

## 2024-08-11 PROCEDURE — 21400001 HC TELEMETRY ROOM: Mod: HCNC

## 2024-08-11 PROCEDURE — 85025 COMPLETE CBC W/AUTO DIFF WBC: CPT | Mod: HCNC | Performed by: EMERGENCY MEDICINE

## 2024-08-11 PROCEDURE — 94799 UNLISTED PULMONARY SVC/PX: CPT | Mod: HCNC,XB

## 2024-08-11 PROCEDURE — 99900035 HC TECH TIME PER 15 MIN (STAT): Mod: HCNC

## 2024-08-11 PROCEDURE — 25000003 PHARM REV CODE 250: Mod: HCNC | Performed by: STUDENT IN AN ORGANIZED HEALTH CARE EDUCATION/TRAINING PROGRAM

## 2024-08-11 RX ORDER — AMOXICILLIN 250 MG
1 CAPSULE ORAL ONCE
Status: COMPLETED | OUTPATIENT
Start: 2024-08-11 | End: 2024-08-11

## 2024-08-11 RX ORDER — FUROSEMIDE 20 MG/1
20 TABLET ORAL DAILY
Status: DISCONTINUED | OUTPATIENT
Start: 2024-08-11 | End: 2024-08-12 | Stop reason: HOSPADM

## 2024-08-11 RX ADMIN — TAMSULOSIN HYDROCHLORIDE 0.8 MG: 0.4 CAPSULE ORAL at 08:08

## 2024-08-11 RX ADMIN — FINASTERIDE 5 MG: 5 TABLET, FILM COATED ORAL at 08:08

## 2024-08-11 RX ADMIN — ALLOPURINOL 300 MG: 300 TABLET ORAL at 08:08

## 2024-08-11 RX ADMIN — HEPARIN SODIUM 15 UNITS/KG/HR: 10000 INJECTION, SOLUTION INTRAVENOUS at 03:08

## 2024-08-11 RX ADMIN — FUROSEMIDE 20 MG: 20 TABLET ORAL at 11:08

## 2024-08-11 RX ADMIN — SENNOSIDES AND DOCUSATE SODIUM 1 TABLET: 50; 8.6 TABLET ORAL at 08:08

## 2024-08-11 RX ADMIN — HYDROCHLOROTHIAZIDE 12.5 MG: 12.5 TABLET ORAL at 08:08

## 2024-08-11 RX ADMIN — ATORVASTATIN CALCIUM 10 MG: 10 TABLET, FILM COATED ORAL at 08:08

## 2024-08-11 RX ADMIN — APIXABAN 10 MG: 2.5 TABLET, FILM COATED ORAL at 08:08

## 2024-08-11 NOTE — ASSESSMENT & PLAN NOTE
-Saddle PE w/o right heart strain noted on imaging, Cardiology consulted for evaluation.   -Evaluation of bilateral lower extremity pain and swelling via lower extremity ultrasound noted concerns for bilateral acute occlusive thrombus of the popliteal veins  so Vascular Surgery consulted for evaluation.      Plan:  -Cardiology consulted, follow-up recs  - Vascular surgery consulted for further evaluation.  - Lower extremity ultrasound noted concerns for bilateral popliteal DVT in the setting of saddle PE. Vascular Surgery consulted for evaluation  -Continue Heparin drip. Transition to DOAC when appropriate  -monitor labs per nomogram

## 2024-08-11 NOTE — PROGRESS NOTES
[facility]  Cardiology  Consult Note     Patient Name: Amauri Lara  MRN: [id number]  Admission Date: 8/9/2024  Hospital Length of Stay: 1 days  Code Status: Full Code   Attending Provider: Jose Raul Powers DO   Consulting Provider: Samina Sanchez MD   Primary Care Physician: Stefan Vera MD  Principal Problem:Acute saddle pulmonary embolism without acute cor pulmonale     Patient information was obtained from patient and ER records.      Inpatient consult to Cardiology  Consult performed by: Clifton Sanchez MD  Consult ordered by: Jose Raul Powers DO           Subjective:      Chief Complaint:        HPI:   Mr Lara is a 89 year old male with a past medical history of DM, histoyr of PE (2011) HTN, on chronic coumadin,(apparently off coumadin). Urology workup (CT) showed saddle pulmonary embolism. Patient with some SOB and denies any chest pain. Chest X-ray showed mild CHF. EKG with no changes, NSR, and probable septal scar. Patient on IV heparin.   Recommendation to continue IV heparin, check echo, pt will eventually back on eliquis ,will give one dose of IV lasix CHF.     Hospital Course:    8/11/24- Patient seen and examined today. No complaints. No SOB. Echo is normal. Normal LV and RV function. Pulmonary hypertension is mild. Will start low dose lasix for CHF.           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    Obesity, unspecified      Primary osteoarthritis involving multiple joints                 Past Surgical History:   Procedure Laterality Date    CATARACT EXTRACTION         aphakic od    CATARACT EXTRACTION W/   "INTRAOCULAR LENS IMPLANT Left 12/13/2018    ENDOSCOPIC ULTRASOUND OF UPPER GASTROINTESTINAL TRACT N/A 12/30/2022     Procedure: ULTRASOUND, UPPER GI TRACT, ENDOSCOPIC;  Surgeon: Mani Howard MD;  Location: [facility] ENDO;  Service: Endoscopy;  Laterality: N/A;    TRANSURETHRAL RESECTION OF PROSTATE N/A 5/31/2018     Procedure: PROSTATECTOMY-TRANSURETHRAL;  Surgeon: Michael Westbrook IV, MD;  Location: [facility] OR;  Service: Urology;  Laterality: N/A;    VASCULAR SURGERY        yag laser capsulotomy Left 09/08/2020               Review of patient's allergies indicates:   Allergen Reactions    Morphine Other (See Comments)       Pt states, "It only makes my pain worse."             Current Facility-Administered Medications on File Prior to Encounter   Medication    [COMPLETED] iohexoL (OMNIPAQUE 350) injection 100 mL           Current Outpatient Medications on File Prior to Encounter   Medication Sig    allopurinoL (ZYLOPRIM) 300 MG tablet TAKE ONE TABLET BY MOUTH EVERY DAY    amoxicillin (AMOXIL) 500 MG capsule Take 500 mg by mouth every 8 (eight) hours.    finasteride (PROSCAR) 5 mg tablet Take 1 tablet (5 mg total) by mouth once daily.    HYDROcodone-acetaminophen (NORCO) 7.5-325 mg per tablet Take 1 tablet by mouth every 8 (eight) hours as needed for Pain.    lisinopriL-hydrochlorothiazide (PRINZIDE,ZESTORETIC) 20-12.5 mg per tablet TAKE ONE TABLET BY MOUTH TWICE DAILY    lovastatin (MEVACOR) 40 MG tablet TAKE ONE TABLET BY MOUTH EVERY EVENING    metFORMIN (GLUCOPHAGE) 850 MG tablet TAKE ONE TABLET BY MOUTH EVERY DAY    metoprolol tartrate (LOPRESSOR) 50 MG tablet TAKE ONE TABLET BY MOUTH ONCE DAILY    mupirocin (BACTROBAN) 2 % ointment Apply topically 3 (three) times daily.    tamsulosin (FLOMAX) 0.4 mg Cap TAKE TWO CAPSULES BY MOUTH EVERY DAY    blood-glucose meter (TRUE METRIX GLUCOSE METER) kit Use as instructed    fluticasone propionate (FLONASE) 50 mcg/actuation nasal spray INHALE 2 SPRAYS IN EACH NOSTRIL ONCE " DAILY    lancets Misc 1 each by Misc.(Non-Drug; Combo Route) route 4 (four) times daily.    prednisoLONE acetate (PRED FORTE) 1 % DrpS Place 1 drop into the left eye 4 (four) times daily.    SSD 1 % cream      STOOL SOFTENER 100 mg capsule TAKE ONE CAPSULE BY MOUTH TWICE DAILY    TRUE METRIX GLUCOSE TEST STRIP Strp TEST BLOOD SUGAR FOUR TIMES DAILY      Family History         Problem Relation (Age of Onset)     Diabetes Brother                  Tobacco Use    Smoking status: Never    Smokeless tobacco: Never   Substance and Sexual Activity    Alcohol use: No    Drug use: No    Sexual activity: Never   Review of Systems   Constitutional:  Negative for chills, diaphoresis, fatigue and fever.   Respiratory:  Positive for shortness of breath. Negative for cough.    Cardiovascular:  Positive for chest pain and leg swelling. Negative for palpitations.   Gastrointestinal:  Negative for abdominal pain, diarrhea, nausea and vomiting.   Genitourinary:  Positive for hematuria. Negative for dysuria and frequency.   All other systems reviewed and are negative.     Objective:      Vital Signs (Most Recent):  Temp: 97.8 °F (36.6 °C) (08/10/24 0458)  Pulse: 68 (08/10/24 0458)  Resp: 17 (08/10/24 0458)  BP: (!) 140/73 (08/10/24 0458)  SpO2: 95 % (08/10/24 0458) Vital Signs (24h Range):  Temp:  [97.5 °F (36.4 °C)-98 °F (36.7 °C)] 97.8 °F (36.6 °C)  Pulse:  [67-81] 68  Resp:  [16-22] 17  SpO2:  [95 %-98 %] 95 %  BP: (140-173)/(73-95) 140/73      Weight: 97.3 kg (214 lb 8.1 oz)  Body mass index is 29.92 kg/m².     Physical Exam  Vitals and nursing note reviewed.   Constitutional:       General: He is awake. He is not in acute distress.     Appearance: Normal appearance. He is well-developed and well-groomed. He is not ill-appearing, toxic-appearing or diaphoretic.   HENT:      Head: Normocephalic and atraumatic.   Eyes:      Extraocular Movements: Extraocular movements intact.      Conjunctiva/sclera: Conjunctivae normal.    Cardiovascular:      Rate and Rhythm: Normal rate and regular rhythm.      Pulses: Normal pulses.      Heart sounds: Normal heart sounds. No murmur heard.  Pulmonary:      Effort: Pulmonary effort is normal.      Breath sounds: Decreased breath sounds present. No wheezing, rhonchi or rales.   Abdominal:      General: Bowel sounds are normal.      Palpations: Abdomen is soft.      Tenderness: There is no abdominal tenderness.   Musculoskeletal:      Cervical back: Normal range of motion and neck supple.      Right lower leg: Edema present.      Left lower leg: Edema present.      Comments: 5/5 strength throughout   Skin:     General: Skin is warm and dry.      Capillary Refill: Capillary refill takes less than 2 seconds.   Neurological:      General: No focal deficit present.      Mental Status: He is alert and oriented to person, place, and time. Mental status is at baseline.      GCS: GCS eye subscore is 4. GCS verbal subscore is 5. GCS motor subscore is 6.      Cranial Nerves: Cranial nerves 2-12 are intact.      Sensory: Sensation is intact.      Motor: Motor function is intact.   Psychiatric:         Mood and Affect: Mood normal.         Behavior: Behavior normal. Behavior is cooperative.   Significant Labs: BMP:       Recent Labs   Lab 08/09/24  1742   *      K 4.4      CO2 23   BUN 17   CREATININE 1.1   CALCIUM 9.8   , CMP       Recent Labs   Lab 08/09/24  1742      K 4.4      CO2 23   *   BUN 17   CREATININE 1.1   CALCIUM 9.8   PROT 8.1   ALBUMIN 3.4*   BILITOT 0.6   ALKPHOS 88   AST 20   ALT 19   ANIONGAP 10   , CBC        Recent Labs   Lab 08/09/24  1742 08/10/24  0150   WBC 7.47 6.62   HGB 15.5 14.5   HCT 48.2 44.3    190   , and Troponin         Recent Labs   Lab 08/09/24  2212 08/10/24  0150 08/10/24  0603   TROPONINI 0.038* 0.030* 0.024         Significant Imaging: Echocardiogram: 2D echo with color flow doppler:         Results for orders placed or  performed during the hospital encounter of 01/27/17   2D echo with color flow doppler   Result Value Ref Range     EF + QEF 60 55 - 65     Diastolic Dysfunction No       Est. PA Systolic Pressure 7.29       Narrative     Date of Procedure: 01/28/2017           TEST DESCRIPTION   Technical Quality: This study was performed in conjunction with a 3ml intravenous injection of Optison contrast agent because of poor endocardial visualization.      Aorta: The aortic root is normal in size, measuring 3.5 cm at sinotubular junction and 3.5 cm at Sinuses of Valsalva. The proximal ascending aorta is normal in size, measuring 3.3 cm across.      Left Atrium: The left atrial volume index is mildly enlarged, measuring 38.41 cc/m2.      Left Ventricle: The left ventricle is normal in size, with an end-diastolic diameter of 4.4 cm, and an end-systolic diameter of 3.1 cm. LV wall thickness is normal, with the septum measuring 1.4 cm and the posterior wall measuring 1.0 cm across. Relative   wall thickness was increased at 0.45, and the LV mass index was 99.5 g/m2 consistent with concentric remodeling. Global left ventricular systolic function appears normal. Visually estimated ejection fraction is 60-65%. The LV Doppler derived stroke   volume equals 81.0 ccs.   The E/e'(lat) is 5, consistent with normal diastolic function.      Right Atrium: The right atrium is normal in size, measuring 5.4 cm in length and 4.1 cm in width in the apical view.      Right Ventricle: The right ventricle is normal in size. Global right ventricular systolic function appears normal. Tricuspid annular plane systolic excursion (TAPSE) is 2.2 cm. The estimated PA systolic pressure is greater than 7 mmHg.      Aortic Valve:  The aortic valve is mildly sclerotic. The mean gradient obtained across the aortic valve is 6.3 mmHg.      Mitral Valve:  The pressure half time is 54 msec. The calculated mitral valve area is 4.07 cm2.      Intracavitary: There is no  evidence of pericardial effusion, intracavity mass, thrombi, or vegetation.            CONCLUSIONS     1 - Mild left atrial enlargement.     2 - Concentric remodeling.     3 - Normal left ventricular systolic function (EF 60-65%).     4 - Normal left ventricular diastolic function.     5 - Normal right ventricular systolic function .     6 - The estimated PA systolic pressure is greater than 7 mmHg.                  This document has been electronically    SIGNED BY: Clifton Sanchez MD On: 01/28/2017 11:53    and Transthoracic echo (TTE) complete (Cupid Only):         Results for orders placed or performed during the hospital encounter of 08/09/24   Echo   Result Value Ref Range     BSA 2.21 m2     LVOT stroke volume 88.30 cm3     LVIDd 4.11 3.5 - 6.0 cm     LV Systolic Volume 26.98 mL     LV Systolic Volume Index 12.4 mL/m2     LVIDs 2.70 2.1 - 4.0 cm     LV Diastolic Volume 74.77 mL     LV Diastolic Volume Index 34.46 mL/m2     Left Ventricular End Systolic Volume by Teichholz Method 26.98 mL     Left Ventricular End Diastolic Volume by Teichholz Method 74.77 mL     IVS 1.26 (A) 0.6 - 1.1 cm     LVOT diameter 2.08 cm     LVOT area 3.4 cm2     FS 34 28 - 44 %     Left Ventricle Relative Wall Thickness 0.75 cm     Posterior Wall 1.54 (A) 0.6 - 1.1 cm     LV mass 217.35 g     LV Mass Index 100 g/m2     MV Peak E Efra 0.55 m/s     TDI LATERAL 0.08 m/s     TDI SEPTAL 0.07 m/s     E/E' ratio 7.33 m/s     MV Peak A Efra 0.86 m/s     TR Max Efra 2.78 m/s     E/A ratio 0.64       IVRT 94.20 msec     E wave deceleration time 309.24 msec     LV SEPTAL E/E' RATIO 7.86 m/s     LV LATERAL E/E' RATIO 6.88 m/s     LVOT peak efra 1.01 m/s     Left Ventricular Outflow Tract Mean Velocity 0.69 cm/s     Left Ventricular Outflow Tract Mean Gradient 2.17 mmHg     RVOT peak VTI 5.9 cm     TAPSE 1.80 cm     LA size 3.41 cm     Left Atrium Minor Axis 3.19 cm     Left Atrium Major Axis 6.58 cm     RA Major Axis 5.24 cm     AV regurgitation  pressure 1/2 time [id number] ms     AR Max Efra 4.55 m/s     AV mean gradient 15 mmHg     AV peak gradient 24 mmHg     Ao peak efra 2.45 m/s     Ao VTI 52.50 cm     LVOT peak VTI 26.00 cm     AV valve area 1.68 cm²     AV Velocity Ratio 0.41       AV index (prosthetic) 0.50       MARIBEL by Velocity Ratio 1.40 cm²     MV stenosis pressure 1/2 time 89.68 ms     MV valve area p 1/2 method 2.45 cm2     Triscuspid Valve Regurgitation Peak Gradient 31 mmHg     PV mean gradient 0 mmHg     RVOT peak efra 0.49 m/s     Ao root annulus 3.70 cm     STJ 4.03 cm     Ascending aorta 3.77 cm     IVC diameter 2.16 cm     Mean e' 0.08 m/s     ZLVIDS -3.81       ZLVIDD -5.58       LA Volume Index 20.7 mL/m2     LA volume 44.84 cm3     LA WIDTH 3.6 cm     RA Width 3.0 cm     EF 60 %     TV resting pulmonary artery pressure 34 mmHg     RV TB RVSP 6 mmHg     Est. RA pres 3 mmHg     Narrative       Left Ventricle: The left ventricle is normal in size. Normal wall   thickness. There is concentric remodeling. There is normal systolic   function. Ejection fraction by visual approximation is 60%. There is   normal diastolic function.    Right Ventricle: Normal right ventricular cavity size. Wall thickness   is normal. Systolic function is normal.    Aortic Valve: There is moderate aortic valve sclerosis. There is mild   stenosis. Aortic valve area by VTI is 1.68 cm². Aortic valve peak velocity   is 2.45 m/s. Mean gradient is 15 mmHg. The dimensionless index is 0.50.    Pulmonary Artery: The estimated pulmonary artery systolic pressure is   34 mmHg.    IVC/SVC: Normal venous pressure at 3 mmHg.       and EKG:   Assessment and Plan:   Mr Lara is a 89 year old male with a past medical history of DM, histoyr of PE (2011) HTN, on chronic coumadin,(apparently off coumadin). Urology workup (CT) showed saddle pulmonary embolism. Patient with some SOB and denies any chest pain. Chest X-ray showed mild CHF. EKG with no changes, NSR, and probable  "septal scar. Patient on IV heparin.   Recommendation to continue IV heparin, check echo, Pt will eventually be back on eliquis ,will give one dose of IV lasix.      Acute saddle pulmonary embolism without acute cor pulmonale  Saddle PE w/o right heart strain noted. Currently on Heparin drip.   Plan:  -continue Heparin drip  -monitor labs per nomogram   -echo  -Cards consult  -Defer IR consult to day team     Hypertension  Chronic, controlled. Latest blood pressure and vitals reviewed-      Temp:  [97.5 °F (36.4 °C)-98 °F (36.7 °C)]   Pulse:  [71-81]   Resp:  [16-22]   BP: (141-173)/(76-95)   SpO2:  [95 %-98 %] .   Home meds for hypertension were reviewed and noted below.   Hypertension Medications                    lisinopriL-hydrochlorothiazide (PRINZIDE,ZESTORETIC) 20-12.5 mg per tablet TAKE ONE TABLET BY MOUTH TWICE DAILY     metoprolol tartrate (LOPRESSOR) 50 MG tablet TAKE ONE TABLET BY MOUTH ONCE DAILY                While in the hospital, will manage blood pressure as follows; Continue home antihypertensive regimen     Will utilize p.r.n. blood pressure medication only if patient's blood pressure greater than 160/100 and he develops symptoms such as worsening chest pain or shortness of breath.     Type 2 diabetes mellitus, without long-term current use of insulin  Patient's FSGs are controlled on current medication regimen.  Last A1c reviewed-             Lab Results   Component Value Date     HGBA1C 5.5 05/28/2024      Most recent fingerstick glucose reviewed- No results for input(s): "POCTGLUCOSE" in the last 24 hours.  Current correctional scale  Low  Maintain anti-hyperglycemic dose as follows-   Antihyperglycemics (From admission, onward)                      Start     Stop Route Frequency Ordered     08/09/24 2210   insulin aspart U-100 pen 0-5 Units         -- SubQ Before meals & nightly PRN 08/09/24 2111             Plan:  -SSI  -A1c  -Accu-checks  -Hold oral hypoglycemics while patient is in the " hospital  -Continue home long-acting insulin at 20% decrease, titrate up as needed  -Hypoglycemic protocol             CKD stage 3 due to type 2 diabetes mellitus  Creatine stable for now. BMP reviewed- noted Estimated Creatinine Clearance: 48.8 mL/min (based on SCr of 1.1 mg/dL). according to latest data. Based on current GFR, CKD stage is stage 3 - GFR 30-59.  Monitor UOP and serial BMP and adjust therapy as needed. Renally dose meds. Avoid nephrotoxic medications and procedures.     History of gout  Compliant with home medications. Voices no complaints of active flare-up.  Plan:  -continue allopurinol        Benign prostatic hyperplasia  Voices no new or worsening urinary complaints. Compliant with home medication.  Plan:  -continue proscar and flomax        Hyperlipidemia  Patient is chronically on statin.will continue for now. Last Lipid Panel:             Lab Results   Component Value Date     CHOL 164 05/28/2024     HDL 51 05/28/2024     LDLCALC 86.8 05/28/2024     TRIG 131 05/28/2024     CHOLHDL 31.1 05/28/2024      Plan:  -Continue home medication  -low fat/low calorie diet     VTE Risk Mitigation (From admission, onward)              Ordered       Reason for No Pharmacological VTE Prophylaxis  Once        Question:  Reasons:  Answer:  IV Heparin w/in 24 hrs. Pre or Post-Op    08/09/24 2111       IP VTE HIGH RISK PATIENT  Once         08/09/24 2111       Place sequential compression device  Until discontinued         08/09/24 2111       heparin 25,000 units in dextrose 5% (100 units/ml) IV bolus from bag HIGH INTENSITY nomogram - [facility]  As needed (PRN)        Question:  Heparin Infusion Adjustment (DO NOT MODIFY ANSWER)  Answer:  [email or web address]\epic\Images\Pharmacy\HeparinInfusions\heparin HIGH INTENSITY nomogram for [facility] [id number]    08/09/24 1734       heparin 25,000 units in dextrose 5% (100 units/ml) IV bolus from bag HIGH INTENSITY nomogram - [facility]  As needed (PRN)        Question:  Heparin Infusion  Adjustment (DO NOT MODIFY ANSWER)  Answer:  [email or web address]\epic\Images\Pharmacy\HeparinInfusions\heparin HIGH INTENSITY nomogram for [facility] [id number]    08/09/24 1734       heparin 25,000 units in dextrose 5% 250 mL (100 units/mL) infusion HIGH INTENSITY nomogram - [facility]  Continuous        Question:  Begin at (units/kg/hr)  Answer:  18    08/09/24 1734                          Cardiology   [facility]

## 2024-08-11 NOTE — SUBJECTIVE & OBJECTIVE
Interval History: No acute events overnight, afebrile, hemodynamically stable. Denies chest pain, palpitations, lightheadedness, dizziness, shortness of breath, cough, nausea, vomiting. Evaluation of bilateral lower extremity pain and swelling via lower extremity ultrasound noted concerns for bilateral acute occlusive thrombus of the popliteal veins  so Vascular Surgery consulted for evaluation.       Objective:     Vital Signs (Most Recent):  Temp: 98 °F (36.7 °C) (08/11/24 1254)  Pulse: 86 (08/11/24 1500)  Resp: 18 (08/11/24 1254)  BP: (!) 149/71 (08/11/24 1254)  SpO2: (!) 93 % (08/11/24 1254) Vital Signs (24h Range):  Temp:  [97.6 °F (36.4 °C)-98.3 °F (36.8 °C)] 98 °F (36.7 °C)  Pulse:  [68-86] 86  Resp:  [18] 18  SpO2:  [93 %-97 %] 93 %  BP: (125-149)/(68-75) 149/71     Weight: 97.1 kg (214 lb)  Body mass index is 29.85 kg/m².    Intake/Output Summary (Last 24 hours) at 8/11/2024 1700  Last data filed at 8/11/2024 1649  Gross per 24 hour   Intake --   Output 1200 ml   Net -1200 ml         Physical Exam  Vitals and nursing note reviewed.   Constitutional:       General: He is not in acute distress.     Appearance: He is not ill-appearing, toxic-appearing or diaphoretic.   HENT:      Head: Normocephalic and atraumatic.      Mouth/Throat:      Mouth: Mucous membranes are moist.   Eyes:      General: No scleral icterus.  Cardiovascular:      Rate and Rhythm: Normal rate and regular rhythm.      Heart sounds: Normal heart sounds. No murmur heard.  Pulmonary:      Effort: Pulmonary effort is normal. No respiratory distress.      Breath sounds: No wheezing, rhonchi or rales.   Abdominal:      General: Bowel sounds are normal. There is no distension.      Palpations: Abdomen is soft.      Tenderness: There is no abdominal tenderness. There is no guarding or rebound.   Musculoskeletal:         General: Tenderness (Bilateral lower extremity) present.      Right lower leg: Edema present.      Left lower leg: Edema present.    Skin:     General: Skin is warm and dry.      Coloration: Skin is not jaundiced.   Neurological:      Mental Status: He is alert.   Psychiatric:         Mood and Affect: Mood normal.         Behavior: Behavior normal.             Significant Labs: All pertinent labs within the past 24 hours have been reviewed.  CBC:   Recent Labs   Lab 08/09/24  1742 08/10/24  0150 08/11/24  0545   WBC 7.47 6.62 8.87   HGB 15.5 14.5 15.8   HCT 48.2 44.3 50.3    190 201     CMP:   Recent Labs   Lab 08/09/24  1742      K 4.4      CO2 23   *   BUN 17   CREATININE 1.1   CALCIUM 9.8   PROT 8.1   ALBUMIN 3.4*   BILITOT 0.6   ALKPHOS 88   AST 20   ALT 19   ANIONGAP 10         Significant Imaging: I have reviewed all pertinent imaging results/findings within the past 24 hours.

## 2024-08-11 NOTE — PROGRESS NOTES
"[facility] Medicine  Progress Note    Patient Name: Amauri Lara  MRN: [id number]  Patient Class: IP- Inpatient   Admission Date: 8/9/2024  Length of Stay: 2 days  Attending Physician: Jose Raul Powers DO  Primary Care Provider: Stefan Vera MD        Subjective:     Principal Problem:Acute saddle pulmonary embolism without acute cor pulmonale        HPI:  Amauri Lara is a 89 y.o. male with a PMH  has a past medical history of Aphakia of right eye, Chronic venous insufficiency, CKD stage 3 due to type 2 diabetes mellitus, DM (diabetes mellitus), type 2 with complications, Hearing loss of aging, History of gout, History of pulmonary embolism (2011), Hypertension associated with diabetes, Iridodialysis of right eye, Obesity, unspecified, and Primary osteoarthritis involving multiple joints.presented to the Emergency Department for evaluation after being sent by urology -- Kylie Alexis NP -- for saddle pulmonary embolism on CT urogram performed today for hematuria. Patient has no other complaints at this time. Patient denies any fever, CP, chest tightness, SOB, nausea, vomiting, and all other sxs at this time. No prior Tx reported.      Independent patient history obtained from patient's family member at bedside. States that pt has been "hurting all weekend". Family member reports symptoms of generalized weakness and states that patient slide out of his recliner, but denies hitting his head of having LOC. No further complaints or concerns at this time.      ER workup revealed CBC to be unremarkable.  CMP unremarkable.  BNP within normal limits.  Initial troponin 0.037.  Chest x-ray revealed pulmonary vascular congestion or interstitial edema suspected.  EKG revealed normal sinus rhythm with a ventricular rate of 80 beats per minute with a QT/QTC of 404/465.  Patient initiated on heparin drip for saddle PE.  Hospital Medicine consulted to admit patient for further evaluation and treatment. "  Patient and family at bedside in agreement with treatment plan.  Patient admitted under inpatient status.    PCP: Stefan Vera      Overview/Hospital Course:  Admitted to Hospital Medicine for evaluation of saddle pulmonary embolism.  Cardiology consulted for further evaluation.  TTE without concerns for right heart strain. Started on Furosemide for CHF concerns. Evaluation of bilateral lower extremity pain and swelling via lower extremity ultrasound noted concerns for bilateral acute occlusive thrombus of the popliteal veins  so Vascular Surgery consulted for evaluation.    Interval History: No acute events overnight, afebrile, hemodynamically stable. Denies chest pain, palpitations, lightheadedness, dizziness, shortness of breath, cough, nausea, vomiting. Evaluation of bilateral lower extremity pain and swelling via lower extremity ultrasound noted concerns for bilateral acute occlusive thrombus of the popliteal veins  so Vascular Surgery consulted for evaluation.       Objective:     Vital Signs (Most Recent):  Temp: 98 °F (36.7 °C) (08/11/24 1254)  Pulse: 86 (08/11/24 1500)  Resp: 18 (08/11/24 1254)  BP: (!) 149/71 (08/11/24 1254)  SpO2: (!) 93 % (08/11/24 1254) Vital Signs (24h Range):  Temp:  [97.6 °F (36.4 °C)-98.3 °F (36.8 °C)] 98 °F (36.7 °C)  Pulse:  [68-86] 86  Resp:  [18] 18  SpO2:  [93 %-97 %] 93 %  BP: (125-149)/(68-75) 149/71     Weight: 97.1 kg (214 lb)  Body mass index is 29.85 kg/m².    Intake/Output Summary (Last 24 hours) at 8/11/2024 1700  Last data filed at 8/11/2024 1649  Gross per 24 hour   Intake --   Output 1200 ml   Net -1200 ml         Physical Exam  Vitals and nursing note reviewed.   Constitutional:       General: He is not in acute distress.     Appearance: He is not ill-appearing, toxic-appearing or diaphoretic.   HENT:      Head: Normocephalic and atraumatic.      Mouth/Throat:      Mouth: Mucous membranes are moist.   Eyes:      General: No scleral icterus.  Cardiovascular:       Rate and Rhythm: Normal rate and regular rhythm.      Heart sounds: Normal heart sounds. No murmur heard.  Pulmonary:      Effort: Pulmonary effort is normal. No respiratory distress.      Breath sounds: No wheezing, rhonchi or rales.   Abdominal:      General: Bowel sounds are normal. There is no distension.      Palpations: Abdomen is soft.      Tenderness: There is no abdominal tenderness. There is no guarding or rebound.   Musculoskeletal:         General: Tenderness (Bilateral lower extremity) present.      Right lower leg: Edema present.      Left lower leg: Edema present.   Skin:     General: Skin is warm and dry.      Coloration: Skin is not jaundiced.   Neurological:      Mental Status: He is alert.   Psychiatric:         Mood and Affect: Mood normal.         Behavior: Behavior normal.             Significant Labs: All pertinent labs within the past 24 hours have been reviewed.  CBC:   Recent Labs   Lab 08/09/24  1742 08/10/24  0150 08/11/24  0545   WBC 7.47 6.62 8.87   HGB 15.5 14.5 15.8   HCT 48.2 44.3 50.3    190 201     CMP:   Recent Labs   Lab 08/09/24  1742      K 4.4      CO2 23   *   BUN 17   CREATININE 1.1   CALCIUM 9.8   PROT 8.1   ALBUMIN 3.4*   BILITOT 0.6   ALKPHOS 88   AST 20   ALT 19   ANIONGAP 10         Significant Imaging: I have reviewed all pertinent imaging results/findings within the past 24 hours.    Assessment/Plan:      * Acute saddle pulmonary embolism without acute cor pulmonale  -Saddle PE w/o right heart strain noted on imaging, Cardiology consulted for evaluation.   -Evaluation of bilateral lower extremity pain and swelling via lower extremity ultrasound noted concerns for bilateral acute occlusive thrombus of the popliteal veins  so Vascular Surgery consulted for evaluation.      Plan:  -Cardiology consulted, follow-up recs  - Vascular surgery consulted for further evaluation.  - Lower extremity ultrasound noted concerns for bilateral popliteal DVT  in the setting of saddle PE. Vascular Surgery consulted for evaluation  -Continue Heparin drip. Transition to DOAC when appropriate  -monitor labs per nomogram    Hyperlipidemia  Patient is chronically on statin.will continue for now. Last Lipid Panel:   Lab Results   Component Value Date    CHOL 164 05/28/2024    HDL 51 05/28/2024    LDLCALC 86.8 05/28/2024    TRIG 131 05/28/2024    CHOLHDL 31.1 05/28/2024     Plan:  -Continue home medication  -low fat/low calorie diet        Type 2 diabetes mellitus, without long-term current use of insulin  Stable  on admission      Home Diabetes Medications               metFORMIN (GLUCOPHAGE) 850 MG tablet TAKE ONE TABLET BY MOUTH EVERY DAY            Most recent A1c reviewed-   Lab Results   Component Value Date    HGBA1C 5.5 05/28/2024        Most recent glucose reviewed-  Recent Labs     08/10/24  0618 08/10/24  1200 08/10/24  1625 08/11/24  0542 08/11/24  1107 08/11/24  1648   POCTGLUCOSE 95 112* 122* 103 126* 118*           Plan:  Antihyperglycemics (From admission, onward)      Start     Stop Route Frequency Ordered    08/09/24 2210  insulin aspart U-100 pen 0-5 Units         -- SubQ Before meals & nightly PRN 08/09/24 2111        - POCT glucose ACHS  - Will monitor glucose results and adjust insulin regimen accordingly  - Hold oral hypoglycemic agents while patient is in the hospital.    CKD stage 3 due to type 2 diabetes mellitus  Creatine stable for now. BMP reviewed- noted Estimated Creatinine Clearance: 48.8 mL/min (based on SCr of 1.1 mg/dL). according to latest data. Based on current GFR, CKD stage is stage 3 - GFR 30-59.  Monitor UOP and serial BMP and adjust therapy as needed. Renally dose meds. Avoid nephrotoxic medications and procedures.    Benign prostatic hyperplasia  Voices no new or worsening urinary complaints. Compliant with home medication.  Plan:  -continue proscar and flomax      History of gout  Compliant with home medications. Voices no complaints  of active flare-up.    Plan:  -continue allopurinol      Hypertension  Chronic, controlled. Latest blood pressure and vitals reviewed-     Temp:  [97.6 °F (36.4 °C)-98.3 °F (36.8 °C)]   Pulse:  [68-86]   Resp:  [18]   BP: (125-149)/(68-75)   SpO2:  [93 %-97 %] .   Home meds for hypertension were reviewed and noted below.   -lisinopriL-hydrochlorothiazide (PRINZIDE,ZESTORETIC) 20-12.5 mg per tablet:  Daughter reports medication was discontinued by PCP(Dr. Stefan Vera)  -metoprolol tartrate (LOPRESSOR) 50 MG tablet: Daughter reports medication was discontinued by Cardiologist(Dr. Wyatt)        PLAN:  Will utilize p.r.n. blood pressure medication only if patient's blood pressure greater than 160/100 and he develops symptoms such as worsening chest pain or shortness of breath.      VTE Risk Mitigation (From admission, onward)           Ordered     Reason for No Pharmacological VTE Prophylaxis  Once        Question:  Reasons:  Answer:  IV Heparin w/in 24 hrs. Pre or Post-Op    08/09/24 2111     IP VTE HIGH RISK PATIENT  Once         08/09/24 2111     Place sequential compression device  Until discontinued         08/09/24 2111     heparin 25,000 units in dextrose 5% (100 units/ml) IV bolus from bag HIGH INTENSITY nomogram - [facility]  As needed (PRN)        Question:  Heparin Infusion Adjustment (DO NOT MODIFY ANSWER)  Answer:  \\[email or web address]\epic\Images\Pharmacy\HeparinInfusions\heparin HIGH INTENSITY nomogram for [facility] [id number]    08/09/24 1734     heparin 25,000 units in dextrose 5% (100 units/ml) IV bolus from bag HIGH INTENSITY nomogram - [facility]  As needed (PRN)        Question:  Heparin Infusion Adjustment (DO NOT MODIFY ANSWER)  Answer:  \\[email or web address]\epic\Images\Pharmacy\HeparinInfusions\heparin HIGH INTENSITY nomogram for [facility] [id number].pdf    08/09/24 1734     heparin 25,000 units in dextrose 5% 250 mL (100 units/mL) infusion HIGH INTENSITY nomogram - [facility]  Continuous        Question:  Begin at (units/kg/hr)  Answer:  18     08/09/24 1734                    Discharge Planning   SAM:      Code Status: Full Code   Is the patient medically ready for discharge?:     Reason for patient still in hospital (select all that apply): Patient trending condition, Laboratory test, Treatment, Consult recommendations, and PT / OT recommendations  Discharge Plan A: Home with family          Jose Raul Powers DO  Department of Hospital Medicine   [facility]    Voice recognition software was used in the creation of this note/communication and any sound-alike/typographical errors which may have occurred despite initial review prior to signing should be taken in context when interpreting.  If such errors prevent a clear understanding of the note/communication, please contact the provider/office for clarification.

## 2024-08-11 NOTE — ASSESSMENT & PLAN NOTE
Chronic, controlled. Latest blood pressure and vitals reviewed-     Temp:  [97.6 °F (36.4 °C)-98.3 °F (36.8 °C)]   Pulse:  [68-86]   Resp:  [18]   BP: (125-149)/(68-75)   SpO2:  [93 %-97 %] .   Home meds for hypertension were reviewed and noted below.   -lisinopriL-hydrochlorothiazide (PRINZIDE,ZESTORETIC) 20-12.5 mg per tablet:  Daughter reports medication was discontinued by PCP(Dr. Stefan Vera)  -metoprolol tartrate (LOPRESSOR) 50 MG tablet: Daughter reports medication was discontinued by Cardiologist(Dr. Wyatt)        PLAN:  Will utilize p.r.n. blood pressure medication only if patient's blood pressure greater than 160/100 and he develops symptoms such as worsening chest pain or shortness of breath.

## 2024-08-11 NOTE — ASSESSMENT & PLAN NOTE
Stable  on admission      Home Diabetes Medications               metFORMIN (GLUCOPHAGE) 850 MG tablet TAKE ONE TABLET BY MOUTH EVERY DAY            Most recent A1c reviewed-   Lab Results   Component Value Date    HGBA1C 5.5 05/28/2024        Most recent glucose reviewed-  Recent Labs     08/10/24  0618 08/10/24  1200 08/10/24  1625 08/11/24  0542 08/11/24  1107 08/11/24  1648   POCTGLUCOSE 95 112* 122* 103 126* 118*           Plan:  Antihyperglycemics (From admission, onward)    Start     Stop Route Frequency Ordered    08/09/24 2210  insulin aspart U-100 pen 0-5 Units         -- SubQ Before meals & nightly PRN 08/09/24 2111      - POCT glucose ACHS  - Will monitor glucose results and adjust insulin regimen accordingly  - Hold oral hypoglycemic agents while patient is in the hospital.

## 2024-08-11 NOTE — PLAN OF CARE
08/11/24 1528   Post-Acute Status   Post-Acute Authorization Home Health   Home Health Status Pending medical clearance/testing   Discharge Plan   Discharge Plan A Home with family     Orders added to home health referral submitted to [facility] yesterday by Rose WOOTEN

## 2024-08-11 NOTE — CONSULTS
"    Consult    Consulting Physician:  Jose Raul Powers DO  Reason for Consultation: saddle PE    CC: Pulmonary Embolism (Pt sent by AJIT Alexis for  saddle PE on CT today)      HPI: Amauri Lara is a 89 y.o. male with PMHx as seen below, was admitted on 8/9/2024 from the ER for incidental finding of saddle PE on CT urogram.  Patient was being worked up for UTI/kidney stones and report of some hematuria.  Has been undergoing work-up for "weakness" since July, per daughter.  Patient lives with daughter I spoke with at bedside.  Normally he is fairly active around the house doing gardening and does all of his ADLs alone but since July the daughter has noted he has been weaker than normal.  Presented to ER in July and told he had a UTI and started on antibiotics.  Per history, has remote history of PE but I have reviewed a CTA chest from 2017 and there was no PE noted at that time.  He was previously on coumadin, per chart review but nothing recently.  At present, he is resting in bed on room air w/o complaints.  Per nursing, he has not been out of bed ambulating since admitted.  He is currently on a heparin drip.  No reports of hematuria since admission and his H/H is normal (actually hemoconcentrated at present possibly secondary to diuresis).      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    Obesity, unspecified     Primary osteoarthritis involving multiple joints        Past Surgical History:   Procedure Laterality Date    CATARACT EXTRACTION      aphakic od    CATARACT EXTRACTION W/  INTRAOCULAR LENS IMPLANT Left 12/13/2018    ENDOSCOPIC ULTRASOUND OF UPPER " GASTROINTESTINAL TRACT N/A 12/30/2022    Procedure: ULTRASOUND, UPPER GI TRACT, ENDOSCOPIC;  Surgeon: Mani Howard MD;  Location: [facility] ENDO;  Service: Endoscopy;  Laterality: N/A;    TRANSURETHRAL RESECTION OF PROSTATE N/A 5/31/2018    Procedure: PROSTATECTOMY-TRANSURETHRAL;  Surgeon: Michael Westbrook IV, MD;  Location: [facility] OR;  Service: Urology;  Laterality: N/A;    VASCULAR SURGERY      yag laser capsulotomy Left 09/08/2020       Social History     Socioeconomic History    Marital status:    Tobacco Use    Smoking status: Never    Smokeless tobacco: Never   Substance and Sexual Activity    Alcohol use: No    Drug use: No    Sexual activity: Never     Social Determinants of Health     Financial Resource Strain: Low Risk  (8/11/2024)    Overall Financial Resource Strain (CARDIA)     Difficulty of Paying Living Expenses: Not hard at all   Food Insecurity: No Food Insecurity (8/11/2024)    Hunger Vital Sign     Worried About Running Out of Food in the Last Year: Never true     Ran Out of Food in the Last Year: Never true   Transportation Needs: No Transportation Needs (8/11/2024)    TRANSPORTATION NEEDS     Transportation : No   Stress: No Stress Concern Present (8/11/2024)    [employer] - Occupational Stress Questionnaire     Feeling of Stress : Not at all   Housing Stability: Low Risk  (8/11/2024)    Housing Stability Vital Sign     Unable to Pay for Housing in the Last Year: No     Homeless in the Last Year: No       Family History   Problem Relation Name Age of Onset    Diabetes Brother           Current Facility-Administered Medications:     acetaminophen suppository 650 mg, 650 mg, Rectal, Q4H PRN, Jorge Palmer, NP    acetaminophen tablet 650 mg, 650 mg, Oral, Q6H PRN, Jorge Palmer, AJIT    albuterol-ipratropium 2.5 mg-0.5 mg/3 mL nebulizer solution 3 mL, 3 mL, Nebulization, Q4H PRN, Jorge Palmer, NP    allopurinoL tablet 300 mg, 300 mg, Oral, Daily,  Jorge Palmer NP, 300 mg at 08/11/24 0817    aluminum-magnesium hydroxide-simethicone 200-200-20 mg/5 mL suspension 30 mL, 30 mL, Oral, QID PRN, Jorge Palmer NP    atorvastatin tablet 10 mg, 10 mg, Oral, QHS, Jorge Palmer NP, 10 mg at 08/10/24 2149    dextrose 10% bolus 125 mL 125 mL, 12.5 g, Intravenous, PRN, Jorge Palmer NP    dextrose 10% bolus 250 mL 250 mL, 25 g, Intravenous, PRN, Jorge Palmer NP    finasteride tablet 5 mg, 5 mg, Oral, Daily, Jorge Palmer NP, 5 mg at 08/11/24 0817    furosemide tablet 20 mg, 20 mg, Oral, Daily, Clifton Sanchez MD, 20 mg at 08/11/24 1105    glucagon (human recombinant) injection 1 mg, 1 mg, Intramuscular, PRN, Jorge Palmer NP    glucose chewable tablet 16 g, 16 g, Oral, PRN, Jorge Palmer NP    glucose chewable tablet 24 g, 24 g, Oral, PRN, Jorge Palmer NP    heparin 25,000 units in dextrose 5% (100 units/ml) IV bolus from bag HIGH INTENSITY nomogram - [facility], 60 Units/kg, Intravenous, PRN, Tito Matamoros,     heparin 25,000 units in dextrose 5% (100 units/ml) IV bolus from bag HIGH INTENSITY nomogram - [facility], 30 Units/kg, Intravenous, PRN, Tito Matamoros,     heparin 25,000 units in dextrose 5% 250 mL (100 units/mL) infusion HIGH INTENSITY nomogram - [facility], 0-40 Units/kg/hr, Intravenous, Continuous, Tito Matamoros, , Last Rate: 14.6 mL/hr at 08/11/24 1520, 15 Units/kg/hr at 08/11/24 1520    insulin aspart U-100 pen 0-5 Units, 0-5 Units, Subcutaneous, QID (AC + HS) PRN, [place], Jorge J, NP    melatonin tablet 6 mg, 6 mg, Oral, Nightly PRN, Jorge Palmer, NP    naloxone 0.4 mg/mL injection 0.02 mg, 0.02 mg, Intravenous, PRN, Jorge Palmer, NP    ondansetron injection 4 mg, 4 mg, Intravenous, Q8H PRN, Jorge Palmer, NP    polyethylene glycol packet 17 g, 17 g, Oral, Daily PRN, Jorge Palmer, NP    promethazine tablet 25 mg, 25 mg, Oral, Q6H PRN, Jorge Palmer, NP     "simethicone chewable tablet 80 mg, 1 tablet, Oral, QID PRN, Jorge Palmer, NP    sodium chloride 0.9% flush 3 mL, 3 mL, Intravenous, Q12H PRN, Jorge Palmer, NP    tamsulosin 24 hr capsule 0.8 mg, 0.8 mg, Oral, Daily, Jorge Palmer NP, 0.8 mg at 08/11/24 0817    Review of patient's allergies indicates:   Allergen Reactions    Morphine Other (See Comments)     Pt states, "It only makes my pain worse."       ROS:  10 point review of systems is negative except as mentioned in HPI.    Vitals:    08/11/24 1718   BP: 139/67   Pulse: 91   Resp: 18   Temp: 100 °F (37.8 °C)       Objective:  General Appearance:  Comfortable and in no acute distress (appears rather frail).    Vital signs: (most recent): Blood pressure 139/67, pulse 91, temperature 100 °F (37.8 °C), temperature source Oral, resp. rate 18, height 5' 11" (1.803 m), weight 97.1 kg (214 lb), SpO2 (!) 93%.  Vital signs are normal.    HEENT: Normal HEENT exam.    Lungs:  Normal effort and normal respiratory rate.    Heart: Normal rate.  Regular rhythm.    Abdomen: Abdomen is soft.  There is no abdominal tenderness.     Extremities: Normal range of motion.    Pulses: (Feet warm and perfused)    Neurological: Patient is alert and oriented to person, place and time.    Pupils:  Pupils are equal, round, and reactive to light.    Skin:  Warm.        LABS:  I have reviewed all pertinent lab results within the past 24 hours.    IMAGING:  I have personally reviewed the imaging.    US Lower Extremity Veins Bilateral   Final Result   Abnormal      Bilateral acute occlusive thrombus of the popliteal veins.      Ordering provider notified at 14:15.      This report was flagged in Epic as abnormal.         Electronically signed by: Samuel Rowan   Date:    08/11/2024   Time:    14:27      X-Ray Chest AP Portable   Final Result      Pulmonary vascular congestion or interstitial edema suspected         Electronically signed by: Yanely Benton " "  Date:    08/09/2024   Time:    18:32        I have reviewed CT urogram and certainly appreciate filling defects in right pulmonary artery and segmental branches but don't appear occlusive.  Can't really appreciate left pulmonary artery on this scan.    I have reviewed ECHO which does not suggest any right heart strain.    Venous duplex positive for acute bilateral popliteal vein thrombus.    MDM  Number of Diagnoses or Management Options  Pulmonary embolism: new, needed workup     Amount and/or Complexity of Data Reviewed  Clinical lab tests: reviewed  Tests in the radiology section of CPT®: reviewed  Tests in the medicine section of CPT®: reviewed  Discussion of test results with the performing providers: yes  Decide to obtain previous medical records or to obtain history from someone other than the patient: yes  Obtain history from someone other than the patient: yes  Review and summarize past medical records: yes  Discuss the patient with other providers: yes  Independent visualization of images, tracings, or specimens: yes    Risk of Complications, Morbidity, and/or Mortality  Presenting problems: moderate  Diagnostic procedures: moderate  Management options: moderate        Assessment & Plan  Amauri Lara is a 89 y.o. male with one month of "weakness."  Found to have pulmonary embolus incidentally on CT urogram.  Echo w/o evidence of heart strain.  Bilateral popliteal vein thrombus noted to be acute.  Currently on IV heparin and no reports of hematuria with an H/H that is normal.  I have spoken with Dr. Werner.  I am going to recommend conservative measures.  I believe we need to transition patient to oral anticoagulation and monitor for any hematuria.  He also needs PT/OT and possible acute rehab vs home with rehab given his weakness.  If he develops hematuria or he is determined to be a fall risk with contraindication to anti-coagulation then I would recommend an IV filter which we would be happy to place " at any time.  I am going to arrange follow-up in three months at our office at the [facility].  Please call us if IVC filter is warranted we will be happy to place.    I discussed this plan with the patient and family and he understands, agrees, and appreciates our input and would like to proceed with our above stated plan.    Myke Eagle  8/11/2024  [facility]  Vascular Surgery  [phone] (Clinic Number)    Active Hospital Problems    Diagnosis  POA    *Acute saddle pulmonary embolism without acute cor pulmonale [I26.92]  Yes    Acute bilateral deep vein thrombosis (DVT) of popliteal veins [I82.433]  Unknown    Type 2 diabetes mellitus, without long-term current use of insulin [E11.9]  Unknown    CKD stage 3 due to type 2 diabetes mellitus [E11.22, N18.30]  Yes    Hyperlipidemia [E78.5]  Yes     Last Assessment & Plan:    Formatting of this note might be different from the original.   History & Physical    1. He will be continued on a statin.    Discharge Summary    Continue statin   Follow-up      Benign prostatic hyperplasia [N40.0]  Yes    Hypertension [I10]  Yes    History of gout [Z87.39]  Yes      Resolved Hospital Problems   No resolved problems to display.

## 2024-08-12 VITALS
HEART RATE: 74 BPM | WEIGHT: 214 LBS | OXYGEN SATURATION: 95 % | DIASTOLIC BLOOD PRESSURE: 71 MMHG | BODY MASS INDEX: 29.96 KG/M2 | TEMPERATURE: 98 F | SYSTOLIC BLOOD PRESSURE: 126 MMHG | HEIGHT: 71 IN | RESPIRATION RATE: 16 BRPM

## 2024-08-12 DIAGNOSIS — I26.99 PULMONARY EMBOLISM, UNSPECIFIED CHRONICITY, UNSPECIFIED PULMONARY EMBOLISM TYPE, UNSPECIFIED WHETHER ACUTE COR PULMONALE PRESENT: Primary | ICD-10-CM

## 2024-08-12 LAB
ANION GAP SERPL CALC-SCNC: 11 MMOL/L (ref 8–16)
BUN SERPL-MCNC: 19 MG/DL (ref 10–30)
CALCIUM SERPL-MCNC: 9.9 MG/DL (ref 8.7–10.5)
CHLORIDE SERPL-SCNC: 99 MMOL/L (ref 95–110)
CO2 SERPL-SCNC: 28 MMOL/L (ref 23–29)
CREAT SERPL-MCNC: 1.6 MG/DL (ref 0.5–1.4)
EST. GFR  (NO RACE VARIABLE): 40 ML/MIN/1.73 M^2
GLUCOSE SERPL-MCNC: 112 MG/DL (ref 70–110)
POCT GLUCOSE: 110 MG/DL (ref 70–110)
POTASSIUM SERPL-SCNC: 3.7 MMOL/L (ref 3.5–5.1)
SODIUM SERPL-SCNC: 138 MMOL/L (ref 136–145)

## 2024-08-12 PROCEDURE — 25000003 PHARM REV CODE 250: Mod: HCNC | Performed by: INTERNAL MEDICINE

## 2024-08-12 PROCEDURE — 80048 BASIC METABOLIC PNL TOTAL CA: CPT | Mod: HCNC | Performed by: INTERNAL MEDICINE

## 2024-08-12 PROCEDURE — 25000003 PHARM REV CODE 250: Mod: HCNC | Performed by: STUDENT IN AN ORGANIZED HEALTH CARE EDUCATION/TRAINING PROGRAM

## 2024-08-12 PROCEDURE — 99232 SBSQ HOSP IP/OBS MODERATE 35: CPT | Mod: HCNC,,,

## 2024-08-12 PROCEDURE — 25000003 PHARM REV CODE 250: Mod: HCNC | Performed by: NURSE PRACTITIONER

## 2024-08-12 PROCEDURE — 94761 N-INVAS EAR/PLS OXIMETRY MLT: CPT | Mod: HCNC

## 2024-08-12 PROCEDURE — 36415 COLL VENOUS BLD VENIPUNCTURE: CPT | Mod: HCNC | Performed by: INTERNAL MEDICINE

## 2024-08-12 PROCEDURE — 94799 UNLISTED PULMONARY SVC/PX: CPT | Mod: HCNC

## 2024-08-12 RX ORDER — FUROSEMIDE 20 MG/1
20 TABLET ORAL DAILY
Qty: 30 TABLET | Refills: 11 | Status: SHIPPED | OUTPATIENT
Start: 2024-08-13 | End: 2025-08-13

## 2024-08-12 RX ADMIN — APIXABAN 10 MG: 2.5 TABLET, FILM COATED ORAL at 10:08

## 2024-08-12 RX ADMIN — TAMSULOSIN HYDROCHLORIDE 0.8 MG: 0.4 CAPSULE ORAL at 10:08

## 2024-08-12 RX ADMIN — ALLOPURINOL 300 MG: 300 TABLET ORAL at 10:08

## 2024-08-12 RX ADMIN — FINASTERIDE 5 MG: 5 TABLET, FILM COATED ORAL at 10:08

## 2024-08-12 RX ADMIN — FUROSEMIDE 20 MG: 20 TABLET ORAL at 10:08

## 2024-08-12 NOTE — PROGRESS NOTES
[facility] Surg  Cardiology  Progress Note    Patient Name: Amauri Lara  MRN: [id number]  Admission Date: 8/9/2024  Hospital Length of Stay: 3 days  Code Status: Full Code   Attending Physician: Chris Erickson MD   Primary Care Physician: Stefan Vera MD  Expected Discharge Date: 8/12/2024  Principal Problem:Acute saddle pulmonary embolism without acute cor pulmonale    Subjective:     Hospital Course:   Mr Lara is a 89 year old male with a past medical history of DM, histoyr of PE (2011) HTN, on chronic coumadin,(apparently off coumadin). Urology workup (CT) showed saddle pulmonary embolism. Patient with some SOB and denies any chest pain. Chest X-ray showed mild CHF. EKG with no changes, NSR, and probable septal scar. Patient on IV heparin.   Recommendation to continue IV heparin, check echo, pt will eventually back on eliquis ,will give one dose of IV lasix CHF.     Hospital Course:     8/11/24- Patient seen and examined today. No complaints. No SOB. Echo is normal. Normal LV and RV function. Pulmonary hypertension is mild. Will start low dose lasix for CHF.     8/12/24 pt seen and examined today resting in bed, daughter at bedside. No acute CV events reported        Review of Systems   Reason unable to perform ROS: dementia.     Objective:     Vital Signs (Most Recent):  Temp: 97.7 °F (36.5 °C) (08/12/24 0751)  Pulse: 74 (08/12/24 0753)  Resp: 16 (08/12/24 0753)  BP: 126/71 (08/12/24 0751)  SpO2: 95 % (08/12/24 0753) Vital Signs (24h Range):  Temp:  [97.1 °F (36.2 °C)-100 °F (37.8 °C)] 97.7 °F (36.5 °C)  Pulse:  [74-92] 74  Resp:  [16-18] 16  SpO2:  [92 %-98 %] 95 %  BP: (109-139)/(62-71) 126/71     Weight: 97.1 kg (214 lb)  Body mass index is 29.85 kg/m².     SpO2: 95 %         Intake/Output Summary (Last 24 hours) at 8/12/2024 1502  Last data filed at 8/12/2024 0705  Gross per 24 hour   Intake --   Output 1075 ml   Net -1075 ml       Lines/Drains/Airways       None                      Physical  "Exam  Vitals and nursing note reviewed.   Constitutional:       Appearance: Normal appearance. He is ill-appearing.   HENT:      Head: Normocephalic and atraumatic.   Eyes:      General:         Right eye: No discharge.         Left eye: No discharge.      Pupils: Pupils are equal, round, and reactive to light.   Cardiovascular:      Rate and Rhythm: Normal rate and regular rhythm.      Heart sounds: S1 normal and S2 normal. No murmur heard.     No friction rub.   Pulmonary:      Effort: Pulmonary effort is normal. No respiratory distress.      Breath sounds: Normal breath sounds. No rales.   Abdominal:      Palpations: Abdomen is soft.      Tenderness: There is no abdominal tenderness.   Musculoskeletal:      Cervical back: Neck supple.      Right lower leg: No edema.      Left lower leg: No edema.   Skin:     General: Skin is warm and dry.   Neurological:      Mental Status: He is alert. Mental status is at baseline.      Motor: Weakness present.            Significant Labs: BMP:   Recent Labs   Lab 08/12/24  1106   *      K 3.7   CL 99   CO2 28   BUN 19   CREATININE 1.6*   CALCIUM 9.9   , CMP   Recent Labs   Lab 08/12/24  1106      K 3.7   CL 99   CO2 28   *   BUN 19   CREATININE 1.6*   CALCIUM 9.9   ANIONGAP 11   , CBC   Recent Labs   Lab 08/11/24  0545   WBC 8.87   HGB 15.8   HCT 50.3      , INR No results for input(s): "INR", "PROTIME" in the last 48 hours., Lipid Panel No results for input(s): "CHOL", "HDL", "LDLCALC", "TRIG", "CHOLHDL" in the last 48 hours., Troponin No results for input(s): "TROPONINI" in the last 48 hours., and All pertinent lab results from the last 24 hours have been reviewed.    Significant Imaging: Cardiac Cath: reviewed, Echocardiogram: Transthoracic echo (TTE) complete (Cupid Only):   Results for orders placed or performed during the hospital encounter of 08/09/24   Echo   Result Value Ref Range    BSA 2.21 m2    LVOT stroke volume 88.30 cm3    LVIDd " 4.11 3.5 - 6.0 cm    LV Systolic Volume 26.98 mL    LV Systolic Volume Index 12.4 mL/m2    LVIDs 2.70 2.1 - 4.0 cm    LV Diastolic Volume 74.77 mL    LV Diastolic Volume Index 34.46 mL/m2    Left Ventricular End Systolic Volume by Teichholz Method 26.98 mL    Left Ventricular End Diastolic Volume by Teichholz Method 74.77 mL    IVS 1.26 (A) 0.6 - 1.1 cm    LVOT diameter 2.08 cm    LVOT area 3.4 cm2    FS 34 28 - 44 %    Left Ventricle Relative Wall Thickness 0.75 cm    Posterior Wall 1.54 (A) 0.6 - 1.1 cm    LV mass 217.35 g    LV Mass Index 100 g/m2    MV Peak E Efra 0.55 m/s    TDI LATERAL 0.08 m/s    TDI SEPTAL 0.07 m/s    E/E' ratio 7.33 m/s    MV Peak A Efra 0.86 m/s    TR Max Efra 2.78 m/s    E/A ratio 0.64     IVRT 94.20 msec    E wave deceleration time 309.24 msec    LV SEPTAL E/E' RATIO 7.86 m/s    LV LATERAL E/E' RATIO 6.88 m/s    LVOT peak efra 1.01 m/s    Left Ventricular Outflow Tract Mean Velocity 0.69 cm/s    Left Ventricular Outflow Tract Mean Gradient 2.17 mmHg    RVOT peak VTI 5.9 cm    TAPSE 1.80 cm    LA size 3.41 cm    Left Atrium Minor Axis 3.19 cm    Left Atrium Major Axis 6.58 cm    RA Major Axis 5.24 cm    AV regurgitation pressure 1/2 time [id number] ms    AR Max Efra 4.55 m/s    AV mean gradient 15 mmHg    AV peak gradient 24 mmHg    Ao peak efra 2.45 m/s    Ao VTI 52.50 cm    LVOT peak VTI 26.00 cm    AV valve area 1.68 cm²    AV Velocity Ratio 0.41     AV index (prosthetic) 0.50     MARIBEL by Velocity Ratio 1.40 cm²    MV stenosis pressure 1/2 time 89.68 ms    MV valve area p 1/2 method 2.45 cm2    Triscuspid Valve Regurgitation Peak Gradient 31 mmHg    PV mean gradient 0 mmHg    RVOT peak efra 0.49 m/s    Ao root annulus 3.70 cm    STJ 4.03 cm    Ascending aorta 3.77 cm    IVC diameter 2.16 cm    Mean e' 0.08 m/s    ZLVIDS -3.81     ZLVIDD -5.58     LA Volume Index 20.7 mL/m2    LA volume 44.84 cm3    LA WIDTH 3.6 cm    RA Width 3.0 cm    EF 60 %    TV resting pulmonary artery pressure  "34 mmHg    RV TB RVSP 6 mmHg    Est. RA pres 3 mmHg    Narrative      Left Ventricle: The left ventricle is normal in size. Normal wall   thickness. There is concentric remodeling. There is normal systolic   function. Ejection fraction by visual approximation is 60%. There is   normal diastolic function.    Right Ventricle: Normal right ventricular cavity size. Wall thickness   is normal. Systolic function is normal.    Aortic Valve: There is moderate aortic valve sclerosis. There is mild   stenosis. Aortic valve area by VTI is 1.68 cm². Aortic valve peak velocity   is 2.45 m/s. Mean gradient is 15 mmHg. The dimensionless index is 0.50.    Pulmonary Artery: The estimated pulmonary artery systolic pressure is   34 mmHg.    IVC/SVC: Normal venous pressure at 3 mmHg.     , EKG: reviewed, Stress Test: reviewed, and X-Ray: CXR: X-Ray Chest 1 View (CXR): No results found for this visit on 08/09/24.  Assessment and Plan:       * Acute saddle pulmonary embolism without acute cor pulmonale  Cont eliquis  Discussed with pt and daughter, recommend ED if feeling SOB or CP    Acute bilateral deep vein thrombosis (DVT) of popliteal veins  Cont eliquis  Discussed with pt and daughter, recommend ED if feeling SOB or CP    Hyperlipidemia  statin    CKD stage 3 due to type 2 diabetes mellitus  Crt 1.6 today, monitor  Gentle hydration    Hypertension  Titrate medications        VTE Risk Mitigation (From admission, onward)           Ordered     apixaban tablet 5 mg  2 times daily        Placed in "Followed by" Linked Group    08/11/24 1856     apixaban tablet 10 mg  2 times daily        Placed in "Followed by" Linked Group    08/11/24 1856     Reason for No Pharmacological VTE Prophylaxis  Once        Question:  Reasons:  Answer:  IV Heparin w/in 24 hrs. Pre or Post-Op    08/09/24 2111     IP VTE HIGH RISK PATIENT  Once         08/09/24 2111     Place sequential compression device  Until discontinued         08/09/24 2111              "       Sita Fischer NP  Cardiology  [facility]

## 2024-08-12 NOTE — HOSPITAL COURSE
Mr Lara is a 89 year old male with a past medical history of DM, histoyr of PE (2011) HTN, on chronic coumadin,(apparently off coumadin). Urology workup (CT) showed saddle pulmonary embolism. Patient with some SOB and denies any chest pain. Chest X-ray showed mild CHF. EKG with no changes, NSR, and probable septal scar. Patient on IV heparin.   Recommendation to continue IV heparin, check echo, pt will eventually back on eliquis ,will give one dose of IV lasix CHF.     Hospital Course:     8/11/24- Patient seen and examined today. No complaints. No SOB. Echo is normal. Normal LV and RV function. Pulmonary hypertension is mild. Will start low dose lasix for CHF.     8/12/24 pt seen and examined today resting in bed, daughter at bedside. No acute CV events reported

## 2024-08-12 NOTE — PLAN OF CARE
Pt to be d/shruthi. D/c instructions reviewed w/ pt, verbalized understanding. IV and cardiac monitor removed. Meds sent to pharmacy of choice. Questions answered and chart check complete

## 2024-08-12 NOTE — SUBJECTIVE & OBJECTIVE
Review of Systems   Reason unable to perform ROS: dementia.     Objective:     Vital Signs (Most Recent):  Temp: 97.7 °F (36.5 °C) (08/12/24 0751)  Pulse: 74 (08/12/24 0753)  Resp: 16 (08/12/24 0753)  BP: 126/71 (08/12/24 0751)  SpO2: 95 % (08/12/24 0753) Vital Signs (24h Range):  Temp:  [97.1 °F (36.2 °C)-100 °F (37.8 °C)] 97.7 °F (36.5 °C)  Pulse:  [74-92] 74  Resp:  [16-18] 16  SpO2:  [92 %-98 %] 95 %  BP: (109-139)/(62-71) 126/71     Weight: 97.1 kg (214 lb)  Body mass index is 29.85 kg/m².     SpO2: 95 %         Intake/Output Summary (Last 24 hours) at 8/12/2024 1502  Last data filed at 8/12/2024 0705  Gross per 24 hour   Intake --   Output 1075 ml   Net -1075 ml       Lines/Drains/Airways       None                      Physical Exam  Vitals and nursing note reviewed.   Constitutional:       Appearance: Normal appearance. He is ill-appearing.   HENT:      Head: Normocephalic and atraumatic.   Eyes:      General:         Right eye: No discharge.         Left eye: No discharge.      Pupils: Pupils are equal, round, and reactive to light.   Cardiovascular:      Rate and Rhythm: Normal rate and regular rhythm.      Heart sounds: S1 normal and S2 normal. No murmur heard.     No friction rub.   Pulmonary:      Effort: Pulmonary effort is normal. No respiratory distress.      Breath sounds: Normal breath sounds. No rales.   Abdominal:      Palpations: Abdomen is soft.      Tenderness: There is no abdominal tenderness.   Musculoskeletal:      Cervical back: Neck supple.      Right lower leg: No edema.      Left lower leg: No edema.   Skin:     General: Skin is warm and dry.   Neurological:      Mental Status: He is alert. Mental status is at baseline.      Motor: Weakness present.            Significant Labs: BMP:   Recent Labs   Lab 08/12/24  1106   *      K 3.7   CL 99   CO2 28   BUN 19   CREATININE 1.6*   CALCIUM 9.9   , CMP   Recent Labs   Lab 08/12/24  1106      K 3.7   CL 99   CO2 28   GLU  "112*   BUN 19   CREATININE 1.6*   CALCIUM 9.9   ANIONGAP 11   , CBC   Recent Labs   Lab 08/11/24  0545   WBC 8.87   HGB 15.8   HCT 50.3      , INR No results for input(s): "INR", "PROTIME" in the last 48 hours., Lipid Panel No results for input(s): "CHOL", "HDL", "LDLCALC", "TRIG", "CHOLHDL" in the last 48 hours., Troponin No results for input(s): "TROPONINI" in the last 48 hours., and All pertinent lab results from the last 24 hours have been reviewed.    Significant Imaging: Cardiac Cath: reviewed, Echocardiogram: Transthoracic echo (TTE) complete (Cupid Only):   Results for orders placed or performed during the hospital encounter of 08/09/24   Echo   Result Value Ref Range    BSA 2.21 m2    LVOT stroke volume 88.30 cm3    LVIDd 4.11 3.5 - 6.0 cm    LV Systolic Volume 26.98 mL    LV Systolic Volume Index 12.4 mL/m2    LVIDs 2.70 2.1 - 4.0 cm    LV Diastolic Volume 74.77 mL    LV Diastolic Volume Index 34.46 mL/m2    Left Ventricular End Systolic Volume by Teichholz Method 26.98 mL    Left Ventricular End Diastolic Volume by Teichholz Method 74.77 mL    IVS 1.26 (A) 0.6 - 1.1 cm    LVOT diameter 2.08 cm    LVOT area 3.4 cm2    FS 34 28 - 44 %    Left Ventricle Relative Wall Thickness 0.75 cm    Posterior Wall 1.54 (A) 0.6 - 1.1 cm    LV mass 217.35 g    LV Mass Index 100 g/m2    MV Peak E Efra 0.55 m/s    TDI LATERAL 0.08 m/s    TDI SEPTAL 0.07 m/s    E/E' ratio 7.33 m/s    MV Peak A Efra 0.86 m/s    TR Max Efra 2.78 m/s    E/A ratio 0.64     IVRT 94.20 msec    E wave deceleration time 309.24 msec    LV SEPTAL E/E' RATIO 7.86 m/s    LV LATERAL E/E' RATIO 6.88 m/s    LVOT peak efra 1.01 m/s    Left Ventricular Outflow Tract Mean Velocity 0.69 cm/s    Left Ventricular Outflow Tract Mean Gradient 2.17 mmHg    RVOT peak VTI 5.9 cm    TAPSE 1.80 cm    LA size 3.41 cm    Left Atrium Minor Axis 3.19 cm    Left Atrium Major Axis 6.58 cm    RA Major Axis 5.24 cm    AV regurgitation pressure 1/2 time 723.229453940070803 " ms    AR Max Efra 4.55 m/s    AV mean gradient 15 mmHg    AV peak gradient 24 mmHg    Ao peak efra 2.45 m/s    Ao VTI 52.50 cm    LVOT peak VTI 26.00 cm    AV valve area 1.68 cm²    AV Velocity Ratio 0.41     AV index (prosthetic) 0.50     MARIBEL by Velocity Ratio 1.40 cm²    MV stenosis pressure 1/2 time 89.68 ms    MV valve area p 1/2 method 2.45 cm2    Triscuspid Valve Regurgitation Peak Gradient 31 mmHg    PV mean gradient 0 mmHg    RVOT peak efra 0.49 m/s    Ao root annulus 3.70 cm    STJ 4.03 cm    Ascending aorta 3.77 cm    IVC diameter 2.16 cm    Mean e' 0.08 m/s    ZLVIDS -3.81     ZLVIDD -5.58     LA Volume Index 20.7 mL/m2    LA volume 44.84 cm3    LA WIDTH 3.6 cm    RA Width 3.0 cm    EF 60 %    TV resting pulmonary artery pressure 34 mmHg    RV TB RVSP 6 mmHg    Est. RA pres 3 mmHg    Narrative      Left Ventricle: The left ventricle is normal in size. Normal wall   thickness. There is concentric remodeling. There is normal systolic   function. Ejection fraction by visual approximation is 60%. There is   normal diastolic function.    Right Ventricle: Normal right ventricular cavity size. Wall thickness   is normal. Systolic function is normal.    Aortic Valve: There is moderate aortic valve sclerosis. There is mild   stenosis. Aortic valve area by VTI is 1.68 cm². Aortic valve peak velocity   is 2.45 m/s. Mean gradient is 15 mmHg. The dimensionless index is 0.50.    Pulmonary Artery: The estimated pulmonary artery systolic pressure is   34 mmHg.    IVC/SVC: Normal venous pressure at 3 mmHg.     , EKG: reviewed, Stress Test: reviewed, and X-Ray: CXR: X-Ray Chest 1 View (CXR): No results found for this visit on 08/09/24.

## 2024-08-12 NOTE — DISCHARGE SUMMARY
"[facility] Medicine  Discharge Summary      Patient Name: Amauri Lara  MRN: [id number]  DEL: [id number]  Patient Class: IP- Inpatient  Admission Date: 8/9/2024  Hospital Length of Stay: 3 days  Discharge Date and Time:  08/12/2024 10:49 AM  Attending Physician: Chris Erickson MD   Discharging Provider: Chris Erickson MD  Primary Care Provider: Stefan Vera MD    Primary Care Team: [facility] MEDICINE D    HPI:   Amauri Lara is a 89 y.o. male with a PMH  has a past medical history of Aphakia of right eye, Chronic venous insufficiency, CKD stage 3 due to type 2 diabetes mellitus, DM (diabetes mellitus), type 2 with complications, Hearing loss of aging, History of gout, History of pulmonary embolism (2011), Hypertension associated with diabetes, Iridodialysis of right eye, Obesity, unspecified, and Primary osteoarthritis involving multiple joints.presented to the Emergency Department for evaluation after being sent by urology -- Kylie Alexis NP -- for saddle pulmonary embolism on CT urogram performed today for hematuria. Patient has no other complaints at this time. Patient denies any fever, CP, chest tightness, SOB, nausea, vomiting, and all other sxs at this time. No prior Tx reported.      Independent patient history obtained from patient's family member at bedside. States that pt has been "hurting all weekend". Family member reports symptoms of generalized weakness and states that patient slide out of his recliner, but denies hitting his head of having LOC. No further complaints or concerns at this time.      ER workup revealed CBC to be unremarkable.  CMP unremarkable.  BNP within normal limits.  Initial troponin 0.037.  Chest x-ray revealed pulmonary vascular congestion or interstitial edema suspected.  EKG revealed normal sinus rhythm with a ventricular rate of 80 beats per minute with a QT/QTC of 404/465.  Patient initiated on heparin drip for saddle PE.  [facility] Medicine " consulted to admit patient for further evaluation and treatment.  Patient and family at bedside in agreement with treatment plan.  Patient admitted under inpatient status.    PCP: Stefan Vera      * No surgery found *      Hospital Course:   Admitted to Hospital Medicine for evaluation of saddle pulmonary embolism.  Cardiology consulted for further evaluation.  TTE without concerns for right heart strain. Started on Furosemide for CHF concerns. Evaluation of bilateral lower extremity pain and swelling via lower extremity ultrasound noted concerns for bilateral acute occlusive thrombus of the popliteal veins  so Vascular Surgery consulted for evaluation. Given hemodynamic stability, patient co-morbidities and operative risk, recommended conservative measures instead of acute surgical intervention at this time. PT/OT and possible acute rehab vs home with rehab given his weakness. If he develops hematuria or he is determined to be a fall risk with contraindication to anti-coagulation then I would recommend an IV filter which we would be happy to place at any time. I am going to arrange follow-up in three months at our office at the [place]. Patient continued to remain hemodynamically stable. Discussed with patient daughter regarding risk-benefits of any potential intervention. Daughter in agreement and verbalized understanding.     -You were admitted to our hospital for treatment of acute saddle pulmonary embolism and bilateral lower extremity DVTs. Over the course of your hospitalization, you were treated with blood thinners.    -Please read the attached information regarding bleeding complications and go to your nearest ED if any of the alarm/concerning signs develop.    -Followup with your primary care physician within 1 week for updated labwork, medication adjustments and any routine post-discharge care.     Goals of Care Treatment Preferences:  Code Status: Full Code      SDOH Screening:  The patient was  screened for utility difficulties, food insecurity, transport difficulties, housing insecurity, and interpersonal safety and there were no concerns identified this admission.     Consults:   Consults (From admission, onward)          Status Ordering Provider     Inpatient consult to Vascular Surgery  Once        Provider:  Jesenia Smith NP    Completed HAILEY MENDENHALL     Inpatient consult to Social Work  Once        Provider:  (Not yet assigned)    Completed HAILEY MENDENHALL     Inpatient consult to Cardiology  Once        Provider:  Clifton Sanchez MD    Completed HAILEY MENDENHALL            No new Assessment & Plan notes have been filed under this hospital service since the last note was generated.  Service: Hospital Medicine    Final Active Diagnoses:    Diagnosis Date Noted POA    PRINCIPAL PROBLEM:  Acute saddle pulmonary embolism without acute cor pulmonale [I26.92] 08/09/2024 Yes    Acute bilateral deep vein thrombosis (DVT) of popliteal veins [I82.433] 08/11/2024 Yes    Type 2 diabetes mellitus, without long-term current use of insulin [E11.9] 08/09/2024 Yes    CKD stage 3 due to type 2 diabetes mellitus [E11.22, N18.30]  Yes    Hyperlipidemia [E78.5] 12/22/2021 Yes    Benign prostatic hyperplasia [N40.0] 05/31/2018 Yes    Hypertension [I10]  Yes    History of gout [Z87.39]  Yes      Problems Resolved During this Admission:       Discharged Condition: stable    Disposition:     Follow Up:   Contact information for follow-up providers       Stefan Vera MD. Schedule an appointment as soon as possible for a visit in 1 week(s).    Specialty: Internal Medicine  Why: Hospital discharge follow up  Contact information:  [address]  [address]  [facility] [address]  [phone]                       Contact information for after-discharge care       Home Medical Care       [facility] .    Service: Home Health Services  Contact information:  [address]  [facility] [address]  [phone]                                 Patient Instructions:      Ambulatory referral/consult to Outpatient Case Management   Referral Priority: Routine Referral Type: Consultation   Referral Reason: Specialty Services Required   Number of Visits Requested: 1       Significant Diagnostic Studies: Labs: All labs within the past 24 hours have been reviewed    Pending Diagnostic Studies:       Procedure Component Value Units Date/Time    Basic metabolic panel [[id number]]     Order Status: Sent Lab Status: No result     Specimen: Blood            Medications:  Reconciled Home Medications:      Medication List        START taking these medications      apixaban 5 mg Tab  Commonly known as: ELIQUIS  Take 2 tablets (10 mg total) by mouth 2 (two) times daily for 6 days, THEN 1 tablet (5 mg total) 2 (two) times daily.  Start taking on: August 12, 2024     furosemide 20 MG tablet  Commonly known as: LASIX  Take 1 tablet (20 mg total) by mouth once daily.  Start taking on: August 13, 2024            CONTINUE taking these medications      allopurinoL 300 MG tablet  Commonly known as: ZYLOPRIM  TAKE ONE TABLET BY MOUTH EVERY DAY     blood-glucose meter kit  Commonly known as: TRUE METRIX GLUCOSE METER  Use as instructed     finasteride 5 mg tablet  Commonly known as: PROSCAR  Take 1 tablet (5 mg total) by mouth once daily.     fluticasone propionate 50 mcg/actuation nasal spray  Commonly known as: FLONASE  INHALE 2 SPRAYS IN EACH NOSTRIL ONCE DAILY     HYDROcodone-acetaminophen 7.5-325 mg per tablet  Commonly known as: NORCO  Take 1 tablet by mouth every 8 (eight) hours as needed for Pain.     lancets Misc  1 each by Misc.(Non-Drug; Combo Route) route 4 (four) times daily.     lovastatin 40 MG tablet  Commonly known as: MEVACOR  TAKE ONE TABLET BY MOUTH EVERY EVENING     metFORMIN 850 MG tablet  Commonly known as: GLUCOPHAGE  TAKE ONE TABLET BY MOUTH EVERY DAY     mupirocin  2 % ointment  Commonly known as: BACTROBAN  Apply topically 3 (three) times daily.     prednisoLONE acetate 1 % Drps  Commonly known as: PRED FORTE  Place 1 drop into the left eye 4 (four) times daily.     SSD 1 % cream  Generic drug: silver sulfADIAZINE 1%     STOOL SOFTENER 100 mg capsule  Generic drug: docusate sodium  TAKE ONE CAPSULE BY MOUTH TWICE DAILY     tamsulosin 0.4 mg Cap  Commonly known as: FLOMAX  TAKE TWO CAPSULES BY MOUTH EVERY DAY     TRUE METRIX GLUCOSE TEST STRIP Strp  Generic drug: blood sugar diagnostic  TEST BLOOD SUGAR FOUR TIMES DAILY            STOP taking these medications      amoxicillin 500 MG capsule  Commonly known as: AMOXIL              Indwelling Lines/Drains at time of discharge:   Lines/Drains/Airways       None                   Time spent on the discharge of patient: 35 minutes         Chris Erickson MD  Department of Hospital Medicine  [facility] [facility]

## 2024-08-12 NOTE — CARE UPDATE
Care update  -Vascular Surgery recommendations for conservative management at this time with outpatient follow-up with vascular surgery.  Discussed consult recommendations with patient and daughter(Nel) at bedside.   -Will transition heparin infusion to apixaban tonight. Daughter counseled on safety precautions, and monitoring for hematuria or any other signs of abnormal bleeding while patient is taking apixaban.  -Discussed options for PT/OT consult for evaluation for SNF/rehab placement given patient's recent weakness.  Daughter and patient declined evaluation, prefer discharge to home health. SW/CM aware  -Plan for likely discharge tomorrow morning/afternoon, daughter plans to provide transport for patient.

## 2024-08-12 NOTE — PLAN OF CARE
[facility] - Med Surg  Discharge Final Note    Primary Care Provider: Stefan Vera MD    Expected Discharge Date: 8/12/2024    Final Discharge Note (most recent)       Final Note - 08/12/24 0920          Final Note    Assessment Type Final Discharge Note     Anticipated Discharge Disposition [place] [facility]     Hospital Resources/Appts/Education Provided Appointments scheduled and added to AVS;Post-Acute resouces added to AVS        Post-Acute Status    Post-Acute Authorization Home Health     Home Health Status Set-up Complete/Auth obtained     Discharge Delays None known at this time                     After-discharge care                Home Medical Care       [facility]   Service: Home Health Services    [address] [address]   Phone: [phone]                     Patient has no d/c needs at this time. Sw to follow up, as needed, for d/c planning purposes.

## 2024-08-13 ENCOUNTER — TELEPHONE (OUTPATIENT)
Dept: INTERNAL MEDICINE | Facility: CLINIC | Age: 89
End: 2024-08-13
Payer: MEDICARE

## 2024-08-13 ENCOUNTER — PATIENT OUTREACH (OUTPATIENT)
Dept: ADMINISTRATIVE | Facility: CLINIC | Age: 89
End: 2024-08-13
Payer: MEDICARE

## 2024-08-13 NOTE — PROGRESS NOTES
C3 nurse spoke with Amauri Lara's daughter Nel for a TCC post hospital discharge follow up call. The patient has a scheduled HOSFU appointment with Stefan Vera MD on 08/21/24 @ 0800.

## 2024-08-13 NOTE — TELEPHONE ENCOUNTER
----- Message from Evaristo Johnson LPN sent at 8/13/2024  1:30 PM CDT -----  Regarding: Letter for Leave of Absence  To Whom It May Concern:    I just spoke to Mr. Lara's daughter Nel for a post discharge follow up call. She requested that I send a message to Dr Vera' staff for assistance with typing up a reason for leave of absence letter for her employer so she can take time off to take care of her father. I reminded her that Mr. Lara has a follow up appt with Dr. Vera on 08/21/24 @ 0800 but she wanted to let you guys know beforehand to see if you could assist.  Please contact Nel @ [phone] for further correspondence/questions if able to help. Thank you!    Please do not reply to this message as this inbox is not routinely monitored.

## 2024-08-13 NOTE — TELEPHONE ENCOUNTER
Spoke  with daughter Nel to inform [place] requires to be filled out at the appointment. Nel verbally understands and will bring patient in Friday 8/16/24 for hospital follow up

## 2024-08-13 NOTE — TELEPHONE ENCOUNTER
----- Message from Yolanda Wasserman sent at 8/13/2024 12:57 PM CDT -----  Contact: Kat  .Patients daughter is calling to speak with the nurse regarding questions and concerns  . Reports having questions about pt appt and needing a leave of absence  . Please give patients daughter a call back at   [phone]

## 2024-08-15 ENCOUNTER — OUTPATIENT CASE MANAGEMENT (OUTPATIENT)
Dept: ADMINISTRATIVE | Facility: OTHER | Age: 89
End: 2024-08-15
Payer: MEDICARE

## 2024-08-15 NOTE — PROGRESS NOTES
Outpatient Care Management   - Patient Assessment    Patient: Amauri Lara  MRN:  [id number]  Date of Service:  8/15/2024  Completed by:  Caitie Ledesma LMSW  Referral Date: 08/12/2024    Reason for Visit   Patient presents with    Social Work Assessment    Case Closure       Brief Summary:  received a referral from hospital provider for the following Low/Mod SW psychosocial needs refused SNF placement per PT recs/ dispo home with HH today ( [id number]).  The patient also requests assistance with N/A. Care plan was created in collaboration with patient/caregiver input.  SW completed assessment with patient daughter Kat. Daughter reports she and patient resides together. Daughter reports she assist patient with ADL's at this present time as he is weak. Daughter reports HH, PT , SN, and OT are in the home providing services. Daughter assists with IADL's . Patient  ambulates with a walker. Daughter aware of hospital follow-up appoitment on tomorrow. [facility] [place] [facility]. Daughter plan is to tale a leave of absence from her employer for approximately 60 days while patient recovers.  Daughter denied information on ACP. Daughter makes decision. Daughter denied patient needs assistance with food, shelter, medication , medical and or utilities. Daughter provides transportation to and from appointments. Daughter denied post discharge meals through [employer]. Daughter aware of resources available through local [place] on aging. SW will close case as no needs.

## 2024-08-16 ENCOUNTER — OFFICE VISIT (OUTPATIENT)
Dept: INTERNAL MEDICINE | Facility: CLINIC | Age: 89
End: 2024-08-16
Payer: MEDICARE

## 2024-08-16 VITALS
DIASTOLIC BLOOD PRESSURE: 86 MMHG | TEMPERATURE: 98 F | HEIGHT: 65 IN | SYSTOLIC BLOOD PRESSURE: 138 MMHG | BODY MASS INDEX: 32.32 KG/M2 | WEIGHT: 194 LBS | HEART RATE: 80 BPM | RESPIRATION RATE: 20 BRPM | OXYGEN SATURATION: 94 %

## 2024-08-16 DIAGNOSIS — E11.69 HYPERLIPIDEMIA ASSOCIATED WITH TYPE 2 DIABETES MELLITUS: Primary | ICD-10-CM

## 2024-08-16 DIAGNOSIS — Z86.711 HISTORY OF PULMONARY EMBOLISM: ICD-10-CM

## 2024-08-16 DIAGNOSIS — E78.5 HYPERLIPIDEMIA ASSOCIATED WITH TYPE 2 DIABETES MELLITUS: Primary | ICD-10-CM

## 2024-08-16 DIAGNOSIS — I10 HYPERTENSION, UNSPECIFIED TYPE: ICD-10-CM

## 2024-08-16 DIAGNOSIS — I82.433 ACUTE BILATERAL DEEP VEIN THROMBOSIS (DVT) OF POPLITEAL VEINS: ICD-10-CM

## 2024-08-16 DIAGNOSIS — E11.8 DM (DIABETES MELLITUS), TYPE 2 WITH COMPLICATIONS: ICD-10-CM

## 2024-08-16 DIAGNOSIS — I26.92 ACUTE SADDLE PULMONARY EMBOLISM WITHOUT ACUTE COR PULMONALE: ICD-10-CM

## 2024-08-16 PROCEDURE — 99999 PR PBB SHADOW E&M-EST. PATIENT-LVL IV: CPT | Mod: PBBFAC,HCNC,, | Performed by: INTERNAL MEDICINE

## 2024-08-16 NOTE — PROGRESS NOTES
"HPI:  Patient is a 89-year-old gentleman who comes in today for hospital follow-up.  Patient was admitted to hospital for acute pulmonary emboli.  Patient was also found to have acute popliteal DVTs bilaterally.  Patient has a history of pulmonary embolus 13 years ago.  He was on anticoagulation for several years and then it was discontinued.  Patient currently is doing well.  He has been on Eliquis now for about a week.  He has had no further recurrence of his shortness a breath.    Current meds have been verified and updated per the EMR  Exam:/86 (BP Location: Left arm, Patient Position: Sitting, BP Method: Large (Manual))   Pulse 80   Temp 97.9 °F (36.6 °C) (Tympanic)   Resp 20   Ht 5' 5" (1.651 m)   Wt 88 kg (194 lb 0.1 oz)   SpO2 (!) 94%   BMI 32.28 kg/m²   Chest clear  Cardiovascular regular rate and rhythm with 1-2/6 systolic murmur    Lab Results   Component Value Date    WBC 8.87 08/11/2024    HGB 15.8 08/11/2024    HCT 50.3 08/11/2024     08/11/2024    CHOL 164 05/28/2024    TRIG 131 05/28/2024    HDL 51 05/28/2024    ALT 19 08/09/2024    AST 20 08/09/2024     08/12/2024    K 3.7 08/12/2024    CL 99 08/12/2024    CREATININE 1.6 (H) 08/12/2024    BUN 19 08/12/2024    CO2 28 08/12/2024    TSH 2.706 05/28/2024    PSA 4.4 (H) 06/14/2021    INR 1.0 08/09/2024    HGBA1C 5.5 05/28/2024    BNP 93 08/09/2024    URICACID 3.5 05/28/2024    SEDRATE 22 (H) 04/18/2011       Impression:  Status post bilateral DVTs and saddle pulmonary embolus, currently on Eliquis.  Patient back to his baseline level of function  Patient Active Problem List   Diagnosis    Obesity    Hearing loss of aging    History of pulmonary embolism    DM (diabetes mellitus), type 2 with complications    Hypertension    History of gout    Type 2 diabetes mellitus with polyneuropathy    Urinary retention    Aneurysm artery, popliteal    Atherosclerosis of aorta    Benign prostatic hyperplasia    Chronic venous insufficiency "    Primary osteoarthritis involving multiple joints    At high risk for falls    CKD stage 3 due to type 2 diabetes mellitus    Acute saddle pulmonary embolism without acute cor pulmonale    Acute bilateral deep vein thrombosis (DVT) of popliteal veins    Hyperlipidemia associated with type 2 diabetes mellitus       Plan:  Orders Placed This Encounter    apixaban (ELIQUIS) 5 mg Tab   Patient will see me at his regular scheduled appointment in December.  His medications all remain the same      This note is generated with speech recognition software and is subject to transcription error and sound alike phrases that may be missed by proofreading.

## 2024-09-12 ENCOUNTER — EXTERNAL HOME HEALTH (OUTPATIENT)
Dept: HOME HEALTH SERVICES | Facility: HOSPITAL | Age: 89
End: 2024-09-12
Payer: MEDICARE

## 2024-09-26 ENCOUNTER — PROCEDURE VISIT (OUTPATIENT)
Dept: UROLOGY | Facility: CLINIC | Age: 89
End: 2024-09-26
Payer: MEDICARE

## 2024-09-26 DIAGNOSIS — R31.0 GROSS HEMATURIA: Primary | ICD-10-CM

## 2024-09-26 PROCEDURE — 52000 CYSTOURETHROSCOPY: CPT | Mod: HCNC,S$GLB,, | Performed by: UROLOGY

## 2024-09-26 PROCEDURE — 87186 SC STD MICRODIL/AGAR DIL: CPT | Mod: HCNC | Performed by: UROLOGY

## 2024-09-26 PROCEDURE — 87088 URINE BACTERIA CULTURE: CPT | Mod: HCNC | Performed by: UROLOGY

## 2024-09-26 PROCEDURE — 87086 URINE CULTURE/COLONY COUNT: CPT | Mod: HCNC | Performed by: UROLOGY

## 2024-09-26 RX ORDER — CIPROFLOXACIN 500 MG/1
500 TABLET ORAL
Status: COMPLETED | OUTPATIENT
Start: 2024-09-26 | End: 2024-09-26

## 2024-09-26 RX ORDER — LIDOCAINE HYDROCHLORIDE 20 MG/ML
JELLY TOPICAL
Status: COMPLETED | OUTPATIENT
Start: 2024-09-26 | End: 2024-09-26

## 2024-09-26 RX ADMIN — LIDOCAINE HYDROCHLORIDE 11 ML: 20 JELLY TOPICAL at 10:09

## 2024-09-26 RX ADMIN — CIPROFLOXACIN 500 MG: 500 TABLET ORAL at 10:09

## 2024-09-26 NOTE — PROCEDURES
Procedures    Procedure:  Diagnostic Cystoscopy    Indications: microhematuria    UA: normal, see lab results for values    Procedure in Detail: After proper consents were obtained, the patient was prepped and draped in normal sterile fashion for diagnostic cystoscopy. 5 ml of lidocaine jelly was instilled in the urethra. The flexible cystoscope was then introduced into the urethra, and advanced into the bladder under direct vision. The urethral mucosa appeared normal, and no strictures were noted. The sphincter was normal, and the veru montanum was unremarkable. The prostatic mucosa and the lateral lobes of the prostate were unremarkable, with visual obstruction. The bladder neck was normal. Inspection of the interior of the bladder was then carried out. The trigone was unremarkable, with no mucosal lesions. The ureteral orifices were normal in position and configuration. Systematic inspection of the mucosa of the bladder it was then carried out, rotating the cystoscope so that all areas of the left and right lateral walls, the dome of the bladder, and the posterior wall were all visualized. The cystoscope was then advanced further into the bladder, and maximum deflection of the scope was performed so that the bladder neck could be inspected. No mucosal lesions were noted there.  Severe trabeculations were noted with some irritation of the bladder likely due to recent infections.  No overt lesions or areas concerning for tumor noted.  The cystoscope was then removed, and the procedure terminated. Patient tolerated the procedure well. No complications.     Findings:  Severe trabeculations, no overt tumors, regrowth of his prostate    Disposition:  - negative hematuria work-up, no need for repeat work-up unless patient has significant increase in number of RBCs/hpf  - regrowth of prostate  - follow-up 3-6 months    Mani Holman MD

## 2024-09-27 LAB — BACTERIA UR CULT: ABNORMAL

## 2024-10-02 ENCOUNTER — OFFICE VISIT (OUTPATIENT)
Dept: OPHTHALMOLOGY | Facility: CLINIC | Age: 89
End: 2024-10-02
Payer: MEDICARE

## 2024-10-02 DIAGNOSIS — H04.123 DRY EYES, BILATERAL: Primary | ICD-10-CM

## 2024-10-02 PROCEDURE — 1159F MED LIST DOCD IN RCRD: CPT | Mod: HCNC,CPTII,S$GLB, | Performed by: OPTOMETRIST

## 2024-10-02 PROCEDURE — 99999 PR PBB SHADOW E&M-EST. PATIENT-LVL III: CPT | Mod: PBBFAC,HCNC,, | Performed by: OPTOMETRIST

## 2024-10-02 PROCEDURE — 92012 INTRM OPH EXAM EST PATIENT: CPT | Mod: HCNC,S$GLB,, | Performed by: OPTOMETRIST

## 2024-10-02 NOTE — PROGRESS NOTES
SUBJECTIVE  Amauri Marco is 89 y.o. male  Corrected distance visual acuity was HM in the right eye and 20/40 -2 in the left eye.   Chief Complaint   Patient presents with    Eye Problem     Trash and pain in eyes          HPI     Eye Problem     Additional comments: Trash and pain in eyes           Comments    Patient states feels like something is in his eyes but mostly left eye and   pain that starts in the mornings and last all day. Using a drop, sometimes   twice a day, but not sure the name. Sometimes blurred vision.  Pain Scale:  8-9  Onset:  2-3 days ago  OD, OS, OU: OU but mostly OS  Discharge: Tearing  A.M. Matting: Sometimes   Itch: No  Redness:  Slight  Photophobia: Sometimes blurred vision  Foreign body sensation: No  Deep pain: Yes  Previous occurrence: Yes  Drops: Not sure the name but use twice a day sometimes           Last edited by Lesley Alavrado on 10/2/2024  3:46 PM.         Assessment /Plan :  1. Dry eyes, bilateral  Worksheet given. Discussed Dry Eyes in detail including Artificial Tears, lubricants, and Omega 3 Fish Oils.  Start Refresh 4 times a day       RTC PRN

## 2024-10-08 DIAGNOSIS — R33.8 BENIGN PROSTATIC HYPERPLASIA WITH URINARY RETENTION: ICD-10-CM

## 2024-10-08 DIAGNOSIS — N40.1 BENIGN PROSTATIC HYPERPLASIA WITH URINARY RETENTION: ICD-10-CM

## 2024-10-11 RX ORDER — FINASTERIDE 5 MG/1
5 TABLET, FILM COATED ORAL DAILY
Qty: 90 TABLET | Refills: 3 | Status: SHIPPED | OUTPATIENT
Start: 2024-10-11

## 2024-11-06 ENCOUNTER — PATIENT MESSAGE (OUTPATIENT)
Dept: INTERNAL MEDICINE | Facility: CLINIC | Age: 89
End: 2024-11-06
Payer: MEDICARE

## 2024-11-11 ENCOUNTER — HOSPITAL ENCOUNTER (OUTPATIENT)
Dept: VASCULAR SURGERY | Facility: HOSPITAL | Age: 89
Discharge: HOME | End: 2024-11-11
Payer: MEDICARE

## 2024-11-11 ENCOUNTER — OFFICE VISIT (OUTPATIENT)
Dept: VASCULAR SURGERY | Facility: CLINIC | Age: 89
End: 2024-11-11
Payer: MEDICARE

## 2024-11-11 VITALS — DIASTOLIC BLOOD PRESSURE: 85 MMHG | SYSTOLIC BLOOD PRESSURE: 140 MMHG

## 2024-11-11 DIAGNOSIS — E78.5 HYPERLIPIDEMIA ASSOCIATED WITH TYPE 2 DIABETES MELLITUS: ICD-10-CM

## 2024-11-11 DIAGNOSIS — I10 HYPERTENSION, UNSPECIFIED TYPE: ICD-10-CM

## 2024-11-11 DIAGNOSIS — E11.69 HYPERLIPIDEMIA ASSOCIATED WITH TYPE 2 DIABETES MELLITUS: ICD-10-CM

## 2024-11-11 DIAGNOSIS — I26.99 PULMONARY EMBOLISM, UNSPECIFIED CHRONICITY, UNSPECIFIED PULMONARY EMBOLISM TYPE, UNSPECIFIED WHETHER ACUTE COR PULMONALE PRESENT: ICD-10-CM

## 2024-11-11 DIAGNOSIS — I72.4 ANEURYSM ARTERY, POPLITEAL: Primary | ICD-10-CM

## 2024-11-11 PROCEDURE — 99214 OFFICE O/P EST MOD 30 MIN: CPT | Mod: HCNC,S$GLB,, | Performed by: STUDENT IN AN ORGANIZED HEALTH CARE EDUCATION/TRAINING PROGRAM

## 2024-11-11 PROCEDURE — 93970 EXTREMITY STUDY: CPT | Mod: 26,HCNC,, | Performed by: STUDENT IN AN ORGANIZED HEALTH CARE EDUCATION/TRAINING PROGRAM

## 2024-11-11 PROCEDURE — 1160F RVW MEDS BY RX/DR IN RCRD: CPT | Mod: HCNC,CPTII,S$GLB, | Performed by: STUDENT IN AN ORGANIZED HEALTH CARE EDUCATION/TRAINING PROGRAM

## 2024-11-11 PROCEDURE — 99999 PR PBB SHADOW E&M-EST. PATIENT-LVL III: CPT | Mod: PBBFAC,HCNC,, | Performed by: STUDENT IN AN ORGANIZED HEALTH CARE EDUCATION/TRAINING PROGRAM

## 2024-11-11 PROCEDURE — 93970 EXTREMITY STUDY: CPT | Mod: HCNC

## 2024-11-11 PROCEDURE — 1159F MED LIST DOCD IN RCRD: CPT | Mod: HCNC,CPTII,S$GLB, | Performed by: STUDENT IN AN ORGANIZED HEALTH CARE EDUCATION/TRAINING PROGRAM

## 2024-11-11 NOTE — ASSESSMENT & PLAN NOTE
Has bilateral popliteal thrombosed aneurysms. I see below knee incisions bilaterally he is asymptomatic. Denies any embolic events. Able to get around in his wheel chair. He did have a mechanical fall few weeks back and spent some time in hospital he is doing well now. No indication for any vascular intervention. He can see me on as needed basis.

## 2024-11-11 NOTE — PROGRESS NOTES
"Bianca Gibbs    OFFICE NOTE    DATE OF VISIT: 2024  PATIENT NAME: Amauri Lara  : 1929  MRN: [id number]  PRIMARY CARE PHYSICIAN: Stefan Vera MD  CARDIOLOGIST:   REFERRING PROVIDER: No ref. provider found    CHIEF COMPLAINT   Chief Complaint   Patient presents with    Follow-up       HISTORY OF PRESENT ILLNESS:  Amauri Lara is a 89 y.o. male who presents to clinic today admitted to hospital recently concern for PE, undergoing workup for weakness. Had incidental saddle PE on ct urogram. We recommended conservative measures and transition to oral anticoagulation at the time consideration was being given to ivc filter placement if he had contraindication to anticoagulation however it seems now that veins were mistaken for arteries on the last venous duplex and he actually has thrombosed arterial aneurysms.    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    Obesity, unspecified     Primary osteoarthritis involving multiple joints        PAST SURGICAL HISTORY:  Past Surgical History:   Procedure Laterality Date    CATARACT EXTRACTION      aphakic od    CATARACT EXTRACTION W/  INTRAOCULAR LENS IMPLANT Left 2018    ENDOSCOPIC ULTRASOUND OF UPPER GASTROINTESTINAL TRACT N/A 2022    Procedure: ULTRASOUND, UPPER GI TRACT, ENDOSCOPIC;  Surgeon: Mani Howard MD;  Location: [facility];  " Service: Endoscopy;  Laterality: N/A;    TRANSURETHRAL RESECTION OF PROSTATE N/A 5/31/2018    Procedure: PROSTATECTOMY-TRANSURETHRAL;  Surgeon: Michael Westbrook IV, MD;  Location: [facility];  Service: Urology;  Laterality: N/A;    VASCULAR SURGERY      yag laser capsulotomy Left 09/08/2020       SOCIAL HISTORY:   Social History     Tobacco Use    Smoking status: Never    Smokeless tobacco: Never   Substance Use Topics    Alcohol use: No    Drug use: No       FAMILY HISTORY:  Family History   Problem Relation Name Age of Onset    Diabetes Brother         REVIEW OF SYSTEMS:  ROS  10 pt ros otherwise negative    PHYSICAL EXAM:  Vitals:    11/11/24 1146   BP: (!) 140/85      Physical Exam      Vss  Frail looking  Hard of hearing  Abd soft nt nd  Legs warm/well perfused  Mild stasis dermatitis and mild leg swelling noted on left leg    VASCULAR LAB STUDIES:  Negative for dvt, thrombosed popliteal artery aneurysms were noted    ASSESSMENT AND PLAN:  Hypertension  Medical management    Hyperlipidemia associated with type 2 diabetes mellitus  Medical management    Aneurysm artery, popliteal  Has bilateral popliteal thrombosed aneurysms. I see below knee incisions bilaterally he is asymptomatic. Denies any embolic events. Able to get around in his wheel chair. He did have a mechanical fall few weeks back and spent some time in hospital he is doing well now. No indication for any vascular intervention. He can see me on as needed basis.      Amauri was seen today for follow-up.    Diagnoses and all orders for this visit:    Aneurysm artery, popliteal    Hypertension, unspecified type    Hyperlipidemia associated with type 2 diabetes mellitus        Follow up if symptoms worsen or fail to improve.        Bianca Gibbs  [facility]  Vascular Surgery  [phone] (Clinic Number)

## 2024-12-16 ENCOUNTER — LAB VISIT (OUTPATIENT)
Dept: LAB | Facility: HOSPITAL | Age: 89
End: 2024-12-16
Attending: INTERNAL MEDICINE
Payer: MEDICARE

## 2024-12-16 DIAGNOSIS — E11.8 DM (DIABETES MELLITUS), TYPE 2 WITH COMPLICATIONS: ICD-10-CM

## 2024-12-16 DIAGNOSIS — E11.42 TYPE 2 DIABETES MELLITUS WITH POLYNEUROPATHY: ICD-10-CM

## 2024-12-16 LAB
ALBUMIN SERPL BCP-MCNC: 3.5 G/DL (ref 3.5–5.2)
ALP SERPL-CCNC: 79 U/L (ref 40–150)
ALT SERPL W/O P-5'-P-CCNC: 15 U/L (ref 10–44)
ANION GAP SERPL CALC-SCNC: 9 MMOL/L (ref 8–16)
AST SERPL-CCNC: 16 U/L (ref 10–40)
BASOPHILS # BLD AUTO: 0.03 K/UL (ref 0–0.2)
BASOPHILS NFR BLD: 0.4 % (ref 0–1.9)
BILIRUB SERPL-MCNC: 0.8 MG/DL (ref 0.1–1)
BUN SERPL-MCNC: 17 MG/DL (ref 10–30)
CALCIUM SERPL-MCNC: 9.7 MG/DL (ref 8.7–10.5)
CHLORIDE SERPL-SCNC: 106 MMOL/L (ref 95–110)
CHOLEST SERPL-MCNC: 170 MG/DL (ref 120–199)
CHOLEST/HDLC SERPL: 4 {RATIO} (ref 2–5)
CO2 SERPL-SCNC: 26 MMOL/L (ref 23–29)
CREAT SERPL-MCNC: 1.4 MG/DL (ref 0.5–1.4)
DIFFERENTIAL METHOD BLD: ABNORMAL
EOSINOPHIL # BLD AUTO: 0.2 K/UL (ref 0–0.5)
EOSINOPHIL NFR BLD: 3 % (ref 0–8)
ERYTHROCYTE [DISTWIDTH] IN BLOOD BY AUTOMATED COUNT: 16.2 % (ref 11.5–14.5)
EST. GFR  (NO RACE VARIABLE): 46.3 ML/MIN/1.73 M^2
ESTIMATED AVG GLUCOSE: 108 MG/DL (ref 68–131)
GLUCOSE SERPL-MCNC: 91 MG/DL (ref 70–110)
HBA1C MFR BLD: 5.4 % (ref 4–5.6)
HCT VFR BLD AUTO: 47.6 % (ref 40–54)
HDLC SERPL-MCNC: 42 MG/DL (ref 40–75)
HDLC SERPL: 24.7 % (ref 20–50)
HGB BLD-MCNC: 14.6 G/DL (ref 14–18)
IMM GRANULOCYTES # BLD AUTO: 0.03 K/UL (ref 0–0.04)
IMM GRANULOCYTES NFR BLD AUTO: 0.4 % (ref 0–0.5)
LDLC SERPL CALC-MCNC: 92.2 MG/DL (ref 63–159)
LYMPHOCYTES # BLD AUTO: 1 K/UL (ref 1–4.8)
LYMPHOCYTES NFR BLD: 15 % (ref 18–48)
MCH RBC QN AUTO: 28.2 PG (ref 27–31)
MCHC RBC AUTO-ENTMCNC: 30.7 G/DL (ref 32–36)
MCV RBC AUTO: 92 FL (ref 82–98)
MONOCYTES # BLD AUTO: 0.8 K/UL (ref 0.3–1)
MONOCYTES NFR BLD: 11.5 % (ref 4–15)
NEUTROPHILS # BLD AUTO: 4.7 K/UL (ref 1.8–7.7)
NEUTROPHILS NFR BLD: 69.7 % (ref 38–73)
NONHDLC SERPL-MCNC: 128 MG/DL
NRBC BLD-RTO: 0 /100 WBC
PLATELET # BLD AUTO: 216 K/UL (ref 150–450)
PMV BLD AUTO: 11.8 FL (ref 9.2–12.9)
POTASSIUM SERPL-SCNC: 3.9 MMOL/L (ref 3.5–5.1)
PROT SERPL-MCNC: 7.6 G/DL (ref 6–8.4)
RBC # BLD AUTO: 5.17 M/UL (ref 4.6–6.2)
SODIUM SERPL-SCNC: 141 MMOL/L (ref 136–145)
TRIGL SERPL-MCNC: 179 MG/DL (ref 30–150)
WBC # BLD AUTO: 6.72 K/UL (ref 3.9–12.7)

## 2024-12-16 PROCEDURE — 83036 HEMOGLOBIN GLYCOSYLATED A1C: CPT | Mod: HCNC | Performed by: INTERNAL MEDICINE

## 2024-12-16 PROCEDURE — 85025 COMPLETE CBC W/AUTO DIFF WBC: CPT | Mod: HCNC | Performed by: INTERNAL MEDICINE

## 2024-12-16 PROCEDURE — 36415 COLL VENOUS BLD VENIPUNCTURE: CPT | Mod: HCNC | Performed by: INTERNAL MEDICINE

## 2024-12-16 PROCEDURE — 84443 ASSAY THYROID STIM HORMONE: CPT | Mod: HCNC | Performed by: INTERNAL MEDICINE

## 2024-12-16 PROCEDURE — 84439 ASSAY OF FREE THYROXINE: CPT | Mod: HCNC | Performed by: INTERNAL MEDICINE

## 2024-12-16 PROCEDURE — 80061 LIPID PANEL: CPT | Mod: HCNC | Performed by: INTERNAL MEDICINE

## 2024-12-16 PROCEDURE — 80053 COMPREHEN METABOLIC PANEL: CPT | Mod: HCNC | Performed by: INTERNAL MEDICINE

## 2024-12-17 ENCOUNTER — TELEPHONE (OUTPATIENT)
Dept: INTERNAL MEDICINE | Facility: CLINIC | Age: 89
End: 2024-12-17
Payer: MEDICARE

## 2024-12-17 LAB
T4 FREE SERPL-MCNC: 0.84 NG/DL (ref 0.71–1.51)
TSH SERPL DL<=0.005 MIU/L-ACNC: 3.06 UIU/ML (ref 0.4–4)

## 2024-12-17 NOTE — TELEPHONE ENCOUNTER
----- Message from No sent at 12/17/2024  1:39 PM CST -----  Contact: ERASMO NIEVES [[id number]]  ..Type:  Patient Requesting Call    Who Called:Kat (daughter)  Does the patient know what this is regarding?:form sent over from dentist that was supposed to be sent back.12/05/24 form was s4ent over  Would the patient rather a call back or a response via [facility]? call  Best Call Back Number:.[phone] (home)     Additional Information:

## 2024-12-20 ENCOUNTER — OFFICE VISIT (OUTPATIENT)
Dept: INTERNAL MEDICINE | Facility: CLINIC | Age: 89
End: 2024-12-20
Payer: MEDICARE

## 2024-12-20 VITALS
BODY MASS INDEX: 33.9 KG/M2 | SYSTOLIC BLOOD PRESSURE: 132 MMHG | DIASTOLIC BLOOD PRESSURE: 60 MMHG | WEIGHT: 203.69 LBS | TEMPERATURE: 97 F | HEART RATE: 86 BPM | OXYGEN SATURATION: 97 %

## 2024-12-20 DIAGNOSIS — I42.0 DILATED CARDIOMYOPATHY: ICD-10-CM

## 2024-12-20 DIAGNOSIS — N40.1 BENIGN PROSTATIC HYPERPLASIA WITH URINARY RETENTION: ICD-10-CM

## 2024-12-20 DIAGNOSIS — R33.8 BENIGN PROSTATIC HYPERPLASIA WITH URINARY RETENTION: ICD-10-CM

## 2024-12-20 DIAGNOSIS — N18.31 CHRONIC KIDNEY DISEASE, STAGE 3A: ICD-10-CM

## 2024-12-20 DIAGNOSIS — I10 HYPERTENSION, UNSPECIFIED TYPE: ICD-10-CM

## 2024-12-20 DIAGNOSIS — Z87.39 HISTORY OF GOUT: ICD-10-CM

## 2024-12-20 DIAGNOSIS — I70.0 ATHEROSCLEROSIS OF AORTA: ICD-10-CM

## 2024-12-20 DIAGNOSIS — E11.22 CKD STAGE 3 DUE TO TYPE 2 DIABETES MELLITUS: ICD-10-CM

## 2024-12-20 DIAGNOSIS — I87.2 CHRONIC VENOUS INSUFFICIENCY: ICD-10-CM

## 2024-12-20 DIAGNOSIS — Z86.711 HISTORY OF PULMONARY EMBOLISM: ICD-10-CM

## 2024-12-20 DIAGNOSIS — Z23 NEED FOR PROPHYLACTIC VACCINATION AND INOCULATION AGAINST INFLUENZA: ICD-10-CM

## 2024-12-20 DIAGNOSIS — E11.8 DM (DIABETES MELLITUS), TYPE 2 WITH COMPLICATIONS: ICD-10-CM

## 2024-12-20 DIAGNOSIS — E11.42 TYPE 2 DIABETES MELLITUS WITH POLYNEUROPATHY: Primary | ICD-10-CM

## 2024-12-20 DIAGNOSIS — Z91.81 AT HIGH RISK FOR FALLS: ICD-10-CM

## 2024-12-20 DIAGNOSIS — E66.811 CLASS 1 OBESITY WITH BODY MASS INDEX (BMI) OF 33.0 TO 33.9 IN ADULT, UNSPECIFIED OBESITY TYPE, UNSPECIFIED WHETHER SERIOUS COMORBIDITY PRESENT: ICD-10-CM

## 2024-12-20 DIAGNOSIS — N18.30 CKD STAGE 3 DUE TO TYPE 2 DIABETES MELLITUS: ICD-10-CM

## 2024-12-20 DIAGNOSIS — I13.0 HYPERTENSIVE HEART AND CHRONIC KIDNEY DISEASE WITH HEART FAILURE AND STAGE 1 THROUGH STAGE 4 CHRONIC KIDNEY DISEASE, OR UNSPECIFIED CHRONIC KIDNEY DISEASE: ICD-10-CM

## 2024-12-20 PROCEDURE — 99999 PR PBB SHADOW E&M-EST. PATIENT-LVL IV: CPT | Mod: PBBFAC,HCNC,, | Performed by: INTERNAL MEDICINE

## 2024-12-20 RX ORDER — METFORMIN HYDROCHLORIDE 850 MG/1
850 TABLET ORAL
Qty: 90 TABLET | Refills: 3 | Status: SHIPPED | OUTPATIENT
Start: 2024-12-20

## 2024-12-20 RX ORDER — FINASTERIDE 5 MG/1
5 TABLET, FILM COATED ORAL DAILY
Qty: 90 TABLET | Refills: 3 | Status: SHIPPED | OUTPATIENT
Start: 2024-12-20

## 2024-12-20 RX ORDER — FUROSEMIDE 20 MG/1
20 TABLET ORAL DAILY
Qty: 90 TABLET | Refills: 3 | Status: SHIPPED | OUTPATIENT
Start: 2024-12-20 | End: 2025-12-20

## 2024-12-20 RX ORDER — TAMSULOSIN HYDROCHLORIDE 0.4 MG/1
2 CAPSULE ORAL DAILY
Qty: 180 CAPSULE | Refills: 3 | Status: SHIPPED | OUTPATIENT
Start: 2024-12-20

## 2024-12-20 RX ORDER — LOVASTATIN 40 MG/1
40 TABLET ORAL NIGHTLY
Qty: 90 TABLET | Refills: 3 | Status: SHIPPED | OUTPATIENT
Start: 2024-12-20

## 2024-12-20 RX ORDER — ALLOPURINOL 300 MG/1
300 TABLET ORAL DAILY
Qty: 90 TABLET | Refills: 3 | Status: SHIPPED | OUTPATIENT
Start: 2024-12-20

## 2024-12-20 NOTE — PROGRESS NOTES
HPI:  Patient is a 89-year-old gentleman who comes in today for follow-up of his diabetes, hypertension, lipids, chronic kidney disease, and gout.  Patient does not check his blood pressure at home on a regular basis.  He denies any hypoglycemic events.  He has had no flares of gout.      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    Obesity, unspecified     Primary osteoarthritis involving multiple joints        Past Surgical History:   Procedure Laterality Date    CATARACT EXTRACTION      aphakic od    CATARACT EXTRACTION W/  INTRAOCULAR LENS IMPLANT Left 12/13/2018    ENDOSCOPIC ULTRASOUND OF UPPER GASTROINTESTINAL TRACT N/A 12/30/2022    Procedure: ULTRASOUND, UPPER GI TRACT, ENDOSCOPIC;  Surgeon: Mani Howard MD;  Location: [facility];  Service: Endoscopy;  Laterality: N/A;    TRANSURETHRAL RESECTION OF PROSTATE N/A 5/31/2018    Procedure: PROSTATECTOMY-TRANSURETHRAL;  Surgeon: Michael Westbrook IV, MD;  Location: [facility];  Service: Urology;  Laterality: N/A;    VASCULAR SURGERY      yag laser capsulotomy Left 09/08/2020       SHx: per the electronic medical record    FHx: recorded in the electronic medical record    ROS:    denies any chest pains or shortness of breath. Denies any nausea, vomiting or diarrhea. Denies any fever, chills or sweats. Denies any change in weight, voice, stool, skin or hair. Denies any dysuria, dyspepsia or dysphagia. Denies any change in vision, hearing or  headaches. Denies any swollen lymph nodes or loss of memory.    PE:  /60 (BP Location: Right arm, Patient Position: Sitting)   Pulse 86   Temp 97.2 °F (36.2 °C) (Tympanic)   Wt 92.4 kg (203 lb 11.3 oz)   SpO2 97%   BMI 33.90 kg/m²   Gen: Well-developed, well-nourished, male, in no acute distress, oriented x3  HEENT: neck is supple, no adenopathy, carotids 2+ equal without bruits, thyroid exam normal size without nodules.  CHEST: clear to auscultation and percussion  CVS: regular rate and rhythm without significant murmur, gallop, or rubs  ABD: soft, benign, no rebound no guarding, no distention.  Bowel sounds are normal.     nontender.  No palpable masses.  No organomegaly and no audible bruits.  RECTAL:  Deferred.  EXT:  He has bilateral chronic venous insufficiency with stasis dermatitis.  No ulcers at this time  LYMPH: no cervical, inguinal, or axillary adenopathy  FEET: no loss of sensation.  No ulcers or pressure sores.  NEURO: gait normal.  Speech normal.   Gross motor and sensory unremarkable.    Lab Results   Component Value Date    WBC 6.72 12/16/2024    HGB 14.6 12/16/2024    HCT 47.6 12/16/2024     12/16/2024    CHOL 170 12/16/2024    TRIG 179 (H) 12/16/2024    HDL 42 12/16/2024    ALT 15 12/16/2024    AST 16 12/16/2024     12/16/2024    K 3.9 12/16/2024     12/16/2024    CREATININE 1.4 12/16/2024    BUN 17 12/16/2024    CO2 26 12/16/2024    TSH 3.056 12/16/2024    PSA 4.4 (H) 06/14/2021    INR 1.0 08/09/2024    HGBA1C 5.4 12/16/2024    BNP 93 08/09/2024    URICACID 3.5 05/28/2024    SEDRATE 22 (H) 04/18/2011       Impression:  Diabetes, hypertension lipids all well controlled on current meds  Chronic kidney disease, stable GFR  Chronic venous insufficiency, patient highly encouraged to wear knee-high compression support stockings daily  Poor dentition, he will be undergoing tooth extractions.  I have filled out his forms for his clearance  Patient Active Problem List    Diagnosis    Obesity    Hearing loss of aging    History of pulmonary embolism    DM (diabetes mellitus), type 2 with complications    Hypertension    History of gout    Type 2 diabetes mellitus with polyneuropathy    Urinary retention    Aneurysm artery, popliteal    Atherosclerosis of aorta    Benign prostatic hyperplasia    Chronic venous insufficiency    Primary osteoarthritis involving multiple joints    At high risk for falls    CKD stage 3 due to type 2 diabetes mellitus    Acute saddle pulmonary embolism without acute cor pulmonale    Acute bilateral deep vein thrombosis (DVT) of popliteal veins    Hyperlipidemia associated with type 2 diabetes mellitus    Hypertensive heart and chronic kidney disease with heart failure and stage 1 through stage 4 chronic kidney disease, or unspecified chronic kidney disease    Chronic kidney disease, stage 3a    Dilated cardiomyopathy       Plan:   Orders Placed This Encounter    Hemoglobin A1C    Comprehensive Metabolic Panel    Lipid Panel    TSH    CBC Auto Differential    influenza (adjuvanted) (Fluad) 45 mcg/0.5 mL IM vaccine (> or = 64 yo) 0.5 mL    metFORMIN (GLUCOPHAGE) 850 MG tablet    finasteride (PROSCAR) 5 mg tablet    apixaban (ELIQUIS) 5 mg Tab    furosemide (LASIX) 20 MG tablet    tamsulosin (FLOMAX) 0.4 mg Cap    allopurinoL (ZYLOPRIM) 300 MG tablet    lovastatin (MEVACOR) 40 MG tablet     Patient was given flu vaccine today.  He will be seen again in 6 months with the above lab work.  His medications remain the same  This note is generated with speech recognition software and is subject to transcription error and sound alike phrases that may be missed by proofreading.

## 2025-01-09 ENCOUNTER — OFFICE VISIT (OUTPATIENT)
Dept: UROLOGY | Facility: CLINIC | Age: OVER 89
End: 2025-01-09
Payer: MEDICARE

## 2025-01-09 VITALS
HEIGHT: 65 IN | BODY MASS INDEX: 33.99 KG/M2 | DIASTOLIC BLOOD PRESSURE: 79 MMHG | WEIGHT: 204 LBS | HEART RATE: 69 BPM | SYSTOLIC BLOOD PRESSURE: 141 MMHG

## 2025-01-09 DIAGNOSIS — R31.0 HEMATURIA, GROSS: ICD-10-CM

## 2025-01-09 DIAGNOSIS — N40.1 BENIGN PROSTATIC HYPERPLASIA WITH URINARY RETENTION: Primary | ICD-10-CM

## 2025-01-09 DIAGNOSIS — R33.8 BENIGN PROSTATIC HYPERPLASIA WITH URINARY RETENTION: Primary | ICD-10-CM

## 2025-01-09 LAB — POC RESIDUAL URINE VOLUME: 184 ML (ref 0–100)

## 2025-01-09 PROCEDURE — 1101F PT FALLS ASSESS-DOCD LE1/YR: CPT | Mod: HCNC,CPTII,S$GLB, | Performed by: NURSE PRACTITIONER

## 2025-01-09 PROCEDURE — 3288F FALL RISK ASSESSMENT DOCD: CPT | Mod: HCNC,CPTII,S$GLB, | Performed by: NURSE PRACTITIONER

## 2025-01-09 PROCEDURE — 1159F MED LIST DOCD IN RCRD: CPT | Mod: HCNC,CPTII,S$GLB, | Performed by: NURSE PRACTITIONER

## 2025-01-09 PROCEDURE — 51798 US URINE CAPACITY MEASURE: CPT | Mod: HCNC,S$GLB,, | Performed by: NURSE PRACTITIONER

## 2025-01-09 PROCEDURE — 99999 PR PBB SHADOW E&M-EST. PATIENT-LVL III: CPT | Mod: PBBFAC,HCNC,, | Performed by: NURSE PRACTITIONER

## 2025-01-09 PROCEDURE — 1126F AMNT PAIN NOTED NONE PRSNT: CPT | Mod: HCNC,CPTII,S$GLB, | Performed by: NURSE PRACTITIONER

## 2025-01-09 PROCEDURE — 99214 OFFICE O/P EST MOD 30 MIN: CPT | Mod: HCNC,S$GLB,, | Performed by: NURSE PRACTITIONER

## 2025-01-09 NOTE — PROGRESS NOTES
Chief Complaint:    Gross hematuria    HPI:   Patient is presenting as a follow-up to gross hematuria.  Has had a negative gross hematuria including cystoscopy and imaging within the last 6 months.  Denies gross hematuria.  Urine in clinic indicates trace blood, all other parameters are negative.  07/23/2024  Patient is a 89-year-old male that is presenting with his daughter.  Daughter states that patient was dizzy and had a fall, brought him to the emergency room, where they were told that he had a urinary tract infection.  In reviewing EMR, urine culture was negative.  Daughter states that patient has had several episodes of gross hematuria.  Was also seen by primary care physician is currently on Augmentin.  PVR was 32 mL in urine in clinic indicates small leukocytes and large blood.  Patient is currently asymptomatic.  [facility]  09/21/2023 - returns today for follow-up, no new issues in the interim, Flomax working well for him, no gross hematuria, dysuria, UTIs     03/23/2023 - patient returns today for follow-up, has been taking the Flomax as prescribed and notes that his urination is now back to normal, does mention that he has passed a few small stones in the interim but nothing overly bothersome, no dysuria or gross hematuria, no UTIs     01/26/2023 - cysto shows some adenoma regrowth, restart flomax     01/19/2023 - returns today for follow-up, urine continues to be dirty with positive leukocyte positive blood, overall feeling okay, no gross hematuria, renal function stable even with mild hydro     Patient is a 89-year-old male that is presenting with his daughter to review renal ultrasound.  Patient was seen in the emergency room and had an incidental finding of hydronephrosis.  Patient was sent for Renal ultrasound that indicated  a slight improvement to mild hydronephrosis.  Urine culture was negative, however, urinalysis indicated micro hematuria.  Patient denies pelvic or flank pain, no gross  hematuria     01/04/2023 - NP Rushing: Patient is a 89-year-old male that is following up after a emergency room visit.  Daughter states that patient had a positive urine and is currently on Augmentin.  In reviewing EMR, urine culture indicated Staph.  Patient had an abdominal ultrasound indicating bilateral hydronephrosis.  Urine in clinic indicates leukocytes and blood, all other parameters are negative.  PVR was 44 mL.  No history of renal stones.  Daughter states that urine is very cloudy.     12/02/2022 - returns for follow-up, no returns for follow-up, completed his antibiotics and his symptoms have resolved and recurred, also believes that he is restarted the Flomax and feels like it is helping, still having some OAB but improved, no further gross hematuria     10/20/2022 - patient returns today for follow-up, notes that he passed three stones in his urine back in June, had another stone in August, he denies any flank pain during these episodes, did have a little bit of blood after the August episode, also notes increased nocturia q.1 hour, also notes recent increase in number of UTIs for which he got antibiotics from his PCP, does not think he has been taking his Flomax     06/24/2021 - presents today for follow-up, notes that he continues to take the Flomax, finasteride, but patient's daughter does not think he has been taking the oxybutynin, urgency and frequency still occurring, wakes up once an hour at night, less during the day, does not feel like he empties all the way, denies any recent UTIs or gross hematuria     Allergies:  Morphine    Medications:  has a current medication list which includes the following prescription(s): allopurinol, apixaban, blood-glucose meter, finasteride, fluticasone propionate, furosemide, hydrocodone-acetaminophen, lancets, lovastatin, metformin, mupirocin, prednisolone acetate, ssd, stool softener, tamsulosin, and true metrix glucose test strip.    Review of  Systems:  General: No fever, chills, fatigability, or weight loss.  Skin: No rashes, itching, or changes in color or texture of skin.  Chest: Denies [place], cyanosis, wheezing, cough, and sputum production.  Abdomen: Appetite fine. No weight loss. Denies diarrhea, abdominal pain, hematemesis, or blood in stool.  Musculoskeletal: No joint stiffness or swelling. Denies back pain.  : As above.  All other review of systems negative.    PMH:   has a past medical history of Aphakia of right eye, Chronic venous insufficiency, CKD stage 3 due to type 2 diabetes mellitus, DM (diabetes mellitus), type 2 with complications, Hearing loss of aging, History of gout, History of pulmonary embolism, Hyperlipidemia associated with type 2 diabetes mellitus, Hypertension associated with diabetes, Iridodialysis of right eye, Obesity, unspecified, and Primary osteoarthritis involving multiple joints.    PSH:   has a past surgical history that includes Vascular surgery; Transurethral resection of prostate (N/A, 5/31/2018); Cataract extraction; Cataract extraction w/  intraocular lens implant (Left, 12/13/2018); yag laser capsulotomy (Left, 09/08/2020); and Endoscopic ultrasound of upper gastrointestinal tract (N/A, 12/30/2022).    FamHx: family history includes Diabetes in his brother.    SocHx:  reports that he has never smoked. He has never used smokeless tobacco. He reports that he does not drink alcohol and does not use drugs.      Physical Exam:  General: A&Ox3, no apparent distress, no deformities  Neck: No masses, normal thyroid  Lungs: normal inspiration, no use of accessory muscles  Heart: normal pulse, no arrhythmias  Abdomen: Soft, NT, ND, no masses, no hernias, no hepatosplenomegaly  Lymphatic: Neck and groin nodes negative  Skin: The skin is warm and dry. No jaundice.  Ext: No c/c/e.    Labs/Studies:   See HPI  Impression/Plan:   Patient denies gross hematuria and has had a negative workup within the last 6 months.  Urine will  be sent for cytology and patient to return to clinic in 1 year for re-evaluation.  Is aware of the need to contact our clinic with any episodes of gross hematuria

## 2025-01-15 DIAGNOSIS — G89.29 CHRONIC LOW BACK PAIN, UNSPECIFIED BACK PAIN LATERALITY, UNSPECIFIED WHETHER SCIATICA PRESENT: Primary | ICD-10-CM

## 2025-01-15 DIAGNOSIS — M54.50 CHRONIC LOW BACK PAIN, UNSPECIFIED BACK PAIN LATERALITY, UNSPECIFIED WHETHER SCIATICA PRESENT: Primary | ICD-10-CM

## 2025-01-15 RX ORDER — HYDROCODONE BITARTRATE AND ACETAMINOPHEN 7.5; 325 MG/1; MG/1
1 TABLET ORAL EVERY 8 HOURS PRN
Qty: 90 TABLET | Refills: 0 | Status: SHIPPED | OUTPATIENT
Start: 2025-01-15

## 2025-01-27 ENCOUNTER — OFFICE VISIT (OUTPATIENT)
Dept: OPHTHALMOLOGY | Facility: CLINIC | Age: OVER 89
End: 2025-01-27
Payer: MEDICARE

## 2025-01-27 DIAGNOSIS — H27.01 APHAKIA OF RIGHT EYE: ICD-10-CM

## 2025-01-27 DIAGNOSIS — Z98.42 CATARACT EXTRACTION STATUS, LEFT: ICD-10-CM

## 2025-01-27 DIAGNOSIS — E11.8 DM (DIABETES MELLITUS), TYPE 2 WITH COMPLICATIONS: Primary | ICD-10-CM

## 2025-01-27 DIAGNOSIS — H04.123 DRY EYES, BILATERAL: ICD-10-CM

## 2025-01-27 PROCEDURE — 99999 PR PBB SHADOW E&M-EST. PATIENT-LVL III: CPT | Mod: PBBFAC,HCNC,, | Performed by: OPHTHALMOLOGY

## 2025-01-27 PROCEDURE — 2023F DILAT RTA XM W/O RTNOPTHY: CPT | Mod: HCNC,CPTII,S$GLB, | Performed by: OPHTHALMOLOGY

## 2025-01-27 PROCEDURE — 92014 COMPRE OPH EXAM EST PT 1/>: CPT | Mod: HCNC,S$GLB,, | Performed by: OPHTHALMOLOGY

## 2025-01-27 PROCEDURE — 1159F MED LIST DOCD IN RCRD: CPT | Mod: HCNC,CPTII,S$GLB, | Performed by: OPHTHALMOLOGY

## 2025-01-27 PROCEDURE — 1160F RVW MEDS BY RX/DR IN RCRD: CPT | Mod: HCNC,CPTII,S$GLB, | Performed by: OPHTHALMOLOGY

## 2025-01-27 RX ORDER — CARBOXYMETHYLCELLULOSE SODIUM 10 MG/ML
GEL OPHTHALMIC
COMMUNITY

## 2025-01-27 NOTE — PROGRESS NOTES
HPI     Diabetic Eye Exam            Comments: Patient states at times OU burns, appears red and will tear,   some days are better then others, using Refresh Gel and has minimal relief   from drops.  Also states he could never see clearly out of his current   glasses when he first got them and needs a new RX for glasses, C/O [facility]   appear blurry at all ranges OS.      Complete ocular exam  Lab Results       Component                Value               Date                       HGBA1C                   5.4                 12/16/2024                     Comments    1. +DM  2. PCIOL OS +22.5 [id number] / CDE: 20.00 /12-14-18  Yag OS 9-8-20  3. Aphakia OD (WORK INJURY EARLY 60S)  4. Iriodialysis OD  5. Dry Eyes OU  6. Exotropia  7. Dry eyes             Last edited by Argenis Stanley, Patient Care Assistant on 1/27/2025  9:21   AM.            Assessment /Plan     For exam results, see Encounter Report.      ICD-10-CM ICD-9-CM    1. DM (diabetes mellitus), type 2 with complications  E11.8 250.90 Diabetes with no diabetic retinopathy on dilated exam.   Reviewed diabetic eye precautions including excellent blood sugar control, and importance of regular follow up.           2. Dry eyes, bilateral  H04.123 375.15 With slight incomplete blink-  follow       3. Aphakia of right eye  H27.01 379.31 Follow       4. Cataract extraction status, left  Z98.42 V45.61           RETURN TO CLINIC 1 year

## 2025-01-30 DIAGNOSIS — Z00.00 ENCOUNTER FOR MEDICARE ANNUAL WELLNESS EXAM: ICD-10-CM

## 2025-03-24 ENCOUNTER — TELEPHONE (OUTPATIENT)
Dept: INTERNAL MEDICINE | Facility: CLINIC | Age: OVER 89
End: 2025-03-24
Payer: MEDICARE

## 2025-03-24 NOTE — TELEPHONE ENCOUNTER
Spoke with patient's daughter.  The patient has been having nose bleed since Saturday. Taking Eliquis and wanted to discuss getting off.  Offered appt today declined.  Appointment scheduled Tuesday 3/25/25

## 2025-03-24 NOTE — TELEPHONE ENCOUNTER
----- Message from Evelyne sent at 3/24/2025  8:13 AM CDT -----  Name of Caller:Patient's daughterWhen is the first available appointment?Symptoms:nose bleeds, weakBest Call Back Number:.[phone]  Additional Information: She is requesting a call back regarding her father having nose bleeds and weak. He is taking apixaban (ELIQUIS) 5 mg Tab and she like to know if to continue taking medication and schedule an appointment. Saturday his nose bled, legs numb and he was cold. Tried calling triage nurse but no answer. Thx.EL  ----- Message -----  From: Evelyne Glass  Sent: 3/24/2025   8:18 AM CDT  To: [facility] Staff    Name of Caller:Patient's daughterWhen is the first available appointment?Symptoms:nose bleeds, weakBest Call Back Number:.[phone]  Additional Information: She is requesting a call back regarding her father having nose bleeds and weak. He is taking apixaban (ELIQUIS) 5 mg Tab and she like to know if to continue taking medication and schedule an appointment. Saturday his nose bled, legs numb and he was cold. Tried calling triage nurse but no answer. Thx.EL

## 2025-03-25 ENCOUNTER — OFFICE VISIT (OUTPATIENT)
Dept: INTERNAL MEDICINE | Facility: CLINIC | Age: OVER 89
End: 2025-03-25
Payer: MEDICARE

## 2025-03-25 ENCOUNTER — LAB VISIT (OUTPATIENT)
Dept: LAB | Facility: HOSPITAL | Age: OVER 89
End: 2025-03-25
Payer: MEDICARE

## 2025-03-25 VITALS
SYSTOLIC BLOOD PRESSURE: 130 MMHG | BODY MASS INDEX: 34.56 KG/M2 | DIASTOLIC BLOOD PRESSURE: 70 MMHG | HEART RATE: 76 BPM | WEIGHT: 207.69 LBS | RESPIRATION RATE: 20 BRPM | TEMPERATURE: 98 F | OXYGEN SATURATION: 98 %

## 2025-03-25 DIAGNOSIS — E11.42 TYPE 2 DIABETES MELLITUS WITH POLYNEUROPATHY: ICD-10-CM

## 2025-03-25 DIAGNOSIS — N18.31 CHRONIC KIDNEY DISEASE, STAGE 3A: ICD-10-CM

## 2025-03-25 DIAGNOSIS — I10 HYPERTENSION, UNSPECIFIED TYPE: ICD-10-CM

## 2025-03-25 DIAGNOSIS — M79.662 PAIN AND SWELLING OF LEFT LOWER LEG: ICD-10-CM

## 2025-03-25 DIAGNOSIS — M79.89 PAIN AND SWELLING OF LEFT LOWER LEG: ICD-10-CM

## 2025-03-25 DIAGNOSIS — I82.503 CHRONIC DEEP VEIN THROMBOSIS (DVT) OF BOTH LOWER EXTREMITIES, UNSPECIFIED VEIN: ICD-10-CM

## 2025-03-25 DIAGNOSIS — R60.0 PEDAL EDEMA: Primary | ICD-10-CM

## 2025-03-25 DIAGNOSIS — I13.0 HYPERTENSIVE HEART AND CHRONIC KIDNEY DISEASE WITH HEART FAILURE AND STAGE 1 THROUGH STAGE 4 CHRONIC KIDNEY DISEASE, OR UNSPECIFIED CHRONIC KIDNEY DISEASE: ICD-10-CM

## 2025-03-25 PROCEDURE — 85025 COMPLETE CBC W/AUTO DIFF WBC: CPT | Mod: HCNC

## 2025-03-25 PROCEDURE — 1125F AMNT PAIN NOTED PAIN PRSNT: CPT | Mod: HCNC,CPTII,S$GLB,

## 2025-03-25 PROCEDURE — G2211 COMPLEX E/M VISIT ADD ON: HCPCS | Mod: HCNC,S$GLB,,

## 2025-03-25 PROCEDURE — 99999 PR PBB SHADOW E&M-EST. PATIENT-LVL III: CPT | Mod: PBBFAC,HCNC,,

## 2025-03-25 PROCEDURE — 99214 OFFICE O/P EST MOD 30 MIN: CPT | Mod: HCNC,S$GLB,,

## 2025-03-25 PROCEDURE — 1101F PT FALLS ASSESS-DOCD LE1/YR: CPT | Mod: HCNC,CPTII,S$GLB,

## 2025-03-25 PROCEDURE — 80053 COMPREHEN METABOLIC PANEL: CPT | Mod: HCNC

## 2025-03-25 PROCEDURE — 3288F FALL RISK ASSESSMENT DOCD: CPT | Mod: HCNC,CPTII,S$GLB,

## 2025-03-25 PROCEDURE — 36415 COLL VENOUS BLD VENIPUNCTURE: CPT | Mod: HCNC,PO

## 2025-03-25 NOTE — PROGRESS NOTES
Patient ID: Amauri Lara is a 89 y.o. male.    Chief Complaint: No chief complaint on file.    History of Present Illness    CHIEF COMPLAINT:  - Mr. Lara presents with frequent nosebleeds, difficulty regulating body temperature, and left foot swelling and pain.    HPI:  Mr. Lara has frequent nosebleeds, occurring daily and intermittently throughout the day. These began after resuming medication following dental work, performed approximately 2 weeks ago. The nosebleeds have progressively worsened over the last 2-3 days. Each episode lasts about 30-40 minutes before stopping, recurring after 3-4 hours.    He has problems regulating his body temperature, consistently feeling cold. This symptom appears related to the frequent blood loss from the nosebleeds.    He has significant swelling and pain in his left foot. The swelling is severe, and the pain is localized across the top of his foot. He has a history of blood clots in his legs, for which he has been taking anticoagulants (Eliquis).    Mr. Lara's son reports that he had gotten up off the bed and hit the fireplace, suggesting potential balance or mobility issues. Mr. Lara confirms this, stating he walked up to the fireplace and got on the sofa.    He has a history of chronic kidney disease and takes medications for cholesterol, diabetes (Metformin), prostate issues (Tamsulosin and Finasteride), gout (Allopurinol), blood thinning (Eliquis), and fluid retention (Lasix/furosemide). He also takes hydrocodone for pain management, usually one dose at bedtime.      ROS:  General: -fever, -chills, -fatigue, -weight gain, -weight loss, +cold intolerance  Eyes: -vision changes, -redness, -discharge  ENT: -ear pain, -nasal congestion, -sore throat, +nosebleeds  Cardiovascular: -chest pain, -palpitations, -lower extremity edema  Respiratory: -cough, -shortness of breath  Gastrointestinal: -abdominal pain, -nausea, -vomiting, -diarrhea, -constipation, -blood  in stool  Genitourinary: -dysuria, -hematuria, -frequency  Musculoskeletal: -joint pain, -muscle pain, +limb swelling, +limb pain  Skin: -rash, -lesion  Neurological: -headache, -dizziness, -numbness, -tingling  Psychiatric: -anxiety, -depression, -sleep difficulty  Neck: +neck pain         Pmh, Psh, Family Hx, Social Hx updated in Epic Tabs today.         3/25/2025    11:47 AM 6/3/2024     9:04 AM 2/27/2023    10:40 AM 6/27/2019    10:35 AM 5/21/2018     2:07 PM 12/28/2017    10:09 AM   Depression Patient Health Questionnaire   Over the last two weeks how often have you been bothered by little interest or pleasure in doing things Not at all Not at all Not at all  Not at all  Not at all  Not at all    Over the last two weeks how often have you been bothered by feeling down, depressed or hopeless Not at all Not at all Not at all Not at all  Not at all  Not at all    PHQ-2 Total Score 0 0 0 0 0 0       Data saved with a previous flowsheet row definition       Active Problem List with Overview Notes    Diagnosis Date Noted    Hypertensive heart and chronic kidney disease with heart failure and stage 1 through stage 4 chronic kidney disease, or unspecified chronic kidney disease 12/20/2024    Chronic kidney disease, stage 3a 12/20/2024    Dilated cardiomyopathy 12/20/2024    Hyperlipidemia associated with type 2 diabetes mellitus     Acute bilateral deep vein thrombosis (DVT) of popliteal veins 08/11/2024    Acute saddle pulmonary embolism without acute cor pulmonale 08/09/2024    CKD stage 3 due to type 2 diabetes mellitus     At high risk for falls 07/09/2021    Primary osteoarthritis involving multiple joints     Chronic venous insufficiency     Benign prostatic hyperplasia 05/31/2018    Atherosclerosis of aorta 12/28/2017     CXR 1/28/17-   Comparison 01/27/2017.  Atherosclerosis of the thoracic aorta.  Stable thyromegaly.  There appears to be in mild infiltrate or atelectasis at the lung bases there may be slight  worsening on the left this exam.  Followup recommended.      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    Urinary retention 01/27/2017    DM (diabetes mellitus), type 2 with complications     Hypertension     History of gout     Type 2 diabetes mellitus with polyneuropathy     History of pulmonary embolism     Obesity     Hearing loss of aging        Past Medical History:   Diagnosis Date    Aphakia of right eye     Years ago    Chronic venous insufficiency     CKD stage 3 due to type 2 diabetes mellitus     DM (diabetes mellitus), type 2 with complications     Hearing loss of aging     History of gout     History of pulmonary embolism     2011 and again 2024    Hyperlipidemia associated with type 2 diabetes mellitus     Hypertension associated with diabetes     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    Obesity, unspecified     Primary osteoarthritis involving multiple joints        Past Surgical History:   Procedure Laterality Date    CATARACT EXTRACTION      aphakic od    CATARACT EXTRACTION W/  INTRAOCULAR LENS IMPLANT Left 12/13/2018    ENDOSCOPIC ULTRASOUND OF UPPER GASTROINTESTINAL TRACT N/A 12/30/2022    Procedure: ULTRASOUND, UPPER GI TRACT, ENDOSCOPIC;  Surgeon: Mani Howard MD;  Location: [facility] ENDO;  Service: Endoscopy;  Laterality: N/A;    TRANSURETHRAL RESECTION OF PROSTATE N/A 5/31/2018    Procedure: PROSTATECTOMY-TRANSURETHRAL;  Surgeon: Michael Westbrook IV, MD;  Location: [facility];  Service: Urology;  Laterality: N/A;    VASCULAR SURGERY      yag laser capsulotomy Left 09/08/2020       Family History   Problem Relation Name Age of Onset    Diabetes Brother         Social History     Socioeconomic History    Marital status:    Tobacco Use    Smoking status: Never    Smokeless tobacco: Never   Substance and Sexual Activity    Alcohol use: No    Drug use: No    Sexual activity: Never     Social Drivers of Health     Financial Resource Strain: Low Risk  (8/11/2024)    Overall  "Financial Resource Strain (CARDIA)     Difficulty of Paying Living Expenses: Not hard at all   Food Insecurity: No Food Insecurity (8/11/2024)    Hunger Vital Sign     Worried About Running Out of Food in the Last Year: Never true     Ran Out of Food in the Last Year: Never true   Transportation Needs: No Transportation Needs (8/11/2024)    TRANSPORTATION NEEDS     Transportation : No   Stress: No Stress Concern Present (8/11/2024)    [place] [employer] - Occupational Stress Questionnaire     Feeling of Stress : Not at all   Housing Stability: Unknown (8/11/2024)    Housing Stability Vital Sign     Unable to Pay for Housing in the Last Year: No     Homeless in the Last Year: No       Medications Ordered Prior to Encounter[1]    Review of patient's allergies indicates:   Allergen Reactions    Morphine Other (See Comments)     Pt states, "It only makes my pain worse."       General - Well developed, alert and oriented in NAD  HEENT - normocephalic, no evidence of trauma, sclera white, EOMI  Neck - full range of motion  COR - regular rate and rhythm without murmurs or gallops  Lungs - Clear  Abdomen - soft, non-tender  Ext - no cyanosis or edema     Assessment:     1. Pedal edema    2. Pain and swelling of left lower leg    3. Chronic deep vein thrombosis (DVT) of both lower extremities, unspecified vein    4. Hypertension, unspecified type    5. Type 2 diabetes mellitus with polyneuropathy    6. Hypertensive heart and chronic kidney disease with heart failure and stage 1 through stage 4 chronic kidney disease, or unspecified chronic kidney disease    7. Chronic kidney disease, stage 3a        Pertinent Labs:    Chemistry        Component Value Date/Time     12/16/2024 0741    K 3.9 12/16/2024 0741     12/16/2024 0741    CO2 26 12/16/2024 0741    BUN 17 12/16/2024 0741    CREATININE 1.4 12/16/2024 0741    GLU 91 12/16/2024 0741        Component Value Date/Time    CALCIUM 9.7 12/16/2024 " "0741    ALKPHOS 79 12/16/2024 0741    AST 16 12/16/2024 0741    ALT 15 12/16/2024 0741    BILITOT 0.8 12/16/2024 0741    ESTGFRAFRICA >60.0 02/11/2022 0844    EGFRNONAA >60.0 02/11/2022 0844          Lab Results   Component Value Date    WBC 6.72 12/16/2024    HGB 14.6 12/16/2024    HCT 47.6 12/16/2024    MCV 92 12/16/2024    MCH 28.2 12/16/2024    MCHC 30.7 (L) 12/16/2024    RDW 16.2 (H) 12/16/2024     12/16/2024    MPV 11.8 12/16/2024    PLTEST Appears normal 12/05/2019       Lab Results   Component Value Date    HGBA1C 5.4 12/16/2024    HGBA1C 5.5 05/28/2024    HGBA1C 5.3 11/20/2023    GLU 91 12/16/2024     Lab Results   Component Value Date    LDLCALC 92.2 12/16/2024     Lab Results   Component Value Date    TSH 3.056 12/16/2024     Lab Results   Component Value Date    CHOL 170 12/16/2024    CHOL 164 05/28/2024    CHOL 141 11/20/2023     Lab Results   Component Value Date    TRIG 179 (H) 12/16/2024    TRIG 131 05/28/2024    TRIG 129 11/20/2023     Lab Results   Component Value Date    HDL 42 12/16/2024    HDL 51 05/28/2024    HDL 46 11/20/2023     Lab Results   Component Value Date    LDLCALC 92.2 12/16/2024    LDLCALC 86.8 05/28/2024    LDLCALC 69.2 11/20/2023     No results found for: "NONHDLC"  Lab Results   Component Value Date    CHOLHDL 24.7 12/16/2024    CHOLHDL 31.1 05/28/2024    CHOLHDL 32.6 11/20/2023       The ASCVD Risk score (Carlitos DK, et al., 2019) failed to calculate for the following reasons:    The 2019 ASCVD risk score is only valid for ages 40 to 79    Plan:     Assessment & Plan    Assessed recent nosebleeds, likely related to anticoagulant use.  Evaluated swelling and pain in left foot, considering history of leg clots.  Weighed risks and benefits of continuing anticoagulant given active bleeding.  Considered potential anemia due to frequent nosebleeds.  Reviewed diabetes management, noting well-controlled levels over past 3 years.  Evaluated appropriateness of metformin use given " CKD and well-controlled diabetes.    TYPE 2 DIABETES MELLITUS WITH POLYNEUROPATHY:  - Evaluated patient's diabetes levels, which have been in normal range for at least 3 years, not even pre-diabetes.  - Discontinued metformin due to well-controlled glucose levels and potential risks associated with kidney disease.    HYPERTENSIVE HEART AND CHRONIC KIDNEY DISEASE WITH HEART FAILURE AND STAGE 1 THROUGH STAGE 4 CHRONIC KIDNEY DISEASE, OR UNSPECIFIED CHRONIC KIDNEY DISEASE:  - Evaluated patient's kidney function levels.  - Noted that the patient has chronic kidney disease and is taking furosemide for fluid management.    CHRONIC KIDNEY DISEASE, STAGE 3A:  - Assessed that the patient can still take metformin in a reduced dose based on current kidney function.  - However, recommended discontinuing metformin due to potential risks associated with kidney disease.  - Ordered labs to check the patient's hemoglobin levels.    EPISTAXIS (NOSEBLEEDS):  - Discontinued apixaban due to active nosebleeds.  - Mr. Lara experiencing frequent nosebleeds daily, lasting 30-40 minutes and recurring every 3-4 hours, worsening over the past 2-3 days.  - Significant blood loss potentially leading to anemia and cold sensation.  - Assessed the risk-benefit of continuing anticoagulants given the active bleeding.    PERIPHERAL VASCULAR DISEASE:  - Ordered ultrasound of the left leg.  - Mr. Lara has a history of clogged arteries and was previously seen by a vascular surgeon.  - Left foot is swollen and painful.    HISTORY OF THROMBOPHLEBITIS:  - Mr. Lara has a history of blood clots in the legs.  - Acknowledged the risk of clots and their potential complications.  - Mr. Lara was on apixaban for clot prevention before discontinuation due to nosebleeds.    GOUT:  - Discontinued allopurinol.    HYPERLIPIDEMIA:  - Mr. Lara is taking cholesterol medication.    BENIGN PROSTATIC HYPERPLASIA:  - Mr. Lara is taking tamsulosin and  finasteride for prostate management.    LONG TERM USE OF ANTICOAGULANTS:  - Referred the patient to a vascular medicine specialist for evaluation and recommendations on continuing anticoagulants.    FAMILY HISTORY OF ISCHEMIC HEART DISEASE:  - Mr. Lara's brother had a heart attack.         1. Pedal edema  - US Lower Extremity Veins Bilateral; Future    2. Pain and swelling of left lower leg  - US Lower Extremity Veins Bilateral; Future    3. Chronic deep vein thrombosis (DVT) of both lower extremities, unspecified vein  - Ambulatory referral/consult to Vascular Medicine; Future    4. Hypertension, unspecified type  - CBC Auto Differential; Future  - Comprehensive Metabolic Panel; Future    5. Type 2 diabetes mellitus with polyneuropathy    6. Hypertensive heart and chronic kidney disease with heart failure and stage 1 through stage 4 chronic kidney disease, or unspecified chronic kidney disease    7. Chronic kidney disease, stage 3a    DM2:  Stop metformin in view of CKD and well-controlled hemoglobin A1c with diet.  Gout:  Allopurinol 300 mg daily.  HLD:  Lovastatin 40 mg daily.  Hypertension:  Furosemide 20 mg daily.  BPH:  Tamsulosin 0.4 mg daily, finasteride 5 mg daily.  Chronic DVT:  Eliquis stopped in view of nosebleeds.  Follow recommendations from vascular Medicine.  Chronic low back pain, osteoarthritis multiple joints:  Norco 7.5 as needed.    Immunization History   Administered Date(s) Administered    COVID-19 MRNA, LN-S PF (MODERNA HALF 0.25 ML DOSE) 01/05/2022    COVID-19, MRNA, LN-S, PF (MODERNA FULL 0.5 ML DOSE) 03/13/2021, 04/10/2021    Influenza (FLUAD) - Quadrivalent - Adjuvanted - PF *Preferred* (65+) 10/14/2020    Influenza - Quadrivalent - High Dose - PF (65 years and older) 10/26/2023    Influenza - Quadrivalent - PF *Preferred* (6 months and older) 12/22/2021    Influenza - Trivalent - Afluria, Fluzone MDV 10/24/2006, 10/09/2008, 10/21/2010    Influenza - Trivalent - Fluad - Adjuvanted -  PF (65 years and older 12/20/2024    Influenza - Trivalent - Fluzone High Dose - PF (65 years and older) 10/10/2011, 12/18/2012, 12/18/2013, 12/22/2015, 12/30/2016, 12/28/2017, 12/27/2018, 01/07/2020    Pneumococcal Conjugate - 13 Valent 12/22/2015    Pneumococcal Polysaccharide - 23 Valent 12/03/2003, 10/10/2011       Orders Placed This Encounter   Procedures    US Lower Extremity Veins Bilateral    CBC Auto Differential    Comprehensive Metabolic Panel    Ambulatory referral/consult to Vascular Medicine       Portions of this note were generated by Qwiki.    Each patient to whom medical services by telemedicine are provided:  (1) informed of the relationship between the physician and patient and the respective role of any other health care provider with respect to management of the patient; and (2) notified that he or she may decline to receive medical services by telemedicine and may withdraw from such care at any time.    I spent a total of 32 minutes face to face and non-face to face on the date of this visit.This includes time preparing to see the patient (eg, review of tests, notes), obtaining and/or reviewing additional history from an independent historian and/or outside medical records, documenting clinical information in the electronic health record, independently interpreting results and/or communicating results to the patient/family/caregiver, or care coordinator.  Visit today included increased complexity associated with the care of the episodic problem addressed and managing the longitudinal care of the patient due to the serious and/or complex managed problem(s).      This note was generated with the assistance of ambient listening technology. Verbal consent was obtained by the patient and accompanying visitor(s) for the recording of patient appointment to facilitate this note. I attest to having reviewed and edited the generated note for accuracy, though some syntax or spelling errors may  persist. Please contact the author of this note for any clarification.      Erwin Ny MD         [1]   Current Outpatient Medications on File Prior to Visit   Medication Sig Dispense Refill    allopurinoL (ZYLOPRIM) 300 MG tablet Take 1 tablet (300 mg total) by mouth once daily. 90 tablet 3    apixaban (ELIQUIS) 5 mg Tab Take 1 tablet (5 mg total) by mouth 2 (two) times daily. 180 tablet 3    carboxymethylcellulose sodium 1 % DpGe Apply to eye.      finasteride (PROSCAR) 5 mg tablet Take 1 tablet (5 mg total) by mouth once daily. 90 tablet 3    fluticasone propionate (FLONASE) 50 mcg/actuation nasal spray INHALE 2 SPRAYS IN EACH NOSTRIL ONCE DAILY 16 g 3    furosemide (LASIX) 20 MG tablet Take 1 tablet (20 mg total) by mouth once daily. 90 tablet 3    HYDROcodone-acetaminophen (NORCO) 7.5-325 mg per tablet Take 1 tablet by mouth every 8 (eight) hours as needed for Pain. 90 tablet 0    lancets Misc 1 each by Misc.(Non-Drug; Combo Route) route 4 (four) times daily. 200 each 5    lovastatin (MEVACOR) 40 MG tablet Take 1 tablet (40 mg total) by mouth every evening. 90 tablet 3    mupirocin (BACTROBAN) 2 % ointment Apply topically 3 (three) times daily. 30 g 1    prednisoLONE acetate (PRED FORTE) 1 % DrpS Place 1 drop into the left eye 4 (four) times daily. 5 mL 0    SSD 1 % cream       STOOL SOFTENER 100 mg capsule TAKE ONE CAPSULE BY MOUTH TWICE DAILY 180 capsule 3    tamsulosin (FLOMAX) 0.4 mg Cap Take 2 capsules (0.8 mg total) by mouth once daily. 180 capsule 3    TRUE METRIX GLUCOSE TEST STRIP Strp TEST BLOOD SUGAR FOUR TIMES DAILY 400 strip 3    [DISCONTINUED] metFORMIN (GLUCOPHAGE) 850 MG tablet Take 1 tablet (850 mg total) by mouth daily with breakfast. 90 tablet 3    blood-glucose meter (TRUE METRIX GLUCOSE METER) kit Use as instructed 1 each 0     No current facility-administered medications on file prior to visit.

## 2025-03-26 ENCOUNTER — RESULTS FOLLOW-UP (OUTPATIENT)
Dept: INTERNAL MEDICINE | Facility: CLINIC | Age: OVER 89
End: 2025-03-26

## 2025-03-26 ENCOUNTER — HOSPITAL ENCOUNTER (OUTPATIENT)
Dept: RADIOLOGY | Facility: HOSPITAL | Age: OVER 89
Discharge: HOME | End: 2025-03-26
Payer: MEDICARE

## 2025-03-26 DIAGNOSIS — R60.0 PEDAL EDEMA: ICD-10-CM

## 2025-03-26 DIAGNOSIS — M79.662 PAIN AND SWELLING OF LEFT LOWER LEG: ICD-10-CM

## 2025-03-26 DIAGNOSIS — M79.89 PAIN AND SWELLING OF LEFT LOWER LEG: ICD-10-CM

## 2025-03-26 LAB
ABSOLUTE EOSINOPHIL (OHS): 0.16 K/UL
ABSOLUTE MONOCYTE (OHS): 1.31 K/UL (ref 0.3–1)
ABSOLUTE NEUTROPHIL COUNT (OHS): 4.36 K/UL (ref 1.8–7.7)
ALBUMIN SERPL BCP-MCNC: 3.5 G/DL (ref 3.5–5.2)
ALP SERPL-CCNC: 74 UNIT/L (ref 40–150)
ALT SERPL W/O P-5'-P-CCNC: 16 UNIT/L (ref 10–44)
ANION GAP (OHS): 13 MMOL/L (ref 8–16)
AST SERPL-CCNC: 20 UNIT/L (ref 11–45)
BASOPHILS # BLD AUTO: 0.03 K/UL
BASOPHILS NFR BLD AUTO: 0.4 %
BILIRUB SERPL-MCNC: 0.4 MG/DL (ref 0.1–1)
BUN SERPL-MCNC: 22 MG/DL (ref 10–30)
CALCIUM SERPL-MCNC: 10.1 MG/DL (ref 8.7–10.5)
CHLORIDE SERPL-SCNC: 107 MMOL/L (ref 95–110)
CO2 SERPL-SCNC: 23 MMOL/L (ref 23–29)
CREAT SERPL-MCNC: 1.2 MG/DL (ref 0.5–1.4)
ERYTHROCYTE [DISTWIDTH] IN BLOOD BY AUTOMATED COUNT: 16 % (ref 11.5–14.5)
GFR SERPLBLD CREATININE-BSD FMLA CKD-EPI: 56 ML/MIN/1.73/M2
GLUCOSE SERPL-MCNC: 72 MG/DL (ref 70–110)
HCT VFR BLD AUTO: 47.9 % (ref 40–54)
HGB BLD-MCNC: 14.5 GM/DL (ref 14–18)
IMM GRANULOCYTES # BLD AUTO: 0.02 K/UL (ref 0–0.04)
IMM GRANULOCYTES NFR BLD AUTO: 0.3 % (ref 0–0.5)
LYMPHOCYTES # BLD AUTO: 0.99 K/UL (ref 1–4.8)
MCH RBC QN AUTO: 28 PG (ref 27–50)
MCHC RBC AUTO-ENTMCNC: 30.3 G/DL (ref 32–36)
MCV RBC AUTO: 93 FL (ref 82–98)
NUCLEATED RBC (/100WBC) (OHS): 0 /100 WBC
PLATELET # BLD AUTO: 213 K/UL (ref 150–450)
PMV BLD AUTO: 11.9 FL (ref 9.2–12.9)
POTASSIUM SERPL-SCNC: 4.1 MMOL/L (ref 3.5–5.1)
PROT SERPL-MCNC: 7.9 GM/DL (ref 6–8.4)
RBC # BLD AUTO: 5.17 M/UL (ref 4.6–6.2)
RELATIVE EOSINOPHIL (OHS): 2.3 %
RELATIVE LYMPHOCYTE (OHS): 14.4 % (ref 18–48)
RELATIVE MONOCYTE (OHS): 19.1 % (ref 4–15)
RELATIVE NEUTROPHIL (OHS): 63.5 % (ref 38–73)
SODIUM SERPL-SCNC: 143 MMOL/L (ref 136–145)
WBC # BLD AUTO: 6.87 K/UL (ref 3.9–12.7)

## 2025-03-26 PROCEDURE — 93970 EXTREMITY STUDY: CPT | Mod: TC,HCNC

## 2025-03-26 PROCEDURE — 93970 EXTREMITY STUDY: CPT | Mod: 26,HCNC,, | Performed by: RADIOLOGY

## 2025-05-01 DIAGNOSIS — G89.29 CHRONIC LOW BACK PAIN, UNSPECIFIED BACK PAIN LATERALITY, UNSPECIFIED WHETHER SCIATICA PRESENT: ICD-10-CM

## 2025-05-01 DIAGNOSIS — M54.50 CHRONIC LOW BACK PAIN, UNSPECIFIED BACK PAIN LATERALITY, UNSPECIFIED WHETHER SCIATICA PRESENT: ICD-10-CM

## 2025-05-01 RX ORDER — HYDROCODONE BITARTRATE AND ACETAMINOPHEN 7.5; 325 MG/1; MG/1
1 TABLET ORAL EVERY 8 HOURS PRN
Qty: 90 TABLET | Refills: 0 | Status: SHIPPED | OUTPATIENT
Start: 2025-05-01

## 2025-05-01 NOTE — TELEPHONE ENCOUNTER
Requested Prescriptions     Pending Prescriptions Disp Refills    HYDROcodone-acetaminophen (NORCO) 7.5-325 mg per tablet 90 tablet 0     Sig: Take 1 tablet by mouth every 8 (eight) hours as needed for Pain.     LV   3/25/25  LF   1/15/25      ----- Message from Mike sent at 5/1/2025  9:04 AM CDT -----  Contact: Daughter  Type:  RX Refill RequestWho Called: Nel-daughterRefill or New Rx:refillRX Name and Strength:HYDROcodone-acetaminophen (NORCO) 7.5-325 mg per tabletHow is the patient currently taking it? (ex. 1XDay):Is this a 30 day or 90 day RX:90 dayPreferred Pharmacy with phone number:[facility] - [place], [place] - [address] [place], [place] [id number] [facility] [address]Vgnhn: [phone] Fax: [phone] or Mail Order:localOrdering Provider:Dyllanould the patient rather a call back or a response via [facility]? Call backBTuba City Regional [facility] Call Back Number:[phone] Information: the patient has been out since Feb

## 2025-06-20 ENCOUNTER — LAB VISIT (OUTPATIENT)
Dept: LAB | Facility: HOSPITAL | Age: OVER 89
End: 2025-06-20
Attending: INTERNAL MEDICINE
Payer: MEDICARE

## 2025-06-20 DIAGNOSIS — E11.8 DM (DIABETES MELLITUS), TYPE 2 WITH COMPLICATIONS: ICD-10-CM

## 2025-06-20 LAB
ABSOLUTE EOSINOPHIL (OHS): 0.13 K/UL
ABSOLUTE MONOCYTE (OHS): 0.79 K/UL (ref 0.3–1)
ABSOLUTE NEUTROPHIL COUNT (OHS): 5.22 K/UL (ref 1.8–7.7)
ALBUMIN SERPL BCP-MCNC: 3.7 G/DL (ref 3.5–5.2)
ALP SERPL-CCNC: 96 UNIT/L (ref 40–150)
ALT SERPL W/O P-5'-P-CCNC: 16 UNIT/L (ref 10–44)
ANION GAP (OHS): 8 MMOL/L (ref 8–16)
AST SERPL-CCNC: 17 UNIT/L (ref 11–45)
BASOPHILS # BLD AUTO: 0.03 K/UL
BASOPHILS NFR BLD AUTO: 0.4 %
BILIRUB SERPL-MCNC: 0.9 MG/DL (ref 0.1–1)
BUN SERPL-MCNC: 23 MG/DL (ref 10–30)
CALCIUM SERPL-MCNC: 9.7 MG/DL (ref 8.7–10.5)
CHLORIDE SERPL-SCNC: 107 MMOL/L (ref 95–110)
CHOLEST SERPL-MCNC: 159 MG/DL (ref 120–199)
CHOLEST/HDLC SERPL: 4.4 {RATIO} (ref 2–5)
CO2 SERPL-SCNC: 28 MMOL/L (ref 23–29)
CREAT SERPL-MCNC: 1.3 MG/DL (ref 0.5–1.4)
EAG (OHS): 108 MG/DL (ref 68–131)
ERYTHROCYTE [DISTWIDTH] IN BLOOD BY AUTOMATED COUNT: 16.4 % (ref 11.5–14.5)
GFR SERPLBLD CREATININE-BSD FMLA CKD-EPI: 51 ML/MIN/1.73/M2
GLUCOSE SERPL-MCNC: 96 MG/DL (ref 70–110)
HBA1C MFR BLD: 5.4 % (ref 4–5.6)
HCT VFR BLD AUTO: 48 % (ref 40–54)
HDLC SERPL-MCNC: 36 MG/DL (ref 40–75)
HDLC SERPL: 22.6 % (ref 20–50)
HGB BLD-MCNC: 14.7 GM/DL (ref 14–18)
IMM GRANULOCYTES # BLD AUTO: 0.02 K/UL (ref 0–0.04)
IMM GRANULOCYTES NFR BLD AUTO: 0.3 % (ref 0–0.5)
LDLC SERPL CALC-MCNC: 85.8 MG/DL (ref 63–159)
LYMPHOCYTES # BLD AUTO: 0.97 K/UL (ref 1–4.8)
MCH RBC QN AUTO: 28.1 PG (ref 27–31)
MCHC RBC AUTO-ENTMCNC: 30.6 G/DL (ref 32–36)
MCV RBC AUTO: 92 FL (ref 82–98)
NONHDLC SERPL-MCNC: 123 MG/DL
NUCLEATED RBC (/100WBC) (OHS): 0 /100 WBC
PLATELET # BLD AUTO: 226 K/UL (ref 150–450)
PMV BLD AUTO: 12.7 FL (ref 9.2–12.9)
POTASSIUM SERPL-SCNC: 4 MMOL/L (ref 3.5–5.1)
PROT SERPL-MCNC: 7.7 GM/DL (ref 6–8.4)
RBC # BLD AUTO: 5.23 M/UL (ref 4.6–6.2)
RELATIVE EOSINOPHIL (OHS): 1.8 %
RELATIVE LYMPHOCYTE (OHS): 13.5 % (ref 18–48)
RELATIVE MONOCYTE (OHS): 11 % (ref 4–15)
RELATIVE NEUTROPHIL (OHS): 73 % (ref 38–73)
SODIUM SERPL-SCNC: 143 MMOL/L (ref 136–145)
TRIGL SERPL-MCNC: 186 MG/DL (ref 30–150)
TSH SERPL-ACNC: 2.9 UIU/ML (ref 0.4–4)
WBC # BLD AUTO: 7.16 K/UL (ref 3.9–12.7)

## 2025-06-20 PROCEDURE — 84443 ASSAY THYROID STIM HORMONE: CPT

## 2025-06-20 PROCEDURE — 82040 ASSAY OF SERUM ALBUMIN: CPT

## 2025-06-20 PROCEDURE — 82465 ASSAY BLD/SERUM CHOLESTEROL: CPT

## 2025-06-20 PROCEDURE — 85025 COMPLETE CBC W/AUTO DIFF WBC: CPT

## 2025-06-20 PROCEDURE — 83036 HEMOGLOBIN GLYCOSYLATED A1C: CPT

## 2025-06-20 PROCEDURE — 36415 COLL VENOUS BLD VENIPUNCTURE: CPT

## 2025-06-24 ENCOUNTER — OFFICE VISIT (OUTPATIENT)
Dept: INTERNAL MEDICINE | Facility: CLINIC | Age: OVER 89
End: 2025-06-24
Payer: MEDICARE

## 2025-06-24 VITALS
OXYGEN SATURATION: 100 % | BODY MASS INDEX: 29.63 KG/M2 | DIASTOLIC BLOOD PRESSURE: 80 MMHG | HEIGHT: 71 IN | HEART RATE: 88 BPM | SYSTOLIC BLOOD PRESSURE: 130 MMHG | WEIGHT: 211.63 LBS | TEMPERATURE: 98 F

## 2025-06-24 DIAGNOSIS — N18.31 CHRONIC KIDNEY DISEASE, STAGE 3A: ICD-10-CM

## 2025-06-24 DIAGNOSIS — G89.29 CHRONIC LOW BACK PAIN, UNSPECIFIED BACK PAIN LATERALITY, UNSPECIFIED WHETHER SCIATICA PRESENT: ICD-10-CM

## 2025-06-24 DIAGNOSIS — E11.42 TYPE 2 DIABETES MELLITUS WITH POLYNEUROPATHY: ICD-10-CM

## 2025-06-24 DIAGNOSIS — I82.503 CHRONIC DEEP VEIN THROMBOSIS (DVT) OF BOTH LOWER EXTREMITIES, UNSPECIFIED VEIN: ICD-10-CM

## 2025-06-24 DIAGNOSIS — Z79.899 OTHER LONG TERM (CURRENT) DRUG THERAPY: ICD-10-CM

## 2025-06-24 DIAGNOSIS — I10 HYPERTENSION, UNSPECIFIED TYPE: Primary | ICD-10-CM

## 2025-06-24 DIAGNOSIS — M54.50 CHRONIC LOW BACK PAIN, UNSPECIFIED BACK PAIN LATERALITY, UNSPECIFIED WHETHER SCIATICA PRESENT: ICD-10-CM

## 2025-06-24 PROCEDURE — 99999 PR PBB SHADOW E&M-EST. PATIENT-LVL IV: CPT | Mod: PBBFAC,,,

## 2025-06-24 NOTE — PROGRESS NOTES
Patient ID: Amauri Lara is a 89 y.o. male.    Chief Complaint: Follow-up (Est care from Dr. Vera) and Leg Swelling    History of Present Illness    CHIEF COMPLAINT:  - Mr. Lara presents for a follow-up visit to discuss medication management, leg swelling, and recent labs results.    HPI:  Mr. Lara reports ongoing issues with leg swelling. He was previously advised to wear compression socks but has declined due to discomfort. He elevated his legs the night before the visit, resulting in a 3-pound weight loss. He continues to take furosemide daily for the swelling. Mr. Lara also has some forgetfulness, though it is unclear if this is a new symptom or age-related. He has poor circulation, a history of blood clots in his legs, and chronic kidney disease, which predispose him to fluid retention. He uses a cane for mobility but declines to use a walker, despite it providing better balance. Rainy weather tends to exacerbate his condition, setting him back for 2-3 days. He takes hydrocodone for pain management but only when in severe pain. He previously had nose bleeds and felt cold when on anticoagulants, which led to discontinuation of metformin and dulaglutide. He has an asymptomatic hernia and denies any related abdominal pain.      ROS:  General: -fever, -chills, -fatigue, -weight gain, -weight loss  Eyes: -vision changes, -redness, -discharge  ENT: -ear pain, -nasal congestion, -sore throat  Cardiovascular: -chest pain, -palpitations, +lower extremity edema  Respiratory: -cough, -shortness of breath  Gastrointestinal: -abdominal pain, -nausea, -vomiting, -diarrhea, -constipation, -blood in stool  Genitourinary: -dysuria, -hematuria, -frequency  Musculoskeletal: -joint pain, -muscle pain  Skin: -rash, -lesion  Neurological: -headache, -dizziness, -numbness, -tingling, +forgetfulness  Psychiatric: -anxiety, -depression, -sleep difficulty         Pmh, Psh, Family Hx, Social Hx updated in Epic Tabs  today.         3/25/2025    11:47 AM 6/3/2024     9:04 AM 2/27/2023    10:40 AM 6/27/2019    10:35 AM 5/21/2018     2:07 PM 12/28/2017    10:09 AM   Depression Patient Health Questionnaire   Over the last two weeks how often have you been bothered by little interest or pleasure in doing things Not at all Not at all Not at all  Not at all  Not at all  Not at all    Over the last two weeks how often have you been bothered by feeling down, depressed or hopeless Not at all Not at all Not at all Not at all  Not at all  Not at all    PHQ-2 Total Score 0 0 0 0 0 0       Data saved with a previous flowsheet row definition       Active Problem List with Overview Notes    Diagnosis Date Noted    Hypertensive heart and chronic kidney disease with heart failure and stage 1 through stage 4 chronic kidney disease, or unspecified chronic kidney disease 12/20/2024    Chronic kidney disease, stage 3a 12/20/2024    Dilated cardiomyopathy 12/20/2024    Hyperlipidemia associated with type 2 diabetes mellitus     Acute bilateral deep vein thrombosis (DVT) of popliteal veins 08/11/2024    Acute saddle pulmonary embolism without acute cor pulmonale 08/09/2024    CKD stage 3 due to type 2 diabetes mellitus     At high risk for falls 07/09/2021    Primary osteoarthritis involving multiple joints     Chronic venous insufficiency     Benign prostatic hyperplasia 05/31/2018    Atherosclerosis of aorta 12/28/2017     CXR 1/28/17-   Comparison 01/27/2017.  Atherosclerosis of the thoracic aorta.  Stable thyromegaly.  There appears to be in mild infiltrate or atelectasis at the lung bases there may be slight worsening on the left this exam.  Followup recommended.      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    Urinary retention 01/27/2017    DM (diabetes mellitus), type 2 with complications     Hypertension     History of gout     Type 2 diabetes mellitus with polyneuropathy     History of pulmonary embolism     Obesity     Hearing loss of aging         Past Medical History:   Diagnosis Date    Aphakia of right eye     Years ago    Chronic venous insufficiency     CKD stage 3 due to type 2 diabetes mellitus     DM (diabetes mellitus), type 2 with complications     Hearing loss of aging     History of gout     History of pulmonary embolism     2011 and again 2024    Hyperlipidemia associated with type 2 diabetes mellitus     Hypertension associated with diabetes     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    Obesity, unspecified     Primary osteoarthritis involving multiple joints        Past Surgical History:   Procedure Laterality Date    CATARACT EXTRACTION      aphakic od    CATARACT EXTRACTION W/  INTRAOCULAR LENS IMPLANT Left 12/13/2018    ENDOSCOPIC ULTRASOUND OF UPPER GASTROINTESTINAL TRACT N/A 12/30/2022    Procedure: ULTRASOUND, UPPER GI TRACT, ENDOSCOPIC;  Surgeon: Mani Howard MD;  Location: [facility] ENDO;  Service: Endoscopy;  Laterality: N/A;    TRANSURETHRAL RESECTION OF PROSTATE N/A 5/31/2018    Procedure: PROSTATECTOMY-TRANSURETHRAL;  Surgeon: Michael Westbrook IV, MD;  Location: [facility] OR;  Service: Urology;  Laterality: N/A;    VASCULAR SURGERY      yag laser capsulotomy Left 09/08/2020       Family History   Problem Relation Name Age of Onset    Diabetes Brother         Social History     Socioeconomic History    Marital status:    Tobacco Use    Smoking status: Never    Smokeless tobacco: Never   Substance and Sexual Activity    Alcohol use: No    Drug use: No    Sexual activity: Never     Social Drivers of Health     Financial Resource Strain: Low Risk  (8/11/2024)    Overall Financial Resource Strain (CARDIA)     Difficulty of Paying Living Expenses: Not hard at all   Food Insecurity: No Food Insecurity (8/11/2024)    Hunger Vital Sign     Worried About Running Out of Food in the Last Year: Never true     Ran Out of Food in the Last Year: Never true   Transportation Needs: No Transportation Needs (8/11/2024)     "TRANSPORTATION NEEDS     Transportation : No   Stress: No Stress Concern Present (8/11/2024)    [employer] - Occupational Stress Questionnaire     Feeling of Stress : Not at all   Housing Stability: Unknown (8/11/2024)    Housing Stability Vital Sign     Unable to Pay for Housing in the Last Year: No     Homeless in the Last Year: No       Medications Ordered Prior to Encounter[1]    Review of patient's allergies indicates:   Allergen Reactions    Morphine Other (See Comments)     Pt states, "It only makes my pain worse."       General - Well developed, alert and oriented in NAD  HEENT - normocephalic, no evidence of trauma, sclera white, EOMI  Neck - full range of motion  COR - regular rate and rhythm without murmurs or gallops  Lungs - Clear  Abdomen - soft, non-tender  Ext - no cyanosis     Assessment:     1. Hypertension, unspecified type    2. Chronic low back pain, unspecified back pain laterality, unspecified whether sciatica present    3. Chronic deep vein thrombosis (DVT) of both lower extremities, unspecified vein    4. Chronic kidney disease, stage 3a    5. Type 2 diabetes mellitus with polyneuropathy    6. Other long term (current) drug therapy        Pertinent Labs:    Chemistry        Component Value Date/Time     06/20/2025 0749     12/16/2024 0741    K 4.0 06/20/2025 0749    K 3.9 12/16/2024 0741     06/20/2025 0749     12/16/2024 0741    CO2 28 06/20/2025 0749    CO2 26 12/16/2024 0741    BUN 23 06/20/2025 0749    CREATININE 1.3 06/20/2025 0749    GLU 96 06/20/2025 0749    GLU 91 12/16/2024 0741        Component Value Date/Time    CALCIUM 9.7 06/20/2025 0749    CALCIUM 9.7 12/16/2024 0741    ALKPHOS 96 06/20/2025 0749    ALKPHOS 79 12/16/2024 0741    AST 17 06/20/2025 0749    AST 16 12/16/2024 0741    ALT 16 06/20/2025 0749    ALT 15 12/16/2024 0741    BILITOT 0.9 06/20/2025 0749    BILITOT 0.8 12/16/2024 0741    ESTGFRAFRICA >60.0 02/11/2022 " "0844    EGFRNONAA >60.0 02/11/2022 0844          Lab Results   Component Value Date    WBC 7.16 06/20/2025    HGB 14.7 06/20/2025    HCT 48.0 06/20/2025    MCV 92 06/20/2025    MCH 28.1 06/20/2025    MCHC 30.6 (L) 06/20/2025    RDW 16.4 (H) 06/20/2025     06/20/2025    MPV 12.7 06/20/2025    PLTEST Appears normal 12/05/2019       Lab Results   Component Value Date    HGBA1C 5.4 06/20/2025    HGBA1C 5.4 12/16/2024    HGBA1C 5.5 05/28/2024    GLU 96 06/20/2025     Lab Results   Component Value Date    LDLCALC 85.8 06/20/2025     Lab Results   Component Value Date    TSH 2.895 06/20/2025     Lab Results   Component Value Date    CHOL 159 06/20/2025    CHOL 170 12/16/2024    CHOL 164 05/28/2024     Lab Results   Component Value Date    TRIG 186 (H) 06/20/2025    TRIG 179 (H) 12/16/2024    TRIG 131 05/28/2024     Lab Results   Component Value Date    HDL 36 (L) 06/20/2025    HDL 42 12/16/2024    HDL 51 05/28/2024     Lab Results   Component Value Date    LDLCALC 85.8 06/20/2025    LDLCALC 92.2 12/16/2024    LDLCALC 86.8 05/28/2024     No results found for: "NONHDLC"  Lab Results   Component Value Date    CHOLHDL 22.6 06/20/2025    CHOLHDL 24.7 12/16/2024    CHOLHDL 31.1 05/28/2024       The ASCVD Risk score (Carlitos DK, et al., 2019) failed to calculate for the following reasons:    The 2019 ASCVD risk score is only valid for ages 40 to 79    Plan:     Assessment & Plan    Assessed leg swelling, noting some indentation but not significant enough to warrant increasing diuretic dosage.  Reviewed lab results, noting stable renal function, cholesterol, and glucose levels.  Considered history of blood clots in legs and CKD as factors predisposing to fluid retention.  Evaluated cognitive status, noting some forgetfulness but no current need for intervention.  Assessed fall risk and mobility, acknowledging preference for cane over walker but recommending walker for better balance and fall prevention.    CHRONIC KIDNEY " DISEASE:  - Continued furosemide at current dose, to be taken daily for fluid management.  - Monitored kidney function and renal parameters, which appear stable.    PERIPHERAL VASCULAR DISEASE AND VENOUS INSUFFICIENCY:  - Recommend compression stockings for daily use for a few hours as tolerated to improve venous return and reduce peripheral edema.  - Emphasized the importance of regular physical activity and movement to improve overall circulation and reduce venous stasis.  - Instructed the patient to elevate legs when sitting to reduce edema.    ABDOMINAL HERNIA:  - Evaluated the abdominal hernia, which is currently asymptomatic with no pain reported.  - Recommend conservative management since it is not causing discomfort.    LOCALIZED EDEMA:  - Noted the patient has swelling and indentation with pressure from phone.    MEMORY DEFICIT:  - Noted the patient is experiencing memory deficits and forgetfulness.  - Advised the patient to implement safety measures and maintain support systems to prevent falls.    HISTORY OF VENOUS THROMBOSIS:  - Noted the patient's history of deep vein thrombosis in the legs.  - Advised the patient to continue taking the prescribed diuretic and keep limbs elevated to prevent recurrence.    LONG-TERM USE OF OPIATE ANALGESIC:  - Continued hydrocodone prescription with instructions to use only for significant pain due to age-related risk factors.  - Instructed the patient to contact the office for refills when needed.    DEPENDENCE ON ENABLING DEVICES:  - Noted the patient uses a walker for balance assistance.  - Encouraged continued use to maintain stability and prevent falls.  - Discussed fall prevention strategies and advised caution, especially during outdoor activities.    FOLLOW-UP:  - Follow up in 6 months.         1. Chronic low back pain, unspecified back pain laterality, unspecified whether sciatica present    2. Hypertension, unspecified type    3. Chronic deep vein thrombosis  (DVT) of both lower extremities, unspecified vein    4. Chronic kidney disease, stage 3a    5. Type 2 diabetes mellitus with polyneuropathy    6. Other long term (current) drug therapy  - CBC Auto Differential; Future  - Comprehensive Metabolic Panel; Future  - Hemoglobin A1C; Future  - Lipid Panel; Future  - URIC ACID; Future    DM2:  Stop metformin in view of CKD and well-controlled hemoglobin A1c with diet.  Gout:  Chronic, controlled with Allopurinol 300 mg daily.  HLD:  Chronic, stable, continue Lovastatin 40 mg daily.  Hypertension:  Chronic, stable, continue Furosemide 20 mg daily.  BPH:  Chronic, stable, continue Tamsulosin 0.4 mg daily, finasteride 5 mg daily.  Chronic DVT:  Eliquis stopped in view of nosebleeds.  Follow recommendations from vascular Medicine.  Chronic low back pain, osteoarthritis multiple joints:  Norco 7.5 as needed.    Immunization History   Administered Date(s) Administered    COVID-19 MRNA, LN-S PF (MODERNA HALF 0.25 ML DOSE) 01/05/2022    COVID-19, MRNA, LN-S, PF (MODERNA FULL 0.5 ML DOSE) 03/13/2021, 04/10/2021    Influenza (FLUAD) - Quadrivalent - Adjuvanted - PF *Preferred* (65+) 10/14/2020    Influenza - Quadrivalent - High Dose - PF (65 years and older) 10/26/2023    Influenza - Quadrivalent - PF *Preferred* (6 months and older) 12/22/2021    Influenza - Trivalent - Afluria, Fluzone MDV 10/24/2006, 10/09/2008, 10/21/2010    Influenza - Trivalent - Fluad - Adjuvanted - PF (65 years and older 12/20/2024    Influenza - Trivalent - Fluzone High Dose - PF (65 years and older) 10/10/2011, 12/18/2012, 12/18/2013, 12/22/2015, 12/30/2016, 12/28/2017, 12/27/2018, 01/07/2020    Pneumococcal Conjugate - 13 Valent 12/22/2015    Pneumococcal Polysaccharide - 23 Valent 12/03/2003, 10/10/2011       Orders Placed This Encounter   Procedures    CBC Auto Differential    Comprehensive Metabolic Panel    Hemoglobin A1C    Lipid Panel    URIC ACID       Portions of this note were generated by  Rupali.    Each patient to whom medical services by telemedicine are provided:  (1) informed of the relationship between the physician and patient and the respective role of any other health care provider with respect to management of the patient; and (2) notified that he or she may decline to receive medical services by telemedicine and may withdraw from such care at any time.    I spent a total of 32 minutes face to face and non-face to face on the date of this visit.This includes time preparing to see the patient (eg, review of tests, notes), obtaining and/or reviewing additional history from an independent historian and/or outside medical records, documenting clinical information in the electronic health record, independently interpreting results and/or communicating results to the patient/family/caregiver, or care coordinator.  Visit today included increased complexity associated with the care of the episodic problem addressed and managing the longitudinal care of the patient due to the serious and/or complex managed problem(s).      This note was generated with the assistance of ambient listening technology. Verbal consent was obtained by the patient and accompanying visitor(s) for the recording of patient appointment to facilitate this note. I attest to having reviewed and edited the generated note for accuracy, though some syntax or spelling errors may persist. Please contact the author of this note for any clarification.      Erwin Ny MD         [1]   Current Outpatient Medications on File Prior to Visit   Medication Sig Dispense Refill    allopurinoL (ZYLOPRIM) 300 MG tablet Take 1 tablet (300 mg total) by mouth once daily. 90 tablet 3    finasteride (PROSCAR) 5 mg tablet Take 1 tablet (5 mg total) by mouth once daily. 90 tablet 3    fluticasone propionate (FLONASE) 50 mcg/actuation nasal spray INHALE 2 SPRAYS IN EACH NOSTRIL ONCE DAILY 16 g 3    furosemide (LASIX) 20 MG tablet Take 1 tablet (20  mg total) by mouth once daily. 90 tablet 3    HYDROcodone-acetaminophen (NORCO) 7.5-325 mg per tablet Take 1 tablet by mouth every 8 (eight) hours as needed for Pain. 90 tablet 0    lancets Misc 1 each by Misc.(Non-Drug; Combo Route) route 4 (four) times daily. 200 each 5    lovastatin (MEVACOR) 40 MG tablet Take 1 tablet (40 mg total) by mouth every evening. 90 tablet 3    STOOL SOFTENER 100 mg capsule TAKE ONE CAPSULE BY MOUTH TWICE DAILY 180 capsule 3    tamsulosin (FLOMAX) 0.4 mg Cap Take 2 capsules (0.8 mg total) by mouth once daily. 180 capsule 3    TRUE METRIX GLUCOSE TEST STRIP Strp TEST BLOOD SUGAR FOUR TIMES DAILY 400 strip 3    blood-glucose meter (TRUE METRIX GLUCOSE METER) kit Use as instructed 1 each 0    carboxymethylcellulose sodium 1 % DpGe Apply to eye. (Patient not taking: Reported on 6/24/2025)      [DISCONTINUED] apixaban (ELIQUIS) 5 mg Tab Take 1 tablet (5 mg total) by mouth 2 (two) times daily. 180 tablet 3    [DISCONTINUED] mupirocin (BACTROBAN) 2 % ointment Apply topically 3 (three) times daily. (Patient not taking: Reported on 6/24/2025) 30 g 1    [DISCONTINUED] prednisoLONE acetate (PRED FORTE) 1 % DrpS Place 1 drop into the left eye 4 (four) times daily. (Patient not taking: Reported on 6/24/2025) 5 mL 0    [DISCONTINUED] SSD 1 % cream        No current facility-administered medications on file prior to visit.

## 2025-07-08 RX ORDER — DOCUSATE SODIUM 100 MG/1
100 CAPSULE, LIQUID FILLED ORAL 2 TIMES DAILY
Qty: 180 CAPSULE | Refills: 3 | Status: SHIPPED | OUTPATIENT
Start: 2025-07-08

## 2025-07-08 NOTE — TELEPHONE ENCOUNTER
Requested Prescriptions     Pending Prescriptions Disp Refills    docusate sodium (STOOL SOFTENER) 100 MG capsule 180 capsule 3     Sig: Take 1 capsule (100 mg total) by mouth 2 (two) times daily.     LV 6/25/25  LF 8/9/24

## (undated) DEVICE — Device

## (undated) DEVICE — SOL WATER STRL IRR 1000ML

## (undated) DEVICE — SYR 50ML CATH TIP

## (undated) DEVICE — GLOVE PROTEXIS HYDROGEL SZ8

## (undated) DEVICE — SKIN MARKER DEVON 160

## (undated) DEVICE — SUPPORT ULNA NERVE PROTECTOR

## (undated) DEVICE — SEE MEDLINE ITEM 154981

## (undated) DEVICE — EVACUATOR BLADDER UROVAC ADPT

## (undated) DEVICE — UROVIEW 2600/2800

## (undated) DEVICE — GOWN SMARTGOWN LVL4 X-LONG XL

## (undated) DEVICE — SEE MEDLINE ITEM 157185

## (undated) DEVICE — SEE MEDLINE ITEM 152622

## (undated) DEVICE — BAG DRAIN URINARY CONT IRRG

## (undated) DEVICE — SET IRRIGATION WARMING NORMAL

## (undated) DEVICE — GUIDEWIRE AMPLATZ .035X145CM

## (undated) DEVICE — SYR 10CC LUER LOCK

## (undated) DEVICE — GAUZE SPONGE 4X4 12PLY

## (undated) DEVICE — CONTAINER SPECIMEN STRL 4OZ

## (undated) DEVICE — SEE MEDLINE ITEM 152487

## (undated) DEVICE — GLOVE 6.0 PROTEXIS PI BLUE